# Patient Record
Sex: MALE | Race: BLACK OR AFRICAN AMERICAN | NOT HISPANIC OR LATINO | Employment: FULL TIME | URBAN - METROPOLITAN AREA
[De-identification: names, ages, dates, MRNs, and addresses within clinical notes are randomized per-mention and may not be internally consistent; named-entity substitution may affect disease eponyms.]

---

## 2019-07-04 ENCOUNTER — TRANSFERRED RECORDS (OUTPATIENT)
Dept: HEALTH INFORMATION MANAGEMENT | Facility: CLINIC | Age: 29
End: 2019-07-04

## 2019-08-12 ENCOUNTER — TRANSFERRED RECORDS (OUTPATIENT)
Dept: HEALTH INFORMATION MANAGEMENT | Facility: CLINIC | Age: 29
End: 2019-08-12

## 2019-11-26 ENCOUNTER — TRANSFERRED RECORDS (OUTPATIENT)
Dept: HEALTH INFORMATION MANAGEMENT | Facility: CLINIC | Age: 29
End: 2019-11-26

## 2019-12-17 ENCOUNTER — OFFICE VISIT (OUTPATIENT)
Dept: FAMILY MEDICINE | Facility: CLINIC | Age: 29
End: 2019-12-17
Payer: MEDICAID

## 2019-12-17 VITALS
HEART RATE: 74 BPM | HEIGHT: 70 IN | DIASTOLIC BLOOD PRESSURE: 81 MMHG | BODY MASS INDEX: 22.82 KG/M2 | WEIGHT: 159.4 LBS | RESPIRATION RATE: 16 BRPM | SYSTOLIC BLOOD PRESSURE: 118 MMHG | TEMPERATURE: 98.2 F

## 2019-12-17 DIAGNOSIS — G89.29 CHRONIC BILATERAL LOW BACK PAIN WITHOUT SCIATICA: ICD-10-CM

## 2019-12-17 DIAGNOSIS — M54.50 CHRONIC BILATERAL LOW BACK PAIN WITHOUT SCIATICA: ICD-10-CM

## 2019-12-17 DIAGNOSIS — N20.0 NEPHROLITHIASIS: Primary | ICD-10-CM

## 2019-12-17 SDOH — HEALTH STABILITY: MENTAL HEALTH: HOW OFTEN DO YOU HAVE A DRINK CONTAINING ALCOHOL?: NEVER

## 2019-12-17 ASSESSMENT — MIFFLIN-ST. JEOR: SCORE: 1684.28

## 2019-12-17 NOTE — PROGRESS NOTES
Preceptor Attestation:   Patient seen, evaluated and discussed with the resident. I have verified the content of the note, which accurately reflects my assessment of the patient and the plan of care.   Supervising Physician:  Ish Bhatia MD.

## 2019-12-17 NOTE — PROGRESS NOTES
"       HPI       Emelyn Marmolejo is a 29 year old  who presents for   Chief Complaint   Patient presents with     Pain     Muscle pain on back and chest, feels tightness and soreness. x 3 days, pain rate 5/10     Urinary Problem     pt states it was very yellow and smelled, also had pain as he urinates. pt thinks its is a kidney issue     Patient is a 29-year-old male who is here for further evaluation of back pain.  He has a long history of low back pain but she has been suspected to be secondary to a large left renal pelvis stone.  Previously he has seen urology in [place] in August with the recommendation for nephrectomy.  He has been seen in the emergency department for back pain, chest pain, and in [facility] for the same over the last month.  He has roughly 4 visits for this.  He was referred to nephrology and urology as well as physical therapy.  Per the EMR he currently has appointment scheduled with urology and physical therapy for January.     Today he reports ongoing low back pain which is not changed or different than previous.  He denies any hematuria but states his urine has been darker yellow.  He has had several urinalyses recently which were noninfectious but showed RBCs present.  He denies any fevers or chills.  He denies other medical history.    A [place] video  was used for  this visit.    +++++++    Problem, Medication and Allergy Lists were reviewed and updated if needed..    Patient is a new patient to this clinic and so  I reviewed/updated the Past Medical History, the Family History and the Social History .         Review of Systems:   Review of Systems  A greater than four-point review of systems was completed and negative other than noted in HPI.       Physical Exam:     Vitals:    12/17/19 1023   BP: 118/81   Pulse: 74   Resp: 16   Temp: 98.2  F (36.8  C)   TempSrc: Oral   Weight: 72.3 kg (159 lb 6.4 oz)   Height: 1.778 m (5' 10\")     Body mass index is 22.87 " kg/m .  Vitals were reviewed and were normal     Physical Exam  Constitutional:       General: He is not in acute distress.     Appearance: Normal appearance. He is not ill-appearing, toxic-appearing or diaphoretic.   HENT:      Head: Normocephalic and atraumatic.      Mouth/Throat:      Mouth: Mucous membranes are moist.   Cardiovascular:      Rate and Rhythm: Normal rate and regular rhythm.      Heart sounds: No murmur. No friction rub. No gallop.    Pulmonary:      Effort: Pulmonary effort is normal. No respiratory distress.      Breath sounds: Normal breath sounds. No wheezing, rhonchi or rales.   Abdominal:      General: There is no distension.      Palpations: Abdomen is soft.      Tenderness: There is no abdominal tenderness. There is no right CVA tenderness, left CVA tenderness, guarding or rebound.   Skin:     General: Skin is warm and dry.   Neurological:      General: No focal deficit present.      Mental Status: He is alert.   Psychiatric:         Mood and Affect: Mood normal.         Behavior: Behavior normal.           Results:   No testing ordered today    Assessment and Plan        1. Nephrolithiasis  2. Chronic bilateral low back pain without sciatica  The patient stated he was here to get a second opinion regarding his kidney.  We discussed that he already has one opinion from [place] and is scheduled to get a second opinion through urology at [facility].  No acute concerns today with normal vital signs and normal exam.  No concerning symptoms for infection associated with the stone and he has had several recent negative for infection urine tests.  I recommended to him to follow-up with urology at [facility] as scheduled and provided him with that appointment date as well as contact information for the clinic that referred him.  Recommended also that he follow-up with me here to consider referral to urology at the [facility] if he needs further evaluation or recommendations after that  appointment.         There are no discontinued medications.    Options for treatment and follow-up care were reviewed with the patient. Emelyn Marmolejo  engaged in the decision making process and verbalized understanding of the options discussed and agreed with the final plan.    Ramos Hanson MD

## 2019-12-17 NOTE — PATIENT INSTRUCTIONS
Appointments through [facility]:     1/8/2020 Physical therapy   1/24/2020 Scheduled to see urology      Yahir Winters MD    Family Medicine    [address]  [address] [place] [id number]         Phone: [phone]     If you would like to see somebody at the [facility] after the above urology appointment give me a call.

## 2019-12-30 ENCOUNTER — OFFICE VISIT (OUTPATIENT)
Dept: FAMILY MEDICINE | Facility: CLINIC | Age: 29
End: 2019-12-30
Payer: MEDICAID

## 2019-12-30 VITALS
HEIGHT: 69 IN | TEMPERATURE: 100.6 F | OXYGEN SATURATION: 97 % | BODY MASS INDEX: 22.9 KG/M2 | WEIGHT: 154.6 LBS | HEART RATE: 104 BPM | SYSTOLIC BLOOD PRESSURE: 122 MMHG | RESPIRATION RATE: 16 BRPM | DIASTOLIC BLOOD PRESSURE: 84 MMHG

## 2019-12-30 DIAGNOSIS — R10.9 RIGHT FLANK PAIN: ICD-10-CM

## 2019-12-30 DIAGNOSIS — J11.1 INFLUENZA: Primary | ICD-10-CM

## 2019-12-30 LAB
% GRANULOCYTES: 77.7 %G (ref 40–75)
BACTERIA: NORMAL
BILIRUBIN UR: NEGATIVE MG/DL
BLOOD UR: ABNORMAL MG/DL
BUN SERPL-MCNC: 7.7 MG/DL (ref 7–21)
CALCIUM SERPL-MCNC: 10.1 MG/DL (ref 8.5–10.1)
CASTS: NORMAL /LPF
CHLORIDE SERPLBLD-SCNC: 99.5 MMOL/L (ref 98–110)
CO2 SERPL-SCNC: 24.7 MMOL/L (ref 20–32)
CREAT SERPL-MCNC: 1 MG/DL (ref 0.7–1.3)
CRP SERPL-MCNC: 35 MG/L (ref 0–8)
CRYSTAL URINE: NORMAL /LPF
EPITHELIAL CELLS UR: <2 /LPF (ref 0–2)
GFR SERPL CREATININE-BSD FRML MDRD: >90 ML/MIN/1.7 M2
GLUCOSE SERPL-MCNC: 90.3 MG'DL (ref 70–99)
GLUCOSE URINE: NEGATIVE
GRANULOCYTES #: 6.3 K/UL (ref 1.6–8.3)
HCT VFR BLD AUTO: 52.6 % (ref 40–53)
HEMOGLOBIN: 15.6 G/DL (ref 13.3–17.7)
KETONES UR QL: NEGATIVE MG/DL
LEUKOCYTE ESTERASE UR: NEGATIVE
LYMPHOCYTES # BLD AUTO: 1.1 K/UL (ref 0.8–5.3)
LYMPHOCYTES NFR BLD AUTO: 13.9 %L (ref 20–48)
MCH RBC QN AUTO: 27.4 PG (ref 26.5–35)
MCHC RBC AUTO-ENTMCNC: 29.7 G/DL (ref 32–36)
MCV RBC AUTO: 92.3 FL (ref 78–100)
MID #: 0.7 K/UL (ref 0–2.2)
MID %: 8.4 %M (ref 0–20)
MUCOUS URINE: NORMAL LPF
NITRITE UR QL STRIP: NEGATIVE MG/DL
PH UR STRIP: 5.5 [PH] (ref 4.5–8)
PLATELET # BLD AUTO: 219 K/UL (ref 150–450)
POTASSIUM SERPL-SCNC: 3.8 MMOL/DL (ref 3.3–4.5)
PROTEIN UR: NEGATIVE MG/DL
RBC # BLD AUTO: 5.7 M/UL (ref 4.4–5.9)
RBC URINE: NORMAL /HPF
SODIUM SERPL-SCNC: 132.9 MMOL/L (ref 132.6–141.4)
SP GR UR STRIP: 1.01 (ref 1–1.03)
UROBILINOGEN UR STRIP-ACNC: ABNORMAL E.U./DL
WBC # BLD AUTO: 8.1 K/UL (ref 4–11)
WBC URINE: NORMAL /HPF

## 2019-12-30 RX ORDER — ACETAMINOPHEN 325 MG/1
975 TABLET ORAL ONCE
Status: COMPLETED | OUTPATIENT
Start: 2019-12-30 | End: 2019-12-30

## 2019-12-30 RX ORDER — ACETAMINOPHEN 500 MG
1000 TABLET ORAL EVERY 8 HOURS PRN
Qty: 90 TABLET | Refills: 1 | Status: SHIPPED | OUTPATIENT
Start: 2019-12-30 | End: 2020-01-30

## 2019-12-30 RX ADMIN — ACETAMINOPHEN 975 MG: 325 TABLET ORAL at 14:37

## 2019-12-30 ASSESSMENT — MIFFLIN-ST. JEOR: SCORE: 1646.64

## 2019-12-30 NOTE — LETTER
January 8, 2020      Emelyn Marmolejo  [address]  [place] [address]        Dear Emelyn,    Thank you for getting your care at [facility]. Please see below for your test results.    Resulted Orders   CBC with Diff Plt ([facility])   Result Value Ref Range    WBC 8.1 4.0 - 11.0 K/uL    Lymphocytes # 1.1 0.8 - 5.3 K/uL    % Lymphocytes 13.9 (L) 20.0 - 48.0 %L    Mid # 0.7 0.0 - 2.2 K/uL    Mid % 8.4 0.0 - 20.0 %M    GRANULOCYTES # 6.3 1.6 - 8.3 K/uL    % Granulocytes 77.7 (H) 40.0 - 75.0 %G    RBC 5.70 4.40 - 5.90 M/uL    Hemoglobin 15.6 13.3 - 17.7 g/dL    Hematocrit 52.6 40.0 - 53.0 %    MCV 92.3 78.0 - 100.0 fL    MCH 27.4 26.5 - 35.0 pg    MCHC 29.7 (L) 32.0 - 36.0 g/dL    Platelets 219.0 150.0 - 450.0 K/uL   CRP inflammation   Result Value Ref Range    CRP Inflammation 35.0 (H) 0.0 - 8.0 mg/L   Urinalysis, Micro If (UA) ([facility])   Result Value Ref Range    Specific Gravity Urine 1.010 1.005 - 1.030    pH Urine 5.5 4.5 - 8.0    Leukocyte Esterase UR Negative NEGATIVE    Nitrite Urine Negative NEGATIVE mg/dL    Protein UR Negative NEGATIVE mg/dL    Glucose Urine Negative NEGATIVE    Ketones Urine Negative NEGATIVE mg/dL    Urobilinogen mg/dL 0.2 E.U./dL 0.2 E.U./dL E.U./dL    Bilirubin UR Negative NEGATIVE mg/dL    Blood UR 2+ (A) NEGATIVE mg/dL   Urine Culture Aerobic Bacterial   Result Value Ref Range    Specimen Description Midstream Urine     Special Requests Specimen received in preservative     Culture Micro No growth    Basic Metabolic Panel ([facility])   Result Value Ref Range    Calcium 10.1 8.5 - 10.1 mg/dL    Chloride 99.5 98.0 - 110.0 mmol/L    Carbon Dioxide 24.7 20.0 - 32.0 mmol/L    Creatinine 1.0 0.7 - 1.3 mg/dL    Glucose 90.3 70.0 - 99.0 mg'dL    Potassium 3.8 3.3 - 4.5 mmol/dL    Sodium 132.9 132.6 - 141.4 mmol/L    GFR Estimate >90 >60.0 mL/min/1.7 m2    GFR Estimate If Black >90 >60.0 mL/min/1.7 m2    Urea Nitrogen 7.7 7.0 - 21.0 mg/dL   Urine Microscopic (UA)  ([facility])   Result Value Ref Range    WBC Urine None <5 /hpf    RBC Urine 25-50 <5 /hpf    Epithelial Cells UR <2 0 - 2 /lpf    Mucous Urine None NONE lpf    Casts Urine None NONE /lpf    Crystal Urine None NONE /lpf    Bacteria Wet Prep Few None       As we discussed at our last visit, please follow up with a clinic appointment to review these results further.    Sincerely,    Kevin Jarvis MD

## 2019-12-30 NOTE — PROGRESS NOTES
tylenol       HPI       Emelyn Marmolejo is a 29 year old  who presents for   Chief Complaint   Patient presents with     Pain     All over bodyache, x yesterday, pt states high fever, headache and back pain is still consistant, pain rate today is 10/10     Pmhx:   Has appt with urologist in 1/2020 for hx of kidney stone  Recent U/A showing RBC 12/3/19    Acute Illness   Concerns: fever, headache, body aches  Took 2 advil last night - felt better  Feels a burning/itchiness on the upper throat  Runny nose last night - right side is blocked now  When did it start? Yesterday evening  Is it getting better, worse or staying the same? worse: body pain and headaches    Fatigue/Achiness?: YES     Fever?:  YES     Chills/Sweats?:  YES - last night    Headache (location?) YES - bilateral temporal    Sinus Pressure?: YES - frontal    Eye redness/Discharge?: No     Ear Pain?: No     Runny nose?:  YES     Congestion?:  YES     Sore Throat?:  YES - mild soreness  Respiratory    Cough?: no     Wheeze?: No   GI/    Decreased Appetite?:  YES     Nausea?:No     Vomiting?: No     Diarrhea?:  No     Dysuria/Frequency?.:No       Any Illness Exposure?:  YES - ? Other family members have a cold    Any foreign travel or contact with anyone ill who travelled abroad? No     Therapies Tried and outcome: 2 advil tabs yesterday, Details: improved temporarily      A Maltese  was used for  this visit.    +++++++    Problem, Medication and Allergy Lists were reviewed and updated if needed..    Patient is an established patient of this clinic..         Review of Systems:   Review of Systems  Skin: negative except as above  Respiratory: negative except as above  Cardiovascular: negative except as above  Gastrointestinal: negative except as above  Genitourinary: negative except as above  Musculoskeletal: negative except as above  Neurologic: negative except as above  Psychiatric: negative except as above  Hematologic/Lymphatic/Immunologic:  "negative except as above  Endocrine: negative except as above         Physical Exam:     Vitals:    12/30/19 1316   BP: 122/84   Pulse: 104   Resp: 16   Temp: 100.6  F (38.1  C)   TempSrc: Oral   SpO2: 97%   Weight: 70.1 kg (154 lb 9.6 oz)   Height: 1.753 m (5' 9\")     Body mass index is 22.83 kg/m .  Vital signs normal except fever     Physical Exam  Constitutional: Oriented to person, place, and time. Appears well-developed and well-nourished.   HENT:   Head: Normocephalic and atraumatic.   Eyes: Conjunctivae are normal.   Neck: Normal range of motion.   Cardiovascular: Normal rate, regular rhythm, normal heart sounds and intact distal pulses.    Pulmonary/Chest: Effort normal and breath sounds normal. No respiratory distress. No wheezes.   Abdominal: Soft. Exhibits no distension. There is no flank or abdominal tenderness.   Musculoskeletal: Normal range of motion.   Neurological: Alert and oriented to person, place, and time.   Skin: Skin is warm and dry.   Psychiatric: Has a normal mood and affect. Behavior is normal.       Results:      Results from this visit  Results for orders placed or performed in visit on 12/30/19   CBC with Diff Plt ([facility])     Status: Abnormal   Result Value Ref Range    WBC 8.1 4.0 - 11.0 K/uL    Lymphocytes # 1.1 0.8 - 5.3 K/uL    % Lymphocytes 13.9 (L) 20.0 - 48.0 %L    Mid # 0.7 0.0 - 2.2 K/uL    Mid % 8.4 0.0 - 20.0 %M    GRANULOCYTES # 6.3 1.6 - 8.3 K/uL    % Granulocytes 77.7 (H) 40.0 - 75.0 %G    RBC 5.70 4.40 - 5.90 M/uL    Hemoglobin 15.6 13.3 - 17.7 g/dL    Hematocrit 52.6 40.0 - 53.0 %    MCV 92.3 78.0 - 100.0 fL    MCH 27.4 26.5 - 35.0 pg    MCHC 29.7 (L) 32.0 - 36.0 g/dL    Platelets 219.0 150.0 - 450.0 K/uL   CRP inflammation     Status: Abnormal   Result Value Ref Range    CRP Inflammation 35.0 (H) 0.0 - 8.0 mg/L   Urinalysis, Micro If (UA) ([facility])     Status: Abnormal   Result Value Ref Range    Specific Gravity Urine 1.010 1.005 - 1.030    pH Urine 5.5 4.5 - 8.0 "    Leukocyte Esterase UR Negative NEGATIVE    Nitrite Urine Negative NEGATIVE mg/dL    Protein UR Negative NEGATIVE mg/dL    Glucose Urine Negative NEGATIVE    Ketones Urine Negative NEGATIVE mg/dL    Urobilinogen mg/dL 0.2 E.U./dL 0.2 E.U./dL E.U./dL    Bilirubin UR Negative NEGATIVE mg/dL    Blood UR 2+ (A) NEGATIVE mg/dL   Basic Metabolic Panel ([facility])     Status: None   Result Value Ref Range    Calcium 10.1 8.5 - 10.1 mg/dL    Chloride 99.5 98.0 - 110.0 mmol/L    Carbon Dioxide 24.7 20.0 - 32.0 mmol/L    Creatinine 1.0 0.7 - 1.3 mg/dL    Glucose 90.3 70.0 - 99.0 mg'dL    Potassium 3.8 3.3 - 4.5 mmol/dL    Sodium 132.9 132.6 - 141.4 mmol/L    GFR Estimate >90 >60.0 mL/min/1.7 m2    GFR Estimate If Black >90 >60.0 mL/min/1.7 m2    Urea Nitrogen 7.7 7.0 - 21.0 mg/dL   Urine Microscopic (UA) ([facility])     Status: None   Result Value Ref Range    WBC Urine None <5 /hpf    RBC Urine 25-50 <5 /hpf    Epithelial Cells UR <2 0 - 2 /lpf    Mucous Urine None NONE lpf    Casts Urine None NONE /lpf    Crystal Urine None NONE /lpf    Bacteria Wet Prep Few None   Urine Culture Aerobic Bacterial     Status: None   Result Value Ref Range    Specimen Description Midstream Urine     Special Requests Specimen received in preservative     Culture Micro No growth        Assessment and Plan        1. Influenza  Supportive management as this is likely 2/2 influenza. He does not have any hx of immunocompromise status therefore starting oseltamivir would not be indicated.   - acetaminophen (TYLENOL) 500 MG tablet; Take 2 tablets (1,000 mg) by mouth every 8 hours as needed for mild pain  Dispense: 90 tablet; Refill: 1  -  : Sign Language or Oral - 113+ minutes    2. Right flank pain  Patient has a hx of nephrolithiasis and has Urology f/u soon. Will recheck labs today to monitor previously seen RBC on urinalysis. Recommended increasing hydration and close f/u with urology. Return to clinic if symptoms worsen.   - CBC with  Diff Plt ([facility])  - CRP inflammation  - Urinalysis, Micro If (UA) ([facility])  - Urine Culture Aerobic Bacterial  - acetaminophen (TYLENOL) tablet 975 mg  - Basic Metabolic Panel ([facility])  - Urine Microscopic (UA) ([facility])  -  : Sign Language or Oral - 113+ minutes       There are no discontinued medications.    Options for treatment and follow-up care were reviewed with the patient. Emelyn Marmolejo  engaged in the decision making process and verbalized understanding of the options discussed and agreed with the final plan.    Kevin Jarvis MD

## 2019-12-30 NOTE — NURSING NOTE
See MAR for details for Clinic administered medication.    Administrations This Visit       acetaminophen (TYLENOL) tablet 975 mg Admin Date  12/30/2019 Action  Given

## 2019-12-30 NOTE — NURSING NOTE
Due to patient being non-English speaking/uses sign language, an  was used for this visit. Only for face-to-face interpretation by an external agency, date and length of interpretation can be found on the scanned worksheet.     name: Grace Gil  Language: Lithuanian  Agency: [employer]  Phone number:   Type of interpretation: Face-to-face, spoken        Serenity Whitehead MA on 12/30/2019 at 1:14 PM

## 2019-12-31 LAB
BACTERIA SPEC CULT: NO GROWTH
Lab: NORMAL
SPECIMEN SOURCE: NORMAL

## 2020-01-17 NOTE — PROGRESS NOTES
Preceptor Attestation:   Patient seen, evaluated and discussed with the resident. I have verified the content of the note, which accurately reflects my assessment of the patient and the plan of care.   Supervising Physician:  Bina Daniels MD.

## 2020-01-25 ENCOUNTER — TRANSFERRED RECORDS (OUTPATIENT)
Dept: HEALTH INFORMATION MANAGEMENT | Facility: CLINIC | Age: 30
End: 2020-01-25

## 2020-01-27 ENCOUNTER — APPOINTMENT (OUTPATIENT)
Dept: INTERPRETER SERVICES | Facility: CLINIC | Age: 30
End: 2020-01-27
Payer: COMMERCIAL

## 2020-01-27 ENCOUNTER — OFFICE VISIT (OUTPATIENT)
Dept: FAMILY MEDICINE | Facility: CLINIC | Age: 30
End: 2020-01-27
Payer: COMMERCIAL

## 2020-01-27 VITALS
BODY MASS INDEX: 22.81 KG/M2 | DIASTOLIC BLOOD PRESSURE: 83 MMHG | OXYGEN SATURATION: 98 % | RESPIRATION RATE: 16 BRPM | WEIGHT: 154 LBS | SYSTOLIC BLOOD PRESSURE: 132 MMHG | HEART RATE: 83 BPM | TEMPERATURE: 98.1 F | HEIGHT: 69 IN

## 2020-01-27 DIAGNOSIS — R53.83 OTHER FATIGUE: ICD-10-CM

## 2020-01-27 DIAGNOSIS — R53.83 FATIGUE: Primary | ICD-10-CM

## 2020-01-27 DIAGNOSIS — R07.0 THROAT PAIN: Primary | ICD-10-CM

## 2020-01-27 DIAGNOSIS — R06.00 DYSPNEA: ICD-10-CM

## 2020-01-27 DIAGNOSIS — R06.02 SHORTNESS OF BREATH: ICD-10-CM

## 2020-01-27 LAB
DEPRECATED S PYO AG THROAT QL EIA: NORMAL
FLUAV+FLUBV AG SPEC QL: NEGATIVE
FLUAV+FLUBV AG SPEC QL: NEGATIVE
SPECIMEN SOURCE: NORMAL
SPECIMEN SOURCE: NORMAL

## 2020-01-27 PROCEDURE — 87081 CULTURE SCREEN ONLY: CPT | Performed by: NURSE PRACTITIONER

## 2020-01-27 RX ORDER — ALBUTEROL SULFATE 0.83 MG/ML
2.5 SOLUTION RESPIRATORY (INHALATION) ONCE
Status: COMPLETED | OUTPATIENT
Start: 2020-01-27 | End: 2020-01-27

## 2020-01-27 RX ADMIN — ALBUTEROL SULFATE 2.5 MG: 0.83 SOLUTION RESPIRATORY (INHALATION) at 14:37

## 2020-01-27 ASSESSMENT — ENCOUNTER SYMPTOMS
FEVER: 0
CHILLS: 0
FATIGUE: 1
SHORTNESS OF BREATH: 1

## 2020-01-27 ASSESSMENT — MIFFLIN-ST. JEOR: SCORE: 1638.92

## 2020-01-27 ASSESSMENT — PAIN SCALES - GENERAL: PAINLEVEL: NO PAIN (0)

## 2020-01-27 NOTE — NURSING NOTE
Chief Complaint   Patient presents with     Chest Pain     Pt complains of a cough, chest pain, and shortness of breath.         KARL Chacon on 1/27/2020 at 2:02 PM

## 2020-01-27 NOTE — PATIENT INSTRUCTIONS

## 2020-01-28 NOTE — TELEPHONE ENCOUNTER
RECORDS RECEIVED FROM: internal/CE/in process    DATE RECEIVED: 4/13/20    NOTES STATUS DETAILS   OFFICE NOTE from referring provider Internal 1.27.20 Bina Casey,    OFFICE NOTE from other specialist N/A    DISCHARGE SUMMARY from hospital N/A    DISCHARGE REPORT from the ER Care Everywhere 1.25.20 carmen black  10/6/19 [facility]     MEDICATION LIST Internal    IMAGING  (NEED IMAGES AND REPORTS)     CT SCAN Care Everywhere [facility] - 9/24/19   [facility]- 8/9/19   CHEST XRAY (CXR) In process/CE 4/13/20- scheduled   CE- 1.25.20 carmen  CE- 11.26.19- [facility] 10.6.19 [facility]- 5/3/19   TESTS     PULMONARY FUNCTION TESTING (PFT) In process 4/13/20- scheduled        Action 1.28.20 sv   Action Taken request sent to   carmen, 1.25.20  jonathan, 11/26/19  [facility] 10/6/19  [facility] , 9/24/19  [facility] health 5.3.19     Action 1.29.20 sv   Action Taken Received fax from [facility], reports sent to [facility] and cd was sent through [facility]        Action 1.30.20 sv    Action Taken Received fax from [facility] saying they dont have reports or images, unable to locate image on where it was done         Action 2.3.20 sv   Action Taken Received CD from [facility] with CT KUB, sent to [facility] for scaning, another request resent for CT chest and CXR    Received CXR from [facility] (1.25.20)   Not able to locate any information on where images where done from-  [facility]  CT chest- 8/9/19,  CXR-5/3/19 ( no information on images)        Action 2.5.20 sv   Action Taken Received reports and image from [facility]     Action 3.25.20 sv    Action Taken Imaging request sent out again to   [facility] CXR- 9.24.19, CT chest- 9.24.19         Action 3.26.20 sv   Action Taken Received fax back from [facility]. [facility] and [facility] stated that they dont have those images on file, unable to locate images.    Most updated images from [facility] and [facility] are in PACS

## 2020-01-29 ENCOUNTER — TELEPHONE (OUTPATIENT)
Dept: FAMILY MEDICINE | Facility: CLINIC | Age: 30
End: 2020-01-29

## 2020-01-29 ENCOUNTER — APPOINTMENT (OUTPATIENT)
Dept: INTERPRETER SERVICES | Facility: CLINIC | Age: 30
End: 2020-01-29
Payer: COMMERCIAL

## 2020-01-29 LAB
BACTERIA SPEC CULT: NORMAL
Lab: NORMAL
SPECIMEN SOURCE: NORMAL

## 2020-01-29 NOTE — TELEPHONE ENCOUNTER
INT and I tried calling pt. Phone number on file is incorrect or out of service.     Melissa Henning, TYREE 4:09 PM  1/29/2020

## 2020-01-30 ENCOUNTER — OFFICE VISIT (OUTPATIENT)
Dept: FAMILY MEDICINE | Facility: CLINIC | Age: 30
End: 2020-01-30
Payer: COMMERCIAL

## 2020-01-30 VITALS
BODY MASS INDEX: 22.48 KG/M2 | WEIGHT: 152.2 LBS | DIASTOLIC BLOOD PRESSURE: 80 MMHG | RESPIRATION RATE: 18 BRPM | HEART RATE: 72 BPM | SYSTOLIC BLOOD PRESSURE: 116 MMHG | TEMPERATURE: 98.8 F | OXYGEN SATURATION: 96 %

## 2020-01-30 DIAGNOSIS — R06.2 WHEEZING: ICD-10-CM

## 2020-01-30 DIAGNOSIS — R07.89 CHEST WALL PAIN: Primary | ICD-10-CM

## 2020-01-30 DIAGNOSIS — Z22.7 LATENT TUBERCULOSIS: ICD-10-CM

## 2020-01-30 RX ORDER — ALBUTEROL SULFATE 90 UG/1
2 AEROSOL, METERED RESPIRATORY (INHALATION) EVERY 6 HOURS
Qty: 18 G | Refills: 0 | Status: SHIPPED | OUTPATIENT
Start: 2020-01-30 | End: 2020-02-27

## 2020-01-30 RX ORDER — IBUPROFEN 800 MG/1
800 TABLET, FILM COATED ORAL EVERY 8 HOURS PRN
Qty: 15 TABLET | Refills: 0 | Status: SHIPPED | OUTPATIENT
Start: 2020-01-30 | End: 2020-02-27

## 2020-01-30 NOTE — PROGRESS NOTES
HPI       Emelyn Marmolejo is a 30 year old  who presents for   Chief Complaint   Patient presents with     Chest Pain     different parts of the chest and sob. and sob when sleeping     Patient is an otherwise healthy 30 year old male here for evaluation of ongoing chest pain, cough, shortness of breath. Patient has been experiencing chest pain, cough, shortness of breath for the past 4 days. Previous work up has been in the ED with negative EKG, CXR, troponin and UC with negative rapid strep and influenza testing. Today he reports ongoing migratory chest pain throughout both sides of his chest and shortness of breath when lying down at night. Pain has no particular worsening or alleviating factors. Denies any reflux symptoms. No fevers, chills. Overall has not been worsening.     Patient has a positive quant gold from April of this year. Has not been treated. Negative CXR.     +++++++    Problem, Medication and Allergy Lists were reviewed and updated if needed..    Patient is an established patient of this clinic..         Review of Systems:   Review of Systems  A greater than 4 point review of symptoms was completed and negative other than noted in the HPI.        Physical Exam:     Vitals:    01/30/20 1004   BP: 116/80   Pulse: 72   Resp: 18   Temp: 98.8  F (37.1  C)   TempSrc: Oral   SpO2: 96%   Weight: 69 kg (152 lb 3.2 oz)     Body mass index is 22.48 kg/m .  Vitals were reviewed and were normal     Physical Exam  Constitutional:       General: He is not in acute distress.     Appearance: Normal appearance. He is not ill-appearing, toxic-appearing or diaphoretic.   HENT:      Head: Normocephalic and atraumatic.   Cardiovascular:      Rate and Rhythm: Normal rate and regular rhythm.      Heart sounds: No murmur. No friction rub. No gallop.    Pulmonary:      Effort: Pulmonary effort is normal. No respiratory distress.      Breath sounds: Normal breath sounds. No stridor. No wheezing, rhonchi or rales.    Chest:      Chest wall: Tenderness (diffuse) present.   Skin:     General: Skin is warm and dry.   Neurological:      General: No focal deficit present.      Mental Status: He is alert.   Psychiatric:         Mood and Affect: Mood normal.         Behavior: Behavior normal.           Results:   No testing ordered today    Assessment and Plan        1. Chest wall pain  Patient suffering from what is most likely chest wall pain. He is a low risk 30 year old male without any cardiac history or significant risk factors. Cardiac workup in ED negative. Strep and flu negative in UC. No concerning exam or history findings today. Discussed symptomatic care with albuterol trial for shortness of breath and ibuprofen for pain. Also discussed short term use of ibuprofen only given history of kidney disease.   - albuterol (PROAIR HFA/PROVENTIL HFA/VENTOLIN HFA) 108 (90 Base) MCG/ACT inhaler; Inhale 2 puffs into the lungs every 6 hours  Dispense: 18 g; Refill: 0  - ibuprofen (ADVIL/MOTRIN) 800 MG tablet; Take 1tablet (800 mg) by mouth every 8 hours as needed for moderate pain  Dispense: 15 tablet; Refill: 0    2. Wheezing  - albuterol (PROAIR HFA/PROVENTIL HFA/VENTOLIN HFA) 108 (90 Base) MCG/ACT inhaler; Inhale 2 puffs into the lungs every 6 hours  Dispense: 18 g; Refill: 0    3. Latent tuberculosis  - MED THERAPY MANAGEMENT REFERRAL       There are no discontinued medications.    Options for treatment and follow-up care were reviewed with the patient. Emelyn Marmolejo  engaged in the decision making process and verbalized understanding of the options discussed and agreed with the final plan.    Ramos Hanson MD

## 2020-01-30 NOTE — PROGRESS NOTES
Preceptor Attestation:   Patient seen and discussed with the resident. Assessment and plan reviewed with resident and agreed upon.   Supervising Physician:  DO [facility]

## 2020-01-30 NOTE — PATIENT INSTRUCTIONS
Here is the plan from today's visit    1. Chest wall pain  - albuterol (PROAIR HFA/PROVENTIL HFA/VENTOLIN HFA) 108 (90 Base) MCG/ACT inhaler; Inhale 2 puffs into the lungs every 6 hours  Dispense: 18 g; Refill: 0    2. Wheezing  - albuterol (PROAIR HFA/PROVENTIL HFA/VENTOLIN HFA) 108 (90 Base) MCG/ACT inhaler; Inhale 2 puffs into the lungs every 6 hours  Dispense: 18 g; Refill: 0    3. Latent tuberculosis  - MED THERAPY MANAGEMENT REFERRAL      Please call or return to clinic if your symptoms don't go away.    Follow up plan  Please make a clinic appointment for follow up with me (MACARENA GORMAN) in 1  week for recheck.    Thank you for coming to [facility] today.  Lab Testing:  **If you had lab testing today and your results are reassuring or normal they will be mailed to you or sent through [employer] within 7 days.   **If the lab tests need quick action we will call you with the results.  The phone number we will call with results is # [phone] (home) . If this is not the best number please call our clinic and change the number.  Medication Refills:  If you need any refills please call your pharmacy and they will contact us.   If you need to  your refill at a new pharmacy, please contact the new pharmacy directly. The new pharmacy will help you get your medications transferred faster.   Scheduling:  If you have any concerns about today's visit or wish to schedule another appointment please call our office during normal business hours [phone] (8-5:00 M-F)  If a referral was made to a [facility] and you don't get a call from central scheduling please call [phone].  If a Mammogram was ordered for you at [facility] call [phone] to schedule or change your appointment.  If you had an XRay/CT/Ultrasound/MRI ordered the number is [phone] to schedule or change your radiology appointment.   Medical Concerns:  If you have urgent medical concerns please call  [phone] at any time of the day.    Ramos Hanson MD

## 2020-02-03 ENCOUNTER — TELEPHONE (OUTPATIENT)
Dept: FAMILY MEDICINE | Facility: CLINIC | Age: 30
End: 2020-02-03

## 2020-02-03 NOTE — TELEPHONE ENCOUNTER
MTM referral from: [facility] visit (referral by provider)    MTM referral outreach attempt #2 on February 3, 2020 at 12:12 PM      Outcome: Patient not reachable after several attempts, will route to MTM Pharmacist/Provider as an FYI. Thank you for the referral.    See [facility] Coordinator

## 2020-02-06 ENCOUNTER — OFFICE VISIT (OUTPATIENT)
Dept: FAMILY MEDICINE | Facility: CLINIC | Age: 30
End: 2020-02-06
Payer: COMMERCIAL

## 2020-02-06 VITALS
RESPIRATION RATE: 16 BRPM | BODY MASS INDEX: 22.81 KG/M2 | HEIGHT: 69 IN | TEMPERATURE: 98.8 F | HEART RATE: 82 BPM | WEIGHT: 154 LBS | DIASTOLIC BLOOD PRESSURE: 76 MMHG | SYSTOLIC BLOOD PRESSURE: 121 MMHG | OXYGEN SATURATION: 97 %

## 2020-02-06 DIAGNOSIS — N20.0 RECURRENT KIDNEY STONES: Primary | ICD-10-CM

## 2020-02-06 DIAGNOSIS — R10.9 RIGHT FLANK PAIN: ICD-10-CM

## 2020-02-06 LAB
BILIRUBIN UR: NEGATIVE MG/DL
BLOOD UR: ABNORMAL MG/DL
BUN SERPL-MCNC: 11.1 MG/DL (ref 7–21)
CALCIUM SERPL-MCNC: 10 MG/DL (ref 8.5–10.1)
CHLORIDE SERPLBLD-SCNC: 101.1 MMOL/L (ref 98–110)
CO2 SERPL-SCNC: 27.4 MMOL/L (ref 20–32)
CREAT SERPL-MCNC: 1 MG/DL (ref 0.7–1.3)
GFR SERPL CREATININE-BSD FRML MDRD: >90 ML/MIN/1.7 M2
GLUCOSE SERPL-MCNC: 104.5 MG'DL (ref 70–99)
GLUCOSE URINE: NEGATIVE
KETONES UR QL: NEGATIVE MG/DL
LEUKOCYTE ESTERASE UR: NEGATIVE
NITRITE UR QL STRIP: NEGATIVE MG/DL
PH UR STRIP: 6 [PH] (ref 4.5–8)
POTASSIUM SERPL-SCNC: 3.4 MMOL/L (ref 3.3–4.5)
PROTEIN UR: NEGATIVE MG/DL
SODIUM SERPL-SCNC: 136.4 MMOL/L (ref 132.6–141.4)
SP GR UR STRIP: 1.01 (ref 1–1.03)
UROBILINOGEN UR STRIP-ACNC: ABNORMAL E.U./DL

## 2020-02-06 RX ORDER — TAMSULOSIN HYDROCHLORIDE 0.4 MG/1
0.4 CAPSULE ORAL DAILY
Qty: 30 CAPSULE | Refills: 1 | Status: SHIPPED | OUTPATIENT
Start: 2020-02-06 | End: 2020-03-26

## 2020-02-06 ASSESSMENT — MIFFLIN-ST. JEOR: SCORE: 1638.92

## 2020-02-06 NOTE — PROGRESS NOTES
LEONARDO Marmolejo is a 30 year old  who presents for flank pain. He is a well documented history of renal stones. Since July 2019 he has had stones in his left kidney. He was seen for this in [place] and [place] [facility] The assessment is that the left kidney is severely shrunken, no longer functional, and should be removed. For the past couple months he has had increasing pain in his right flank. Whenever he is driving he has pain in the right side, and is often awoken at night from the pain. He says that is feels like another stone. He is having difficulty peeing as it hurts to pee and also his pee has turned orange. Pain control has only been partially achieved through tylenol. The pain is so bad that in the past week he has become nauseous and vomited multiple times. He denies trauma, cancer hx, HTN, or travel to [place].     flank Pain     Onset: 2 months    Description:   Character: Sharp  Location: right flank left flank  Radiation: None    Intensity: moderate    Progression of Symptoms:  worsening    Accompanying Signs & Symptoms:  Fever/Chills?: No   Gas/Bloating: No   Dysuria: YES   Hematuria:  YES   Nausea:  YES   Vomiting:  YES   Diarrhea: YES softer occassionally  Constipation: NO       History:           Trauma: No   Previous similar pain:  YES    Previous tests done: x-ray    Precipitating factors:   Does the pain change with:     Food?: no     BM?: no     Urination:yes    What makes it better?:    Therapies Tried and outcome: tylenol, Details:little help    LMP:  not applicable    A Japanese  was used for  this visit.    +++++++  Problem, Medication and Allergy Lists were reviewed and updated if needed..    Patient is an established patient of this clinic..         Review of Systems:   Review of Systems    6 point system negative unless stated above       Physical Exam:   There were no vitals filed for this visit.  There is no height or weight on file to calculate  BMI.  Vitals were reviewed and were normal     PHYSICAL EXAMINATION    Gen: alert, non-distressed, appropriately dressed  HEENT: atraumatic, EOMI, sclera clear, mucosa moist  CV: RRR without m/g/r, warm to touch, acyanotic, no abdominal pulsatile mass  RESP: CTAB, normal WoB  GI: soft, non-distended, tender in LLQ RLQ  : suprapubic tender  MSK: bilateral CVA tenderness, normal RoM, no edema in legs, no spinal process tenderness   NEURO: CN intact, mentating appropriately, coordination normal  PSYCH: pleasant, normal speech pattern, thought process clear        Results:      Results from this visit  Results for orders placed or performed in visit on 02/06/20   Basic Metabolic Panel ([place] [facility])     Status: Abnormal   Result Value Ref Range    Calcium 10.0 8.5 - 10.1 mg/dL    Chloride 101.1 98.0 - 110.0 mmol/L    Carbon Dioxide 27.4 20.0 - 32.0 mmol/L    Creatinine 1.0 0.7 - 1.3 mg/dL    Glucose 104.5 (H) 70.0 - 99.0 mg'dL    Potassium 3.4 3.3 - 4.5 mmol/L    Sodium 136.4 132.6 - 141.4 mmol/L    GFR Estimate >90 >60.0 mL/min/1.7 m2    GFR Estimate If Black >90 >60.0 mL/min/1.7 m2    Urea Nitrogen 11.1 7.0 - 21.0 mg/dL   Urinalysis (UA) ([facility])     Status: Abnormal   Result Value Ref Range    Specific Gravity Urine 1.015 1.005 - 1.030    pH Urine 6.0 4.5 - 8.0    Leukocyte Esterase UR Negative NEGATIVE    Nitrite Urine Negative NEGATIVE mg/dL    Protein UR Negative NEGATIVE mg/dL    Glucose Urine Negative NEGATIVE    Ketones Urine Negative NEGATIVE mg/dL    Urobilinogen mg/dL 0.2 E.U./dL 0.2 E.U./dL E.U./dL    Bilirubin UR Negative NEGATIVE mg/dL    Blood UR 2+ (A) NEGATIVE mg/dL       Assessment and Plan        1. Recurrent kidney stones  2. Right flank pain  Hx of kidney stones. Recently evaluated at [facility] for confirmed kidney stones. Physical exam suggestive of renal/bladder pathology. Low concern for AAA, malignancy, acute abdomen.       Patient was already evaluated by urology and they recommended a left  nephrectomy.  Patient seems hesitant about this and would like a referral to the [facility] urology department which I think is quite reasonable.  Patient's presentation today may be recurrence of kidney stones but also could include musculoskeletal back pain.  This pain has been going on for the last few months and I low suspicion of any acute pathology.  We will get an updated CT scan which has not been done since his onset of right-sided pain.  Patient has not been taking Flomax , which was recommended by the urology team and discussed with patient the need to start that today which he agreed to.              -Would recommend checking PTH at next visit  - tamsulosin (FLOMAX) 0.4 MG capsule; Take 1 capsule (0.4 mg) by mouth daily  Dispense: 30 capsule; Refill: 1  - Basic Metabolic Panel ([place])  - Urinalysis (UA) ([facility])  - UROLOGY ADULT REFERRAL - INTERNAL  - CT Urogram wo & w Contrast; Future  - Urine Culture Aerobic Bacterial  - Neisseria gonorrhoeae PCR  - Chlamydia trachomatis PCR     There are no discontinued medications.    Options for treatment and follow-up care were reviewed with the patient. Emelyn Marmolejo  engaged in the decision making process and verbalized understanding of the options discussed and agreed with the final plan.    Da Schofield, MS3  [facility] [employer]    I was present with the medical student who participated in the service and in the documentation of this note. I have verified the history and personally performed the physical exam and medical decision making, and have verified the content of the note, which accurately reflects my assessment of the patient and the plan of care.   Mark Mojica, DO

## 2020-02-07 LAB
BACTERIA SPEC CULT: NO GROWTH
C TRACH DNA SPEC QL NAA+PROBE: NEGATIVE
Lab: NORMAL
N GONORRHOEA DNA SPEC QL NAA+PROBE: NEGATIVE
SPECIMEN SOURCE: NORMAL

## 2020-02-07 NOTE — TELEPHONE ENCOUNTER
MEDICAL RECORDS REQUEST   [facility]  Urology Clinic  [address] [place] [address]  PHONE: [phone]  Fax: [phone]        FUTURE VISIT INFORMATION                                                   ISHAN Dietz: 1990 scheduled for future visit at [facility]    APPOINTMENT INFORMATION:    Date: 20 6:30PM    Provider:  Deya Andrews RN     Reason for Visit/Diagnosis: Recurrent kidney stones     REFERRAL INFORMATION:    Referring provider:  N/A    Specialty: N/A    Referring providers clinic:  [facility]    Clinic contact number:  N/A    RECORDS REQUESTED FOR VISIT                                                     NOTES  STATUS/DETAILS   OFFICE NOTE from referring provider  yes   OFFICE NOTE from other specialist  yes   DISCHARGE SUMMARY from hospital  yes   DISCHARGE REPORT from the ER  yes   OPERATIVE REPORT  no   MEDICATION LIST  yes   KIDNEY STONE     CT STONE  yes     PRE-VISIT CHECKLIST      Record collection complete Yes- Internal recs in epic  [facility] recs in CE  Fax to [facility] and  to push and fed ex imaging - 7:34AM  Received CD from [facility] - will have images uploaded  6:35AM   Appointment appropriately scheduled           (right time/right provider) Yes   MyChart activation No- Declined    Questionnaire complete If no, please explain: In process      Completed by: Prachi Yarbrough

## 2020-02-12 ENCOUNTER — PRE VISIT (OUTPATIENT)
Dept: UROLOGY | Facility: CLINIC | Age: 30
End: 2020-02-12

## 2020-02-12 NOTE — TELEPHONE ENCOUNTER
Reason for Visit: Consult    Diagnosis: Recurrent Kidney Stones    Orders/Procedures/Records: Records available    Contact Patient: N/A    Rooming Requirements: Normal      Dori Tabor  02/12/20  1:14 PM

## 2020-02-18 ENCOUNTER — APPOINTMENT (OUTPATIENT)
Dept: INTERPRETER SERVICES | Facility: CLINIC | Age: 30
End: 2020-02-18
Payer: COMMERCIAL

## 2020-02-18 ENCOUNTER — TELEPHONE (OUTPATIENT)
Dept: UROLOGY | Facility: CLINIC | Age: 30
End: 2020-02-18

## 2020-02-18 NOTE — TELEPHONE ENCOUNTER
Spoke with pt and rescheduled appt on 2/19/20 to 2/28/20 with Ricci. Scheduled per instructions from Arianna.

## 2020-02-18 NOTE — TELEPHONE ENCOUNTER
----- Message from Arianna Lawton CMA sent at 2/18/2020  2:59 PM CST -----  Please call this patient and make them make a appointment with , Dr. Mckenna or .this patient  dose not need to see Deyamaria m Keller

## 2020-02-19 ENCOUNTER — PRE VISIT (OUTPATIENT)
Dept: UROLOGY | Facility: CLINIC | Age: 30
End: 2020-02-19

## 2020-02-19 NOTE — TELEPHONE ENCOUNTER
Chief Complaint : New-From self    Hx/Sx: Recurrent Stones    Records/Orders: Available     Pt Contacted: n/a    At Rooming: Normal

## 2020-02-27 ENCOUNTER — OFFICE VISIT (OUTPATIENT)
Dept: FAMILY MEDICINE | Facility: CLINIC | Age: 30
End: 2020-02-27
Payer: COMMERCIAL

## 2020-02-27 VITALS
OXYGEN SATURATION: 97 % | BODY MASS INDEX: 21.74 KG/M2 | DIASTOLIC BLOOD PRESSURE: 84 MMHG | SYSTOLIC BLOOD PRESSURE: 132 MMHG | RESPIRATION RATE: 16 BRPM | WEIGHT: 147.2 LBS | HEART RATE: 78 BPM | TEMPERATURE: 98.8 F

## 2020-02-27 DIAGNOSIS — Z00.00 HEALTHCARE MAINTENANCE: ICD-10-CM

## 2020-02-27 DIAGNOSIS — K21.9 GASTROESOPHAGEAL REFLUX DISEASE, ESOPHAGITIS PRESENCE NOT SPECIFIED: Primary | ICD-10-CM

## 2020-02-27 LAB
CREAT SERPL-MCNC: 0.9 MG/DL (ref 0.7–1.3)
HBA1C MFR BLD: 5.3 % (ref 4.1–5.7)

## 2020-02-27 NOTE — PROGRESS NOTES
HPI       Emelyn Marmolejo is a 30 year old  who presents for   Chief Complaint   Patient presents with     check for dm     check blood pressure     Patient here for diabetes and blood pressure check.  Reports he has had borderline BP in past, never been on meds  Patient with known kidney disease from recurrent kidney stones, has normal renal function  Seeing urology tomorrow  Patient also diagnosed with GERD in ED a few weeks back. Omeprazole has really helped. He is requesting refill of omeprazole    A [employer]  was used for  this visit.    +++++++      Problem, Medication and Allergy Lists were reviewed and updated if needed..    Patient is an established patient of this clinic..         Review of Systems:   Review of Systems  ROS: 7 point ROS neg other than the symptoms noted above in the HPI.       Physical Exam:     Vitals:    02/27/20 1333   BP: 132/84   Pulse: 78   Resp: 16   Temp: 98.8  F (37.1  C)   TempSrc: Oral   SpO2: 97%   Weight: 66.8 kg (147 lb 3.2 oz)     Body mass index is 21.74 kg/m .  Vitals were reviewed and were normal     Physical Exam  General- No acute distress, well-appearing  Skin- warm, no obvious skin lesions  Neuro- AOX3, CN 2-12 grossly intact  pulm- normal TV, no distress  Psych- appropriate mood and affect    Results:   Results are ordered and pending    Assessment and Plan        Emelyn was seen today for check for dm.    Diagnoses and all orders for this visit:      --Patient to see Urology tomorrow, considering nephrectomy per prior urologist notes  --BP is upper end of normal, I provided education on lifestyle interventions  --omeprazole refilled  --Hgb A1c for screening        Gastroesophageal reflux disease, esophagitis presence not specified  -     omeprazole (PRILOSEC) 20 MG DR capsule; Take 1 capsule (20 mg) by mouth daily    Healthcare maintenance  -     Hemoglobin A1c ([place])  -     Creatinine (s) ([facility])           There are no discontinued  medications.    Options for treatment and follow-up care were reviewed with the patient. Emelyn Marmolejo  engaged in the decision making process and verbalized understanding of the options discussed and agreed with the final plan.    Mark Mojica DO

## 2020-02-27 NOTE — LETTER
February 27, 2020      Emelyn Marmolejo  [address]  [place] [address]        Dear Emelyn,    Thank you for getting your care at [facility]. Please see below for your test results.    Resulted Orders   Hemoglobin A1c ([facility])   Result Value Ref Range    Hemoglobin A1C 5.3 4.1 - 5.7 %   Creatinine (s) ([facility])   Result Value Ref Range    Creatinine 0.9 0.7 - 1.3 mg/dL       If you have any concerns about these results please call and leave a message for me or send a [employer] message to the clinic.    Sincerely,    Mark Mojica, DO

## 2020-02-27 NOTE — PROGRESS NOTES
Preceptor Attestation:   Patient seen and discussed with the resident. Assessment and plan reviewed with resident and agreed upon.   Supervising Physician:  Moi Yoon MD  [facility]

## 2020-02-28 ENCOUNTER — ANCILLARY PROCEDURE (OUTPATIENT)
Dept: CT IMAGING | Facility: CLINIC | Age: 30
End: 2020-02-28
Attending: UROLOGY
Payer: COMMERCIAL

## 2020-02-28 ENCOUNTER — OFFICE VISIT (OUTPATIENT)
Dept: UROLOGY | Facility: CLINIC | Age: 30
End: 2020-02-28
Attending: FAMILY MEDICINE
Payer: COMMERCIAL

## 2020-02-28 VITALS
DIASTOLIC BLOOD PRESSURE: 88 MMHG | WEIGHT: 147 LBS | HEIGHT: 69 IN | SYSTOLIC BLOOD PRESSURE: 144 MMHG | BODY MASS INDEX: 21.77 KG/M2 | HEART RATE: 77 BPM

## 2020-02-28 DIAGNOSIS — N20.0 CALCULUS OF KIDNEY: Primary | ICD-10-CM

## 2020-02-28 DIAGNOSIS — N20.0 CALCULUS OF KIDNEY: ICD-10-CM

## 2020-02-28 DIAGNOSIS — N26.1 ATROPHY OF LEFT KIDNEY: ICD-10-CM

## 2020-02-28 ASSESSMENT — MIFFLIN-ST. JEOR: SCORE: 1607.17

## 2020-02-28 ASSESSMENT — ENCOUNTER SYMPTOMS
ORTHOPNEA: 0
NAIL CHANGES: 0
COUGH: 0
LIGHT-HEADEDNESS: 0
INCREASED ENERGY: 0
HEMATURIA: 1
CHILLS: 0
STIFFNESS: 0
SLEEP DISTURBANCES DUE TO BREATHING: 1
PALPITATIONS: 0
HYPOTENSION: 0
EXERCISE INTOLERANCE: 0
HEMOPTYSIS: 0
POLYPHAGIA: 0
SPUTUM PRODUCTION: 0
POOR WOUND HEALING: 0
SHORTNESS OF BREATH: 1
COUGH DISTURBING SLEEP: 0
HYPERTENSION: 0
ARTHRALGIAS: 0
NECK PAIN: 0
POLYDIPSIA: 1
NIGHT SWEATS: 0
WHEEZING: 0
DYSPNEA ON EXERTION: 0
ALTERED TEMPERATURE REGULATION: 0
DIFFICULTY URINATING: 1
HALLUCINATIONS: 0
BACK PAIN: 1
POSTURAL DYSPNEA: 0
JOINT SWELLING: 0
LEG PAIN: 0
MUSCLE CRAMPS: 0
MUSCLE WEAKNESS: 0
SNORES LOUDLY: 0
FLANK PAIN: 0

## 2020-02-28 ASSESSMENT — PAIN SCALES - GENERAL: PAINLEVEL: NO PAIN (0)

## 2020-02-28 NOTE — LETTER
2020       RE: Emelyn Marmolejo  [address]  [place] [address]     Dear Colleague,    Thank you for referring your patient, Emelyn Marmolejo, to the [facility] UROLOGY AND INST FOR PROSTATE AND UROLOGIC CANCERS at [facility]. Please see a copy of my visit note below.      SUBJECTIVE:                                                      Emelyn Marmolejo is a 30 year old male who comes in for problems related to kidney stones.    Patient is a 29 y/o M with large left ureteral calculus in non-functioning left kidney.Had renal scan in 2019 that showed 14% function on the left. Was seen at [facility] and was given recommendation of nephrectomy rather than stone clearance as PCNL for the 3cm would be challenging given lack of parenchyma and non-functioning kidney.    I had a very similar discussion. We discussed 3cm stone, poorly functioning kidney, nephrectomy, PCNL, contralateral kidney function.           ROS:  CONSTITUTIONAL:NEGATIVE for fever, chills, change in weight  INTEGUMENTARY/SKIN: NEGATIVE for worrisome rashes, moles or lesions  EYES: NEGATIVE for vision changes or irritation  ENT/MOUTH: NEGATIVE for ear, mouth and throat problems  RESP:NEGATIVE for significant cough or SOB  CV: NEGATIVE for chest pain, palpitations or peripheral edema  GI: NEGATIVE for nausea, abdominal pain, heartburn, or change in bowel habits  MUSCULOSKELETAL: NEGATIVE for significant arthralgias or myalgia  PSYCHIATRIC: NEGATIVE for changes in mood or affect    No past medical history on file.    No past surgical history on file.    No family history on file.    Social History     Tobacco Use     Smoking status: Former Smoker     Types: Cigarettes     Last attempt to quit: 2019     Years since quittin.4     Smokeless tobacco: Never Used   Substance Use Topics     Alcohol use: Never     Frequency: Never         OBJECTIVE:                                                    BP (!)  "144/88   Pulse 77   Ht 1.753 m (5' 9\")   Wt 66.7 kg (147 lb)   BMI 21.71 kg/m     Body mass index is 21.71 kg/m .    EXAM:        GENERAL APPEARANCE: healthy, alert and no distress  RESP: negative  CV: negative   Abdomen: Abdomen soft, non-tender  CVAT: absent  U/A: nothing abnormal  KUB: nothing abnormal   (male): deferred  SKIN: no suspicious lesions or rashes    IMPRESSION: 29 y/o M with 3cm renal stone in atrophic left kidney         Diagnostic test results:  Reviewed renal scan and CT scan with the patient      ASSESSMENT/PLAN:                                                    No diagnosis found.    Recommend left nephrectomy   Patient will consider option as he is hesitant to proceed to nephrectomy  He will contact us if he would like to proceed.     Dariana Wynne MD  [facility] UROLOGY AND [facility] FOR PROSTATE AND UROLOGIC CANCERS      I spent over 30 minutes with the patient.  Over half this time was spent on counseling for renal calculus.       Answers for HPI/ROS submitted by the patient on 2/28/2020   General Symptoms: Yes  Skin Symptoms: Yes  HENT Symptoms: No  EYE SYMPTOMS: No  HEART SYMPTOMS: Yes  LUNG SYMPTOMS: Yes  INTESTINAL SYMPTOMS: No  URINARY SYMPTOMS: Yes  REPRODUCTIVE SYMPTOMS: No  SKELETAL SYMPTOMS: Yes  BLOOD SYMPTOMS: No  NERVOUS SYSTEM SYMPTOMS: No  MENTAL HEALTH SYMPTOMS: No  Night sweats: No  Chills: No  Increased stress: No  Excessive hunger: No  Excessive thirst: Yes  Feeling hot or cold when others believe the temperature is normal: No  Loss of height: No  Post-operative complications: No  Surgical site pain: No  Hallucinations: No  Change in or Loss of Energy: No  Hyperactivity: No  Confusion: No  Changes in hair: No  Itching: No  Rashes: No  Changes in nails: No  Acne: No  Change in facial hair: No  Non-healing sores: No  Scarring: No  Flaking of skin: No  Color changes of hands/feet in cold : No  Sun sensitivity: No  Skin thickening: No  Cough: No  Sputum or phlegm: " No  Coughing up blood: No  Difficulty breating or shortness of breath: Yes  Snoring: No  Wheezing: No  Difficulty breathing on exertion: No  Nighttime Cough: No  Difficulty breathing when lying flat: No  Chest pain or pressure: Yes  Fast or irregular heartbeat: No  Pain in legs with walking: No  Trouble breathing while lying down: No  Fingers or toes appear blue: No  High blood pressure: No  Low blood pressure: No  Varicose veins: No  Wake up at night with shortness of breath: Yes  Light-headedness: No  Exercise intolerance: No  Blood in urine: Yes  Decreased frequency of urination: Yes  Frequent nighttime urination: Yes  Flank pain: No  Difficulty emptying bladder: Yes  Back pain: Yes  Neck pain: No  Swollen joints: No  Joint pain: No  Bone pain: No  Muscle cramps: No  Muscle weakness: No  Joint stiffness: No      Again, thank you for allowing me to participate in the care of your patient.      Sincerely,    Dariana Wynne MD

## 2020-02-28 NOTE — PROGRESS NOTES
"  SUBJECTIVE:                                                      Emelyn Marmolejo is a 30 year old male who comes in for problems related to kidney stones.    Patient is a 31 y/o M with large left ureteral calculus in non-functioning left kidney.Had renal scan in 2019 that showed 14% function on the left. Was seen at [facility] and was given recommendation of nephrectomy rather than stone clearance as PCNL for the 3cm would be challenging given lack of parenchyma and non-functioning kidney.    I had a very similar discussion. We discussed 3cm stone, poorly functioning kidney, nephrectomy, PCNL, contralateral kidney function.           ROS:  CONSTITUTIONAL:NEGATIVE for fever, chills, change in weight  INTEGUMENTARY/SKIN: NEGATIVE for worrisome rashes, moles or lesions  EYES: NEGATIVE for vision changes or irritation  ENT/MOUTH: NEGATIVE for ear, mouth and throat problems  RESP:NEGATIVE for significant cough or SOB  CV: NEGATIVE for chest pain, palpitations or peripheral edema  GI: NEGATIVE for nausea, abdominal pain, heartburn, or change in bowel habits  MUSCULOSKELETAL: NEGATIVE for significant arthralgias or myalgia  PSYCHIATRIC: NEGATIVE for changes in mood or affect    No past medical history on file.    No past surgical history on file.    No family history on file.    Social History     Tobacco Use     Smoking status: Former Smoker     Types: Cigarettes     Last attempt to quit: 2019     Years since quittin.4     Smokeless tobacco: Never Used   Substance Use Topics     Alcohol use: Never     Frequency: Never         OBJECTIVE:                                                    BP (!) 144/88   Pulse 77   Ht 1.753 m (5' 9\")   Wt 66.7 kg (147 lb)   BMI 21.71 kg/m    Body mass index is 21.71 kg/m .    EXAM:        GENERAL APPEARANCE: healthy, alert and no distress  RESP: negative  CV: negative   Abdomen: Abdomen soft, non-tender  CVAT: absent  U/A: nothing abnormal  KUB: nothing abnormal   (male): " deferred  SKIN: no suspicious lesions or rashes    IMPRESSION: 31 y/o M with 3cm renal stone in atrophic left kidney         Diagnostic test results:  Reviewed renal scan and CT scan with the patient      ASSESSMENT/PLAN:                                                    No diagnosis found.    Recommend left nephrectomy   Patient will consider option as he is hesitant to proceed to nephrectomy  He will contact us if he would like to proceed.     Dariana Wynne MD  [facility] UROLOGY AND [place] FOR PROSTATE AND UROLOGIC CANCERS      I spent over 30 minutes with the patient.  Over half this time was spent on counseling for renal calculus.       Answers for HPI/ROS submitted by the patient on 2/28/2020   General Symptoms: Yes  Skin Symptoms: Yes  HENT Symptoms: No  EYE SYMPTOMS: No  HEART SYMPTOMS: Yes  LUNG SYMPTOMS: Yes  INTESTINAL SYMPTOMS: No  URINARY SYMPTOMS: Yes  REPRODUCTIVE SYMPTOMS: No  SKELETAL SYMPTOMS: Yes  BLOOD SYMPTOMS: No  NERVOUS SYSTEM SYMPTOMS: No  MENTAL HEALTH SYMPTOMS: No  Night sweats: No  Chills: No  Increased stress: No  Excessive hunger: No  Excessive thirst: Yes  Feeling hot or cold when others believe the temperature is normal: No  Loss of height: No  Post-operative complications: No  Surgical site pain: No  Hallucinations: No  Change in or Loss of Energy: No  Hyperactivity: No  Confusion: No  Changes in hair: No  Itching: No  Rashes: No  Changes in nails: No  Acne: No  Change in facial hair: No  Non-healing sores: No  Scarring: No  Flaking of skin: No  Color changes of hands/feet in cold : No  Sun sensitivity: No  Skin thickening: No  Cough: No  Sputum or phlegm: No  Coughing up blood: No  Difficulty breating or shortness of breath: Yes  Snoring: No  Wheezing: No  Difficulty breathing on exertion: No  Nighttime Cough: No  Difficulty breathing when lying flat: No  Chest pain or pressure: Yes  Fast or irregular heartbeat: No  Pain in legs with walking: No  Trouble breathing while  lying down: No  Fingers or toes appear blue: No  High blood pressure: No  Low blood pressure: No  Varicose veins: No  Wake up at night with shortness of breath: Yes  Light-headedness: No  Exercise intolerance: No  Blood in urine: Yes  Decreased frequency of urination: Yes  Frequent nighttime urination: Yes  Flank pain: No  Difficulty emptying bladder: Yes  Back pain: Yes  Neck pain: No  Swollen joints: No  Joint pain: No  Bone pain: No  Muscle cramps: No  Muscle weakness: No  Joint stiffness: No

## 2020-02-28 NOTE — NURSING NOTE
"  Chief Complaint   Patient presents with     New Patient     Stone Consult        Blood pressure (!) 144/88, pulse 77, height 1.753 m (5' 9\"), weight 66.7 kg (147 lb). Body mass index is 21.71 kg/m .    There is no problem list on file for this patient.      No Known Allergies    Current Outpatient Medications   Medication Sig Dispense Refill     omeprazole (PRILOSEC) 20 MG DR capsule Take 1 capsule (20 mg) by mouth daily 30 capsule 1     tamsulosin (FLOMAX) 0.4 MG capsule Take 1 capsule (0.4 mg) by mouth daily 30 capsule 1       Social History     Tobacco Use     Smoking status: Former Smoker     Types: Cigarettes     Last attempt to quit: 2019     Years since quittin.4     Smokeless tobacco: Never Used   Substance Use Topics     Alcohol use: Never     Frequency: Never     Drug use: Never       Jesus Mojica, EMT, EMT  2020  3:48 PM      "

## 2020-03-02 ENCOUNTER — TELEPHONE (OUTPATIENT)
Dept: UROLOGY | Facility: CLINIC | Age: 30
End: 2020-03-02

## 2020-03-02 NOTE — TELEPHONE ENCOUNTER
[facility] Call Center    Phone Message    May a detailed message be left on voicemail: yes     Reason for Call: Other:      Marilou is calling in stating that Emelyn wants to see a Resident dr to f/u on his kidney stone.   She said she they want to see a different dr.  Not Dr Wynne.      Please call back to discuss.       Action Taken: Message routed to:  [facility] Urology    Travel Screening: Not Applicable

## 2020-03-03 NOTE — TELEPHONE ENCOUNTER
Hi ladies,    Please call patient to coordinate an appointment with Dr. Maulik Finney.      Thanks, Bobby Lux MA

## 2020-03-03 NOTE — TELEPHONE ENCOUNTER
Donato Mckenzie,     Should patient schedule a visit with Dr. Maulik Finney? Dr. Wynne has not finish her note. Please advise     Thanks, Bobby Lux MA

## 2020-03-04 NOTE — TELEPHONE ENCOUNTER
Spoke with pt and scheduled the next avail appt with Dr Finney 4/21 3pm. If symptoms get worse pt will call and speak with triage.

## 2020-03-06 ENCOUNTER — TELEPHONE (OUTPATIENT)
Dept: UROLOGY | Facility: CLINIC | Age: 30
End: 2020-03-06

## 2020-03-06 NOTE — TELEPHONE ENCOUNTER
[facility] Call Center    Phone Message    May a detailed message be left on voicemail: yes     Reason for Call: Symptoms or Concerns     If patient has red-flag symptoms, warm transfer to triage line    Current symptom or concern: pt is calling due to the fact that he can't wait for a month for an appt for his kidney stones. His pain is getting worse over the last month and needs to get in.     Symptoms have been present for:  2 month(s)    Has patient previously been seen for this? Yes    By Dr Wynne    Date: 2/28/2020    Are there any new or worsening symptoms? Yes: just pain is getting worse      Action Taken: Message routed to:  [facility] urology    Travel Screening: Not Applicable

## 2020-03-09 PROBLEM — N20.0 RENAL STONE: Status: ACTIVE | Noted: 2019-08-21

## 2020-03-09 PROBLEM — N26.1 RENAL ATROPHY, LEFT: Status: ACTIVE | Noted: 2019-08-21

## 2020-03-09 PROBLEM — R93.89 ABNORMAL CT OF THE CHEST: Status: ACTIVE | Noted: 2019-10-09

## 2020-03-09 PROBLEM — R06.02 SHORTNESS OF BREATH: Status: ACTIVE | Noted: 2019-10-09

## 2020-03-10 ENCOUNTER — PRE VISIT (OUTPATIENT)
Dept: UROLOGY | Facility: CLINIC | Age: 30
End: 2020-03-10

## 2020-03-10 NOTE — TELEPHONE ENCOUNTER
Reason for Visit: Dr. Wynne pt - Kidney stones    Orders/Procedures/Records: Records available, last CT 2/28/2020    Contact Patient: N/A    Rooming Requirements: Castillo Tabor  03/10/20  7:02 AM

## 2020-03-13 ENCOUNTER — OFFICE VISIT (OUTPATIENT)
Dept: FAMILY MEDICINE | Facility: CLINIC | Age: 30
End: 2020-03-13
Payer: COMMERCIAL

## 2020-03-13 VITALS
TEMPERATURE: 98.9 F | HEIGHT: 69 IN | WEIGHT: 145.8 LBS | HEART RATE: 83 BPM | BODY MASS INDEX: 21.59 KG/M2 | DIASTOLIC BLOOD PRESSURE: 77 MMHG | RESPIRATION RATE: 16 BRPM | SYSTOLIC BLOOD PRESSURE: 113 MMHG | OXYGEN SATURATION: 95 %

## 2020-03-13 DIAGNOSIS — N20.0 RENAL STONE: ICD-10-CM

## 2020-03-13 DIAGNOSIS — R10.9 FLANK PAIN: Primary | ICD-10-CM

## 2020-03-13 DIAGNOSIS — N26.1 RENAL ATROPHY, LEFT: ICD-10-CM

## 2020-03-13 LAB
BACTERIA: NORMAL
BILIRUBIN UR: NEGATIVE MG/DL
BLOOD UR: ABNORMAL MG/DL
CASTS: NORMAL /LPF
CRYSTAL URINE: NORMAL /LPF
EPITHELIAL CELLS UR: <2 /LPF (ref 0–2)
GLUCOSE URINE: NEGATIVE
KETONES UR QL: NEGATIVE MG/DL
LEUKOCYTE ESTERASE UR: NEGATIVE
MUCOUS URINE: NORMAL LPF
NITRITE UR QL STRIP: NEGATIVE MG/DL
PH UR STRIP: 6 [PH] (ref 4.5–8)
PROTEIN UR: NEGATIVE MG/DL
RBC URINE: NORMAL /HPF
SP GR UR STRIP: <=1.005 (ref 1–1.03)
UROBILINOGEN UR STRIP-ACNC: ABNORMAL E.U./DL
WBC URINE: NORMAL /HPF

## 2020-03-13 ASSESSMENT — MIFFLIN-ST. JEOR: SCORE: 1606.33

## 2020-03-13 NOTE — PROGRESS NOTES
"       HPI       Emelyn Marmolejo is a 30 year old  who presents for   Chief Complaint   Patient presents with     Kidney Problem     Patient is complaing of kidney stones and right upper back pain for [id number]      Presenting today with right sided flank pain.  Has been there for months, past few days much more severe. Much more painful, pain with peeing. Urine very yellow, but now better. No blood.  Has not passed any stones. Not taking flomax. Pain radiates to groin. Use to come and go, now all the time.   Worse with eating. Sharp. No injury. + nausea, but now resolving. No constipation, a little diarrhea (loose 4x/day). Tylenol helped a little. Last week felt a little hot, did not check temp, had headache.     Urology 2/28/20, rec left nephrectomy. Patient would prefer to just have the stone out.       A [employer]  was used for  this visit.    +++++++      Problem, Medication and Allergy Lists were reviewed and updated if needed..    Patient is an established patient of this clinic..         Review of Systems:   Review of Systems  7 system ROS negative other than as noted in HPI         Physical Exam:     Vitals:    03/13/20 1509   BP: (!) 137/90   Pulse: 100   Resp: 16   Temp: 99.1  F (37.3  C)   TempSrc: Oral   SpO2: 98%   Weight: 66.1 kg (145 lb 12.8 oz)   Height: 1.76 m (5' 9.29\")     Body mass index is 21.35 kg/m .    Vital signs normal except blood pressure elevation     Physical Exam  Vitals signs and nursing note reviewed.   Constitutional:       General: He is not in acute distress.     Appearance: He is well-developed.   HENT:      Head: Normocephalic and atraumatic.   Neck:      Musculoskeletal: Normal range of motion.   Pulmonary:      Effort: Pulmonary effort is normal. No respiratory distress.   Abdominal:      General: Abdomen is flat. Bowel sounds are normal. There is no distension.      Palpations: Abdomen is soft.      Tenderness: There is abdominal tenderness (suprapubic). There is right " CVA tenderness and guarding. There is no left CVA tenderness or rebound.   Musculoskeletal: Normal range of motion.   Skin:     General: Skin is warm and dry.   Neurological:      Mental Status: He is alert and oriented to person, place, and time.           Results:      Results from this visit  Results for orders placed or performed in visit on 03/13/20   Urine Microscopic (UA) ([facility])     Status: None   Result Value Ref Range    WBC Urine None <5 /hpf    RBC Urine None <5 /hpf    Epithelial Cells UR <2 0 - 2 /lpf    Mucous Urine None NONE lpf    Casts Urine None NONE /lpf    Crystal Urine None NONE /lpf    Bacteria Wet Prep None None   Urinalysis (UA) ([facility])     Status: Abnormal   Result Value Ref Range    Specific Gravity Urine <=1.005 1.005 - 1.030    pH Urine 6.0 4.5 - 8.0    Leukocyte Esterase UR Negative NEGATIVE    Nitrite Urine Negative NEGATIVE mg/dL    Protein UR Negative NEGATIVE mg/dL    Glucose Urine Negative NEGATIVE    Ketones Urine Negative NEGATIVE mg/dL    Urobilinogen mg/dL 0.2 E.U./dL 0.2 E.U./dL E.U./dL    Bilirubin UR Negative NEGATIVE mg/dL    Blood UR 1+ (A) NEGATIVE mg/dL     CT stone protocol ordered and scheduled for 5pm today.  CBC, BMP, PTH ordered    Assessment and Plan        1. Flank pain  2. Renal atrophy, left  3. Renal stone  Patient presenting today with right flank pain and suprapubic pain, concerning for urologic pathology.  He is followed by urology due to recurrent left nephrolithiasis and resultant renal atrophy.  It has been recommended that he had a left nephrectomy, however patient declines this at this time.  Given that his pain has been present for months and he had recent ED evaluation including CT scan without right sided, this does lower my suspicion for acute pathology.  However he is functionally dependent on his right kidney, due to this I will be aggressive in ruling out right-sided stone/obstruction.  UA today in clinic with 1+ blood, no signs of  infection.  Have scheduled him for CT stone protocol at 5 PM today.  Also advised him to continue to take the Flomax as prescribed by urology.  Patient is agreeable to plan and will proceed to imaging department.  - Urine Microscopic (UA) ([facility])  - CT Abdomen Pelvis w/o Contrast; Future  - Urinalysis (UA) ([facility])  - BMP, PTH, CBC  - tamsulosin (FLOMAX) 0.4 MG capsule; Take 1 capsule (0.4 mg) by mouth daily  Dispense: 30 capsule; Refill: 1  - Follow-up with urology          There are no discontinued medications.    Options for treatment and follow-up care were reviewed with the patient. Emeyln Marmolejo  engaged in the decision making process and verbalized understanding of the options discussed and agreed with the final plan.    Kusum Dee MD

## 2020-03-16 ENCOUNTER — VIRTUAL VISIT (OUTPATIENT)
Dept: FAMILY MEDICINE | Facility: CLINIC | Age: 30
End: 2020-03-16
Payer: COMMERCIAL

## 2020-03-16 DIAGNOSIS — R10.9 FLANK PAIN: Primary | ICD-10-CM

## 2020-03-16 NOTE — PROGRESS NOTES
Assessment and Plan        Diagnoses and all orders for this visit:    Flank pain  Has not had a CT to r/o stone given his history. We rescheduled his CT for this afternoon and I advised that he go there now. He will head there as soon as he is able. Unable to assess him clinically but no evidence over the phone of pyelonephritis and he was able to maintain a conversation making him less likely to be acutely toxic.            LEONARDO Marmolejo is a 30 year old year old male with a history of  has no past medical history on file. who presents for No chief complaint on file.    This visit occurred via phone encounter due to COVID-19 pandemic.     Pt has a h/o L nephrectomy and recurrent kidney stones. He has been having right flank pain for several days now. He saw Dr. Dee here who had scheduled him for a same-day CT stone protocol to evaluate for another stone, however he was unable to make it to this appointment. He then went to the ED the next day and had basic labwork done including CBC, BMP, UA, Lipase that were all within normal limits, except some blood in his urine. No WBC elevation or Cr bump. He had a RUQ ultrasound done that showed some sludge, but nothing else. He was felt to be low risk for stone so was discharged home. He is continuing to have pain though. Denies fevers, chills, nausea, vomiting.       A [employer]  via language line was used for  this visit.    +++++++    Problem, Medication and Allergy Lists were reviewed and updated if needed.    Patient is an established patient of this clinic.         Review of Systems:   Review of Systems  A 6 point review of systems was performed and was negative except as noted above.         Physical Exam:   Unable to assess due to phone encounter      Results:   No testing ordered today     There are no discontinued medications.    Options for treatment and follow-up care were reviewed with the patient. Emelyn Marmolejo engaged in the decision making  process and verbalized understanding of the options discussed and agreed with the final plan.    Jese Jose MD

## 2020-03-16 NOTE — PROGRESS NOTES
Preceptor Attestation:   Patient seen, evaluated and discussed with the resident. I have verified the content of the note, which accurately reflects my assessment of the patient and the plan of care.   Supervising Physician:  Alvino Benavides MD

## 2020-03-17 NOTE — PROGRESS NOTES
Preceptor Attestation:   Patient discussed with the resident. I have verified the content of the note, which accurately reflects my assessment of the history and the plan of care.   Supervising Physician:  Alvino Benavides MD

## 2020-03-18 ENCOUNTER — TELEPHONE (OUTPATIENT)
Dept: UROLOGY | Facility: CLINIC | Age: 30
End: 2020-03-18

## 2020-03-24 ENCOUNTER — VIRTUAL VISIT (OUTPATIENT)
Dept: UROLOGY | Facility: CLINIC | Age: 30
End: 2020-03-24
Payer: COMMERCIAL

## 2020-03-24 ENCOUNTER — APPOINTMENT (OUTPATIENT)
Dept: INTERPRETER SERVICES | Facility: CLINIC | Age: 30
End: 2020-03-24
Payer: COMMERCIAL

## 2020-03-24 ENCOUNTER — TELEPHONE (OUTPATIENT)
Dept: UROLOGY | Facility: CLINIC | Age: 30
End: 2020-03-24

## 2020-03-24 DIAGNOSIS — N20.0 CALCULUS OF KIDNEY: Primary | ICD-10-CM

## 2020-03-25 ENCOUNTER — VIRTUAL VISIT (OUTPATIENT)
Dept: FAMILY MEDICINE | Facility: CLINIC | Age: 30
End: 2020-03-25
Payer: COMMERCIAL

## 2020-03-25 DIAGNOSIS — R10.9 RIGHT FLANK PAIN: Primary | ICD-10-CM

## 2020-03-25 NOTE — PROGRESS NOTES
"Family Medicine Telephone Visit Note           Telephone Visit Consent   Patient was verbally read the following and verbal consent was obtained.  \"I understand that I may revoke this request for a phone visit at any time.  This consent will automatically  3 months from the signed date and time.\"    Name person giving consent:  Patient   Date verbal consent given:  3/25/2020  Time verbal consent given:  3:06 PM    Chief Complaint   Patient presents with     right side pain     Current Outpatient Medications   Medication Sig Dispense Refill     omeprazole (PRILOSEC) 20 MG DR capsule Take 1 capsule (20 mg) by mouth daily (Patient not taking: Reported on 3/13/2020) 30 capsule 1     tamsulosin (FLOMAX) 0.4 MG capsule Take 1 capsule (0.4 mg) by mouth daily 30 capsule 1     No Known Allergies                HPI   Patients name: Suly hx of left renal atrophy from chronic nephrolithiasis   Appointment start time:  1506    Patient w  3 weeks of right sided flank/belly pain. Feels more in back dull ache almost like how his kidney stone feels in left side but it is constant. Never goes away. Takes tylenol x1 500mg and gets some relief. Patient was having the pain before stopping PPI on his own roughly 2 weeks ago. Didn't think about this but that his pain may have worsened after stopping that medicine. Has 1-2 BM a day, no significant gas or reflux. Doesn't use NSAIDs or drink alcohol. Unsure if ever checked for H pylori.     Pain does get worse after eating sometimes but not every time. Doesn't know what foods make it worse. Originally this was thought to be nephrolithiasis given his trouble with them on left side. CT scan 3 weeks ago w/o stones. Had phone visit w urology yesterday who states they don't think this is stone related and to call us. No fever, no chills, cp, SOB.      used via [employer] or similar service.  number: [phone]           Assessment and Plan   1. Right flank " pain  Subacute right flank pain. Pt has chronic hematuria from left renal stone. Symptoms are always present, worsen after food and stopping PPI. Think more likely gastritis/PUD vs biliary source (although biliary tree unremarkable on 2/28 CT. Patient would like to have an inperson exam to better differentiate what is the cause given this has been constant for 3 weeks. Feel this is appropriate. For now until tomorrow will increase tylenol to 500 every 4 hours, encouraged hydration, and to resume back on PPI. Would like further hx on biliary sx, full abdominal exam in AM. Consider H pylori testing if thought to be possible PUD.         After Visit Information:  Patient declined AVS     Appointment end time: 3:26 PM  This is a telephone visit that took 20 minutes.      Clinician location:  [facility] [place]

## 2020-03-26 ENCOUNTER — TELEPHONE (OUTPATIENT)
Dept: UROLOGY | Facility: CLINIC | Age: 30
End: 2020-03-26

## 2020-03-26 ENCOUNTER — OFFICE VISIT (OUTPATIENT)
Dept: FAMILY MEDICINE | Facility: CLINIC | Age: 30
End: 2020-03-26
Payer: COMMERCIAL

## 2020-03-26 VITALS
SYSTOLIC BLOOD PRESSURE: 118 MMHG | OXYGEN SATURATION: 99 % | TEMPERATURE: 98.6 F | HEART RATE: 81 BPM | DIASTOLIC BLOOD PRESSURE: 80 MMHG | RESPIRATION RATE: 16 BRPM

## 2020-03-26 DIAGNOSIS — N41.0 ACUTE PROSTATITIS: ICD-10-CM

## 2020-03-26 DIAGNOSIS — R19.7 DIARRHEA, UNSPECIFIED TYPE: ICD-10-CM

## 2020-03-26 DIAGNOSIS — R10.84 ABDOMINAL PAIN, GENERALIZED: ICD-10-CM

## 2020-03-26 DIAGNOSIS — R10.13 DYSPEPSIA: ICD-10-CM

## 2020-03-26 DIAGNOSIS — R10.9 FLANK PAIN: Primary | ICD-10-CM

## 2020-03-26 LAB
% GRANULOCYTES: 64 %G (ref 40–75)
ALBUMIN SERPL-MCNC: 4.6 MG/DL (ref 3.8–5)
ALP SERPL-CCNC: 86.5 U/L (ref 31.7–110.7)
ALT SERPL-CCNC: 27.3 U/L (ref 0–45)
AST SERPL-CCNC: 21.9 U/L (ref 0–55)
BACTERIA: NORMAL
BILIRUB SERPL-MCNC: <0.4 MG/DL (ref 0.2–1.3)
BILIRUBIN UR: NEGATIVE MG/DL
BILIRUBIN UR: NEGATIVE MG/DL
BLOOD UR: ABNORMAL MG/DL
BLOOD UR: ABNORMAL MG/DL
BUN SERPL-MCNC: 12.7 MG/DL (ref 7–21)
CALCIUM SERPL-MCNC: 9.8 MG/DL (ref 8.5–10.1)
CASTS: NORMAL /LPF
CHLORIDE SERPLBLD-SCNC: 100.4 MMOL/L (ref 98–110)
CO2 SERPL-SCNC: 27.2 MMOL/L (ref 20–32)
CREAT SERPL-MCNC: 1.1 MG/DL (ref 0.7–1.3)
CRP SERPL-MCNC: 42 MG/L (ref 0–8)
CRYSTAL URINE: NORMAL /LPF
EPITHELIAL CELLS UR: NORMAL /LPF (ref 0–2)
GFR SERPL CREATININE-BSD FRML MDRD: 85.2 ML/MIN/1.7 M2
GLUCOSE SERPL-MCNC: 97.4 MG'DL (ref 70–99)
GLUCOSE URINE: NEGATIVE
GLUCOSE URINE: NEGATIVE
GRANULOCYTES #: 4.9 K/UL (ref 1.6–8.3)
HCT VFR BLD AUTO: 44.5 % (ref 40–53)
HEMOGLOBIN: 13.4 G/DL (ref 13.3–17.7)
KETONES UR QL: NEGATIVE MG/DL
KETONES UR QL: NEGATIVE MG/DL
LEUKOCYTE ESTERASE UR: NEGATIVE
LEUKOCYTE ESTERASE UR: NEGATIVE
LYMPHOCYTES # BLD AUTO: 2.1 K/UL (ref 0.8–5.3)
LYMPHOCYTES NFR BLD AUTO: 27.4 %L (ref 20–48)
MCH RBC QN AUTO: 27.7 PG (ref 26.5–35)
MCHC RBC AUTO-ENTMCNC: 30.1 G/DL (ref 32–36)
MCV RBC AUTO: 91.9 FL (ref 78–100)
MID #: 0.7 K/UL (ref 0–2.2)
MID %: 8.6 %M (ref 0–20)
MUCOUS URINE: NORMAL LPF
NITRITE UR QL STRIP: NEGATIVE MG/DL
NITRITE UR QL STRIP: NEGATIVE MG/DL
PH UR STRIP: 6 [PH] (ref 4.5–8)
PH UR STRIP: 6.5 [PH] (ref 4.5–8)
PLATELET # BLD AUTO: 295 K/UL (ref 150–450)
POTASSIUM SERPL-SCNC: 3.6 MMOL/L (ref 3.3–4.5)
PROT SERPL-MCNC: 10 G/DL (ref 6.8–8.8)
PROTEIN UR: NEGATIVE MG/DL
PROTEIN UR: NEGATIVE MG/DL
RBC # BLD AUTO: 4.84 M/UL (ref 4.4–5.9)
RBC URINE: <2 /HPF
SODIUM SERPL-SCNC: 133.4 MMOL/L (ref 132.6–141.4)
SP GR UR STRIP: <=1.005 (ref 1–1.03)
SP GR UR STRIP: <=1.005 (ref 1–1.03)
UROBILINOGEN UR STRIP-ACNC: ABNORMAL E.U./DL
UROBILINOGEN UR STRIP-ACNC: ABNORMAL E.U./DL
WBC # BLD AUTO: 7.7 K/UL (ref 4–11)
WBC URINE: <2 /HPF

## 2020-03-26 NOTE — PROGRESS NOTES
[facility] Telephone Visit    I spoke with: Emelyn Marmolejo    The reason for the telephone visit was: kidney stone    Pertinent history and review of systems: 31 yo M with atrophic left kidney from large obstructing stone, presumably longstanding, minimal function.  Has been recommended to have left nephrectomy but is interested in stone removal alone.  Also with 3-4 months of intermittant right flank pain.  He says this pain comes and goes with foods, particularly spicy foods.  No other obvious palliative or aggravating factors.  Denies hematuria, dysuria or UTI. Has had renal scan over the past year which did not show obstruction on the right side, also has recent CT from 3 weeks ago that did not show any right sided pathology.    Assessment: 31 yo M with atrophic left kidney secondary to stone  -Discussed that based on  imaging unable to identify a urologic source of his ongoing right flank pain.  Does not appear to be stone or obstruction  -Recommend he discuss with his PMD  -Reviewed indications for nephrectomy of left kidney including pain, infection, hematuria.  Suspect this is longstanding and can be safely deferred until post Covid.  Do not recommend stone treatment alone as risk likleu outweighs benefit given marginal renal function  -Requests male physician to discuss nephrectomy    Phone call contact time - 15 mintues spent over phone with assistance of [employer]  through language line     EXAMINATION: CT ABDOMEN PELVIS W/O CONTRAST, 2/28/2020 3:34 PM     TECHNIQUE:  Helical CT images from the lung bases through the pubic  symphysis were obtained without IV contrast.      COMPARISON: CT 1/1/2020     HISTORY: Flank pain, recurrent stone disease suspected; Calculus of  kidney     FINDINGS:     Abdomen and pelvis: No significant change in 2.8 x 1.5 cm calculus  within the left renal pelvis, with unchanged atrophy appearance of the  left kidney. No significant left  "hydronephrosis. Density of this left  renal calculus measures 343 Hounsfield units. Unremarkable right  kidney, without nephrolithiasis or hydroureteronephrosis. Unremarkable  urinary bladder. Unremarkable prostate gland. Symmetric seminal  vesicles.     No abnormally dilated or thickened loops of small and large bowel. No  intraperitoneal free fluid or free air. No pneumatosis or portal  venous gas. No infrarenal abdominal aortic aneurysm. No pathologically  enlarged lymph nodes within the abdomen or pelvis.     Unremarkable noncontrast appearance of the liver, gallbladder,  pancreas, spleen, and adrenal glands.     Lung bases:  Visualized lung bases are clear, without pleural  effusion. No significant change in the extensive cystic changes within  the right middle lobe and cystic changes within the anterior aspect of  the bilateral lower lobes and lingula. The heart is normal in size,  without pericardial effusion.     Bones and soft tissues: No acute or aggressive appearing osseous  lesion. Mild degenerative changes of the spine. Stable sclerosis  involving the inferior endplates of T8, T11, T12. Tiny fat-containing  umbilical hernia.                                                                      IMPRESSION:   1. No significant change in large left renal calculus measuring 2.8 x  1.5 cm, with unchanged atrophy of the left kidney. No right  nephrolithiasis or hydroureteronephrosis.   2. Stable multicystic changes of the right middle lobe and anterior  aspects of the bilateral lower lobes. Recommend correlation with  clinical history and any outside dedicated chest imaging.    The patient has been notified of following:     \"This telephone visit will be conducted via a call between you and your physician/provider. We have found that certain health care needs can be provided without the need for a physical exam. This service lets us provide the care you need with a short phone conversation. If a prescription " "is necessary we can send it directly to your pharmacy. If lab work is needed we can place an order for that and you can then stop by our lab to have the test done at a later time.   If during the course of the call the physician/provider feels a telephone visit is not appropriate, you will not be charged for this service.\"        "

## 2020-03-26 NOTE — TELEPHONE ENCOUNTER
Writer attempted to reach the patient through  Services, to schedule a Virtual Video Visit with Dr. Hedrick here in [place] on Tuesday March 31, 2020.    Voicemail was left for the patient to call the Call Center to schedule.    CALL CENTER OK TO SCHEDULE VIRTUAL VIDEO VISIT with Dr. Hedrick on Tues March 31, M [facility].    Monse Barba  [facility]  Adult Med Spec/Surg Spec   [phone]

## 2020-03-26 NOTE — PROGRESS NOTES
HPI       Emelyn Marmolejo is a 30 year old  who presents for   Chief Complaint   Patient presents with     diarrhe     x 4 days     Dysuria     x 4 days     Hx atrophic L kidney from large obsturcting stone - nephrectomy upcoming    New R flank pain intermittent, related to food. worse after stopping PPI.    ED 3/14:   - U/S GB some sludge.   - CTAP: 2.5x1.5 left renal pelvis calculus; ureteral calculi both BL ureters, no hydro.  - Lipase normal    Virtual with Mock yesterday  - Per urology, CT findings unlikely to explain new R flank pain  - Revealed PPI stop made pain worse.   - Suspect maybe PUD  - Visit for exam to be done    Flank pain for a month  Throbbing, not like kidney stones - more subtle.   No rash.  Ongoing for 4 months. Increased for 2 weeks.  Worse with foods - right away. Especially milk and dairy. All foods except lemon juice - feels better with this. Doesn't eat much dairy items. Bread hard.  R flank pain - change in stools - 4-5 times a day very soft, yellow. Gurgling stomach - this for 1 month also. Lives with roommates, asymptomatic. No blood in stools.  RBC in urine always. No gross blood.  Off PPIs since 2 weeks.  Never any fevers.   Discomfort with urination. Feels incomplete voiding. Dribbling after a void. Straining to urinate. Nocturia.     +++++++    Problem, Medication and Allergy Lists were reviewed and updated if needed..    Patient is an established patient of this clinic..         Review of Systems:   Review of Systems         Physical Exam:     Vitals:    03/26/20 1519 03/26/20 1520   BP: 131/79 118/80   Pulse: 81    Resp: 16    Temp: 98.6  F (37  C)    TempSrc: Oral    SpO2: 99%      There is no height or weight on file to calculate BMI.  Vitals were reviewed and were normal     Physical Exam  Constitutional:       General: He is not in acute distress.     Appearance: He is well-developed. He is not toxic-appearing or diaphoretic.   HENT:      Head: Normocephalic and  atraumatic.   Eyes:      Conjunctiva/sclera: Conjunctivae normal.   Neck:      Musculoskeletal: Normal range of motion.   Cardiovascular:      Rate and Rhythm: Normal rate.   Pulmonary:      Effort: Pulmonary effort is normal. No respiratory distress.   Abdominal:      General: There is no distension.      Palpations: Abdomen is soft. There is no mass.      Tenderness: There is abdominal tenderness (moderate suprapubic; moderate RUQ; mild epigastric). There is no guarding or rebound.      Comments: Bilateral CVAT    Genitourinary:     Comments: Prostate non-enlarged though tender. Pain at tip of penis with palpation of prostate.  Musculoskeletal: Normal range of motion.   Neurological:      General: No focal deficit present.      Mental Status: He is alert.         Results:   Results are ordered and pending    Assessment and Plan        Emelyn was seen today for diarrhe and dysuria.    Diagnoses and all orders for this visit:    Flank pain  Hx of atrophic L kidney due to stone. Now RUQ and R flank pain - patient concerned for right-sided renal impairment though CT done 3/14 was reassuring. Appears more GI in origin (see below), though some lower urinary tract symptoms also.    -     Comprehensive Metabolic Panel ([facility])  -     Helicobacter pylori Antigen Stool; Future  -     Urinalysis (UA) ([facility])  -     Urine Microscopic (UA) ([facility])  -     Urinalysis, Micro If (UA) ([facility])  -     Urine Culture Aerobic Bacterial    Abdominal pain, RUQ, epigastric  Diarrhea, unspecified type  Dyspepsia  Ongoing 4 months of 4-5 episodes soft yellow stools daily, RUQ and epigastric abdominal pain worse immediately after eating. Does have hx of ulcers. Pain worse after stopping PPI, as well as worse with dairy (which he has been avoiding) and bread. No recent abx.  Will obtain H pylori lab, stool infectious studies and lactoferrin, and celiac w/u.  No concern for acute abdomen.  -     IgA [LAB73]  -     Deamidated Giladin  Peptide Alfonso IgA IgG [[id number]]  -     Tissue transglutaminase alfonso IgA and IgG [[id number]]  -     Endomysial Antibody IgA by IFA [[id number]]  -     HLA DQB1 for Celiac Disease [[id number]]  -     CRP inflammation  -     Ova and Parasite Exam Routine; Future  -     CBC with Diff Plt ([facility])  -     Urinalysis, Micro If (UA) ([facility])  -     Urine Culture Aerobic Bacterial  -     Fecal Lactoferrin; Future    Acute prostatitis  Non-enlarged tender prostate on exam, with pain radiating to tip of penis. Suspect acute prostatitis. Re-ordered UA for s/p prostate exam. Will also culture urine.  Discussed diagnosis with patient, and risk vs benefit of abx treatment. Patient agrees to treatment, but for now deferring for until we have labs completed from today.  I will call patient to discuss results of labs from today.  Will plan for 4 weeks of ciprofloxacin 500 mg BID. If renal function impaired will adjust.  -     Urinalysis, Micro If (UA) ([facility])  -     Urine Culture Aerobic Bacterial  Add on if able to 1st urine: urine GC/Chlamydia. If not, repeat collect when he returns to drop off stool       Medications Discontinued During This Encounter   Medication Reason     tamsulosin (FLOMAX) 0.4 MG capsule      omeprazole (PRILOSEC) 20 MG DR capsule        Options for treatment and follow-up care were reviewed with the patient. Emelyn Marmolejo  engaged in the decision making process and verbalized understanding of the options discussed and agreed with the final plan.    Aida Echevarria MD    F/u by phone Fri or Mon/Tues  - f/u lab results  - Rx prostatitis tx

## 2020-03-27 ENCOUNTER — APPOINTMENT (OUTPATIENT)
Dept: INTERPRETER SERVICES | Facility: CLINIC | Age: 30
End: 2020-03-27
Payer: COMMERCIAL

## 2020-03-27 DIAGNOSIS — R19.7 DIARRHEA, UNSPECIFIED TYPE: ICD-10-CM

## 2020-03-27 DIAGNOSIS — R10.9 FLANK PAIN: ICD-10-CM

## 2020-03-27 DIAGNOSIS — N41.0 ACUTE PROSTATITIS WITH HEMATURIA: Primary | ICD-10-CM

## 2020-03-27 DIAGNOSIS — R10.84 ABDOMINAL PAIN, GENERALIZED: ICD-10-CM

## 2020-03-27 DIAGNOSIS — N41.0 ACUTE PROSTATITIS: ICD-10-CM

## 2020-03-27 LAB
BACTERIA SPEC CULT: NO GROWTH
GLIADIN IGA SER-ACNC: 2 U/ML
GLIADIN IGG SER-ACNC: <1 U/ML
IGA SERPL-MCNC: 405 MG/DL (ref 84–499)
LACTOFERRIN STL QL IA: POSITIVE
Lab: NORMAL
SPECIMEN SOURCE: NORMAL
TTG IGA SER-ACNC: 1 U/ML
TTG IGG SER-ACNC: <1 U/ML

## 2020-03-27 RX ORDER — CIPROFLOXACIN 500 MG/1
500 TABLET, FILM COATED ORAL 2 TIMES DAILY
Qty: 56 TABLET | Refills: 0 | Status: SHIPPED | OUTPATIENT
Start: 2020-03-27 | End: 2020-04-27

## 2020-03-28 LAB — ENDOMYSIUM IGA TITR SER IF: NORMAL {TITER}

## 2020-03-29 LAB
C TRACH DNA SPEC QL NAA+PROBE: NEGATIVE
N GONORRHOEA DNA SPEC QL NAA+PROBE: NEGATIVE
SPECIMEN SOURCE: NORMAL
SPECIMEN SOURCE: NORMAL

## 2020-03-30 LAB
H PYLORI AG STL QL IA: POSITIVE
O+P STL MICRO: NORMAL
SPECIMEN SOURCE: NORMAL
SPECIMEN SOURCE: NORMAL

## 2020-03-30 NOTE — TELEPHONE ENCOUNTER
Writer called patient via  services, Patient would like video encounter instruction sent to [email or web address]. Will email to patient today.    Kylee Mena LPN

## 2020-03-31 ENCOUNTER — VIRTUAL VISIT (OUTPATIENT)
Dept: UROLOGY | Facility: CLINIC | Age: 30
End: 2020-03-31
Payer: COMMERCIAL

## 2020-03-31 ENCOUNTER — PREP FOR PROCEDURE (OUTPATIENT)
Dept: UROLOGY | Facility: CLINIC | Age: 30
End: 2020-03-31

## 2020-03-31 DIAGNOSIS — N26.1 RENAL ATROPHY, LEFT: Primary | ICD-10-CM

## 2020-03-31 DIAGNOSIS — N26.1 ATROPHIC KIDNEY: Primary | ICD-10-CM

## 2020-03-31 DIAGNOSIS — R10.11 RUQ ABDOMINAL PAIN: ICD-10-CM

## 2020-03-31 DIAGNOSIS — N20.0 RENAL STONE: ICD-10-CM

## 2020-03-31 PROCEDURE — 99203 OFFICE O/P NEW LOW 30 MIN: CPT | Mod: GT | Performed by: UROLOGY

## 2020-03-31 NOTE — Clinical Note
Hi Dr. Hanson,    I saw Emelyn today as a virtual visit for discussion of his left atrophic kidney.  He was referred to me for a nephrectomy from my partner Dr. Finney.  I agree that given his good baseline kidney function with low function of his left kidney that he would be best served by a nephrectomy.  We discussed that given the COVID-19 pandemic this will likely not be scheduled for 3 to 6 months.  He continues to have right upper quadrant abdominal pain which I do not believe is related to his right kidney.    Thanks,   Aj Hedrick M.D.  Cell: [phone]

## 2020-03-31 NOTE — PROGRESS NOTES
"Emelyn Marmolejo is a 30 year old male who is being evaluated via a billable video visit.      The patient has been notified of following:     \"This video visit will be conducted via a call between you and your physician/provider. We have found that certain health care needs can be provided without the need for an in-person physical exam.  This service lets us provide the care you need with a video conversation.  If a prescription is necessary we can send it directly to your pharmacy.  If lab work is needed we can place an order for that and you can then stop by our lab to have the test done at a later time.    If during the course of the call the physician/provider feels a video visit is not appropriate, you will not be charged for this service.\"     Patient has given verbal consent for Video visit? Yes    Patient would like the video invitation sent by: Text to cell phone: [phone]    PROVIDER NOTES    Subjective     Emelyn Marmolejo is a 30 year old male who presents to clinic today for the following health issues with the assistance of a .    HPI   30 year old man with an atrophic left kidney due to a large obstructing stone. He was last evaluated by Dr. Finney via telephone encounter on 3/24/20 and referred for discussion of a nephrectomy.  He has had a long history of this left atrophic kidney, however has also had about 3 to 4 months of intermittent right upper abdominal pain.  This pain is associated with p.o. food intake and he has not received any other treatment for this.  He is very concerned that this is related to his right kidney.  He denies any UTIs or flank pain.    No past medical history on file.   No past surgical history on file.  Current Outpatient Medications   Medication     ciprofloxacin (CIPRO) 500 MG tablet     No current facility-administered medications for this visit.           PHYSICAL EXAM  Patient is a 30 year old  male   Vitals: There were no vitals taken for this " visit.  General Appearance Adult: There is no height or weight on file to calculate BMI.  Alert, no acute distress, oriented  Lungs: no respiratory distress, or pursed lip breathing  Heart: No obvious jugular venous distension present  Abdomen: non-distended  Musculoskeltal: extremities normal, no peripheral edema  Skin: no suspicious lesions or rashes  Neuro: Alert, oriented, speech and mentation normal  Psych: affect and mood normal    Creatinine   Date Value Ref Range Status   03/26/2020 1.1 0.7 - 1.3 mg/dL Final      UA RESULTS:  Recent Labs   Lab Test 03/26/20  1602 03/26/20  1520   URINEGLC Negative  --    URINEKETONE Negative  --    SG <=1.005  --    URINEPH 6.0  --    NITRITE Negative  --    RBCU  --  <2   WBCU  --  <2        IMAGING:  NM RENAL SCAN 8/12/19:  Outside hospital imaging - report available  1. Left kidney 14% and Right kidney 86%    All pertinent imaging reviewed:    All imaging studies reviewed by me.  I personally reviewed these imaging films.  A formal report from radiology will follow.    CT ABD/PEL:  IMPRESSION:   1. No significant change in large left renal calculus measuring 2.8 x  1.5 cm, with unchanged atrophy of the left kidney. No right  nephrolithiasis or hydroureteronephrosis.   2. Stable multicystic changes of the right middle lobe and anterior  aspects of the bilateral lower lobes. Recommend correlation with  clinical history and any outside dedicated chest imaging.      ASSESSMENT and PLAN  30-year-old man with a left atrophic kidney with a 2.8 x 1.5 cm kidney stone and right abdominal pain    Left atrophic kidney  -I reviewed his labs and imaging we discussed in depth that this kidney has been demonstrated to have at best a 14% overall function  -We discussed that generally we recommend kidney removal when the function is less than 20%  -Reviewed that given his lack of urinary tract infections and his normal overall kidney function there is no evidence that the removal of the  left kidney is an urgent matter and he may safely be scheduled for this in 3 to 6 months  -We had a long discussion that this delay in scheduling will not impact his right kidney function.  - It is my professional judgment, in conjunction with the current COVID-19 pandemic recommended restrictions on elective procedures, that this procedure can safely be deferred until a later date which may be beyond the typical treatment period/window. I have engaged in a discussion with the patient (and family) about the need for this deferral, explained why the procedure can be safely deferred, the potential risks associated with the deferral, and answered all of the patients questions. The patient has also been advised that if there is a change in their condition/symptoms they should notify me, my clinic/facility, and/or seek appropriate medical care. That patient has also been informed that the decision to defer this procedure can be re-evaluated if there is a change in their condition/symptoms. I have also told the patient they have the right to seek a second opinion on the decision to defer their procedure.  -We discussed the plan for a left laparoscopic nephrectomy to be done at [facility]  -Again we will plan for this in about 3 to 6 months    Right abdominal pain  -We discussed that this abdominal pain does not seem to be consistent with a kidney etiology and is more likely related to his gastrointestinal tract or his gallbladder  -We discussed that this pain does not seem to be causing any change or function of his kidney and I would not anticipate that this would be the case.  -I recommend he follow-up with his PCP to discuss further treatment options and further diagnostic testing needed for this pain.    Chart documentation with Dragon Voice recognition Software. Although reviewed after completion, some words and grammatical errors may remain.     I spent over 40 minutes with the patient.   Over half this time was spent on counseling regarding the above.    Aj Hedrick MD   Urology  [facility]  Clinic Phone [phone]          Video-Visit Details    Type of service:  Video Visit    Video Start Time: 9:01 AM    Video End Time (time video stopped): 9:41 AM    Originating Location (pt. Location): Home    Distant Location (provider location):  [facility], [place]     Mode of Communication:  Video Conference via [employer]

## 2020-04-01 ENCOUNTER — VIRTUAL VISIT (OUTPATIENT)
Dept: FAMILY MEDICINE | Facility: CLINIC | Age: 30
End: 2020-04-01
Payer: COMMERCIAL

## 2020-04-01 ENCOUNTER — TELEPHONE (OUTPATIENT)
Dept: FAMILY MEDICINE | Facility: CLINIC | Age: 30
End: 2020-04-01

## 2020-04-01 DIAGNOSIS — N41.0 ACUTE BACTERIAL PROSTATITIS: Primary | ICD-10-CM

## 2020-04-01 DIAGNOSIS — T50.905A ADVERSE EFFECT OF DRUG, INITIAL ENCOUNTER: ICD-10-CM

## 2020-04-01 LAB
DQA1*: NORMAL
DQA1*LOCUS: NORMAL
DQB1* LOCUS: NORMAL
DQB1*: NORMAL
DRSSO COMMENTS: NORMAL
DRSSO TEST METHOD: NORMAL

## 2020-04-01 RX ORDER — DOXYCYCLINE HYCLATE 100 MG
100 TABLET ORAL 2 TIMES DAILY
Qty: 60 TABLET | Refills: 0 | Status: SHIPPED | OUTPATIENT
Start: 2020-04-01 | End: 2020-04-27

## 2020-04-01 NOTE — PROGRESS NOTES
"Family Medicine Telephone Visit Note               Telephone Visit Consent   Patient was verbally read the following and verbal consent was obtained.  \"I understand that I may revoke this request for a phone visit at any time.  This consent will automatically  3 months from the signed date and time.\"    Name person giving consent:  Patient   Date verbal consent given:    Time verbal consent given:  3:08 PM          No chief complaint on file.    Current Outpatient Medications   Medication Sig Dispense Refill     ciprofloxacin (CIPRO) 500 MG tablet Take 1 tablet (500 mg) by mouth 2 times daily for 28 days 56 tablet 0     No Known Allergies             HPI   Patients name: Emelyn  Appointment start time:  3:10 PM    Started on cipro for suspected acute bacterial prostatitis on 3/30/20. Immediately started having \"nerve pain\" and abdominal pain. Last took the medication last night, only took one day of cipro. No skin rash, facial swelling, or difficulty breathing. Patient is not going to continue taking the cipro.     Patient also has one functional kidney and does not want to take anything that could affect renal function.      used via Language Line or similar service.  number: Grace Louise         Assessment and Plan   1. Acute bacterial prostatitis  Patient with previous diagnosis of acute bacterial prostatitis but now not tolerating cipro after one day. Considered switch to bactrim but he has concerns about his renal function so selected doxycyline instead. 4 weeks of treatment and follow up by phone.   - doxycycline hyclate (VIBRA-TABS) 100 MG tablet; Take 1 tablet (100 mg) by mouth 2 times daily  Dispense: 60 tablet; Refill: 0    2. Adverse effect of drug, initial encounter  Patient with adverse effects form cipro with increased diffuse \"nerve pain\". No skin rash, facial swelling, or concern for anaphylaxis. Patient not interested in continuing treatment with cipro. Switched to " doxycycline as above. As these are not typical medication allergy symptoms did not put cipro on allergy list.       Refilled medications that would be required in the next 3 months.     After Visit Information:  Patient declined AVS     Appointment end time: 3:25 PM  This is a telephone visit that took 15 minutes.      Clinician location:  [facility]

## 2020-04-01 NOTE — TELEPHONE ENCOUNTER
Returned phone call to follow up. Pt reported -developing stomach upset, headache and body aches after starting medication. ciprofloxacin (CIPRO) 500 MG tablet  On 03/31/2020. No hives, swelling, difficulty breathing, rash or any other allergy reaction reported. Same provider telephone visit scheduled. At 1540 for medication consult. Pt verbalized understanding and agreed.    Maura Hodges RN

## 2020-04-01 NOTE — NURSING NOTE
Due to patient being non-English speaking/uses sign language, an  was used for this visit. Only for face-to-face interpretation by an external agency, date and length of interpretation can be found on the scanned worksheet.     name: Grace Gil  Language: Qatari  Agency: [employer]  Phone number: [phone]  Type of interpretation: Face-to-face, spoken

## 2020-04-01 NOTE — TELEPHONE ENCOUNTER
[facility] Family Medicine phone call message-patient reporting a symptom:     Symptom: Pt started taking Cipro yesterday and now is complaining of Left side pain and headache.    When did symptoms begin? Yesterday  Characteristics: (location on body, intensity, what makes it better or worse, associated symptoms):     Additional Details:       Same Day Visit Offered  Additional comments:    OK to leave message on voice mail? Yes    Advised patient that RN would call back within 3 hours, unless emergent   Primary language: Jamaican      needed? Yes    Call taken on April 1, 2020 at 8:25 AM by Lynda Luna CMA

## 2020-04-01 NOTE — PROGRESS NOTES
Preceptor Attestation: Telephone visit   Patient discussed with the resident. I have verified the content of the note, which accurately reflects my assessment of the patient and the plan of care.   Supervising Physician:  Trupti Hassan MD

## 2020-04-13 ENCOUNTER — PRE VISIT (OUTPATIENT)
Dept: PULMONOLOGY | Facility: CLINIC | Age: 30
End: 2020-04-13

## 2020-04-27 ENCOUNTER — VIRTUAL VISIT (OUTPATIENT)
Dept: FAMILY MEDICINE | Facility: CLINIC | Age: 30
End: 2020-04-27
Payer: COMMERCIAL

## 2020-04-27 DIAGNOSIS — N41.0 ACUTE PROSTATITIS: Primary | ICD-10-CM

## 2020-04-27 DIAGNOSIS — A04.8 H. PYLORI INFECTION: ICD-10-CM

## 2020-04-27 RX ORDER — AMOXICILLIN 500 MG/1
1000 CAPSULE ORAL 2 TIMES DAILY
Qty: 56 CAPSULE | Refills: 0 | Status: SHIPPED | OUTPATIENT
Start: 2020-04-27 | End: 2020-05-14

## 2020-04-27 RX ORDER — CLARITHROMYCIN 500 MG
500 TABLET ORAL 2 TIMES DAILY
Qty: 28 TABLET | Refills: 0 | Status: SHIPPED | OUTPATIENT
Start: 2020-04-27 | End: 2020-05-14

## 2020-04-27 NOTE — PROGRESS NOTES
"Family Medicine Telephone Visit Note               Telephone Visit Consent   Patient was verbally read the following and verbal consent was obtained.    \"This telephone visit will be conducted via a call between you and your physician/provider. We have found that certain health care needs can be provided without the need for a physical exam.  This service lets us provide the care you need with a short phone conversation.  If a prescription is necessary we can send it directly to your pharmacy.  If lab work is needed we can place an order for that and you can then stop by our lab to have the test done at a later time.    Telephone visits are billed at different rates depending on your insurance coverage. During this emergency period, for some insurers they may be billed the same as an in-person visit.  Please reach out to your insurance provider with any questions.    If during the course of the call the physician/provider feels a telephone visit is not appropriate, you will not be charged for this service.\"    Name person giving consent:  Patient   Date verbal consent given:  4/27/2020  Time verbal consent given:  1:24 PM      Chief Complaint   Patient presents with     Recheck Medication     finished doxicycline and would like to start medication for bacteria in his stomach         Due to patient being non-English speaking/uses sign language, an  was used for this visit. Only for face-to-face interpretation by an external agency, date and length of interpretation can be found on the scanned worksheet.     name: Grace Gil  Agency: [employer]  Language: [place] Islander   Telephone number: [phone]  Type of interpretation: Telephone, spoken         HPI   Patients name: Emelyn  Appointment start time:  1:31 PM    Seen for phone visit on 4/1 for follow up on prostatitis after side effect from cipro. Started on doxycycline and should have 2 days remaining. Took the medication for 3 weeks and discontinued " it.  Now asymptomatic with resolution of the pelvic pain.     Having some upper abdominal pain with eating.       Current Outpatient Medications   Medication Sig Dispense Refill     doxycycline hyclate (VIBRA-TABS) 100 MG tablet Take 1 tablet (100 mg) by mouth 2 times daily (Patient not taking: Reported on 4/27/2020) 60 tablet 0     No Known Allergies           Review of Systems:     Greater than 4 point review of symptoms was completed and negative other than noted in the HPI.           Assessment and Plan   1. Acute prostatitis  Patient took 3 out of the 4 weeks of his doxycycline for prostatitis.  Symptoms have resolved.  Discussed calling back if he has recurrence of symptoms but not resuming therapy at this point.    2. H. pylori infection  Patient with positive H. pylori after number of screening labs for chronic abdominal pain.  Will treat with triple therapy.  Patient is not going to start until Ramadan is over at the end of May.  Recommended follow-up in 4 to 6 weeks after completing treatment.  - omeprazole (PRILOSEC) 20 MG DR capsule; Take 1 capsule (20 mg) by mouth 2 times daily for 14 days  Dispense: 28 capsule; Refill: 0  - clarithromycin (BIAXIN) 500 MG tablet; Take 1 tablet (500 mg) by mouth 2 times daily for 14 days  Dispense: 28 tablet; Refill: 0  - amoxicillin (AMOXIL) 500 MG capsule; Take 2 capsules (1,000 mg) by mouth 2 times daily for 14 days  Dispense: 56 capsule; Refill: 0    Refilled medications that would be required in the next 3 months.     After Visit Information:  Patient declined AVS     No follow-ups on file.    Appointment end time: 1:48 PM  This is a telephone visit that took 17 minutes.      Clinician location:  [facility]    Ramos Hanson MD  I precepted today with Dr Primo Ricks.

## 2020-04-27 NOTE — NURSING NOTE
Due to patient being non-English speaking/uses sign language, an  was used for this visit. Only for face-to-face interpretation by an external agency, date and length of interpretation can be found on the scanned worksheet.     name: Grace Gil  Language: Belizean  Agency: [employer]  Phone number: [phone]  Type of interpretation: Face-to-face, spoken

## 2020-04-27 NOTE — PROGRESS NOTES
Preceptor Attestation:    I talked to the patient on the phone and discussed the patient with the resident. I have verified the content of the note, which accurately reflects my assessment of the patient and the plan of care.   Supervising Physician:  Primo Ricks MD.

## 2020-05-01 ENCOUNTER — VIRTUAL VISIT (OUTPATIENT)
Dept: FAMILY MEDICINE | Facility: CLINIC | Age: 30
End: 2020-05-01
Payer: COMMERCIAL

## 2020-05-01 DIAGNOSIS — F41.9 ANXIETY: Primary | ICD-10-CM

## 2020-05-01 DIAGNOSIS — R10.9 RIGHT-SIDED ABDOMINAL PAIN OF UNKNOWN ETIOLOGY: ICD-10-CM

## 2020-05-01 DIAGNOSIS — A04.8 H. PYLORI INFECTION: ICD-10-CM

## 2020-05-01 RX ORDER — FAMOTIDINE 20 MG/1
20 TABLET, FILM COATED ORAL
Qty: 60 TABLET | Refills: 1 | Status: SHIPPED | OUTPATIENT
Start: 2020-05-01 | End: 2020-05-14

## 2020-05-01 RX ORDER — LIDOCAINE 50 MG/G
PATCH TOPICAL
COMMUNITY
Start: 2020-02-19 | End: 2020-05-14

## 2020-05-01 RX ORDER — ACETAMINOPHEN 325 MG/1
325-650 TABLET ORAL
COMMUNITY
Start: 2019-11-20 | End: 2020-12-18

## 2020-05-01 RX ORDER — CETIRIZINE HYDROCHLORIDE 10 MG/1
TABLET ORAL
COMMUNITY
Start: 2019-03-07 | End: 2020-05-14

## 2020-05-01 RX ORDER — TAMSULOSIN HYDROCHLORIDE 0.4 MG/1
0.4 CAPSULE ORAL
COMMUNITY
Start: 2020-01-24 | End: 2020-05-14

## 2020-05-01 NOTE — PROGRESS NOTES
Preceptor Attestation:   Patient seen, evaluated and discussed with the resident. I have verified the content of the note, which accurately reflects my assessment of the patient and the plan of care.   Supervising Physician:  Bhavana Rowland MD

## 2020-05-01 NOTE — PROGRESS NOTES
"Family Medicine Video Visit Note  Emelyn is being evaluated via a billable video visit.               Video Visit Consent     Patient was verbally read the following and verbal consent was obtained.  \"Video visits are billed at different rates depending on your insurance coverage. During this emergency period, for some insurers they may be billed the same as an in-person visit.  Please reach out to your insurance provider with any questions.  If during the course of the call the physician/provider feels a telephone visit is not appropriate, you will not be charged for this service.\"     (Name person giving consent:  Patient   Date verbal consent given:  5/1/2020  Time verbal consent given:  3:41 PM)    Patient would like the video invitation sent by: Text to cell phone: [phone]         Chief Complaint   Patient presents with     RECHECK     follow up kidney stones, and right side pain               HPI       Video Start Time: 3:49 PM    Emelyn presents to clinic today for the following health issues:    H/o left atrophic kidney due to large obstructing stone. Now is reporting right sided lower abdominal pain, in addition to right upper abdominal pain. He has had extensive workup recently for these pains including an ED visit. Since I last saw him he has been worked up extensively for GI causes (see Dr. Echevarria in-person visit on 3/26), which did reveal H. Pylori test positive. He is currently practicing ramadan though and is waiting to start triple therapy until after this. He was also treated for prostatitis since I last saw him. On further elucidation he reports his pain is intermittent and sometimes he says it is lower and sometimes it is upper. Per urology's recent visit, they are not concerned about his right kidney function and did not recommend any change in course of action besides the left kidney nephrectomy previously planned. He also went to the ED down at [place] in March and had a RUQ US done which showed " "minimal sludge, but no stones or signs of cholecystitis. He has failed to make it to two urgently scheduled CT scans to rule out repeat kidney stones and his pain has not significantly worsened since then.     Current Outpatient Medications   Medication Sig Dispense Refill     acetaminophen (TYLENOL) 325 MG tablet Take 325-650 mg by mouth       cetirizine (ZYRTEC) 10 MG tablet        famotidine (PEPCID) 20 MG tablet Take 1 tablet (20 mg) by mouth nightly as needed (for bloating) 60 tablet 1     lidocaine (LIDODERM) 5 % patch Apply 1 patch to painful area of skin for up to 12 hours within a 24-hour period.       tamsulosin (FLOMAX) 0.4 MG capsule Take 0.4 mg by mouth       amoxicillin (AMOXIL) 500 MG capsule Take 2 capsules (1,000 mg) by mouth 2 times daily for 14 days (Patient not taking: Reported on 5/1/2020) 56 capsule 0     clarithromycin (BIAXIN) 500 MG tablet Take 1 tablet (500 mg) by mouth 2 times daily for 14 days (Patient not taking: Reported on 5/1/2020) 28 tablet 0     omeprazole (PRILOSEC) 20 MG DR capsule Take 1 capsule (20 mg) by mouth 2 times daily for 14 days (Patient not taking: Reported on 5/1/2020) 28 capsule 0     No Known Allergies           Review of Systems:     ROS COMP: Constitutional, HEENT, cardiovascular, pulmonary, gi and gu systems are negative, except as otherwise noted.         Physical Exam:     There were no vitals taken for this visit.  Estimated body mass index is 21.35 kg/m  as calculated from the following:    Height as of 3/13/20: 1.76 m (5' 9.29\").    Weight as of 3/13/20: 66.1 kg (145 lb 12.8 oz).    GENERAL: healthy, alert and no distress  EYES: Eyes grossly normal to inspection, conjunctivae and sclerae normal  RESP: no audible wheeze, cough, or visible cyanosis.  No visible retractions or increased work of breathing.  Able to speak fully in complete sentences.  NEURO: Cranial nerves grossly intact, mentation intact and speech normal  PSYCH: mentation appears normal, affect " normal/bright, judgement and insight intact, normal speech and appearance well-groomed          Assessment and Plan   Emelyn was seen today for recheck.    Diagnoses and all orders for this visit:    Right-sided abdominal pain of unknown etiology  Anxiety  H. pylori infection  Pt with extensive history and workup as noted in HPI. Hasn't yet been treated for H. Pylori given Ramadan, but would like something to help with pain. I offered pepcid for symptomatic management until he is able to take Triple Therapy after Ramadan. I feel his symptoms are not consistent with any obvious pathology as his pain moves around and his workup has been largely un-revealing. Urology has evaluated and do not feel worsening kidney function. Nothing notable on RUQ US either. It may be that H. Pylori is causing his symptoms in an atypical presentation, but we will not know until he is able to take his treatment. The other possible explanation is psychosomatization, which would explain the migrating pains. I did discuss the significant amount of stress he is dealing with regarding only having one kidney and the fears of losing his second kidney. I offered psychotherapy to help deal with this stress and anxiety and he was agreeable to this plan. He should have close follow up with one provider to help manage his symptoms and stress to best serve him.   -     BEHAVIORAL HEALTH REFERRAL ([facility] interal and external)  -     famotidine (PEPCID) 20 MG tablet; Take 1 tablet (20 mg) by mouth nightly as needed (for bloating) (Patient not taking: Reported on 5/7/2020)      Refilled medications that would be required in the next 3 months.     After Visit Information:  Patient declined AVS       No follow-ups on file.      Video-Visit Details    Type of service:  Video Visit    Video End Time (time video stopped): 4:20 PM    Originating Location (pt. Location): Home    Distant Location (provider location):  [facility]     Mode of  Communication:  Video Conference via [employer]      Kwabena Jose MD  I precepted today with Dr. Rowland.

## 2020-05-04 ENCOUNTER — TELEPHONE (OUTPATIENT)
Dept: PSYCHOLOGY | Facility: CLINIC | Age: 30
End: 2020-05-04

## 2020-05-04 NOTE — TELEPHONE ENCOUNTER
"Mental Health Referral:  Please schedule with Dr. Graham      Please let patient know this is for primary care behavioral health services.  Therapists at our clinic are able to see patients for 8-12 sessions (video/phone). If further assistance is needed, they will help the patient connect with ongoing services in the community.    Check with the patient if they are able to do a video visit.  They will need access to either a smartphone or a laptop with camera/microphone.  If they can do a video visit, please schedule as a video visit.  If they do not have the technology to do a video visit, please schedule as a telephone visit. For either visit, please put the phone number or email in the appt notes that the provider is supposed to call or send the visit invite.  There are some instructions regarding how the video visits work for patients below. This can be copied/pasted into a [employer]iva message if the patient would like more information.      If you are unable to reach the patient after two phone attempts, please send a letter and close the encounter.      Thank you!      You can use either your smartphone or a laptop/computer that is set up with a camera and microphone to do a phone visit.  You will select which device you want to use for the virtual visit.  If you use a smartphone/tablet, we will need the phone number to send you a text invitation to the visit.  If you are using a laptop/computer, we will need your email address to send you the invitation to the visit.      Smartphone/tablet:  Go to your Apple Brian Store or Google Play store to download an brian called \"AW Touchpoint\"  This is the brian that you will use for your visit. It is free.  That way when you receive the text invitation, you can just click on the invitation and it will bring right into the visit through the brian.    Computer/Laptop with Webcam:  It is important that you have set our default web browser to a modern web browswer such as Google " Chrome (recommended), Safari or Edge.  Internet Explorer is not compatible with the telemedicine website.  If you need instructions about how to change your default web browser on a PC, we can email them to you or send them through a [employer] message.  If you use a Mac, your default web browser should work already.  If you are using a laptop, please go to [email or web address] to make sure all your settings are good to go.    Please be ready 15 minutes before your visit.  You will receive an invitation to the visit via email or text message (whichever was specified by you) from your provider as soon as the provider is ready.  Please be ready to click the link in the invitation so you can join the visit.  Your provider will send the invitation once.  If you don't join the telemedicine visit within five minutes, the provider will call you to complete a telephone visit instead.  If you don't answer the phone or respond to the invitation within 15 minutes, your provider will likely go onto their next patient and you will need to reschedule your visit.

## 2020-05-05 ENCOUNTER — TELEPHONE (OUTPATIENT)
Dept: FAMILY MEDICINE | Facility: CLINIC | Age: 30
End: 2020-05-05

## 2020-05-05 NOTE — TELEPHONE ENCOUNTER
5/5/20 Unable to reach patient x 2 to schedule  appointment with . Sending letter to patient home.    Inna Sanders  Care Coordinator

## 2020-05-05 NOTE — TELEPHONE ENCOUNTER
[facility] phone call message-patient reporting a symptom:     Symptom: abdominal  pain poss chest pain     When did symptoms begin? One week    Characteristics: (location on body, intensity, what makes it better or worse, associated symptoms):      Additional Details:abd pain  irritability        Same Day Visit Offered: Yes, declined    Additional comments: pt want to speak with triage nurse regarding Lack of sleep stomach ache would you please call       OK to leave message on voice mail? Yes    Advised patient that RN would call back within 3 hours, unless emergent   Primary language: Bermudian      needed? Yes    Call taken on May 5, 2020 at 12:19 PM by Juan Hernandez

## 2020-05-05 NOTE — TELEPHONE ENCOUNTER
returned phone call to patient to follow up, unable to get hold, left message to call back the clinic if patient returns phone call, please transfer to RN.    Will continue to follow up.    Maura Hodges RN

## 2020-05-07 ENCOUNTER — VIRTUAL VISIT (OUTPATIENT)
Dept: FAMILY MEDICINE | Facility: CLINIC | Age: 30
End: 2020-05-07
Payer: COMMERCIAL

## 2020-05-07 DIAGNOSIS — R30.0 DYSURIA: ICD-10-CM

## 2020-05-07 DIAGNOSIS — G47.00 INSOMNIA, UNSPECIFIED TYPE: ICD-10-CM

## 2020-05-07 DIAGNOSIS — R30.0 DYSURIA: Primary | ICD-10-CM

## 2020-05-07 LAB
BACTERIA: NORMAL
BILIRUBIN UR: NEGATIVE MG/DL
BLOOD UR: ABNORMAL MG/DL
CASTS: NORMAL /LPF
CRYSTAL URINE: NORMAL /LPF
EPITHELIAL CELLS UR: NORMAL /LPF (ref 0–2)
GLUCOSE URINE: NEGATIVE
KETONES UR QL: NEGATIVE MG/DL
LEUKOCYTE ESTERASE UR: NEGATIVE
MUCOUS URINE: NORMAL LPF
NITRITE UR QL STRIP: NEGATIVE MG/DL
PH UR STRIP: 7 [PH] (ref 4.5–8)
PROTEIN UR: NEGATIVE MG/DL
RBC URINE: NORMAL /HPF
SP GR UR STRIP: 1.02 (ref 1–1.03)
UROBILINOGEN UR STRIP-ACNC: 0.2 E.U./DL
WBC URINE: NORMAL /HPF

## 2020-05-07 ASSESSMENT — ENCOUNTER SYMPTOMS
ABDOMINAL PAIN: 1
SLEEP DISTURBANCE: 1
CONSTIPATION: 0
SORE THROAT: 0
CHILLS: 0
FATIGUE: 1
SHORTNESS OF BREATH: 0
DYSURIA: 1
HEADACHES: 1
LIGHT-HEADEDNESS: 1
COUGH: 0
HEMATURIA: 0
DIARRHEA: 1
DIZZINESS: 0
NAUSEA: 0
BLOOD IN STOOL: 0
FEVER: 0
VOMITING: 0

## 2020-05-07 NOTE — LETTER
"May 7, 2020      Emelyn Marmolejo  [address]  [place] [address]        Dear Emelyn,    Thank you for getting your care at [facility]. Please see below for your test results.    Resulted Orders   Urine Microscopic (UA) ([facility])   Result Value Ref Range    WBC Urine None <5 /hpf    RBC Urine 2-5 <5 /hpf    Epithelial Cells UR None 0 - 2 /lpf    Mucous Urine None NONE lpf    Casts Urine None NONE /lpf    Crystal Urine None NONE /lpf    Bacteria Wet Prep None None       {[facility] FOLLOW UP LTR:[id number]::\"If you have any concerns about these results please call and leave a message for me or send a [employer] message to the clinic.\"}    Sincerely,    Magaly Bell MD    "

## 2020-05-07 NOTE — LETTER
"5/7/2020      RE: Ebergillian Jaleel  [address]  [place] [address]       Family Medicine Telephone Visit Note         Telephone Visit Consent   Patient was verbally read the following and verbal consent was obtained.    \"Telephone visits are billed at different rates depending on your insurance coverage. During this emergency period, for some insurers they may be billed the same as an in-person visit.  Please reach out to your insurance provider with any questions.  If during the course of the call the physician/provider feels a telephone visit is not appropriate, you will not be charged for this service.\"    Name person giving consent:  Patient   Date verbal consent given:  5/7/2020  Time verbal consent given:  2:21 PM      Chief Complaint   Patient presents with     RECHECK     sharp stomach pain-  pt states sx's have been going on for 4 to 3 days bottom of belly button. Some cramping some slight headache pain., loosing some sleep.       Due to patient being non-English speaking/uses sign language, an  was used for this visit. Only for face-to-face interpretation by an external agency, date and length of interpretation can be found on the scanned worksheet.     name: Barbie**  Agency: [employer]  Language: [employer]   Telephone number: [phone]  Type of interpretation: Telephone, spoken         HPI   Patients name: Emelyn  Appointment start time:  2:32 PM    Patient is a 30 year old male with past medical history of renal stone leading to left renal atrophy who has a telephone visit today to discuss abdominal pain with dysuria and trouble sleeping.     Abdominal pain:   Patient notes sharp, intermittent abdominal pain in the suprapubic region, which lasts about 1 minute of time and comes on about every couple hours, for the past 4 days.  He has associated dysuria.  He denies any hematuria.  He states that the dysuria is similar to prior urinary tract infections, but the " abdominal pain is new.  He denies any fevers with this.  He does have concern for urinary tract infection, given the fact that he only has 1 functioning kidney and is worried about this 1 failing.  He was recently treated with 3 weeks of doxycycline for prostatitis, finishing his course of couple weeks ago.  He has also had ongoing gassiness and diarrhea, which have been present for much longer than the pain has been, and he states this is most likely related to the H. pylori he has been diagnosed with.  He has not started treatment for that yet as it is currently Ramadan and he does not want to take his medications twice daily during Ramadan.  He denies any new sexual partners.    Insomnia:  Patient is also noting that he has had trouble sleeping for the past week.  Prior to coronavirus [place], he was going to bed between 9:51 PM and waking up at 5 AM.  However, he has not been working due to the coronavirus, and has not set a specific schedule for himself.  He therefore wakes at various times, and does not have a set sleep schedule right now.  He has not tried any environmental sleep hygiene changes.  He is reporting he feels he only gets about 1 hour of sleep every night.  He does not note anything additionally stressful other than the changes due to coronavirus.      Current Outpatient Medications   Medication Sig Dispense Refill     acetaminophen (TYLENOL) 325 MG tablet Take 325-650 mg by mouth       amoxicillin (AMOXIL) 500 MG capsule Take 2 capsules (1,000 mg) by mouth 2 times daily for 14 days (Patient not taking: Reported on 5/1/2020) 56 capsule 0     cetirizine (ZYRTEC) 10 MG tablet        clarithromycin (BIAXIN) 500 MG tablet Take 1 tablet (500 mg) by mouth 2 times daily for 14 days (Patient not taking: Reported on 5/1/2020) 28 tablet 0     famotidine (PEPCID) 20 MG tablet Take 1 tablet (20 mg) by mouth nightly as needed (for bloating) (Patient not taking: Reported on 5/7/2020) 60 tablet 1     lidocaine  "(LIDODERM) 5 % patch Apply 1 patch to painful area of skin for up to 12 hours within a 24-hour period.       omeprazole (PRILOSEC) 20 MG DR capsule Take 1 capsule (20 mg) by mouth 2 times daily for 14 days (Patient not taking: Reported on 5/1/2020) 28 capsule 0     tamsulosin (FLOMAX) 0.4 MG capsule Take 0.4 mg by mouth       No Known Allergies           Review of Systems:     Review of Systems   Constitutional: Positive for fatigue. Negative for chills and fever.   HENT: Negative for congestion and sore throat.    Respiratory: Negative for cough and shortness of breath.    Cardiovascular: Negative for chest pain.   Gastrointestinal: Positive for abdominal pain and diarrhea. Negative for blood in stool, constipation, nausea and vomiting.   Genitourinary: Positive for dysuria. Negative for discharge, hematuria, scrotal swelling and testicular pain.   Neurological: Positive for light-headedness (right before bed) and headaches. Negative for dizziness.   Psychiatric/Behavioral: Positive for sleep disturbance (insomnia).              Physical Exam:     There were no vitals taken for this visit.  Estimated body mass index is 21.35 kg/m  as calculated from the following:    Height as of 3/13/20: 1.76 m (5' 9.29\").    Weight as of 3/13/20: 66.1 kg (145 lb 12.8 oz).    Exam:  Constitutional: healthy, alert and no distress  Psychiatric: mentation appears normal and affect normal/bright    Diagnostic Test Results:  Labs reviewed in Epic  Results for orders placed or performed in visit on 05/07/20 (from the past 24 hour(s))   Urine Microscopic (UA) ([facility])   Result Value Ref Range    WBC Urine None <5 /hpf    RBC Urine 2-5 <5 /hpf    Epithelial Cells UR None 0 - 2 /lpf    Mucous Urine None NONE lpf    Casts Urine None NONE /lpf    Crystal Urine None NONE /lpf    Bacteria Wet Prep None None             Assessment and Plan   Emelyn was seen today for recheck.    Diagnoses and all orders for this visit:    Dysuria  Patient has " had sharp suprapubic abdominal pain intermittently, about every couple hours, which lasts about 1 minute, for the past 4 days.  He was recently treated with 3 weeks of doxycycline for prostatitis.  He states this pain is different from when he had prostatitis.  He has significant concerned that pain and dysuria he is feeling is related to a urinary tract infection, and is especially worried since he has only 1 functioning kidney.  He has not felt febrile.  When he palpates his abdomen, he is reporting pain with palpation of the suprapubic region.  He denies any CVA tenderness on the right side, though it is not known if he was performing this correctly.  We will obtain a urine sample today with a culture to rule out urinary tract infection.  If this is negative, symptoms could be related to ongoing prostatitis, bowel etiology, renal stone, or other etiology.  He denies any recent sexual partners, and did have negative gonorrhea and chlamydia a little over a month ago.  -     Urinalysis, Micro If (UA) ([facility]); Future  -     Urine Culture Aerobic Bacterial  -     Urine Microscopic (UA) ([facility])    Insomnia  Patient is reporting difficulty sleeping.  Since he has been off of work due to the coronavirus, he has not kept a schedule for himself.  He states he has not been able to sleep well for the last week, stating that he only sleeps an hour or so at night.  He is also not tried any sleep hygiene pieces.  He is given 2 different documents with sleep hygiene information, one on the AVS, and one found on the Internet with English translation.  He continues to have issues after sitting is scheduled for himself, and using the other sleep hygiene pieces, he should we discussed this with a provider here at [facility].    Refilled medications that would be required in the next 3 months.     After Visit Information:  Will be printed and picked up at     No follow-ups on file.    Appointment end time: 3:03 PM  This  is a telephone visit that took 31 minutes.      Clinician location:  [facility]    Magaly Bell MD  I precepted today with Dr. Ish Bhatia.      Preceptor Attestation:   I talked to the patient on the phone. I discussed the patient with the resident. I have verified the content of the note, which accurately reflects my assessment of the patient and the plan of care.   Supervising Physician:  Ish Bhatia MD.    Magaly Bell MD

## 2020-05-07 NOTE — PATIENT INSTRUCTIONS
Here is the plan from today's visit    1. Dysuria  We will check your urine today. If there is an infection, we will send a prescription for medication. If not, then we have to figure out another plan.   - Urinalysis, Micro If (UA) ([facility]); Future    Insomnia  I think your trouble sleeping may be because you haven't set a good schedule for yourself. I will attach information about sleep for you. Make a schedule and stick to it. It is easy to let the hours go by without a plan while you are off of work. Stick to a schedule and you may have an easier time sleeping.    Please call or return to clinic if your symptoms don't go away.    Follow up plan  Please make a clinic appointment for follow up with your primary physician Ramos Hanson MD as needed.    Thank you for coming to [facility] today.  Lab Testing:  **If you had lab testing today and your results are reassuring or normal they will be mailed to you or sent through [employer] within 7 days.   **If the lab tests need quick action we will call you with the results.  The phone number we will call with results is # [phone] (home) . If this is not the best number please call our clinic and change the number.  Medication Refills:  If you need any refills please call your pharmacy and they will contact us.   If you need to  your refill at a new pharmacy, please contact the new pharmacy directly. The new pharmacy will help you get your medications transferred faster.   Scheduling:  If you have any concerns about today's visit or wish to schedule another appointment please call our office during normal business hours [phone] (8-5:00 M-F)  If a referral was made to a [facility] and you don't get a call from central scheduling please call [phone].  If a Mammogram was ordered for you at [facility] call [phone] to schedule or change your appointment.  If you had an XRay/CT/Ultrasound/MRI ordered the number  "is [phone] to schedule or change your radiology appointment.   Medical Concerns:  If you have urgent medical concerns please call [phone] at any time of the day.    Magaly Bell MD    Patient Education     Tips for Sleep Hygiene  \"Sleep hygiene\" means having good sleep habits.Follow these tips to sleep better at night:     Get on a schedule. Go to bed and get up at about the same time every day.    Listen to your body. Only try to sleep when you actually feel tired or sleepy.    Be patient. If you haven't been able to get to sleep after about 30 minutes or more, get up and do something calming or boring until you feel sleepy. Then return to bed and try again.    Don't have caffeine (coffee, tea, cola drinks, chocolate and some medicines), alcohol or nicotine (cigarettes). These can make it harder for you to fall asleep and stay asleep.    Use your bed for sleeping only. That means no TV, computer or homework in bed, especially during the evening and before bedtime.    Don't nap during the day. If you must nap, make sure it is for less than 20 minutes.    Create sleep rituals that remind your body it is time to sleep. Examples include breathing exercises, stretching or reading a book.    Avoid all electronic media (smart phone, computer, tablet) within 2 hours of bed time. The \"blue light\" in these devices activates the part of the brain that keeps you awake.    Dim the lights at night.    Get early morning sources of light (walk in the sunshine) to help set sleep patterns at night.    Try a bath or shower before bed. Having a warm bath 1 to 2 hours before bedtime can help you feel sleepy. Hot baths can make you alert, so be mindful of the temperature.    Don't watch the clock. Checking the clock during the night can wake you up. It can also lead to negative thoughts such as, \"I will never fall asleep,\" which can increase anxiety and sleeplessness.    Use a sleep diary. Track your sleep schedule to " know your sleep patterns and to see where you can improve.    Get regular exercise every day. Try not to do heavy exercise in the 4 hours before bedtime.    Eat a healthy, balanced diet.    Try eating a light, healthy snack before bed, but avoid eating a heavy meal.    Create the right sleeping area. A cool, dark, quiet room is best. If needed, try earplugs, fans and blackout curtains.    Keep your daytime routine the same even if you have a bad night sleep. Avoiding activities the next day can make it harder to sleep.  For informational purposes only. Not to replace the advice of your health care provider.   Copyright   2013 [employer]. All rights reserved. [employer] 515159 - 01/16.  For informational purposes only. Not to replace the advice of your health care provider.  Copyright   2018 [employer]. All rights reserved.

## 2020-05-07 NOTE — PROGRESS NOTES
Preceptor Attestation:   I talked to the patient on the phone. I discussed the patient with the resident. I have verified the content of the note, which accurately reflects my assessment of the patient and the plan of care.   Supervising Physician:  Ish Bhatia MD.

## 2020-05-07 NOTE — PROGRESS NOTES
"Family Medicine Telephone Visit Note         Telephone Visit Consent   Patient was verbally read the following and verbal consent was obtained.    \"Telephone visits are billed at different rates depending on your insurance coverage. During this emergency period, for some insurers they may be billed the same as an in-person visit.  Please reach out to your insurance provider with any questions.  If during the course of the call the physician/provider feels a telephone visit is not appropriate, you will not be charged for this service.\"    Name person giving consent:  Patient   Date verbal consent given:  5/7/2020  Time verbal consent given:  2:21 PM      Chief Complaint   Patient presents with     RECHECK     sharp stomach pain-  pt states sx's have been going on for 4 to 3 days bottom of belly button. Some cramping some slight headache pain., loosing some sleep.       Due to patient being non-English speaking/uses sign language, an  was used for this visit. Only for face-to-face interpretation by an external agency, date and length of interpretation can be found on the scanned worksheet.     name: Barbie**  Agency: [employer]  Language: Scottish   Telephone number: [phone]  Type of interpretation: Telephone, spoken         HPI   Patients name: Emelyn  Appointment start time:  2:32 PM    Patient is a 30 year old male with past medical history of renal stone leading to left renal atrophy who has a telephone visit today to discuss abdominal pain with dysuria and trouble sleeping.     Abdominal pain:   Patient notes sharp, intermittent abdominal pain in the suprapubic region, which lasts about 1 minute of time and comes on about every couple hours, for the past 4 days.  He has associated dysuria.  He denies any hematuria.  He states that the dysuria is similar to prior urinary tract infections, but the abdominal pain is new.  He denies any fevers with this.  He does have concern for urinary " tract infection, given the fact that he only has 1 functioning kidney and is worried about this 1 failing.  He was recently treated with 3 weeks of doxycycline for prostatitis, finishing his course of couple weeks ago.  He has also had ongoing gassiness and diarrhea, which have been present for much longer than the pain has been, and he states this is most likely related to the H. pylori he has been diagnosed with.  He has not started treatment for that yet as it is currently Ramadan and he does not want to take his medications twice daily during Ramadan.  He denies any new sexual partners.    Insomnia:  Patient is also noting that he has had trouble sleeping for the past week.  Prior to coronavirus [place], he was going to bed between 9:51 PM and waking up at 5 AM.  However, he has not been working due to the coronavirus, and has not set a specific schedule for himself.  He therefore wakes at various times, and does not have a set sleep schedule right now.  He has not tried any environmental sleep hygiene changes.  He is reporting he feels he only gets about 1 hour of sleep every night.  He does not note anything additionally stressful other than the changes due to coronavirus.      Current Outpatient Medications   Medication Sig Dispense Refill     acetaminophen (TYLENOL) 325 MG tablet Take 325-650 mg by mouth       amoxicillin (AMOXIL) 500 MG capsule Take 2 capsules (1,000 mg) by mouth 2 times daily for 14 days (Patient not taking: Reported on 5/1/2020) 56 capsule 0     cetirizine (ZYRTEC) 10 MG tablet        clarithromycin (BIAXIN) 500 MG tablet Take 1 tablet (500 mg) by mouth 2 times daily for 14 days (Patient not taking: Reported on 5/1/2020) 28 tablet 0     famotidine (PEPCID) 20 MG tablet Take 1 tablet (20 mg) by mouth nightly as needed (for bloating) (Patient not taking: Reported on 5/7/2020) 60 tablet 1     lidocaine (LIDODERM) 5 % patch Apply 1 patch to painful area of skin for up to 12 hours within a  "24-hour period.       omeprazole (PRILOSEC) 20 MG DR capsule Take 1 capsule (20 mg) by mouth 2 times daily for 14 days (Patient not taking: Reported on 5/1/2020) 28 capsule 0     tamsulosin (FLOMAX) 0.4 MG capsule Take 0.4 mg by mouth       No Known Allergies           Review of Systems:     Review of Systems   Constitutional: Positive for fatigue. Negative for chills and fever.   HENT: Negative for congestion and sore throat.    Respiratory: Negative for cough and shortness of breath.    Cardiovascular: Negative for chest pain.   Gastrointestinal: Positive for abdominal pain and diarrhea. Negative for blood in stool, constipation, nausea and vomiting.   Genitourinary: Positive for dysuria. Negative for discharge, hematuria, scrotal swelling and testicular pain.   Neurological: Positive for light-headedness (right before bed) and headaches. Negative for dizziness.   Psychiatric/Behavioral: Positive for sleep disturbance (insomnia).              Physical Exam:     There were no vitals taken for this visit.  Estimated body mass index is 21.35 kg/m  as calculated from the following:    Height as of 3/13/20: 1.76 m (5' 9.29\").    Weight as of 3/13/20: 66.1 kg (145 lb 12.8 oz).    Exam:  Constitutional: healthy, alert and no distress  Psychiatric: mentation appears normal and affect normal/bright    Diagnostic Test Results:  Labs reviewed in Epic  Results for orders placed or performed in visit on 05/07/20 (from the past 24 hour(s))   Urine Microscopic (UA) ([facility])   Result Value Ref Range    WBC Urine None <5 /hpf    RBC Urine 2-5 <5 /hpf    Epithelial Cells UR None 0 - 2 /lpf    Mucous Urine None NONE lpf    Casts Urine None NONE /lpf    Crystal Urine None NONE /lpf    Bacteria Wet Prep None None             Assessment and Plan   Emelyn was seen today for recheck.    Diagnoses and all orders for this visit:    Dysuria  Patient has had sharp suprapubic abdominal pain intermittently, about every couple hours, which " lasts about 1 minute, for the past 4 days.  He was recently treated with 3 weeks of doxycycline for prostatitis.  He states this pain is different from when he had prostatitis.  He has significant concerned that pain and dysuria he is feeling is related to a urinary tract infection, and is especially worried since he has only 1 functioning kidney.  He has not felt febrile.  When he palpates his abdomen, he is reporting pain with palpation of the suprapubic region.  He denies any CVA tenderness on the right side, though it is not known if he was performing this correctly.  We will obtain a urine sample today with a culture to rule out urinary tract infection.  If this is negative, symptoms could be related to ongoing prostatitis, bowel etiology, renal stone, or other etiology.  He denies any recent sexual partners, and did have negative gonorrhea and chlamydia a little over a month ago.  -     Urinalysis, Micro If (UA) ([facility]); Future  -     Urine Culture Aerobic Bacterial  -     Urine Microscopic (UA) ([facility])    Insomnia  Patient is reporting difficulty sleeping.  Since he has been off of work due to the coronavirus, he has not kept a schedule for himself.  He states he has not been able to sleep well for the last week, stating that he only sleeps an hour or so at night.  He is also not tried any sleep hygiene pieces.  He is given 2 different documents with sleep hygiene information, one on the AVS, and one found on the Internet with Honduran translation.  He continues to have issues after sitting is scheduled for himself, and using the other sleep hygiene pieces, he should we discussed this with a provider here at [facility].    Refilled medications that would be required in the next 3 months.     After Visit Information:  Will be printed and picked up at     No follow-ups on file.    Appointment end time: 3:03 PM  This is a telephone visit that took 31 minutes.      Clinician location:   John Bell MD  I precepted today with Dr. Ish Bhatia.

## 2020-05-08 LAB
BACTERIA SPEC CULT: NO GROWTH
Lab: NORMAL
SPECIMEN SOURCE: NORMAL

## 2020-05-14 ENCOUNTER — VIRTUAL VISIT (OUTPATIENT)
Dept: FAMILY MEDICINE | Facility: CLINIC | Age: 30
End: 2020-05-14
Payer: COMMERCIAL

## 2020-05-14 DIAGNOSIS — Z11.4 SCREENING FOR HIV (HUMAN IMMUNODEFICIENCY VIRUS): ICD-10-CM

## 2020-05-14 DIAGNOSIS — M25.511 ACUTE PAIN OF BOTH SHOULDERS: ICD-10-CM

## 2020-05-14 DIAGNOSIS — M25.512 ACUTE PAIN OF BOTH SHOULDERS: ICD-10-CM

## 2020-05-14 DIAGNOSIS — R30.0 DYSURIA: Primary | ICD-10-CM

## 2020-05-14 DIAGNOSIS — Z87.442 HISTORY OF RENAL STONE: ICD-10-CM

## 2020-05-14 DIAGNOSIS — A04.8 H. PYLORI INFECTION: ICD-10-CM

## 2020-05-14 DIAGNOSIS — N26.1 RENAL ATROPHY, LEFT: ICD-10-CM

## 2020-05-14 RX ORDER — CAPSAICIN 0.025 %
1 CREAM (GRAM) TOPICAL 3 TIMES DAILY PRN
Qty: 120 G | Refills: 1 | Status: SHIPPED | OUTPATIENT
Start: 2020-05-14 | End: 2020-05-15

## 2020-05-14 NOTE — PROGRESS NOTES
"Family Medicine Telephone Visit Note           Telephone Visit Consent   Patient was verbally read the following and verbal consent was obtained.    \"Telephone visits are billed at different rates depending on your insurance coverage. During this emergency period, for some insurers they may be billed the same as an in-person visit.  Please reach out to your insurance provider with any questions.  If during the course of the call the physician/provider feels a telephone visit is not appropriate, you will not be charged for this service.\"    Name person giving consent:  Patient   Date verbal consent given:  5/14/2020  Time verbal consent given:  3:45 PM     Chief Complaint   Patient presents with     Dysuria     Patient reports having pain with urination for the past 2 weeks     Pain     Patient states they have been having lower back back and right shoulder pain. Patient states both shoulders now are in pain. All symptoms started last week at the same time     Insomnia     Patient states they have been having issues with staying sleep. Has been an issue for the past 2 weeks. They state they wake up out of no where and can't go back to sleep.      Due to patient being non-English speaking/uses sign language, an  was used for this visit. Only for face-to-face interpretation by an external agency, date and length of interpretation can be found on the scanned worksheet.     name: Grace Gil  Agency: [employer]  Language: Libyan   Telephone number: [phone]  Type of interpretation: Telemedicine, spoken         HPI   Patients name: Emelyn  Appointment start time:  4:02 PM  Patient is a 30 year old male with past medical history of renal stone leading to left renal atrophy who has a telephone visit today to discuss abdominal pain with dysuria and shoulder pain    Dysuria and Low back pain:  Patient was seen via telephone visit on May 7.  At that time had urinalysis with urine culture that was " "negative other than blood in urine.  Patient was recommended to increase fluid intake.  Patient notes he has continued low back pain and dysuria with urination.  He states that dysuria is similar to prior urinary tract infections.  He denies fevers.  Because he only has 1 functioning kidney, history of stones which caused initial kidney atrophy, he is worried about his kidney function.  He notes that he has increased fluid intake, but did not take other recommended medications at this time.  Patient was recently treated with 3 weeks of doxycycline for prostatitis and finished a course a couple weeks ago.  Of note, patient was recently diagnosed with H. pylori on stool antigen test.  Was recommended to start treatment for this, but declines as he is fasting for Ramadan.  He denied new sexual partners.     Shoulder pain: Patient notes he has shoulder pain that is in both shoulders, symptoms started last week.  Has not tried any medications to help with this.  Has not had this type of pain before.    Current Outpatient Medications   Medication Sig Dispense Refill     acetaminophen (TYLENOL) 325 MG tablet Take 325-650 mg by mouth       capsaicin (ZOSTRIX) 0.025 % external cream Apply 1 g topically 3 times daily as needed (pain) 120 g 1     No Known Allergies           Review of Systems:     Constitutional, HEENT, cardiovascular, pulmonary, gi and gu systems are negative, except as otherwise noted.         Physical Exam:     There were no vitals taken for this visit.  Estimated body mass index is 21.35 kg/m  as calculated from the following:    Height as of 3/13/20: 1.76 m (5' 9.29\").    Weight as of 3/13/20: 66.1 kg (145 lb 12.8 oz).    Exam:  Constitutional: alert and no distress  Psychiatric: affect normal/bright  Resp: Speaking in full sentences, breathing well on exam.    Results from last visit:  Orders Only on 05/07/2020   Component Date Value Ref Range Status     Specific Gravity Urine 05/07/2020 1.020  1.005 - " 1.030 Corrected     pH Urine 05/07/2020 7.0  4.5 - 8.0 Corrected     Leukocyte Esterase UR 05/07/2020 NEGATIVE  NEGATIVE Corrected     Nitrite Urine 05/07/2020 NEGATIVE  NEGATIVE mg/dL Corrected     Protein UR 05/07/2020 NEGATIVE  NEGATIVE mg/dL Corrected     Glucose Urine 05/07/2020 NEGATIVE  NEGATIVE Corrected     Ketones Urine 05/07/2020 NEGATIVE  NEGATIVE mg/dL Corrected     Urobilinogen mg/dL 05/07/2020 0.2  E.U./dL Corrected     Bilirubin UR 05/07/2020 NEGATIVE  NEGATIVE mg/dL Corrected     Blood UR 05/07/2020 1+* NEGATIVE mg/dL Corrected           Assessment and Plan   Emelyn was seen today for dysuria, pain and insomnia.    Diagnoses and all orders for this visit:    Dysuria  -     Cancel: Urinalysis, Micro If (UA) ([facility]); Future  -     Basic Metabolic Panel ([facility]); Future  Renal atrophy, left  -     UROLOGY ADULT REFERRAL - INTERNAL  -     Cancel: Urinalysis, Micro If (UA) ([facility]); Future  -     Basic Metabolic Panel ([facility]); Future  History of renal stone    Patient is a 30 year old male with past medical history of renal stone leading to left renal atrophy who has a telephone visit today to discuss.  Concerns.  Recently had visit on 5 7 which showed urinalysis with blood, microscopic with less than 5 RBCs, and negative urine culture.  Renal stone is certainly in the differential considering complex history which led to left renal atrophy.  Patient was supposed to have follow-up with urology but this was delayed due to the pandemic.  Had discussions about left nephrectomy given symptoms, and would like to reinitiate that discussion today.  Referred to urology for follow-up which could certainly be virtual.  Patient is very concerned about kidney function and would like retesting of creatinine.  I believe this is reasonable, and will place future orders for laboratories.  Physical exam would also certainly help differentiate dysuria.  Renal stone high the differential, less likely but not  excluded could be recurrence of cystitis versus pyelonephritis given low back symptoms.  Could also be STI related, but patient has negative gonorrhea and Chlamydia testing in March that was negative and denies any sexual contacts.  Does have a recent history of prostatitis and was treated with 3 weeks of doxycycline.  Could consider additional 2 weeks of doxycycline for additional prostatitis coverage, but would rather prefer ALONDRA to assess for tenderness first.  Will discuss with provider tomorrow to consider repeat urinalysis versus repeat gonorrhea chlamydia testing versus additional blood testing in addition to future BMP ordered tomorrow.    Acute pain of both shoulders  -     capsaicin (ZOSTRIX) 0.025 % external cream; Apply 1 g topically 3 times daily as needed (pain)  Patient with acute pain of both shoulders, certainly musculoskeletal in nature.  May be related to overuse versus muscle tension secondary to anxiety and stress.  Will treat with topical medication.  Patient wants to avoid pills at all possible.  Will consider future osteopathic manipulation versus physical therapy.    H. pylori infection  Patient currently declines initiation of treatment for H. pylori until after Ramadan.    Screening for HIV (human immunodeficiency virus)  -     HIV Antigen Antibody Combo; Future    If patient continues to have pain by early next week despite increasing fluid intake and improving sleep, highly consider 2 additional weeks of doxycycline given that this could be related to prostatitis.     After Visit Information:  Patient declined AVS     Return in 1 day (on 5/15/2020) for Lab Visit, In Person Office Visit.    Appointment end time: 4:26 PM  This is a telephone visit that took 24 minutes.    Clinician location:  John Doyle DO  I precepted today with Liliana Ward MD.

## 2020-05-14 NOTE — Clinical Note
FYMATT, this patient is on your schedule for 820 on Fridays 5/15.  Had a telephone visit with him today and she now had a telephone visit with him last week.  He has 1 solitary working kidney.  Will have a BMP in the morning to make sure his kidney function stable.  Is supposed to have a nephrectomy with nephrology to remove his nonworking left kidney which failed secondary to atrophy from renal stones.    I will be here in the morning if he wanted talk, but wanted you to see ahead of time.  Not sure about repeating a UA since there was one last week.  Might be useful to repeat a gonorrhea chlamydia?  That was negative in March.

## 2020-05-14 NOTE — PROGRESS NOTES
Preceptor Attestation:   I talked to the patient on the phone. I discussed the patient with the resident. I have verified the content of the note, which accurately reflects my assessment of the patient and the plan of care.   Supervising Physician:  Liliana Ward MD.

## 2020-05-15 ENCOUNTER — OFFICE VISIT (OUTPATIENT)
Dept: FAMILY MEDICINE | Facility: CLINIC | Age: 30
End: 2020-05-15
Payer: COMMERCIAL

## 2020-05-15 VITALS
TEMPERATURE: 97.7 F | OXYGEN SATURATION: 99 % | BODY MASS INDEX: 21.38 KG/M2 | DIASTOLIC BLOOD PRESSURE: 88 MMHG | SYSTOLIC BLOOD PRESSURE: 131 MMHG | HEART RATE: 79 BPM | WEIGHT: 146 LBS

## 2020-05-15 DIAGNOSIS — Z11.4 SCREENING FOR HIV (HUMAN IMMUNODEFICIENCY VIRUS): ICD-10-CM

## 2020-05-15 DIAGNOSIS — Z13.9 SCREENING FOR CONDITION: Primary | ICD-10-CM

## 2020-05-15 DIAGNOSIS — A04.8 H. PYLORI INFECTION: ICD-10-CM

## 2020-05-15 DIAGNOSIS — N26.1 RENAL ATROPHY, LEFT: ICD-10-CM

## 2020-05-15 LAB
ANION GAP SERPL CALCULATED.3IONS-SCNC: 7 MMOL/L (ref 3–14)
BUN SERPL-MCNC: 11 MG/DL (ref 7–30)
CALCIUM SERPL-MCNC: 9.4 MG/DL (ref 8.5–10.1)
CHLORIDE SERPL-SCNC: 102 MMOL/L (ref 94–109)
CO2 SERPL-SCNC: 27 MMOL/L (ref 20–32)
CREAT SERPL-MCNC: 0.91 MG/DL (ref 0.66–1.25)
CRP SERPL-MCNC: 8.2 MG/L (ref 0–8)
GFR SERPL CREATININE-BSD FRML MDRD: >90 ML/MIN/{1.73_M2}
GLUCOSE SERPL-MCNC: 93 MG/DL (ref 70–99)
POTASSIUM SERPL-SCNC: 3.9 MMOL/L (ref 3.4–5.3)
SODIUM SERPL-SCNC: 137 MMOL/L (ref 133–144)

## 2020-05-15 ASSESSMENT — ENCOUNTER SYMPTOMS
SLEEP DISTURBANCE: 1
COUGH: 0
ARTHRALGIAS: 0
UNEXPECTED WEIGHT CHANGE: 0
DYSURIA: 1
NAUSEA: 0
CONFUSION: 0
DIFFICULTY URINATING: 0
NECK PAIN: 0
BACK PAIN: 0
FEVER: 0
NERVOUS/ANXIOUS: 1
HEADACHES: 0
CHILLS: 0
MYALGIAS: 0
ABDOMINAL PAIN: 1
ABDOMINAL DISTENTION: 0
APPETITE CHANGE: 0
SHORTNESS OF BREATH: 0
DIARRHEA: 0

## 2020-05-15 NOTE — NURSING NOTE
Due to patient being non-English speaking/uses sign language, an  was used for this visit. Only for face-to-face interpretation by an external agency, date and length of interpretation can be found on the scanned worksheet.     name: Grace Gil  Language: Faroese  Agency: [employer]  Phone number:   Type of interpretation: Telephone, spoken        Danielle Jain CMA on 5/15/2020 at 8:38 AM

## 2020-05-15 NOTE — PATIENT INSTRUCTIONS
1. Tell the  to make me  Mock your PCP since Dr. Smith. You're going to see me again in early June     2. Since you like phone calls I will call you with a regimen from infectious disease to start on May 24th.     3. If there is an infection in urine I will call you for that s well.      4. During our next visit I want to see if your upper belly pain is better, talk about sleep and anxiety.       Thank you for coming to [facility].    **If you had lab testing today and your results are reassuring or normal they will be be mailed to you or sent to your MyChart and you will be notified by email within 7 days.   **If the lab tests need quick action we will call you with the results.  The phone number we will call with results is # [phone] (home)    (home) . If this is not the best number please call our clinic and change the number.  **If any referrals were ordered today you should be getting a call in the next week.  If you don't hear about this within a week please call one of the following numbers   If your referral is for P please call [phone]  If your referral is to outside [facility] please call the clinic number ([phone]) and ask for your team care coordinator.  If you need any refills please call your pharmacy and they will contact us.  If you have any concerns about today's visit or wish to schedule another appointment  please call our office during normal business hours  [phone] (8-5:00 M-F)  If you have urgent medical concerns please call [phone] at any time of the day.  If you have a medical emergency please call [phone]    Again thank you for choosing [facility] and please let us know how we can best partner with you to improve your and your family's health.

## 2020-05-15 NOTE — PROGRESS NOTES
HPI       Emelyn Marmolejo is a 30 year old  who presents for   Chief Complaint   Patient presents with     Follow Up     F/U from phone visit on 5/14/20 to complete labs.      Insomnia-see Dr. Bell note for more details from 5/7. Did not cover today as planned due to time spent on other.     H Pylori/ Abdominal pain- epigastric,very infrequent dysphagia. Deep pain in epigastrum. H pylori positive and delaying treatment due to Ramadan. SOemtimes worse w food but not always. Had biliary tree imaging that was unremarkable in Feb.     Dysuria, frequency, suprapubic pain- . Intermittent very sharp pain in suprapubic area. Occasional right flank pain. Chronic hematuria likely from left atrophic kidney. . Every time he urinates it is painful but the pain is usually more in suprapubic area. . Only penile feeling of dysuria is if he holds too long before urinating. '  -doxycycline took 2.5 of 4 weeks. Stopped it due to fatigue. While on doxy his urine symptoms did go away.       Very worried about right kidney. Wants to know if he has iinfection or stone in right kidney. Missed 2 CT scans to see about right renal stone. No stone since Feb CT.         A [employer]  was used for  this visit.        Problem, Medication and Allergy Lists were reviewed and updated if needed..    Patient is an established patient of this clinic..         Review of Systems:   Review of Systems   Constitutional: Negative for appetite change, chills, fever and unexpected weight change.   Respiratory: Negative for cough and shortness of breath.    Cardiovascular: Negative for chest pain.   Gastrointestinal: Positive for abdominal pain. Negative for abdominal distention, diarrhea and nausea.   Genitourinary: Positive for dysuria. Negative for decreased urine volume and difficulty urinating.   Musculoskeletal: Negative for arthralgias, back pain, myalgias and neck pain.   Neurological: Negative for headaches.   Psychiatric/Behavioral:  Positive for sleep disturbance. Negative for confusion and suicidal ideas. The patient is nervous/anxious.             Physical Exam:     Vitals:    05/15/20 0838   BP: 131/88   BP Location: Left arm   Patient Position: Sitting   Cuff Size: Adult Regular   Pulse: 79   Temp: 97.7  F (36.5  C)   TempSrc: Oral   SpO2: 99%   Weight: 66.2 kg (146 lb)     Body mass index is 21.38 kg/m .  Vitals were reviewed and were normal     Physical Exam  Constitutional:       General: He is not in acute distress.     Appearance: He is well-developed.   HENT:      Head: Normocephalic and atraumatic.   Eyes:      General:         Right eye: No discharge.         Left eye: No discharge.      Conjunctiva/sclera: Conjunctivae normal.   Pulmonary:      Effort: Pulmonary effort is normal. No respiratory distress.   Musculoskeletal:         General: No deformity.   Skin:     Coloration: Skin is not pale.      Findings: No erythema or rash.   Neurological:      Mental Status: He is alert and oriented to person, place, and time.      Coordination: Coordination normal.           Results:   Results are ordered and pending    Assessment and Plan        1. H. pylori infection       2. Screening for condition    - CRP inflammation  - Neisseria gonorrhoeae PCR  - Chlamydia trachomatis PCR  - Basic metabolic panel    3. Screening for HIV (human immunodeficiency virus)    - HIV Antigen Antibody Combo  - Basic metabolic panel    4. Renal atrophy, left    In regards to lower urinary symptoms do seem consistent with prostatitis given quick turn around with incomplete treatment. On May 24th will start quad therapy with doxycycline and addressing the fatigue separately vs doing a different quad therapy that includes an agent appropriate for his prostatitis. Would have a 1 month stool h pylori follow up to ensure eradication.     Would need 4-6 weeks total of doxycycline.    BMP today to ensure right kidney working well. G/C given still sexually active with  sx and doesn't always use protection (1 partner female). CRP given prostatitis       Pt with baseline anxiety/general mistrust towards medical system. Verbalizing how much he wanted to see one doctor if possible. I will be in clinic more often from June until February. Have switched to his PCP.         There are no discontinued medications.    Options for treatment and follow-up care were reviewed with the patient. Emelyn Marmolejo  engaged in the decision making process and verbalized understanding of the options discussed and agreed with the final plan.    Livan Castro MD

## 2020-05-15 NOTE — PROGRESS NOTES
Preceptor Attestation:   Patient seen, evaluated and discussed with the resident. I have verified the content of the note, which accurately reflects my assessment of the patient and the plan of care.   Supervising Physician:  Kwabena Burnham MD

## 2020-05-18 DIAGNOSIS — A04.8 H. PYLORI INFECTION: Primary | ICD-10-CM

## 2020-05-18 DIAGNOSIS — N41.0 ACUTE BACTERIAL PROSTATITIS: ICD-10-CM

## 2020-05-18 LAB — HIV 1+2 AB+HIV1 P24 AG SERPL QL IA: NONREACTIVE

## 2020-05-18 RX ORDER — DOXYCYCLINE 100 MG/1
100 CAPSULE ORAL 2 TIMES DAILY
Qty: 84 CAPSULE | Refills: 0 | Status: SHIPPED | OUTPATIENT
Start: 2020-05-18 | End: 2020-08-12

## 2020-05-18 RX ORDER — METRONIDAZOLE 500 MG/1
500 TABLET ORAL 2 TIMES DAILY
Qty: 28 TABLET | Refills: 0 | Status: SHIPPED | OUTPATIENT
Start: 2020-05-18 | End: 2020-08-12

## 2020-05-18 RX ORDER — NICOTINE POLACRILEX 4 MG/1
20 GUM, CHEWING ORAL 2 TIMES DAILY
Qty: 28 TABLET | Refills: 0 | Status: SHIPPED | OUTPATIENT
Start: 2020-05-18 | End: 2020-08-12

## 2020-05-18 NOTE — PROGRESS NOTES
Called [facility] normal lab results. Also discussed 4 drug regimen (bismuth quad therapy w doxy substitute for tetracycline and then extending doxy for prostate).  and coming to see me in person in 1 month,     Livan Castro MD

## 2020-06-11 ENCOUNTER — OFFICE VISIT (OUTPATIENT)
Dept: FAMILY MEDICINE | Facility: CLINIC | Age: 30
End: 2020-06-11
Payer: COMMERCIAL

## 2020-06-11 VITALS
DIASTOLIC BLOOD PRESSURE: 78 MMHG | BODY MASS INDEX: 19.77 KG/M2 | SYSTOLIC BLOOD PRESSURE: 130 MMHG | TEMPERATURE: 98.5 F | HEART RATE: 81 BPM | OXYGEN SATURATION: 99 % | WEIGHT: 135 LBS

## 2020-06-11 DIAGNOSIS — A04.8 H. PYLORI INFECTION: ICD-10-CM

## 2020-06-11 DIAGNOSIS — G89.29 CHRONIC RIGHT-SIDED THORACIC BACK PAIN: ICD-10-CM

## 2020-06-11 DIAGNOSIS — M54.6 CHRONIC RIGHT-SIDED THORACIC BACK PAIN: ICD-10-CM

## 2020-06-11 DIAGNOSIS — Z83.3 FAMILY HISTORY OF DIABETES MELLITUS: ICD-10-CM

## 2020-06-11 DIAGNOSIS — R35.89 POLYURIA: Primary | ICD-10-CM

## 2020-06-11 DIAGNOSIS — F06.4 ANXIETY DISORDER DUE TO GENERAL MEDICAL CONDITION: ICD-10-CM

## 2020-06-11 DIAGNOSIS — N41.9 PROSTATITIS, UNSPECIFIED PROSTATITIS TYPE: ICD-10-CM

## 2020-06-11 LAB — HBA1C MFR BLD: 5.4 % (ref 4.1–5.7)

## 2020-06-11 NOTE — PATIENT INSTRUCTIONS
1. Finish 2 weeks of the 3 medicines. and 4 weeks of the doxycycline., Come back to see me in 6 weeks and we will check on your weight and yout symptoms.     2. Call the number below to schedule physical therapy for your back and shoulders    3. Call you with appointment to set up therapy     Here is the plan from today's visit    Thank you for coming to [facility] today.  Lab Testing:  **If you had lab testing today and your results are reassuring or normal they will be mailed to you or sent through [employer] within 7 days.   **If the lab tests need quick action we will call you with the results.  The phone number we will call with results is # [phone] (home) . If this is not the best number please call our clinic and change the number.  Medication Refills:  If you need any refills please call your pharmacy and they will contact us.   If you need to  your refill at a new pharmacy, please contact the new pharmacy directly. The new pharmacy will help you get your medications transferred faster.   Scheduling:  If you have any concerns about today's visit or wish to schedule another appointment please call our office during normal business hours [phone] (8-5:00 M-F)  If a referral was made to a [facility] and you don't get a call from central scheduling please call [phone].  If a Mammogram was ordered for you at [facility] call [phone] to schedule or change your appointment.  If you had an XRay/CT/Ultrasound/MRI ordered the number is [phone] to schedule or change your radiology appointment.   Medical Concerns:  If you have urgent medical concerns please call [phone] at any time of the day.    Livan Castro MD

## 2020-06-11 NOTE — Clinical Note
Care coordination- could we call patient 6/19 and see if he started and if so how his antibiotics/medicines are going for H pylori. If he has questions or has not started them due to anxiety let me know and I will call him over the weekend. Also check in if he called for referral for physical therapy

## 2020-06-11 NOTE — NURSING NOTE
Due to patient being non-English speaking/uses sign language, an  was used for this visit. Only for face-to-face interpretation by an external agency, date and length of interpretation can be found on the scanned worksheet.     name: Grace Gil  Language: Guinean  Agency: [employer]  Phone number: [phone]  Type of interpretation: Face-to-face, spoken

## 2020-06-11 NOTE — PROGRESS NOTES
HPI       Emelyn Marmolejo is a 30 year old  who presents for   Chief Complaint   Patient presents with     Abdominal Pain     x 2 months abdominal pains and kidney stones       Right sided back pain still persists but over all a little better. Long term issue for him and tends to think it is from his kidney despite CT, BMPs, UA all being normal.     Epigastric Stomach pain persists. Didn't start his 4 drug therapy for H pylori  because was confused about drugs and how to take them. Using  today to answer all questions.     Prostatisis - similar didn't start 4 drug therapy. Still has occasional perineal discomofrt and dysuria. No pus, fevers, or change in symptoms. If anything these symptoms are milder compared to last visit.       Problem, Medication and Allergy Lists were      Patient Active Problem List    Diagnosis Date Noted     H. pylori infection 05/14/2020     Priority: Medium     History of renal stone 05/14/2020     Priority: Medium     Abnormal CT of the chest 10/09/2019     Priority: Medium     Shortness of breath 10/09/2019     Priority: Medium     Renal atrophy, left 08/21/2019     Priority: Medium     Renal stone 08/21/2019     Priority: Medium   ,     Current Outpatient Medications   Medication Sig Dispense Refill     acetaminophen (TYLENOL) 325 MG tablet Take 325-650 mg by mouth       doxycycline hyclate (VIBRAMYCIN) 100 MG capsule Take 1 capsule (100 mg) by mouth 2 times daily 84 capsule 0     omeprazole 20 MG tablet Take 1 tablet (20 mg) by mouth 2 times daily 28 tablet 0   ,   No Known Allergies.    Patient is an established patient of this clinic..         Review of Systems:   Review of Systems   Constitutional: Negative for appetite change, chills, fever and unexpected weight change.   Respiratory: Negative for cough and shortness of breath.    Cardiovascular: Negative for chest pain.   Gastrointestinal: Positive for abdominal pain and rectal pain. Negative for abdominal  distention, diarrhea and nausea.   Genitourinary: Positive for dysuria. Negative for decreased urine volume and difficulty urinating.   Musculoskeletal: Positive for back pain. Negative for arthralgias, myalgias and neck pain.   Neurological: Negative for headaches.            Physical Exam:     Vitals:    06/11/20 1549 06/11/20 1550   BP: (!) 126/90 130/78   Pulse: 81    Temp: 98.5  F (36.9  C)    TempSrc: Oral    SpO2: 99%    Weight: 61.2 kg (135 lb)      Body mass index is 19.77 kg/m .  Vitals were reviewed and were normal     Physical Exam  Constitutional:       General: He is not in acute distress.     Appearance: He is well-developed.   HENT:      Head: Normocephalic and atraumatic.   Eyes:      General:         Right eye: No discharge.         Left eye: No discharge.      Conjunctiva/sclera: Conjunctivae normal.   Pulmonary:      Effort: Pulmonary effort is normal. No respiratory distress.   Abdominal:      Comments: Back has thoracic and lumbar para muscular tightness. Flanks are bilterally non tender to palpation. No bony verterbral tenderness. Flexsion, extension, lateral rotation normal. SLR bilateral negative     Epigastrum mildly tendern to palpation. BS+, no organomegaly, rest of abd exam benign   Musculoskeletal:         General: No deformity.   Skin:     Coloration: Skin is not pale.      Findings: No erythema or rash.   Neurological:      Mental Status: He is alert and oriented to person, place, and time.      Coordination: Coordination normal.           Results:   Results are ordered and pending    Assessment and Plan      1. H. pylori infection      2. Polyuria    - Hemoglobin A1c ([place])    3. Family history of diabetes mellitus    - Hemoglobin A1c ([place])    4. Prostatitis, unspecified prostatitis type      Pt didn't start 4 drug treatment for H pylori that is modified with doxy with 2 additional weeks of doxy for prostatitis. Did bring all pills today and we went bottle by bottle  explaining how to take them and side effects. Re iterated these would be safe with his single kidney as well. Plan to start today.     Only new symptom from last time is possible polyuria but also is drinking more to prevent kidney stones. Does have strong hx of DM in multiple family members so A1c tested but only in pre diabetic range. Called patient to notify. Will continue to work on diet/exercise that has been harder to do with COVID.     Follow up in 6 weeks to ensure completion ofH pylori and doxy treatment. Will plan to re test H pylori at this time.     Will also send to PT for back pain and have behvaioral healthdiscuss anxiety related to health conditions, single kidney.        There are no discontinued medications.    Options for treatment and follow-up care were reviewed with the patient. Emelyn Marmolejo  engaged in the decision making process and verbalized understanding of the options discussed and agreed with the final plan.    Livan Castro MD

## 2020-06-17 ENCOUNTER — TELEPHONE (OUTPATIENT)
Dept: PSYCHOLOGY | Facility: CLINIC | Age: 30
End: 2020-06-17

## 2020-06-17 ENCOUNTER — TELEPHONE (OUTPATIENT)
Dept: FAMILY MEDICINE | Facility: CLINIC | Age: 30
End: 2020-06-17

## 2020-06-17 ASSESSMENT — ENCOUNTER SYMPTOMS
RECTAL PAIN: 1
ABDOMINAL PAIN: 1
NECK PAIN: 0
SHORTNESS OF BREATH: 0
DYSURIA: 1
UNEXPECTED WEIGHT CHANGE: 0
CHILLS: 0
COUGH: 0
APPETITE CHANGE: 0
FEVER: 0
BACK PAIN: 1
NAUSEA: 0
ARTHRALGIAS: 0
MYALGIAS: 0
DIARRHEA: 0
DIFFICULTY URINATING: 0
HEADACHES: 0
ABDOMINAL DISTENTION: 0

## 2020-06-17 NOTE — TELEPHONE ENCOUNTER
Referral - referred previously, wasn't able to get a hold of him.  Could you please try to schedule him again?  Either with Dandre or Santos. Thank you!

## 2020-06-19 ENCOUNTER — VIRTUAL VISIT (OUTPATIENT)
Dept: FAMILY MEDICINE | Facility: CLINIC | Age: 30
End: 2020-06-19
Payer: COMMERCIAL

## 2020-06-19 DIAGNOSIS — T37.8X5A: ICD-10-CM

## 2020-06-19 DIAGNOSIS — A04.8 H. PYLORI INFECTION: Primary | ICD-10-CM

## 2020-06-19 DIAGNOSIS — R11.0 NAUSEA: ICD-10-CM

## 2020-06-19 RX ORDER — LEVOFLOXACIN 500 MG/1
500 TABLET, FILM COATED ORAL DAILY
Qty: 12 TABLET | Refills: 0 | Status: SHIPPED | OUTPATIENT
Start: 2020-06-19 | End: 2020-08-12

## 2020-06-19 RX ORDER — ONDANSETRON 4 MG/1
4 TABLET, ORALLY DISINTEGRATING ORAL EVERY 8 HOURS PRN
Qty: 20 TABLET | Refills: 0 | Status: SHIPPED | OUTPATIENT
Start: 2020-06-19 | End: 2020-08-12

## 2020-06-19 RX ORDER — CLARITHROMYCIN 500 MG
500 TABLET ORAL 2 TIMES DAILY
Qty: 22 TABLET | Refills: 0 | Status: CANCELLED | OUTPATIENT
Start: 2020-06-19 | End: 2020-07-03

## 2020-06-19 NOTE — PROGRESS NOTES
"Family Medicine Telephone Visit Note               Telephone Visit Consent   Patient was verbally read the following and verbal consent was obtained.    \"Telephone visits are billed at different rates depending on your insurance coverage. During this emergency period, for some insurers they may be billed the same as an in-person visit.  Please reach out to your insurance provider with any questions.  If during the course of the call the physician/provider feels a telephone visit is not appropriate, you will not be charged for this service.\"    Name person giving consent:  Patient   Date verbal consent given:  6/19/2020  Time verbal consent given:  8:42 AM           Chief Complaint   Patient presents with     Recheck Medication            Due to patient being non-English speaking/uses sign language, an  was used for this visit. Only for face-to-face interpretation by an external agency, date and length of interpretation can be found on the scanned worksheet.     name: Randy Calderón  Agency: [employer]  Language: Mosotho   Telephone number: [phone]  Type of interpretation: Face-to-face, spoken               HPI   Patients name: Emelyn  Appointment start time:  0844    Has taken bismuth (pepto) before no problems, also taken doxy and PPI without issues. But since quad therapy started (new drug for his is flagyl) had quite a bit of nausea. Tried to push through but unable to keep taking it. Hoping for alternative treatment. Has done 3 full days, today is # 4    Current Outpatient Medications   Medication Sig Dispense Refill     acetaminophen (TYLENOL) 325 MG tablet Take 325-650 mg by mouth       doxycycline hyclate (VIBRAMYCIN) 100 MG capsule Take 1 capsule (100 mg) by mouth 2 times daily 84 capsule 0     omeprazole 20 MG tablet Take 1 tablet (20 mg) by mouth 2 times daily 28 tablet 0     No Known Allergies           Review of Systems:     Constitutional, HEENT, cardiovascular, pulmonary, gi and " "gu systems are negative, except as otherwise noted.         Physical Exam:     There were no vitals taken for this visit.  Estimated body mass index is 19.77 kg/m  as calculated from the following:    Height as of 3/13/20: 1.76 m (5' 9.29\").    Weight as of 6/11/20: 61.2 kg (135 lb).    Exam:  Constitutional: healthy, alert and no distress  Psychiatric: mentation appears normal and affect normal/bright            Assessment and Plan   1. H. pylori infection    - levofloxacin (LEVAQUIN) 500 MG tablet; Take 1 tablet (500 mg) by mouth daily  Dispense: 12 tablet; Refill: 0    2. Nausea    - ondansetron (ZOFRAN-ODT) 4 MG ODT tab; Take 1 tablet (4 mg) by mouth every 8 hours as needed for nausea  Dispense: 20 tablet; Refill: 0    3. Adverse effect of metronidazole, initial encounter      Pt with nausea preventing completion of doxy,flagyl,bismuth, PPI therapy for H pylori (atypical regimen is because also is taking doxy for 2 additional weeks due to prostatitis). Nausea very common side effect w flayl. No concerns of c diff no belly pain or diarrhea. Will sub levaquin 500 daily for flagyl. Gave zofran for mild nausea as he expresses wanting to be able to complete this regimen.     Livan Castro MD    Precepted today with Dr. Burnham       Refilled medications that would be required in the next 3 months.     After Visit Information:  Patient declined AVS     No follow-ups on file.    Appointment end time: 9:04 AM  This is a telephone visit that took  19 minutes.      Clinician location:  Kera Castro MD    "

## 2020-06-19 NOTE — PROGRESS NOTES
Preceptor Attestation:    I talked to the patient on the phone and discussed the patient with the resident. I have verified the content of the note, which accurately reflects my assessment of the patient and the plan of care.   Supervising Physician:  Kwabena Burnham MD.

## 2020-06-19 NOTE — NURSING NOTE
Due to patient being non-English speaking/uses sign language, an  was used for this visit. Only for face-to-face interpretation by an external agency, date and length of interpretation can be found on the scanned worksheet.     name: Randy Calderón  Agency: [employer]  Language: Egyptian   Telephone number: [phone]  Type of interpretation: Face-to-face, spoken

## 2020-06-23 ENCOUNTER — APPOINTMENT (OUTPATIENT)
Dept: INTERPRETER SERVICES | Facility: CLINIC | Age: 30
End: 2020-06-23
Payer: COMMERCIAL

## 2020-06-23 ENCOUNTER — DOCUMENTATION ONLY (OUTPATIENT)
Dept: FAMILY MEDICINE | Facility: CLINIC | Age: 30
End: 2020-06-23

## 2020-06-23 NOTE — PROGRESS NOTES
Pt walked into clinic wanting BP check. Pt stated he has a headache. Started yesterday. Denies blurry vision. Some nausea    BP 1210/85 asked for a recheck and was 110/70  HR 81  T 97.5  O2 99%  RR 18    RN stated to drink lots of water can try some tylenol. Stated he did eat some today. Also walked 30 minutes here to  medication. RN said if persists to schedule appt    Alfreda Thurman RN

## 2020-06-24 ENCOUNTER — TELEPHONE (OUTPATIENT)
Dept: UROLOGY | Facility: CLINIC | Age: 30
End: 2020-06-24

## 2020-06-24 ENCOUNTER — APPOINTMENT (OUTPATIENT)
Dept: INTERPRETER SERVICES | Facility: CLINIC | Age: 30
End: 2020-06-24
Payer: COMMERCIAL

## 2020-06-24 NOTE — TELEPHONE ENCOUNTER
Called patient using  services. Patient states he has left sided pain due to stone in left kidney. Patient also left kidney does not work. New pain on right side close to kidneys which usually happens after eating. Patient states pain is a 4/10. Denies fever and pain. Informed writer will send a message to Dr. Hedrick on how to move forward. Patient verbalized agreement and stated he would like an in-clinic appointment as soon as possible.    Kylee Mena LPN

## 2020-06-24 NOTE — TELEPHONE ENCOUNTER
[facility] Call Center    Phone Message    May a detailed message be left on voicemail: yes     Reason for Call: Symptoms or Concerns     If patient has red-flag symptoms, warm transfer to triage line    Current symptom or concern: Pt said that he previously had kidney pain on the left side but about 2 weeks ago his right kidney started to hurt and is getting worst.  Pt wanted to know if he can come in to see Dr. Hedrick in person.  Please call him back to discuss. Thanks    Symptoms have been present for:  2 week(s)    Has patient previously been seen for this? Yes    By : Dr. Hedrick    Date: 3/31/20    Are there any new or worsening symptoms? Yes: see above      Action Taken: Message routed to:  [facility] uro clinic    Travel Screening: Not Applicable

## 2020-06-25 ENCOUNTER — APPOINTMENT (OUTPATIENT)
Dept: INTERPRETER SERVICES | Facility: CLINIC | Age: 30
End: 2020-06-25
Payer: COMMERCIAL

## 2020-06-25 NOTE — TELEPHONE ENCOUNTER
Per Dr. Hedrick, patient to have in person visit. Assisted patient in scheduling visit with Dr. Hedrick for 6/30. Patient had no further questions.    Kylee Mena LPN

## 2020-06-30 ENCOUNTER — ANCILLARY PROCEDURE (OUTPATIENT)
Dept: ULTRASOUND IMAGING | Facility: CLINIC | Age: 30
End: 2020-06-30
Attending: UROLOGY
Payer: COMMERCIAL

## 2020-06-30 ENCOUNTER — OFFICE VISIT (OUTPATIENT)
Dept: UROLOGY | Facility: CLINIC | Age: 30
End: 2020-06-30
Payer: COMMERCIAL

## 2020-06-30 DIAGNOSIS — N26.1 ATROPHY OF LEFT KIDNEY: ICD-10-CM

## 2020-06-30 DIAGNOSIS — R10.11 RIGHT UPPER QUADRANT PAIN: ICD-10-CM

## 2020-06-30 DIAGNOSIS — R10.9 RIGHT FLANK PAIN: Primary | ICD-10-CM

## 2020-06-30 DIAGNOSIS — R10.9 RIGHT FLANK PAIN: ICD-10-CM

## 2020-06-30 LAB
ANION GAP SERPL CALCULATED.3IONS-SCNC: 5 MMOL/L (ref 3–14)
BUN SERPL-MCNC: 12 MG/DL (ref 7–30)
CALCIUM SERPL-MCNC: 9.2 MG/DL (ref 8.5–10.1)
CHLORIDE SERPL-SCNC: 109 MMOL/L (ref 94–109)
CO2 SERPL-SCNC: 24 MMOL/L (ref 20–32)
CREAT SERPL-MCNC: 0.88 MG/DL (ref 0.66–1.25)
GFR SERPL CREATININE-BSD FRML MDRD: >90 ML/MIN/{1.73_M2}
GLUCOSE SERPL-MCNC: 92 MG/DL (ref 70–99)
POTASSIUM SERPL-SCNC: 3.9 MMOL/L (ref 3.4–5.3)
SODIUM SERPL-SCNC: 138 MMOL/L (ref 133–144)

## 2020-06-30 PROCEDURE — 99214 OFFICE O/P EST MOD 30 MIN: CPT | Performed by: UROLOGY

## 2020-06-30 PROCEDURE — 80048 BASIC METABOLIC PNL TOTAL CA: CPT | Performed by: UROLOGY

## 2020-06-30 PROCEDURE — 36415 COLL VENOUS BLD VENIPUNCTURE: CPT | Performed by: UROLOGY

## 2020-06-30 PROCEDURE — 76770 US EXAM ABDO BACK WALL COMP: CPT | Performed by: RADIOLOGY

## 2020-06-30 NOTE — Clinical Note
Donato Castro,    I saw Emelyn yesterday for follow-up and further discussion of his right-sided flank and epigastric pain.  We discussed that I do not believe this is related to his right kidney, however given his concern about his functional solitary kidney I repeated a serum creatinine which was normal at 0.88, and obtained renal ultrasound which was read as having bilateral hydronephrosis.  On my interpretation the right-sided hydronephrosis is extremely mild, however I will plan to obtain a repeat CT scan to ensure that there is no abnormalities of the right kidney.  He had initially seen me for plans for a left nephrectomy given his left atrophic kidney, however I think that this should be put on hold until the etiology of his right-sided flank and epigastric pain is fully investigated.    Please let me know if you have any questions or concerns.  Thanks,   Aj Hedrick M.D.  Cell: [phone]

## 2020-06-30 NOTE — NURSING NOTE
Emelyn Marmolejo's goals for this visit include:   Chief Complaint   Patient presents with     Follow Up     kidney pain       He requests these members of his care team be copied on today's visit information:     PCP: Livan Castro    Referring Provider:  No referring provider defined for this encounter.    There were no vitals taken for this visit.    Do you need any medication refills at today's visit? No    Kylee Mena LPN

## 2020-06-30 NOTE — PROGRESS NOTES
[facility]   CHIEF COMPLAINT   It was my pleasure to see Emelyn Marmolejo who is a 30 year old male for follow-up of right flank pain, left atrophic kidney with the assistance of a      HPI   Emelyn Marmolejo is a very pleasant 30 year old male who presents with a history of an atrophic left kidney due to a large obstructing stone.  I had seen him previously for work-up for possible left simple nephrectomy.  At that time he was noting some right-sided flank and upper abdominal pain and is very concerned that this is related to his right kidney.  Imaging from February was reviewed with no evidence of pathology on the right side.  He subsequently been diagnosed with H. pylori and is undergoing treatment for this.  He notes continued right-sided flank and abdominal pain especially with intake of food and also prolonged sitting with driving.  He denies any fevers, chills, or hematuria    PHYSICAL EXAM  Patient is a 30 year old  male   Vitals: There were no vitals taken for this visit.  There is no height or weight on file to calculate BMI.  General Appearance Adult:   Alert, no acute distress, oriented  HENT: throat/mouth:normal, good dentition  Lungs: no respiratory distress, or pursed lip breathing  Heart: No obvious jugular venous distension present  Abdomen: soft, nontender, no organomegaly or masses  Back: No costovertebral tenderness bilaterally   Musculoskeltal: extremities normal, no peripheral edema  Skin: no suspicious lesions or rashes  Neuro: Alert, oriented, speech and mentation normal  Psych: affect and mood normal  Gait: Normal    Creatinine   Date Value Ref Range Status   06/30/2020 0.88 0.66 - 1.25 mg/dL Final   05/15/2020 0.91 0.66 - 1.25 mg/dL Final   03/26/2020 1.1 0.7 - 1.3 mg/dL Final   02/27/2020 0.9 0.7 - 1.3 mg/dL Final   02/06/2020 1.0 0.7 - 1.3 mg/dL Final       IMAGING:  All pertinent imaging reviewed:    All imaging studies reviewed by me.  I personally reviewed these imaging  films.  A formal report from radiology will follow.    FINDINGS:     Abdomen and pelvis: No significant change in 2.8 x 1.5 cm calculus  within the left renal pelvis, with unchanged atrophy appearance of the  left kidney. No significant left hydronephrosis. Density of this left  renal calculus measures 343 Hounsfield units. Unremarkable right  kidney, without nephrolithiasis or hydroureteronephrosis. Unremarkable  urinary bladder. Unremarkable prostate gland. Symmetric seminal  vesicles.     No abnormally dilated or thickened loops of small and large bowel. No  intraperitoneal free fluid or free air. No pneumatosis or portal  venous gas. No infrarenal abdominal aortic aneurysm. No pathologically  enlarged lymph nodes within the abdomen or pelvis.     Unremarkable noncontrast appearance of the liver, gallbladder,  pancreas, spleen, and adrenal glands.     Lung bases:  Visualized lung bases are clear, without pleural  effusion. No significant change in the extensive cystic changes within  the right middle lobe and cystic changes within the anterior aspect of  the bilateral lower lobes and lingula. The heart is normal in size,  without pericardial effusion.     Bones and soft tissues: No acute or aggressive appearing osseous  lesion. Mild degenerative changes of the spine. Stable sclerosis  involving the inferior endplates of T8, T11, T12. Tiny fat-containing  umbilical hernia.                                                         IMPRESSION:   1. No significant change in large left renal calculus measuring 2.8 x  1.5 cm, with unchanged atrophy of the left kidney. No right  nephrolithiasis or hydroureteronephrosis.   2. Stable multicystic changes of the right middle lobe and anterior  aspects of the bilateral lower lobes. Recommend correlation with  clinical history and any outside dedicated chest imaging.    RENAL U/S 6/30/20:  FINDINGS:  Right kidney: Measures 11.8 cm in length.   Left kidney: Measures 8.4 cm in  length. There is a persistent  shadowing calculus in the left kidney, 2.2 cm.     There is bilateral hydronephrosis.  No cystic or solid renal mass.     Bladder: Partially distended without abnormality.     The prostate and seminal vesical are visualized.  The prostate  measures 3.4 x 2.4 x 3.3 cm. There is a small simple prostate cyst, 6  mm.                                                                      IMPRESSION:  1.  Bilateral hydronephrosis without obstructing calculus visualized.  2.  The prostate measures within normal limits.    ASSESSMENT and PLAN  30-year-old man with a left atrophic kidney secondary to a large renal stone with right-sided flank and epigastric pain likely related to H. pylori infection.    Functional solitary kidney  -He is very concerned that given his functionally solitary right kidney that any pain that he feels in the right side could be related to his kidney  -I reviewed his prior laboratory values which demonstrate normal kidney function as well as his prior imaging  -We discussed that given his ongoing symptoms on the right it is reasonable to repeat a BMP and a renal ultrasound  -Please were completed following the visit with a serum creatinine of 0.88, which is normal and at his baseline.  Renal ultrasound was read as having bilateral hydronephrosis, however on my view the right-sided hydronephrosis is very minimal.  However, given this finding and his concern about his kidney I think it is reasonable to repeat a CT abdomen pelvis without contrast to definitively rule out any issue with the right kidney.  -Recommend that he continue to follow-up with his primary care provider regarding treatment for his H. pylori infection and that should his symptoms persist consider a referral to gastroenterology  -Given that he is currently asymptomatic from his left atrophic kidney with no recent urinary tract infections, I do believe that his left nephrectomy should wait until the  etiology of his right-sided pain has been fully explored.      I spent over 25 minutes with the patient.  Over half this time was spent on counseling regarding the above.    Aj Hedrick MD   Urology  [facility]  [facility] Phone: [phone]  [facility] Phone: [phone]

## 2020-07-02 ENCOUNTER — ANCILLARY PROCEDURE (OUTPATIENT)
Dept: CT IMAGING | Facility: CLINIC | Age: 30
End: 2020-07-02
Attending: UROLOGY
Payer: COMMERCIAL

## 2020-07-02 DIAGNOSIS — R10.9 RIGHT FLANK PAIN: ICD-10-CM

## 2020-07-02 LAB — RADIOLOGIST FLAGS: NORMAL

## 2020-07-02 PROCEDURE — 74176 CT ABD & PELVIS W/O CONTRAST: CPT | Performed by: RADIOLOGY

## 2020-07-03 ENCOUNTER — VIRTUAL VISIT (OUTPATIENT)
Dept: UROLOGY | Facility: CLINIC | Age: 30
End: 2020-07-03
Payer: COMMERCIAL

## 2020-07-03 ENCOUNTER — TELEPHONE (OUTPATIENT)
Dept: UROLOGY | Facility: CLINIC | Age: 30
End: 2020-07-03

## 2020-07-03 VITALS — BODY MASS INDEX: 19.9 KG/M2 | HEIGHT: 70 IN | WEIGHT: 139 LBS

## 2020-07-03 DIAGNOSIS — N26.1 RENAL ATROPHY, LEFT: ICD-10-CM

## 2020-07-03 DIAGNOSIS — N20.1 RIGHT URETERAL STONE: Primary | ICD-10-CM

## 2020-07-03 PROCEDURE — 99213 OFFICE O/P EST LOW 20 MIN: CPT | Mod: 95 | Performed by: UROLOGY

## 2020-07-03 RX ORDER — TAMSULOSIN HYDROCHLORIDE 0.4 MG/1
0.4 CAPSULE ORAL DAILY
Qty: 30 CAPSULE | Refills: 0 | Status: SHIPPED | OUTPATIENT
Start: 2020-07-03 | End: 2020-08-12

## 2020-07-03 ASSESSMENT — MIFFLIN-ST. JEOR: SCORE: 1586.75

## 2020-07-03 ASSESSMENT — PAIN SCALES - GENERAL: PAINLEVEL: NO PAIN (0)

## 2020-07-03 NOTE — LETTER
"7/3/2020       RE: Emelyn Marmolejo  [address]  [facility] [address]     Dear Colleague,    Thank you for referring your patient, Emelyn Marmolejo, to the [facility] at [facility]. Please see a copy of my visit note below.    Emelyn Marmolejo is a 30 year old male who is being evaluated via a billable video visit.      The patient has been notified of following:     \"This video visit will be conducted via a call between you and your physician/provider. We have found that certain health care needs can be provided without the need for an in-person physical exam.  This service lets us provide the care you need with a video conversation.  If a prescription is necessary we can send it directly to your pharmacy.  If lab work is needed we can place an order for that and you can then stop by our lab to have the test done at a later time.    Video visits are billed at different rates depending on your insurance coverage.  Please reach out to your insurance provider with any questions.    If during the course of the call the physician/provider feels a video visit is not appropriate, you will not be charged for this service.\"    Patient has given verbal consent for Video visit? Yes  How would you like to obtain your AVS? Mail copy.   Patient would like the video invitation sent by: Text to cell phone: [phone]  Will anyone else be joining your video visit? No    PROVIDER NOTE:  [place]   CHIEF COMPLAINT   It was my pleasure to see Emelyn Marmolejo who is a 30 year old male for follow-up of right flank pain, left atrophic kidney.    HPI   Emelyn Marmolejo is a very pleasant 30 year old male who presents with a history of an atrophic left kidney due to a large obstructing stone.  I had seen him previously for work-up for possible left simple nephrectomy.  At that time he was noting some right-sided flank and upper abdominal pain and is very " "concerned that this is related to his right kidney.  Imaging from February was reviewed with no evidence of pathology on the right side.  He subsequently been diagnosed with H. pylori and is undergoing treatment for this.  He notes continued right-sided flank and abdominal pain especially with intake of food and also prolonged sitting with driving.  He denies any fevers, chills, or hematuria.    TODAY:  Follow-up today to discuss the results of his CT scan which was completed yesterday which demonstrates a small 3 mm distal ureteral stone.  He is feeling okay today with minimal right-sided flank pain.  He denies any fevers, chills, nausea, or vomiting.  He denies noting any stone passage    PHYSICAL EXAM  Patient is a 30 year old  male   Vitals: Height 1.778 m (5' 10\"), weight 63 kg (139 lb).  Body mass index is 19.94 kg/m .  General Appearance Adult:   Alert, no acute distress, oriented  HENT: throat/mouth:normal, good dentition  Lungs: no respiratory distress, or pursed lip breathing  Heart: No obvious jugular venous distension present  Abdomen: non - distended  Musculoskeltal: extremities normal, no peripheral edema  Skin: no suspicious lesions or rashes  Neuro: Alert, oriented, speech and mentation normal  Psych: affect and mood normal  Gait: Normal     Creatinine   Date Value Ref Range Status   06/30/2020 0.88 0.66 - 1.25 mg/dL Final   05/15/2020 0.91 0.66 - 1.25 mg/dL Final   03/26/2020 1.1 0.7 - 1.3 mg/dL Final   02/27/2020 0.9 0.7 - 1.3 mg/dL Final   02/06/2020 1.0 0.7 - 1.3 mg/dL Final       IMAGING:  All pertinent imaging reviewed:    All imaging studies reviewed by me.  I personally reviewed these imaging films.  A formal report from radiology will follow.    CT ABD/PEL 2/28/20  FINDINGS:     Abdomen and pelvis: No significant change in 2.8 x 1.5 cm calculus  within the left renal pelvis, with unchanged atrophy appearance of the  left kidney. No significant left hydronephrosis. Density of this left  renal " calculus measures 343 Hounsfield units. Unremarkable right  kidney, without nephrolithiasis or hydroureteronephrosis. Unremarkable  urinary bladder. Unremarkable prostate gland. Symmetric seminal  vesicles.     No abnormally dilated or thickened loops of small and large bowel. No  intraperitoneal free fluid or free air. No pneumatosis or portal  venous gas. No infrarenal abdominal aortic aneurysm. No pathologically  enlarged lymph nodes within the abdomen or pelvis.     Unremarkable noncontrast appearance of the liver, gallbladder,  pancreas, spleen, and adrenal glands.     Lung bases:  Visualized lung bases are clear, without pleural  effusion. No significant change in the extensive cystic changes within  the right middle lobe and cystic changes within the anterior aspect of  the bilateral lower lobes and lingula. The heart is normal in size,  without pericardial effusion.     Bones and soft tissues: No acute or aggressive appearing osseous  lesion. Mild degenerative changes of the spine. Stable sclerosis  involving the inferior endplates of T8, T11, T12. Tiny fat-containing  umbilical hernia.                                                         IMPRESSION:   1. No significant change in large left renal calculus measuring 2.8 x  1.5 cm, with unchanged atrophy of the left kidney. No right  nephrolithiasis or hydroureteronephrosis.   2. Stable multicystic changes of the right middle lobe and anterior  aspects of the bilateral lower lobes. Recommend correlation with  clinical history and any outside dedicated chest imaging.    RENAL U/S 6/30/20:  FINDINGS:  Right kidney: Measures 11.8 cm in length.   Left kidney: Measures 8.4 cm in length. There is a persistent  shadowing calculus in the left kidney, 2.2 cm.     There is bilateral hydronephrosis.  No cystic or solid renal mass.     Bladder: Partially distended without abnormality.     The prostate and seminal vesical are visualized.  The prostate  measures 3.4 x  2.4 x 3.3 cm. There is a small simple prostate cyst, 6  mm.                                                             IMPRESSION:  1.  Bilateral hydronephrosis without obstructing calculus visualized.  2.  The prostate measures within normal limits.    CT ABD/PEL 7/2/20:  IMPRESSION:   1. Sub-3 mm stone in the right ureter at the level of the pelvis, new.  2. Mild prominence of the right renal collecting system, without overt  hydronephrosis.   3. Stable large left renal calculus at the renal pelvis, with  associated atrophy of the left kidney.  4. Partially visualized multicystic changes of the lung bases appear  stable from prior CT.    ASSESSMENT and PLAN  30-year-old man with a left atrophic kidney secondary to a large renal stone with right-sided flank and epigastric pain likely related to H. pylori infection and now with evidence of a 3 mm distal ureteral stone    Functional solitary right kidney with a 3 mm distal ureteral stone  -We reviewed his labs and imaging  -I was able to show him his CT images using our video connection  -This does demonstrate a small 3 mm right distal ureteral stone with mild hydronephrosis  -We discussed that given the stone size and location he would be estimated to have a 70 to 80% chance of spontaneous stone passage with up to 4 weeks of medical expulsive therapy  -We discussed the benefits of tamsulosin 0.4 mg daily and that this is been shown to help stones pass more quickly.  Prescription for this was sent to his pharmacy  -Given his functional solitary kidney, we discussed the importance of confirming that the stone does pass in a timely fashion.  Ideally he will be able to  a urine strainer at the pharmacy today as well and catch his stone if he passes it.  -If he does not pass the stone, or if it is unclear if he has passed the stone, I will plan for follow-up in 3 to 4 weeks with a CT scan prior to assess for stone passage.  If the stone has not passed at that  time he would then need to undergo ureteroscopy for stone removal.    I spent over 15 minutes with the patient.  Over half this time was spent on counseling regarding the above.    Aj Hedrick MD   Urology  [facility]  [facility] Phone: [phone]  [facility] Phone: [phone]      Video-Visit Details    Type of service:  Video Visit    Video Start Time: 1:50 PM  Video End Time: 2:05 PM    Originating Location (pt. Location): Home    Distant Location (provider location):  [facility]     Platform used for Video Visit: Hamzah Hedrick MD

## 2020-07-03 NOTE — NURSING NOTE
Chief Complaint   Patient presents with     Follow Up     Discuss CT results.      Erica Mora, CMA

## 2020-07-03 NOTE — PROGRESS NOTES
"Emelyn Marmolejo is a 30 year old male who is being evaluated via a billable video visit.      The patient has been notified of following:     \"This video visit will be conducted via a call between you and your physician/provider. We have found that certain health care needs can be provided without the need for an in-person physical exam.  This service lets us provide the care you need with a video conversation.  If a prescription is necessary we can send it directly to your pharmacy.  If lab work is needed we can place an order for that and you can then stop by our lab to have the test done at a later time.    Video visits are billed at different rates depending on your insurance coverage.  Please reach out to your insurance provider with any questions.    If during the course of the call the physician/provider feels a video visit is not appropriate, you will not be charged for this service.\"    Patient has given verbal consent for Video visit? Yes  How would you like to obtain your AVS? Mail copy.   Patient would like the video invitation sent by: Text to cell phone: [phone]  Will anyone else be joining your video visit? No    PROVIDER NOTE:  [place]   CHIEF COMPLAINT   It was my pleasure to see Emelyn Marmolejo who is a 30 year old male for follow-up of right flank pain, left atrophic kidney.    HPI   Emelyn Marmolejo is a very pleasant 30 year old male who presents with a history of an atrophic left kidney due to a large obstructing stone.  I had seen him previously for work-up for possible left simple nephrectomy.  At that time he was noting some right-sided flank and upper abdominal pain and is very concerned that this is related to his right kidney.  Imaging from February was reviewed with no evidence of pathology on the right side.  He subsequently been diagnosed with H. pylori and is undergoing treatment for this.  He notes continued right-sided flank and abdominal pain especially with intake of food and also " "prolonged sitting with driving.  He denies any fevers, chills, or hematuria.    TODAY:  Follow-up today to discuss the results of his CT scan which was completed yesterday which demonstrates a small 3 mm distal ureteral stone.  He is feeling okay today with minimal right-sided flank pain.  He denies any fevers, chills, nausea, or vomiting.  He denies noting any stone passage    PHYSICAL EXAM  Patient is a 30 year old  male   Vitals: Height 1.778 m (5' 10\"), weight 63 kg (139 lb).  Body mass index is 19.94 kg/m .  General Appearance Adult:   Alert, no acute distress, oriented  HENT: throat/mouth:normal, good dentition  Lungs: no respiratory distress, or pursed lip breathing  Heart: No obvious jugular venous distension present  Abdomen: non - distended  Musculoskeltal: extremities normal, no peripheral edema  Skin: no suspicious lesions or rashes  Neuro: Alert, oriented, speech and mentation normal  Psych: affect and mood normal  Gait: Normal     Creatinine   Date Value Ref Range Status   06/30/2020 0.88 0.66 - 1.25 mg/dL Final   05/15/2020 0.91 0.66 - 1.25 mg/dL Final   03/26/2020 1.1 0.7 - 1.3 mg/dL Final   02/27/2020 0.9 0.7 - 1.3 mg/dL Final   02/06/2020 1.0 0.7 - 1.3 mg/dL Final       IMAGING:  All pertinent imaging reviewed:    All imaging studies reviewed by me.  I personally reviewed these imaging films.  A formal report from radiology will follow.    CT ABD/PEL 2/28/20  FINDINGS:     Abdomen and pelvis: No significant change in 2.8 x 1.5 cm calculus  within the left renal pelvis, with unchanged atrophy appearance of the  left kidney. No significant left hydronephrosis. Density of this left  renal calculus measures 343 Hounsfield units. Unremarkable right  kidney, without nephrolithiasis or hydroureteronephrosis. Unremarkable  urinary bladder. Unremarkable prostate gland. Symmetric seminal  vesicles.     No abnormally dilated or thickened loops of small and large bowel. No  intraperitoneal free fluid or free " air. No pneumatosis or portal  venous gas. No infrarenal abdominal aortic aneurysm. No pathologically  enlarged lymph nodes within the abdomen or pelvis.     Unremarkable noncontrast appearance of the liver, gallbladder,  pancreas, spleen, and adrenal glands.     Lung bases:  Visualized lung bases are clear, without pleural  effusion. No significant change in the extensive cystic changes within  the right middle lobe and cystic changes within the anterior aspect of  the bilateral lower lobes and lingula. The heart is normal in size,  without pericardial effusion.     Bones and soft tissues: No acute or aggressive appearing osseous  lesion. Mild degenerative changes of the spine. Stable sclerosis  involving the inferior endplates of T8, T11, T12. Tiny fat-containing  umbilical hernia.                                                         IMPRESSION:   1. No significant change in large left renal calculus measuring 2.8 x  1.5 cm, with unchanged atrophy of the left kidney. No right  nephrolithiasis or hydroureteronephrosis.   2. Stable multicystic changes of the right middle lobe and anterior  aspects of the bilateral lower lobes. Recommend correlation with  clinical history and any outside dedicated chest imaging.    RENAL U/S 6/30/20:  FINDINGS:  Right kidney: Measures 11.8 cm in length.   Left kidney: Measures 8.4 cm in length. There is a persistent  shadowing calculus in the left kidney, 2.2 cm.     There is bilateral hydronephrosis.  No cystic or solid renal mass.     Bladder: Partially distended without abnormality.     The prostate and seminal vesical are visualized.  The prostate  measures 3.4 x 2.4 x 3.3 cm. There is a small simple prostate cyst, 6  mm.                                                             IMPRESSION:  1.  Bilateral hydronephrosis without obstructing calculus visualized.  2.  The prostate measures within normal limits.    CT ABD/PEL 7/2/20:  IMPRESSION:   1. Sub-3 mm stone in the right  ureter at the level of the pelvis, new.  2. Mild prominence of the right renal collecting system, without overt  hydronephrosis.   3. Stable large left renal calculus at the renal pelvis, with  associated atrophy of the left kidney.  4. Partially visualized multicystic changes of the lung bases appear  stable from prior CT.    ASSESSMENT and PLAN  30-year-old man with a left atrophic kidney secondary to a large renal stone with right-sided flank and epigastric pain likely related to H. pylori infection and now with evidence of a 3 mm distal ureteral stone    Functional solitary right kidney with a 3 mm distal ureteral stone  -We reviewed his labs and imaging  -I was able to show him his CT images using our video connection  -This does demonstrate a small 3 mm right distal ureteral stone with mild hydronephrosis  -We discussed that given the stone size and location he would be estimated to have a 70 to 80% chance of spontaneous stone passage with up to 4 weeks of medical expulsive therapy  -We discussed the benefits of tamsulosin 0.4 mg daily and that this is been shown to help stones pass more quickly.  Prescription for this was sent to his pharmacy  -Given his functional solitary kidney, we discussed the importance of confirming that the stone does pass in a timely fashion.  Ideally he will be able to  a urine strainer at the pharmacy today as well and catch his stone if he passes it.  -If he does not pass the stone, or if it is unclear if he has passed the stone, I will plan for follow-up in 3 to 4 weeks with a CT scan prior to assess for stone passage.  If the stone has not passed at that time he would then need to undergo ureteroscopy for stone removal.      I spent over 15 minutes with the patient.  Over half this time was spent on counseling regarding the above.    Aj Hedrick MD   Urology  [facility]  [facility] Phone: [phone]  [facility]  [facility] Phone: [phone]      Video-Visit Details    Type of service:  Video Visit    Video Start Time: 1:50 PM  Video End Time: 2:05 PM    Originating Location (pt. Location): Home    Distant Location (provider location):  [facility]     Platform used for Video Visit: Hamzah Hedrick MD

## 2020-07-03 NOTE — TELEPHONE ENCOUNTER
Aj Hedrick MD  P [facility] [place]               Please contact the patient to inform them of their CT results. This does show a small 3mm stone in his RIGHT distal ureter. This could be contributing to his right sided flank pain     Plan   - Start tamsulosin 0.4mg (Flomax) daily for 30 days or until stone passes   - Reassure him that his kidney function is normal and his right kidney does not appear blocked   -  him on warning signs of fever, chills, N/V for ER presentation   - Let me know if he wants to talk today and I could try to add on a telephone slot     Thanks,   Aj OTERO. MD [place]        Received the above result note and the 7/2/20 CT results from Dr. Hedrick. With [employer]  services,  # [id number], patient was contacted and informed of the above information. Patient verbalized understanding. Patient allergies verified. Flomax ordered per Dr. Hedrick and sent for Dr. Hedrick to review and sign. Prescription sent to [facility] per patient request. Patient requested for telephone visit with Dr. Hedrick today. Telephone visit scheduled for today at 3:00pm. Dr. Hedrick updated.     Inez Mora RN, BSN

## 2020-07-03 NOTE — Clinical Note
Donato Castro,    I had a virtual visit with Emelyn today to discuss the CT scan results.  I started him on tamsulosin 0.4 mg daily and will plan for follow-up with him in about 3 weeks to ensure stone passage given his functional solitary kidney.    Thanks,   Aj Hedrick M.D.  Cell: [phone]

## 2020-07-03 NOTE — TELEPHONE ENCOUNTER
Imaging called with incidental finding with a 3mm right ureteral stone on patients recent CT. Sending a message to Dr. Hedrick.    Kylee Mena LPN

## 2020-07-06 ENCOUNTER — TELEPHONE (OUTPATIENT)
Dept: UROLOGY | Facility: CLINIC | Age: 30
End: 2020-07-06

## 2020-07-06 ENCOUNTER — VIRTUAL VISIT (OUTPATIENT)
Dept: PSYCHOLOGY | Facility: CLINIC | Age: 30
End: 2020-07-06
Payer: COMMERCIAL

## 2020-07-06 DIAGNOSIS — F41.9 ANXIETY DISORDER, UNSPECIFIED TYPE: ICD-10-CM

## 2020-07-06 NOTE — TELEPHONE ENCOUNTER
Called and spoke to patient regarding note below. Telephone visit with Dr. Hedrick scheduled for 7/23/20 at 11:30am. Patient was given the number for imaging scheduling and will call to schedule CT scan on 7/22 or 7/23. Patient aware that if he passes the stone then the CT and follow up visit can be cancelled.    Inez Mora RN, BSN

## 2020-07-06 NOTE — PROGRESS NOTES
" name: Randy Calderón  Agency: [employer]  Language: Norwegian   Telephone number: [phone]  Type of interpretation: Telephone, spoken    Complexity statement: Due to patient being non-English speaking/uses sign language, an  was used for this visit.  Date and length of interpretation can be found on the scanned worksheet.  An  is used not only to interpret language, since the patient does not speak English, but also to help with the complexity of understandings across cultures, since the patient is not well integrated in the larger [place] culture.    The following statement was read to the patient at the outset of this visit on 7/6/2020:     \"We have found that certain health care needs can be provided without the need for a face to face visit.  This service lets us provide the care you need with a phone conversation. I will have full access to your [facility] medical record during this entire phone call.   I will be taking notes for your medical record.  Since this is like an office visit, we will bill your insurance company for this service. There are potential benefits and risks of telephone visits (e.g. limits to patient confidentiality) that differ from in-person visits.?  Confidentiality still applies for telephone services, and nobody will record the visit.  It is important to be in a quiet, private space that is free of distractions (including cell phone or other devices) during the visit.??If during the course of the call I believe a telephone visit is not appropriate, you will not be charged for this service.\"     Consent has been obtained for this service by care team member: Yes      Emergency contact: Saul Pat [phone]   Closest Emergency Room: [facility]   Location at time of call: Home    Start of call: 3:23 PM   End of call: 3:40 PM       Stomach problems, kidney stone.     Behavioral Health Progress Note    Client Legal Name: Emelyn " "Jaleel   Client Preferred Name: Emelyn   Service Type: Individual  Length of Visit: 20 minutes  Attendees:      Identifying Information and Presenting Problem:    The patient is a 30 year old [place] male who is being seen for problematic symptoms of anxiety related to health problems.    Treatment Objective(s) Addressed in This Session:  Psychological distress related to Chronic Disease Management     Progress on / Status of Treatment Objective(s) / Homework:  n/a - first visit    Topics Discussed/Interventions Provided:  Patient was confused about reason for visit and reason for referral to behavioral health. Per referral from Dr. Jose last month, \"I did discuss the significant amount of stress he is dealing with regarding only having one kidney and the fears of losing his second kidney. I offered psychotherapy to help deal with this stress and anxiety and he was agreeable to this plan.: After reviewing this patient notes he recalls this but is feeling much better now after seeing the urologist last week. He learned from the urologist that it was possible to live with only one kidney and feels he got the reassurance he needed. He also learned he has a kidney stone which explains some of the pain he was having and this is now being treated to his satisfaction. He notes that he does not have any other stress in his life. Attempted to discuss mind-body connection and how sometimes our stress is displayed in physical pain. He states he understands this but this is not the case     Assessment: The patient appeared to be active and engaged in today's session and was receptive to feedback.     Mental Status: Emelyn appeared generally alert and oriented.Unable to assess dress, grooming, hygiene, and eye contact due to phone visit. Speech was of normal volume and rate and was clear, coherent, and relevant. Mood was \"fine\" with congruent affect. Thought processes were relevant, logical and goal-directed. Thought " content was WNL with no evidence of psychotic or paranoid features. No evidence of SI/HI or self-harm, intent, or plans. Memory appeared grossly intact. Insight and judgment appeared fair and patient exhibited good impulse control during the appointment.     Does the patient appear to be at imminent risk of harm to self/others at this time? No    The session was necessary to address symptoms of health-related anxiety that have been interfering with patient's ability to function in terms of self-care and accessing medical system effectively.  Ongoing psychotherapy is recommended to provide support but patient declined follow-up.   Diagnosis (DSM-5):  Adjustment disorder with anxiety     Per record patient does have a great deal of anxiety around his health concerns and persistently seeks care to get answers/reassurance from providers and while he denies having any anxiety right now, I do wonder about somatic expression of some of his worries. Somatic symptom disorder is a possibility.     Plan:  1. No follow-up planned.   2. Reiterated recommendation for patient to see consistent PCP at visits as this will improve his care. He states understanding but declined to schedule a visit today with Dr. Castro (listed PCP).   3. He may return to care if needed in the future.       NOTE: Treatment plan update/diagnostic assessment deferred as patient declined follow-up.

## 2020-07-06 NOTE — TELEPHONE ENCOUNTER
----- Message from Inez Mora RN sent at 7/3/2020  3:28 PM CDT -----  Received message from Dr. Hedrick who recommends for patient to follow up in clinic on 7/23/20 with CT scan prior. Per Dr. Hedrick, if patient passes stone, then we can cancel CT and follow up visit.    Inez Mora RN, BSN

## 2020-07-07 NOTE — PROGRESS NOTES
Preceptor Attestation:  I have reviewed and agree with the behavioral health fellow's documentation for this video/phone visit.  I did not see the patient.  Supervising Clinical Psychologist:  Mira Tong PSYD LP

## 2020-07-29 ENCOUNTER — ANCILLARY PROCEDURE (OUTPATIENT)
Dept: CT IMAGING | Facility: CLINIC | Age: 30
End: 2020-07-29
Attending: UROLOGY
Payer: COMMERCIAL

## 2020-07-29 DIAGNOSIS — N20.1 RIGHT URETERAL STONE: ICD-10-CM

## 2020-08-06 ENCOUNTER — VIRTUAL VISIT (OUTPATIENT)
Dept: UROLOGY | Facility: CLINIC | Age: 30
End: 2020-08-06
Payer: COMMERCIAL

## 2020-08-06 ENCOUNTER — OFFICE VISIT (OUTPATIENT)
Dept: FAMILY MEDICINE | Facility: CLINIC | Age: 30
End: 2020-08-06
Payer: COMMERCIAL

## 2020-08-06 VITALS
TEMPERATURE: 98.9 F | SYSTOLIC BLOOD PRESSURE: 121 MMHG | RESPIRATION RATE: 16 BRPM | WEIGHT: 132.8 LBS | HEIGHT: 70 IN | OXYGEN SATURATION: 100 % | HEART RATE: 76 BPM | DIASTOLIC BLOOD PRESSURE: 78 MMHG | BODY MASS INDEX: 19.01 KG/M2

## 2020-08-06 DIAGNOSIS — Z11.59 ENCOUNTER FOR SCREENING FOR OTHER VIRAL DISEASES: Primary | ICD-10-CM

## 2020-08-06 DIAGNOSIS — N20.1 RIGHT URETERAL STONE: Primary | ICD-10-CM

## 2020-08-06 DIAGNOSIS — M54.6 CHRONIC RIGHT-SIDED THORACIC BACK PAIN: ICD-10-CM

## 2020-08-06 DIAGNOSIS — A04.8 H. PYLORI INFECTION: ICD-10-CM

## 2020-08-06 DIAGNOSIS — G47.00 INSOMNIA, UNSPECIFIED TYPE: Primary | ICD-10-CM

## 2020-08-06 DIAGNOSIS — G89.29 CHRONIC RIGHT-SIDED THORACIC BACK PAIN: ICD-10-CM

## 2020-08-06 DIAGNOSIS — J30.2 SEASONAL ALLERGIC RHINITIS, UNSPECIFIED TRIGGER: ICD-10-CM

## 2020-08-06 PROCEDURE — 99442 ZZC PHYSICIAN TELEPHONE EVALUATION 11-20 MIN: CPT | Performed by: UROLOGY

## 2020-08-06 RX ORDER — TRAZODONE HYDROCHLORIDE 50 MG/1
50 TABLET, FILM COATED ORAL
Qty: 30 TABLET | Refills: 0 | Status: SHIPPED | OUTPATIENT
Start: 2020-08-06 | End: 2020-08-12

## 2020-08-06 RX ORDER — LANOLIN ALCOHOL/MO/W.PET/CERES
CREAM (GRAM) TOPICAL
Qty: 30 TABLET | Refills: 0 | Status: SHIPPED | OUTPATIENT
Start: 2020-08-06 | End: 2021-02-03

## 2020-08-06 RX ORDER — ECHINACEA PURPUREA EXTRACT 125 MG
TABLET ORAL
Qty: 104 ML | Refills: 1 | Status: SHIPPED | OUTPATIENT
Start: 2020-08-06 | End: 2021-02-03

## 2020-08-06 ASSESSMENT — ENCOUNTER SYMPTOMS
DECREASED CONCENTRATION: 0
NERVOUS/ANXIOUS: 0
CHILLS: 0
HYPERACTIVE: 0
AGITATION: 0
SLEEP DISTURBANCE: 0
SHORTNESS OF BREATH: 0
COUGH: 0
TREMORS: 0
PALPITATIONS: 0
BACK PAIN: 0
ARTHRALGIAS: 0
FEVER: 0

## 2020-08-06 ASSESSMENT — MIFFLIN-ST. JEOR: SCORE: 1550.69

## 2020-08-06 NOTE — PROGRESS NOTES
Preceptor Attestation:   Patient seen and discussed with the resident. Assessment and plan reviewed with resident and agreed upon.   Supervising Physician:  Moi Yoon MD  [facility]

## 2020-08-06 NOTE — PROGRESS NOTES
PROVIDER NOTE:  [facility]   CHIEF COMPLAINT   It was my pleasure to see Emelyn Marmolejo who is a 30 year old male for follow-up of right flank pain, left atrophic kidney. With the assistance of a      HPI   Emelyn Marmolejo is a very pleasant 30 year old male who presents with a history of an atrophic left kidney due to a large obstructing stone.  I had seen him previously for work-up for possible left simple nephrectomy.  At that time he was noting some right-sided flank and upper abdominal pain and is very concerned that this is related to his right kidney.  Imaging from February was reviewed with no evidence of pathology on the right side.  He subsequently been diagnosed with H. pylori and is undergoing treatment for this.  He notes continued right-sided flank and abdominal pain especially with intake of food and also prolonged sitting with driving.  He denies any fevers, chills, or hematuria.    7/3/20:  Follow-up today to discuss the results of his CT scan which was completed yesterday which demonstrates a small 3 mm distal ureteral stone.  He is feeling okay today with minimal right-sided flank pain.  He denies any fevers, chills, nausea, or vomiting.  He denies noting any stone passage    TODAY:  Follow-up today after his CT scan last week which demonstrated persistence of the 3 mm distal ureteral stone  He notes that he is continued to have ongoing right-sided flank pain    PHYSICAL EXAM  Patient is a 30 year old  male   Vitals: There were no vitals taken for this visit.  There is no height or weight on file to calculate BMI.  General: No evidence of distress   Lungs: normal respiratory effort  Neuro: Alert, oriented, speech and mentation normal  Psych: affect and mood normal    Creatinine  7/25/2020  0.90    Creatinine   Date Value Ref Range Status   06/30/2020 0.88 0.66 - 1.25 mg/dL Final   05/15/2020 0.91 0.66 - 1.25 mg/dL Final   03/26/2020 1.1 0.7 - 1.3 mg/dL Final   02/27/2020 0.9 0.7 -  1.3 mg/dL Final   02/06/2020 1.0 0.7 - 1.3 mg/dL Final       IMAGING:  All pertinent imaging reviewed:    All imaging studies reviewed by me.  I personally reviewed these imaging films.  A formal report from radiology will follow.    CT ABD/PEL 2/28/20  FINDINGS:     Abdomen and pelvis: No significant change in 2.8 x 1.5 cm calculus  within the left renal pelvis, with unchanged atrophy appearance of the  left kidney. No significant left hydronephrosis. Density of this left  renal calculus measures 343 Hounsfield units. Unremarkable right  kidney, without nephrolithiasis or hydroureteronephrosis. Unremarkable  urinary bladder. Unremarkable prostate gland. Symmetric seminal  vesicles.     No abnormally dilated or thickened loops of small and large bowel. No  intraperitoneal free fluid or free air. No pneumatosis or portal  venous gas. No infrarenal abdominal aortic aneurysm. No pathologically  enlarged lymph nodes within the abdomen or pelvis.     Unremarkable noncontrast appearance of the liver, gallbladder,  pancreas, spleen, and adrenal glands.     Lung bases:  Visualized lung bases are clear, without pleural  effusion. No significant change in the extensive cystic changes within  the right middle lobe and cystic changes within the anterior aspect of  the bilateral lower lobes and lingula. The heart is normal in size,  without pericardial effusion.     Bones and soft tissues: No acute or aggressive appearing osseous  lesion. Mild degenerative changes of the spine. Stable sclerosis  involving the inferior endplates of T8, T11, T12. Tiny fat-containing  umbilical hernia.                                                         IMPRESSION:   1. No significant change in large left renal calculus measuring 2.8 x  1.5 cm, with unchanged atrophy of the left kidney. No right  nephrolithiasis or hydroureteronephrosis.   2. Stable multicystic changes of the right middle lobe and anterior  aspects of the bilateral lower lobes.  Recommend correlation with  clinical history and any outside dedicated chest imaging.    RENAL U/S 6/30/20:  FINDINGS:  Right kidney: Measures 11.8 cm in length.   Left kidney: Measures 8.4 cm in length. There is a persistent  shadowing calculus in the left kidney, 2.2 cm.     There is bilateral hydronephrosis.  No cystic or solid renal mass.     Bladder: Partially distended without abnormality.     The prostate and seminal vesical are visualized.  The prostate  measures 3.4 x 2.4 x 3.3 cm. There is a small simple prostate cyst, 6  mm.                                                             IMPRESSION:  1.  Bilateral hydronephrosis without obstructing calculus visualized.  2.  The prostate measures within normal limits.    CT ABD/PEL 7/2/20:  IMPRESSION:   1. Sub-3 mm stone in the right ureter at the level of the pelvis, new.  2. Mild prominence of the right renal collecting system, without overt  hydronephrosis.   3. Stable large left renal calculus at the renal pelvis, with  associated atrophy of the left kidney.  4. Partially visualized multicystic changes of the lung bases appear  stable from prior CT.    CT ABD/PEL 7/29/20:  FINDINGS:     Abdomen and pelvis:   Liver: Normal noncontrast appearance of the liver.  Gallbladder: No gallstones. No evidence of acute cholecystitis.  Spleen: Normal size.  Pancreas: No suspicious pancreatic lesions. The pancreatic duct is not  dilated.  Adrenal glands: No adrenal nodules.  Kidneys: No significant change in the 2.8 x 1.5 cm calculus in the  left renal pelvis but stable atrophic appearance of the left kidney.  Mild left hydronephrosis, similar in appearance. Mildly decreased mild  right pelvocaliectasis without overt hydronephrosis. There is again  mild prominence of the right ureter up 7 mm, which may be secondary to  a stable 3 mm stone in similar position in the region of the right  distal ureter although location within the ureter is difficult to  confirm and this  calculus has been present across multiple prior exams  dating back to January 2020 (series 5, image 280). Urinary bladder is  partially distended. There is a 4 mm calculus located medial and  posterior to the left ureterovesical junction, which likely represents  a migrated IVC seen left distal ureteral calculus (series 5, image  344).  Bowel: No bowel wall thickening. The appendix is unremarkable.  Lymph nodes: No retroperitoneal, mesenteric, or pelvic  lymphadenopathy.  Fluid: No free fluid within the abdomen.  Vessels: No infrarenal aortic aneurysm.      Lung bases: Stable cystic changes within the right middle lobe and  anterior aspect of the lower lungs bilaterally. No pleural effusion.  Heart size is normal without pericardial effusion.     Bones and soft tissues: No suspicious osseous lesions. Stable  sclerosis of the inferior endplates of T11 and T12.                                                            IMPRESSION:   1.  Stable large left renal calculus within the pelvis, with  associated atrophy of left kidney.  2.  The previously seen calculus within the left distal ureter appears  to have migrated and is now located within the posterior aspect of the  urinary bladder, medial to the left ureterovesical junction.  3.  Improved right pelvocaliectasis. Persistent mild dilation of the  right ureter, which may be secondary to a 3 mm calculus in the region  of the right distal ureter. However, this 3mm calculus in the region  of the right distal ureter has been seen on multiple prior exams  dating back to January 2020 and may represent an adjacent vascular  calcification.  4.  Redemonstration of previously visualized multicystic changes of  the lung bases.            ASSESSMENT and PLAN  30-year-old man with a left atrophic kidney secondary to a large renal stone with right-sided flank and epigastric pain likely related to H. pylori infection and now with evidence of a 3 mm distal ureteral  stone    Functional solitary right kidney with a 3 mm distal ureteral stone  -We reviewed his labs and imaging  -We discussed that his most recent CT scan from 7/29/2020 does demonstrate persistence of this right distal ureteral stone with mild associated hydronephrosis  -We discussed that given the failure of medical expulsive therapy that we would need to then move onto a ureteroscopy  -We discussed the recommendation for cystoscopy, right retrograde pyelogram, right ureteroscopy with laser lithotripsy and basket removal of stone, and right ureteral stent placement  -We will work to schedule this next week  -We discussed the risks and benefits of surgery as well as the expected postoperative outcome    Telephone time: 13 minutes    Aj Hedrick MD   Urology  [facility]  [facility] Phone: [phone]  [facility] Phone: [phone]

## 2020-08-06 NOTE — PROGRESS NOTES
HPI       Emelyn Marmolejo is a 30 year old  who presents for   Chief Complaint   Patient presents with     Insomnia     per pt for about 2 weeks he has been having a hard time falling asleep and staying asleep     Right Kidney Pain and Stone- saw urology 8/6 and will have stone removal scheduled.     Insomnia  2 weeks trouble following asleep. Worried about kidney health/stone removed. Has trouble falling asleep and staying asleep. Drinks a lot of black tea. Last one in afternoon. But not new .. about 2 months of doing this.   -sleeps in bed alone, no change in routine/temperature.         Sinus drainage/allergy rhinitis     H pylori finished treatment about 2-3 weeks ago. Pain only there when pushing deep. Also at night time notices stomach gurgling frequently that bothers him. Test of cure pending today.         Problem, Medication and Allergy Lists were      Patient Active Problem List    Diagnosis Date Noted     Right ureteral stone 08/06/2020     Priority: Medium     Added automatically from request for surgery [id number]       Anxiety disorder, unspecified 07/06/2020     Priority: Medium     H. pylori infection 05/14/2020     Priority: Medium     History of renal stone 05/14/2020     Priority: Medium     Abnormal CT of the chest 10/09/2019     Priority: Medium     Shortness of breath 10/09/2019     Priority: Medium     Renal atrophy, left 08/21/2019     Priority: Medium     Renal stone 08/21/2019     Priority: Medium   ,     Current Outpatient Medications   Medication Sig Dispense Refill     acetaminophen (TYLENOL) 325 MG tablet Take 325-650 mg by mouth       levofloxacin (LEVAQUIN) 500 MG tablet Take 1 tablet (500 mg) by mouth daily (Patient not taking: Reported on 7/3/2020) 12 tablet 0     omeprazole 20 MG tablet Take 1 tablet (20 mg) by mouth 2 times daily (Patient not taking: Reported on 7/3/2020) 28 tablet 0     ondansetron (ZOFRAN-ODT) 4 MG ODT tab Take 1 tablet (4 mg) by mouth every 8 hours as needed  "for nausea (Patient not taking: Reported on 7/3/2020) 20 tablet 0   ,   No Known Allergies.    Patient is an established patient of this clinic..         Review of Systems:   Review of Systems   Constitutional: Negative for chills and fever.   Respiratory: Negative for cough and shortness of breath.    Cardiovascular: Negative for chest pain and palpitations.   Musculoskeletal: Negative for arthralgias and back pain.   Neurological: Negative for tremors.   Psychiatric/Behavioral: Negative for agitation, behavioral problems, decreased concentration, self-injury and sleep disturbance. The patient is not nervous/anxious and is not hyperactive.    All other systems reviewed and are negative.           Physical Exam:     Vitals:    08/06/20 1413   BP: 121/78   BP Location: Left arm   Patient Position: Sitting   Cuff Size: Adult Regular   Pulse: 76   Resp: 16   Temp: 98.9  F (37.2  C)   TempSrc: Oral   SpO2: 100%   Weight: 60.2 kg (132 lb 12.8 oz)   Height: 1.765 m (5' 9.5\")     Body mass index is 19.33 kg/m .  Vitals were reviewed and were normal     Physical Exam  Constitutional:       General: He is not in acute distress.     Appearance: He is well-developed.   HENT:      Head: Normocephalic and atraumatic.   Eyes:      General:         Right eye: No discharge.         Left eye: No discharge.      Conjunctiva/sclera: Conjunctivae normal.   Pulmonary:      Effort: Pulmonary effort is normal. No respiratory distress.   Musculoskeletal:         General: No deformity.   Skin:     Coloration: Skin is not pale.      Findings: No erythema or rash.   Neurological:      Mental Status: He is alert and oriented to person, place, and time.      Coordination: Coordination normal.             Results:   Results are ordered and pending    Assessment and Plan           1. Insomnia, unspecified type      2. Chronic right-sided thoracic back pain      3. H. pylori infection      1. Switch to non caffeine in afternoon tea. Try and stop " using cell phone 2 hours before bedtime. If you don't fall asleep in 1 hour get up and do another activity. Mindfulness tayo. 3mg melatonin 2 hours before bedtime. If this all doesn't work then try the trazodone medicine     2. Spray saline in nose twice a day for sinuses. Also getting sleep will help with this.     3. Schedule phone visit with me in 2-4 weeks     4. Checking for h pylori today to see if negative. If still negative can talk to GI doctors about your pain.      There are no discontinued medications.    Options for treatment and follow-up care were reviewed with the patient. Emelyn Marmolejo  engaged in the decision making process and verbalized understanding of the options discussed and agreed with the final plan.    Livan Castro MD

## 2020-08-06 NOTE — NURSING NOTE
Due to patient being non-English speaking/uses sign language, an  was used for this visit. Only for face-to-face interpretation by an external agency, date and length of interpretation can be found on the scanned worksheet.     name: Randy Hernandez  Agency: [employer]  Language: South African   Telephone number: [phone]  Type of interpretation: Telephone, spoken     LUCINDA West 2:11 PM August 6, 2020

## 2020-08-06 NOTE — PATIENT INSTRUCTIONS
1. Switch to non caffeine in afternoon tea. Try and stop using cell phone 2 hours before bedtime. If you don't fall asleep in 1 hour get up and do another activity. Mindfulness tayo. 3mg melatonin 2 hours before bedtime. If this all doesn't work then try the trazodone medicine     2. Spray saline in nose twice a day for sinuses. Also getting sleep will help with this.     3. Schedule a follow up physical with me in 2-4 weeks     4. Checking for h pylori today to see if negative. If still negative can talk to GI doctors about your pain.     **If you had lab testing today and your results are reassuring or normal they will be mailed to you or sent through [facility] within 7 days.   **If the lab tests need quick action we will call you with the results.  The phone number we will call with results is # [phone] (home) . If this is not the best number please call our clinic and change the number.  Medication Refills:  If you need any refills please call your pharmacy and they will contact us.   If you need to  your refill at a new pharmacy, please contact the new pharmacy directly. The new pharmacy will help you get your medications transferred faster.   Scheduling:  If you have any concerns about today's visit or wish to schedule another appointment please call our office during normal business hours [phone] (8-5:00 M-F)  If a referral was made to a [facility] and you don't get a call from central scheduling please call [phone].  If a Mammogram was ordered for you at [facility] call [phone] to schedule or change your appointment.  If you had an XRay/CT/Ultrasound/MRI ordered the number is [phone] to schedule or change your radiology appointment.   Medical Concerns:  If you have urgent medical concerns please call [phone] at any time of the day.    Livan Castro MD

## 2020-08-06 NOTE — PROGRESS NOTES
"Emelyn Marmolejo is a 30 year old male who is being evaluated via a billable telephone visit.      The patient has been notified of following:     \"This telephone visit will be conducted via a call between you and your physician/provider. We have found that certain health care needs can be provided without the need for a physical exam.  This service lets us provide the care you need with a short phone conversation.  If a prescription is necessary we can send it directly to your pharmacy.  If lab work is needed we can place an order for that and you can then stop by our lab to have the test done at a later time.    Telephone visits are billed at different rates depending on your insurance coverage. During this emergency period, for some insurers they may be billed the same as an in-person visit.  Please reach out to your insurance provider with any questions.    If during the course of the call the physician/provider feels a telephone visit is not appropriate, you will not be charged for this service.\"    Patient has given verbal consent for Telephone visit?  Yes    What phone number would you like to be contacted at? [phone]    How would you like to obtain your AVS? Cesar Mena LPN on 8/6/2020 at 9:27 AM    "

## 2020-08-10 DIAGNOSIS — Z11.59 ENCOUNTER FOR SCREENING FOR OTHER VIRAL DISEASES: ICD-10-CM

## 2020-08-11 ENCOUNTER — ANESTHESIA EVENT (OUTPATIENT)
Dept: SURGERY | Facility: AMBULATORY SURGERY CENTER | Age: 30
End: 2020-08-11

## 2020-08-11 LAB
SARS-COV-2 RNA SPEC QL NAA+PROBE: NOT DETECTED
SPECIMEN SOURCE: NORMAL

## 2020-08-12 ENCOUNTER — OFFICE VISIT (OUTPATIENT)
Dept: PEDIATRICS | Facility: CLINIC | Age: 30
End: 2020-08-12
Payer: COMMERCIAL

## 2020-08-12 VITALS
HEART RATE: 71 BPM | DIASTOLIC BLOOD PRESSURE: 60 MMHG | TEMPERATURE: 97.9 F | WEIGHT: 131 LBS | BODY MASS INDEX: 18.75 KG/M2 | OXYGEN SATURATION: 97 % | HEIGHT: 70 IN | SYSTOLIC BLOOD PRESSURE: 115 MMHG

## 2020-08-12 DIAGNOSIS — N26.1 RENAL ATROPHY, LEFT: ICD-10-CM

## 2020-08-12 DIAGNOSIS — Z01.818 PREOP GENERAL PHYSICAL EXAM: Primary | ICD-10-CM

## 2020-08-12 DIAGNOSIS — N20.0 RENAL STONE: ICD-10-CM

## 2020-08-12 PROBLEM — R06.02 SHORTNESS OF BREATH: Status: RESOLVED | Noted: 2019-10-09 | Resolved: 2020-08-12

## 2020-08-12 PROBLEM — F41.9 ANXIETY DISORDER, UNSPECIFIED: Status: RESOLVED | Noted: 2020-07-06 | Resolved: 2020-08-12

## 2020-08-12 PROCEDURE — 99203 OFFICE O/P NEW LOW 30 MIN: CPT | Performed by: INTERNAL MEDICINE

## 2020-08-12 ASSESSMENT — MIFFLIN-ST. JEOR: SCORE: 1542.52

## 2020-08-12 ASSESSMENT — LIFESTYLE VARIABLES: TOBACCO_USE: 1

## 2020-08-12 NOTE — ANESTHESIA PREPROCEDURE EVALUATION
"Anesthesia Pre-Procedure Evaluation    Patient: Emelyn Marmolejo   MRN:     [id number] Gender:   male   Age:    30 year old :      1990        Preoperative Diagnosis: Right ureteral stone [N20.1]   Procedure(s):  CYSTOSCOPY, RIGHT RETROGRADE PYELOGRAM, RIGHT URETEROSCOPY WITH LASER LITHOTRIPSY AND BASKET REMOVAL OF STONE, RIGHT URETERAL STENT PLACEMENT     LABS:  CBC:   Lab Results   Component Value Date    HGB 13.4 2020    HGB 15.6 2019    HCT 44.5 2020    HCT 52.6 2019     BMP:   Lab Results   Component Value Date     2020     05/15/2020    POTASSIUM 3.9 2020    POTASSIUM 3.9 05/15/2020    CHLORIDE 109 2020    CHLORIDE 102 05/15/2020    CO2 24 2020    CO2 27 05/15/2020    BUN 12 2020    BUN 11 05/15/2020    CR 0.88 2020    CR 0.91 05/15/2020    GLC 92 2020    GLC 93 05/15/2020     COAGS: No results found for: PTT, INR, FIBR  POC: No results found for: BGM, HCG, HCGS  OTHER:   Lab Results   Component Value Date    A1C 5.4 2020    DARRELL 9.2 2020    PROTTOTAL 10.0 (H) 2020    ALT 27.3 2020    AST 21.9 2020    ALKPHOS 86.5 2020    BILITOTAL <0.4 2020    CRP 8.2 (H) 05/15/2020        Preop Vitals    BP Readings from Last 3 Encounters:   20 121/78   20 130/78   05/15/20 131/88    Pulse Readings from Last 3 Encounters:   20 76   20 81   05/15/20 79      Resp Readings from Last 3 Encounters:   20 16   20 16   20 16    SpO2 Readings from Last 3 Encounters:   20 100%   20 99%   05/15/20 99%      Temp Readings from Last 1 Encounters:   20 37.2  C (98.9  F) (Oral)    Ht Readings from Last 1 Encounters:   20 1.765 m (5' 9.5\")      Wt Readings from Last 1 Encounters:   20 60.2 kg (132 lb 12.8 oz)    Estimated body mass index is 19.33 kg/m  as calculated from the following:    Height as of 20: 1.765 m (5' 9.5\").    Weight as of 20: " 60.2 kg (132 lb 12.8 oz).     LDA:        Past Medical History:   Diagnosis Date     Prostate infection       History reviewed. No pertinent surgical history.   No Known Allergies     Anesthesia Evaluation     . Pt has not had prior anesthetic            ROS/MED HX    ENT/Pulmonary:     (+)tobacco use, Past use , . 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    Neurologic:  - neg neurologic ROS     Cardiovascular:  - neg cardiovascular ROS       METS/Exercise Tolerance:  4 - Raking leaves, gardening   Hematologic:  - neg hematologic  ROS       Musculoskeletal:  - neg musculoskeletal ROS       GI/Hepatic:         Renal/Genitourinary: Comment: Right ureteral stone        Endo:  - neg endo ROS       Psychiatric:     (+) psychiatric history anxiety      Infectious Disease:  - neg infectious disease ROS       Malignancy:      - no malignancy   Other:    - neg other ROS                     PHYSICAL EXAM:   Mental Status/Neuro: A/A/O   Airway: Facies: Feasible  Mallampati: I  Mouth/Opening: Full  TM distance: > 6 cm  Neck ROM: Full   Respiratory: Auscultation: CTAB     Resp. Rate: Normal     Resp. Effort: Normal      CV: Rhythm: Regular  Rate: Age appropriate  Heart: Normal Sounds  Edema: None   Comments:      Dental: Normal Dentition                Assessment:   ASA SCORE: 2    H&P: History and physical reviewed and following examination; no interval change.   Smoking Status:  Non-Smoker/Unknown   NPO Status: NPO Appropriate     Plan:   Anes. Type:  General   Pre-Medication: None   Induction:  IV (Standard)   Airway: LMA   Access/Monitoring: PIV   Maintenance: Balanced     Postop Plan:   Postop Pain: Opioids  Postop Sedation/Airway: Not planned  Disposition: Outpatient     PONV Management:   Adult Risk Factors:, Non-Smoker, Postop Opioids   Prevention: Ondansetron, Propofol     CONSENT: Direct conversation   Plan and risks discussed with: Patient   Blood  Products: Consent Deferred (Minimal Blood Loss)       Comments for Plan/Consent:  GA LMA vs ETT, Standard ASA monitoring  All available and pertinent medical records and test results reviewed.  Risks, including but not limited to airway injury, bronchospasm,  hypoxemia, PONV d/w patient                 Deedee Greenberg MD

## 2020-08-12 NOTE — PROGRESS NOTES
[address] [address]  [phone]  Dept: [phone]    PRE-OP EVALUATION:  Today's date: 2020    Emelyn Marmolejo (: 1990) presents for pre-operative evaluation assessment as requested by Aj Pichardo MD .  He requires evaluation and anesthesia risk assessment prior to undergoing surgery/procedure for treatment of CYSTOSCOPY, RIGHT RETROGRADE PYELOGRAM, RIGHT URETEROSCOPY WITH LASER LITHOTRIPSY AND BASKET REMOVAL OF STONE, RIGHT URETERAL STENT PLACEMENT  .    Proposed Surgery/ Procedure: CYSTOSCOPY, RIGHT RETROGRADE PYELOGRAM, RIGHT URETEROSCOPY WITH LASER LITHOTRIPSY AND BASKET REMOVAL OF STONE, RIGHT URETERAL STENT PLACEMENT   Date of Surgery/ Procedure: 20  Time of Surgery/ Procedure: 1:55pm  Hospital/Surgical Facility: [facility]  Surgery Fax Number: Note does not need to be faxed, will be available electronically in Epic.  Primary Physician: Livan Castro  Type of Anesthesia Anticipated: General    Preoperative Questionnaire:   No - Have you ever had a heart attack or stroke?  No - Have you ever had surgery on your heart or blood vessels, such as a stent, coronary (heart) bypass, or surgery on an artery in the head, neck, heart, or legs?  No - Do you have chest pain when you are physically active?  No - Do you have a history of heart failure?  No - Do you currently have a cold, bronchitis, or symptoms of other respiratory (head and chest) infections?  No - Do you have a cough, shortness of breath, or wheezing?  No - Do you or anyone in your family have a history of blood clots?  No - Do you or anyone in your family have a serious bleeding problem, such as long-lasting bleeding after surgeries or cuts?  No - Have you ever had anemia or been told to take iron pills?  No - Have you had any abnormal blood loss such as black, tarry or bloody stools, or abnormal vaginal bleeding?  No - Have you ever had a blood transfusion?  Yes - Are  you willing to have a blood transfusion if it is medically needed before, during, or after your surgery?  No - Have you or anyone in your family ever had problems with anesthesia (sedation for surgery)?  No - Do you have sleep apnea, excessive snoring, or daytime drowsiness?   No - Do you have any artifical heart valves or other implanted medical devices, such as a pacemaker, defibrillator, or continuous glucose monitor?  No - Do you have any artifical joints?  No - Are you allergic to latex?      Patient has a Health Care Directive or Living Will:  NO    HPI:     HPI related to upcoming procedure: patient with history of kidney stones over the last 2 years. He is scheduled for surgery.     No other chronic health issues.     MEDICAL HISTORY:     Patient Active Problem List    Diagnosis Date Noted     Right ureteral stone 08/06/2020     Priority: Medium     Added automatically from request for surgery [id number]       Anxiety disorder, unspecified 07/06/2020     Priority: Medium     H. pylori infection 05/14/2020     Priority: Medium     History of renal stone 05/14/2020     Priority: Medium     Abnormal CT of the chest 10/09/2019     Priority: Medium     Shortness of breath 10/09/2019     Priority: Medium     Renal atrophy, left 08/21/2019     Priority: Medium     Renal stone 08/21/2019     Priority: Medium      Past Medical History:   Diagnosis Date     Prostate infection      History reviewed. No pertinent surgical history.  Current Outpatient Medications   Medication Sig Dispense Refill     acetaminophen (TYLENOL) 325 MG tablet Take 325-650 mg by mouth       melatonin 3 MG tablet Take 2 hours before bedtime for the next 10 days. And then as needed after for sleep. 30 tablet 0     sodium chloride (OCEAN) 0.65 % nasal spray Spray in each nose twice a day 104 mL 1     levofloxacin (LEVAQUIN) 500 MG tablet Take 1 tablet (500 mg) by mouth daily (Patient not taking: Reported on 7/3/2020) 12 tablet 0     omeprazole 20 MG  "tablet Take 1 tablet (20 mg) by mouth 2 times daily (Patient not taking: Reported on 7/3/2020) 28 tablet 0     ondansetron (ZOFRAN-ODT) 4 MG ODT tab Take 1 tablet (4 mg) by mouth every 8 hours as needed for nausea (Patient not taking: Reported on 7/3/2020) 20 tablet 0     traZODone (DESYREL) 50 MG tablet Take 1 tablet (50 mg) by mouth nightly as needed for sleep 30 tablet 0     OTC products: None, except as noted above    No Known Allergies   Latex Allergy: NO    Social History     Tobacco Use     Smoking status: Former Smoker     Types: Cigarettes     Last attempt to quit: 2019     Years since quittin.9     Smokeless tobacco: Never Used   Substance Use Topics     Alcohol use: Never     Frequency: Never     History   Drug Use Unknown       REVIEW OF SYSTEMS:   CONSTITUTIONAL: NEGATIVE for fever, chills, change in weight  ENT/MOUTH: NEGATIVE for ear, mouth and throat problems  RESP: NEGATIVE for significant cough or SOB  CV: NEGATIVE for chest pain, palpitations or peripheral edema  GI: NEGATIVE for nausea, abdominal pain, heartburn, or change in bowel habits  MUSCULOSKELETAL: left flank pain    EXAM:   /81   Pulse 71   Temp 97.9  F (36.6  C) (Temporal)   Ht 1.765 m (5' 9.5\")   Wt 59.4 kg (131 lb)   SpO2 97%   BMI 19.07 kg/m    GENERAL APPEARANCE: healthy, alert and no distress  HENT: ear canals and TM's normal and nose and mouth without ulcers or lesions  RESP: lungs clear to auscultation - no rales, rhonchi or wheezes  CV: regular rate and rhythm, normal S1 S2, no S3 or S4 and no murmur, click or rub   ABDOMEN: soft, nontender, no HSM or masses and bowel sounds normal  MS: extremities normal- no gross deformities noted  NEURO: Normal strength and tone, sensory exam grossly normal, mentation intact and speech normal    DIAGNOSTICS:   EKG: Not indicated due to non-vascular surgery and low risk of event (age <65 and without cardiac risk factors)    CBC/CMP from 2020 were within normal limits " (see Care Everywhere)    IMPRESSION:   Reason for surgery/procedure: kidney stone  Diagnosis/reason for consult:       ICD-10-CM    1. Preop general physical exam  Z01.818    2. Renal stone  N20.0    3. Renal atrophy, left  N26.1         The proposed surgical procedure is considered LOW risk.    REVISED CARDIAC RISK INDEX  The patient has the following serious cardiovascular risks for perioperative complications such as (MI, PE, VFib and 3  AV Block):  No serious cardiac risks  INTERPRETATION: 0 risks: Class I (very low risk - 0.4% complication rate)    The patient has the following additional risks for perioperative complications:  No identified additional risk      RECOMMENDATIONS:         --Patient is to take all scheduled medications on the day of surgery    APPROVAL GIVEN to proceed with proposed procedure, without further diagnostic evaluation       Signed Electronically by: Constance Ramsey MD PhD    Copy of this evaluation report is provided to requesting physician.    [facility] Preop Guidelines    Revised Cardiac Risk Index

## 2020-08-13 ENCOUNTER — ANESTHESIA (OUTPATIENT)
Dept: SURGERY | Facility: AMBULATORY SURGERY CENTER | Age: 30
End: 2020-08-13

## 2020-08-13 ENCOUNTER — ANCILLARY PROCEDURE (OUTPATIENT)
Dept: GENERAL RADIOLOGY | Facility: CLINIC | Age: 30
End: 2020-08-13
Attending: UROLOGY
Payer: COMMERCIAL

## 2020-08-13 ENCOUNTER — HOSPITAL ENCOUNTER (OUTPATIENT)
Facility: AMBULATORY SURGERY CENTER | Age: 30
Discharge: HOME | End: 2020-08-13
Attending: UROLOGY | Admitting: UROLOGY
Payer: COMMERCIAL

## 2020-08-13 VITALS
OXYGEN SATURATION: 98 % | HEART RATE: 84 BPM | SYSTOLIC BLOOD PRESSURE: 121 MMHG | DIASTOLIC BLOOD PRESSURE: 79 MMHG | TEMPERATURE: 96.9 F | RESPIRATION RATE: 16 BRPM

## 2020-08-13 DIAGNOSIS — N20.1 RIGHT URETERAL STONE: Primary | ICD-10-CM

## 2020-08-13 DIAGNOSIS — N20.0 RENAL CALCULI: ICD-10-CM

## 2020-08-13 DIAGNOSIS — N20.1 RIGHT URETERAL STONE: ICD-10-CM

## 2020-08-13 PROCEDURE — G8907 PT DOC NO EVENTS ON DISCHARG: HCPCS

## 2020-08-13 PROCEDURE — G8916 PT W IV AB GIVEN ON TIME: HCPCS

## 2020-08-13 PROCEDURE — 52351 CYSTOURETERO & OR PYELOSCOPE: CPT | Mod: RT

## 2020-08-13 PROCEDURE — G8918 PT W/O PREOP ORDER IV AB PRO: HCPCS

## 2020-08-13 RX ORDER — GABAPENTIN 300 MG/1
300 CAPSULE ORAL ONCE
Status: COMPLETED | OUTPATIENT
Start: 2020-08-13 | End: 2020-08-13

## 2020-08-13 RX ORDER — HYDROMORPHONE HYDROCHLORIDE 1 MG/ML
.3-.5 INJECTION, SOLUTION INTRAMUSCULAR; INTRAVENOUS; SUBCUTANEOUS EVERY 10 MIN PRN
Status: DISCONTINUED | OUTPATIENT
Start: 2020-08-13 | End: 2020-08-14 | Stop reason: HOSPADM

## 2020-08-13 RX ORDER — KETOROLAC TROMETHAMINE 30 MG/ML
INJECTION, SOLUTION INTRAMUSCULAR; INTRAVENOUS PRN
Status: DISCONTINUED | OUTPATIENT
Start: 2020-08-13 | End: 2020-08-13

## 2020-08-13 RX ORDER — CEFAZOLIN SODIUM 1 G/3ML
1 INJECTION, POWDER, FOR SOLUTION INTRAMUSCULAR; INTRAVENOUS SEE ADMIN INSTRUCTIONS
Status: DISCONTINUED | OUTPATIENT
Start: 2020-08-13 | End: 2020-08-14 | Stop reason: HOSPADM

## 2020-08-13 RX ORDER — LIDOCAINE HYDROCHLORIDE 20 MG/ML
INJECTION, SOLUTION INFILTRATION; PERINEURAL PRN
Status: DISCONTINUED | OUTPATIENT
Start: 2020-08-13 | End: 2020-08-13

## 2020-08-13 RX ORDER — SODIUM CHLORIDE, SODIUM LACTATE, POTASSIUM CHLORIDE, CALCIUM CHLORIDE 600; 310; 30; 20 MG/100ML; MG/100ML; MG/100ML; MG/100ML
INJECTION, SOLUTION INTRAVENOUS CONTINUOUS
Status: DISCONTINUED | OUTPATIENT
Start: 2020-08-13 | End: 2020-08-14 | Stop reason: HOSPADM

## 2020-08-13 RX ORDER — KETOROLAC TROMETHAMINE 10 MG/1
10 TABLET, FILM COATED ORAL EVERY 6 HOURS
Qty: 20 TABLET | Refills: 0 | Status: SHIPPED | OUTPATIENT
Start: 2020-08-13 | End: 2020-08-17

## 2020-08-13 RX ORDER — FENTANYL CITRATE 50 UG/ML
INJECTION, SOLUTION INTRAMUSCULAR; INTRAVENOUS PRN
Status: DISCONTINUED | OUTPATIENT
Start: 2020-08-13 | End: 2020-08-13

## 2020-08-13 RX ORDER — ONDANSETRON 2 MG/ML
4 INJECTION INTRAMUSCULAR; INTRAVENOUS EVERY 30 MIN PRN
Status: DISCONTINUED | OUTPATIENT
Start: 2020-08-13 | End: 2020-08-14 | Stop reason: HOSPADM

## 2020-08-13 RX ORDER — DEXAMETHASONE SODIUM PHOSPHATE 4 MG/ML
INJECTION, SOLUTION INTRA-ARTICULAR; INTRALESIONAL; INTRAMUSCULAR; INTRAVENOUS; SOFT TISSUE PRN
Status: DISCONTINUED | OUTPATIENT
Start: 2020-08-13 | End: 2020-08-13

## 2020-08-13 RX ORDER — FENTANYL CITRATE 50 UG/ML
25-50 INJECTION, SOLUTION INTRAMUSCULAR; INTRAVENOUS
Status: DISCONTINUED | OUTPATIENT
Start: 2020-08-13 | End: 2020-08-14 | Stop reason: HOSPADM

## 2020-08-13 RX ORDER — ONDANSETRON 4 MG/1
4 TABLET, ORALLY DISINTEGRATING ORAL EVERY 30 MIN PRN
Status: DISCONTINUED | OUTPATIENT
Start: 2020-08-13 | End: 2020-08-14 | Stop reason: HOSPADM

## 2020-08-13 RX ORDER — LIDOCAINE 40 MG/G
CREAM TOPICAL
Status: DISCONTINUED | OUTPATIENT
Start: 2020-08-13 | End: 2020-08-14 | Stop reason: HOSPADM

## 2020-08-13 RX ORDER — FUROSEMIDE 10 MG/ML
INJECTION INTRAMUSCULAR; INTRAVENOUS PRN
Status: DISCONTINUED | OUTPATIENT
Start: 2020-08-13 | End: 2020-08-13

## 2020-08-13 RX ORDER — PHENYLEPHRINE HYDROCHLORIDE 10 MG/ML
INJECTION INTRAVENOUS PRN
Status: DISCONTINUED | OUTPATIENT
Start: 2020-08-13 | End: 2020-08-13

## 2020-08-13 RX ORDER — PROPOFOL 10 MG/ML
INJECTION, EMULSION INTRAVENOUS PRN
Status: DISCONTINUED | OUTPATIENT
Start: 2020-08-13 | End: 2020-08-13

## 2020-08-13 RX ORDER — ACETAMINOPHEN 325 MG/1
975 TABLET ORAL ONCE
Status: COMPLETED | OUTPATIENT
Start: 2020-08-13 | End: 2020-08-13

## 2020-08-13 RX ORDER — MEPERIDINE HYDROCHLORIDE 25 MG/ML
12.5 INJECTION INTRAMUSCULAR; INTRAVENOUS; SUBCUTANEOUS
Status: DISCONTINUED | OUTPATIENT
Start: 2020-08-13 | End: 2020-08-14 | Stop reason: HOSPADM

## 2020-08-13 RX ORDER — OXYCODONE HYDROCHLORIDE 5 MG/1
5 TABLET ORAL EVERY 4 HOURS PRN
Status: DISCONTINUED | OUTPATIENT
Start: 2020-08-13 | End: 2020-08-14 | Stop reason: HOSPADM

## 2020-08-13 RX ORDER — NALOXONE HYDROCHLORIDE 0.4 MG/ML
.1-.4 INJECTION, SOLUTION INTRAMUSCULAR; INTRAVENOUS; SUBCUTANEOUS
Status: DISCONTINUED | OUTPATIENT
Start: 2020-08-13 | End: 2020-08-14 | Stop reason: HOSPADM

## 2020-08-13 RX ORDER — CEFAZOLIN SODIUM 2 G/100ML
2 INJECTION, SOLUTION INTRAVENOUS
Status: COMPLETED | OUTPATIENT
Start: 2020-08-13 | End: 2020-08-13

## 2020-08-13 RX ORDER — ONDANSETRON 2 MG/ML
INJECTION INTRAMUSCULAR; INTRAVENOUS PRN
Status: DISCONTINUED | OUTPATIENT
Start: 2020-08-13 | End: 2020-08-13

## 2020-08-13 RX ADMIN — PHENYLEPHRINE HYDROCHLORIDE 50 MCG: 10 INJECTION INTRAVENOUS at 14:38

## 2020-08-13 RX ADMIN — GABAPENTIN 300 MG: 300 CAPSULE ORAL at 13:44

## 2020-08-13 RX ADMIN — CEFAZOLIN SODIUM 2 G: 2 INJECTION, SOLUTION INTRAVENOUS at 14:15

## 2020-08-13 RX ADMIN — PROPOFOL 200 MG: 10 INJECTION, EMULSION INTRAVENOUS at 14:11

## 2020-08-13 RX ADMIN — KETOROLAC TROMETHAMINE 30 MG: 30 INJECTION, SOLUTION INTRAMUSCULAR; INTRAVENOUS at 14:53

## 2020-08-13 RX ADMIN — FENTANYL CITRATE 50 MCG: 50 INJECTION, SOLUTION INTRAMUSCULAR; INTRAVENOUS at 14:11

## 2020-08-13 RX ADMIN — ACETAMINOPHEN 975 MG: 325 TABLET ORAL at 13:44

## 2020-08-13 RX ADMIN — FENTANYL CITRATE 50 MCG: 50 INJECTION, SOLUTION INTRAMUSCULAR; INTRAVENOUS at 14:22

## 2020-08-13 RX ADMIN — LIDOCAINE HYDROCHLORIDE 60 MG: 20 INJECTION, SOLUTION INFILTRATION; PERINEURAL at 14:11

## 2020-08-13 RX ADMIN — DEXAMETHASONE SODIUM PHOSPHATE 4 MG: 4 INJECTION, SOLUTION INTRA-ARTICULAR; INTRALESIONAL; INTRAMUSCULAR; INTRAVENOUS; SOFT TISSUE at 14:15

## 2020-08-13 RX ADMIN — SODIUM CHLORIDE, SODIUM LACTATE, POTASSIUM CHLORIDE, CALCIUM CHLORIDE: 600; 310; 30; 20 INJECTION, SOLUTION INTRAVENOUS at 14:08

## 2020-08-13 RX ADMIN — ONDANSETRON 4 MG: 2 INJECTION INTRAMUSCULAR; INTRAVENOUS at 14:53

## 2020-08-13 RX ADMIN — FUROSEMIDE 20 MG: 10 INJECTION INTRAMUSCULAR; INTRAVENOUS at 14:53

## 2020-08-13 NOTE — ANESTHESIA CARE TRANSFER NOTE
Patient: Emelyn Marmolejo    Procedure(s):  CYSTOSCOPY, RIGHT RETROGRADE PYELOGRAM, RIGHT diagnostic URETEROSCOPY    Diagnosis: Right ureteral stone [N20.1]  Diagnosis Additional Information: No value filed.    Anesthesia Type:   General     Note:  Airway :Nasal Cannula  Patient transferred to:PACU  Comments: Awake, comfortable, sats 99%, Report to RN.Handoff Report: Identifed the Patient, Identified the Reponsible Provider, Reviewed the pertinent medical history, Discussed the surgical course, Reviewed Intra-OP anesthesia mangement and issues during anesthesia, Set expectations for post-procedure period and Allowed opportunity for questions and acknowledgement of understanding      Vitals: (Last set prior to Anesthesia Care Transfer)    CRNA VITALS  8/13/2020 1432 - 8/13/2020 1509      8/13/2020             Pulse:  87    SpO2:  94 %                Electronically Signed By: CORRINE Manuel CRNA  August 13, 2020  3:09 PM

## 2020-08-13 NOTE — DISCHARGE INSTRUCTIONS
POSTOPERATIVE INSTRUCTIONS    Diagnosis-------------------------------   RIGHT ureteral stone - passed    Procedure-------------------------------  Procedure(s) (LRB):  CYSTOSCOPY, RIGHT RETROGRADE PYELOGRAM, RIGHT diagnostic URETEROSCOPY (Right)      Findings--------------------------------  Cystoscopy with no evidence of stone in the bladder. RIGHT Retrograde Pyelogram and Ureteroscopy with no evidence of stone in the ureter or entire right collecting system.    Home-going instructions-----------------         Activity Limitation:     - No driving or operating heavy machinery while on narcotic pain medication.     FOLLOW THESE INSTRUCTIONS AS INDICATED BELOW:  - Observe operative area for signs of excessive bleeding.  - You may shower.  - Increase fluid intake to promote clear urine.  - Resume usual diet as tolerated    What to expect while recovering-----------  - You may experience some intermittent bleeding that makes your urine pink or cherry colored. This is normal.  - However, if you are unable to urinate, passing large amount of clots, have brittni blood in your urine, or have a temperature >101 degrees, call the urology nurse on call, or present to your nearest emergency department.  - You are encouraged to walk daily, and have no activity restrictions.     Discharge Medications/instructions:     - Take Tylenol 1000mg every 6 hours for pain    - Take Toradol 10mg every 6 hours as needed for additional pain control      Questions/concerns------------------------  [facility]: [phone]  [facility]: [phone]    Future appointments  You will be contacted to arrange follow up telephone visit with Dr. Hedrick in ~2-3 weeks.    Aj Hedrick MD   [facility]   Adult Discharge Orders & Instructions     For 24 hours after surgery    1. Get plenty of rest.  A responsible adult must stay with you for at least 24 hours after you leave the hospital.   2. Do not  drive or use heavy equipment.  If you have weakness or tingling, don't drive or use heavy equipment until this feeling goes away.  3. Do not drink alcohol.  4. Avoid strenuous or risky activities.  Ask for help when climbing stairs.   5. You may feel lightheaded.  IF so, sit for a few minutes before standing.  Have someone help you get up.   6. If you have nausea (feel sick to your stomach): Drink only clear liquids such as apple juice, ginger ale, broth or 7-Up.  Rest may also help.  Be sure to drink enough fluids.  Move to a regular diet as you feel able.  7. You may have a slight fever. Call the doctor if your fever is over 100 F (37.7 C) (taken under the tongue) or lasts longer than 24 hours.  8. You may have a dry mouth, a sore throat, muscle aches or trouble sleeping.  These should go away after 24 hours.  9. Do not make important or legal decisions.     Call your doctor for any of the followin.  Signs of infection (fever, growing tenderness at the surgery site, a large amount of drainage or bleeding, severe pain, foul-smelling drainage, redness, swelling).    2. It has been over 8 to 10 hours since surgery and you are still not able to urinate (pass water).    3.  Headache for over 24 hours.    4.  Numbness, tingling or weakness the day after surgery (if you had spinal anesthesia).                  5. Signs of Covid-19 infection (temperature over 100 degrees, shortness of breath, cough, loss of taste/smell, generalized body aches, persistent headache,                  chills, sore throat, nausea/vomiting/diarrhea).

## 2020-08-13 NOTE — ANESTHESIA POSTPROCEDURE EVALUATION
Anesthesia POST Procedure Evaluation    Patient: Emelyn Marmolejo   MRN:     [id number] Gender:   male   Age:    30 year old :      1990        Preoperative Diagnosis: Right ureteral stone [N20.1]   Procedure(s):  CYSTOSCOPY, RIGHT RETROGRADE PYELOGRAM, RIGHT diagnostic URETEROSCOPY   Postop Comments: No value filed.     Anesthesia Type: General       Disposition: Outpatient   Postop Pain Control: Uneventful            Sign Out: Well controlled pain   PONV: No   Neuro/Psych: Uneventful            Sign Out: Acceptable/Baseline neuro status   Airway/Respiratory: Uneventful            Sign Out: Acceptable/Baseline resp. status   CV/Hemodynamics: Uneventful            Sign Out: Acceptable CV status   Other NRE: NONE   DID A NON-ROUTINE EVENT OCCUR? No         Last Anesthesia Record Vitals:  CRNA VITALS  2020 1432 - 2020 1532      2020             Pulse:  87    SpO2:  94 %          Last PACU Vitals:  Vitals Value Taken Time   BP 99/78 2020  3:15 PM   Temp     Pulse     Resp 15 2020  3:17 PM   SpO2 99 % 2020  3:17 PM   Temp src     NIBP     Pulse     SpO2     Resp     Temp     Ht Rate     Temp 2     Vitals shown include unvalidated device data.      Electronically Signed By: Deedee Greenberg MD, 2020, 4:02 PM

## 2020-08-13 NOTE — OP NOTE
OPERATIVE REPORT  DATE OF SURGERY: 08/13/20  LOCATION OF SURGERY: [facility]  PREOPERATIVE DIAGNOSIS:  (N20.1) Right ureteral stone  POSTOPERATIVE DIAGNOSIS: (N20.1) Right ureteral stone - Passed    START TIME: 2:28 PM  END TIME: 2:59 PM  PROCEDURE PERFORMED:   1. Cystoscopy  2. RIGHT retrograde pyelogram  3. RIGHT ureteroscopy diagnostic  4. <1hr physician fluoroscopy time      STAFF SURGEON: Aj Hedrick MD  ANESTHESIA: General.   ESTIMATED BLOOD LOSS: 0 mL.   DRAINS AND TUBES: 16fr reilly catheter  COMPLICATIONS: None.   DISPOSITION: PACU.   SPECIMENS OBTAINED: None  SIGNIFICANT FINDINGS: Cystoscopy with no evidence of stone in the bladder. RIGHT Retrograde Pyelogram and Ureteroscopy with no evidence of stone in the ureter or entire right collecting system.     HISTORY OF PRESENT ILLNESS: Mr. Marmolejo is a 30 year old man with a functionally solitary RIGHT kidney with a LEFT atrophic kidney. He was having right flank pain in addition to his chronic right back and abdominal pain and was found to have a 3mm distal ureteral stone. He was treated with Medical Expulsive Therapy (MET) with no notable stone passage. He noted continued symptoms and was counseled on treatment options.    OPERATION PERFORMED:   Informed consent was obtained and the patient was brought to the operating room where general anesthesia was induced. The patient was given appropriate preoperative antibiotics and positioned supine. The patient was then repositioned in dorsal lithotomy with all pressure points padded.  We then performed a timeout, verifying the correct patient's site and procedure to be performed.    22 Sami cystoscope was inserted atraumatically into the bladder.  Cystoscopy was performed with no evidence of stone in the bladder.  The right ureteral orifice was identified and cannulated with a 0.035 sensor wire which was advanced to the kidney under fluoroscopic guidance.  The cystoscope was removed.  A semirigid ureteroscope  was assembled and inserted atraumatically into the bladder.  The right ureteral orifice was then cannulated with an Amplatz Super Stiff wire and the ureteroscope was advanced into the ureteral orifice utilizing a robotic technique.  The ureteroscope was advanced gently up to the level of the UPJ with no evidence of stone in the ureter.  A gentle retrograde pyelogram was performed outlining the right renal collecting system.  The wire was left in situ and the semirigid ureteroscope was removed.  A flexible ureteroscope was then assembled and backloaded over the sensor wire up to the level of the renal pelvis.  Flexible pyeloscopy was then performed with visualization of the entire renal collecting system with no evidence of the previous 3 mm ureteral stone having been washed back up to the kidney.  The ureteroscope was removed with excellent visualization of the entire ureter with no ureteral injury or abnormalities noted.  Decision was made not to leave a ureteral stent.  All wires were removed.  The bladder was drained with a 16 Swedish Burgos catheter which was left in place.  He received 30 mg of IV Toradol and 20 mg of IV Lasix.  He was emerged from anesthesia and taken to the recovery room in stable condition.    Chart documentation with Dragon Voice recognition Software. Although reviewed after completion, some words and grammatical errors may remain.     Aj Hedrick MD   Urology  [facility]  Clinic Phone [phone]

## 2020-08-14 ENCOUNTER — TELEPHONE (OUTPATIENT)
Dept: UROLOGY | Facility: CLINIC | Age: 30
End: 2020-08-14

## 2020-08-14 NOTE — TELEPHONE ENCOUNTER
[facility] Call Center    Phone Message    May a detailed message be left on voicemail: yes     Reason for Call: PT called today.  He had surgery yesterday with Dr. Hedrick.  From what I could understand PT said that an  was not available for his visit and he did not get any information on his surgery, how it went or what he needs to do for follow up.  PT would like a call back to discuss.  PT requires a [facility] .  Thanks.    Action Taken: Message routed to:  Adult Clinics: Urology p [id number]    Travel Screening: Not Applicable

## 2020-08-14 NOTE — TELEPHONE ENCOUNTER
Per chart review, patient is to have telephone visit with Dr. Hedrick 2-4 weeks post op. Called patient to assist in scheduling this appointment with a [facility] . Patient requested it be a 2 week post op. Scheduled patient for video visit for 8/25. Patient had no further questions.    Kylee Mena LPN

## 2020-08-17 ENCOUNTER — TELEPHONE (OUTPATIENT)
Dept: FAMILY MEDICINE | Facility: CLINIC | Age: 30
End: 2020-08-17
Payer: COMMERCIAL

## 2020-08-18 ENCOUNTER — APPOINTMENT (OUTPATIENT)
Dept: INTERPRETER SERVICES | Facility: CLINIC | Age: 30
End: 2020-08-18
Payer: COMMERCIAL

## 2020-08-18 ENCOUNTER — TELEPHONE (OUTPATIENT)
Dept: UROLOGY | Facility: CLINIC | Age: 30
End: 2020-08-18

## 2020-08-18 NOTE — TELEPHONE ENCOUNTER
Called patient using  services. [facility]  Per Dr. Hedrick, no changes for healing but pain meds should be discussed with doctor who prescribed it as the post op medications are done now.     Patient had no further questions.        Kylee Mena LPN

## 2020-08-18 NOTE — TELEPHONE ENCOUNTER
[facility] Call Center    Phone Message    May a detailed message be left on voicemail: yes     Reason for Call: Symptoms or Concerns     Patient just diagnosed POSITIVE FOR COVID19, 2 days ago.    He has questions about if he should continue his treatment as discussed after surgery? Continue his pain meds?     Action Taken: Message routed to:  Adult Clinics: Urology p [id number]    Travel Screening: Not Applicable

## 2020-08-19 ENCOUNTER — TELEPHONE (OUTPATIENT)
Dept: UROLOGY | Facility: CLINIC | Age: 30
End: 2020-08-19

## 2020-08-19 NOTE — TELEPHONE ENCOUNTER
Patient called clinic. Patient endorses pain on the right side that is getting worse. States pain is at 10/10 even while taking Toradol. Patient would like a message sent to Dr. Hedrick to see if anything else can be done.    Kylee Mena LPN

## 2020-08-24 NOTE — TELEPHONE ENCOUNTER
Called patient using  services. Relayed note below per Dr. Hedrick. Patient had no further questions.      Aj Hedrick MD  You; [facility] Adult [place] 21 hours ago (4:30 PM)       Given his normal Ureteroscopy, I do not think the pain is coming from his kidney.            Kylee Mena LPN

## 2020-08-25 ENCOUNTER — VIRTUAL VISIT (OUTPATIENT)
Dept: UROLOGY | Facility: CLINIC | Age: 30
End: 2020-08-25
Payer: COMMERCIAL

## 2020-08-25 ENCOUNTER — PREP FOR PROCEDURE (OUTPATIENT)
Dept: UROLOGY | Facility: CLINIC | Age: 30
End: 2020-08-25

## 2020-08-25 DIAGNOSIS — N26.1 RENAL ATROPHY, LEFT: Primary | ICD-10-CM

## 2020-08-25 DIAGNOSIS — R10.9 FLANK PAIN: ICD-10-CM

## 2020-08-25 DIAGNOSIS — N26.1 ATROPHY OF LEFT KIDNEY: Primary | ICD-10-CM

## 2020-08-25 PROBLEM — N20.1 RIGHT URETERAL STONE: Status: RESOLVED | Noted: 2020-08-06 | Resolved: 2020-08-25

## 2020-08-25 PROCEDURE — 99442 ZZC PHYSICIAN TELEPHONE EVALUATION 11-20 MIN: CPT | Performed by: UROLOGY

## 2020-08-25 NOTE — PROGRESS NOTES
PROVIDER NOTE:  [facility]   CHIEF COMPLAINT   It was my pleasure to see Emelyn Marmolejo who is a 30 year old male for follow-up of right flank pain, left atrophic kidney. With the assistance of a      HPI   Emelyn Marmolejo is a very pleasant 30 year old male who presents with a history of an atrophic left kidney due to a large obstructing stone.  I had seen him previously for work-up for possible left simple nephrectomy.  At that time he was noting some right-sided flank and upper abdominal pain and is very concerned that this is related to his right kidney.  Imaging from February was reviewed with no evidence of pathology on the right side.  He subsequently been diagnosed with H. pylori and is undergoing treatment for this.  He notes continued right-sided flank and abdominal pain especially with intake of food and also prolonged sitting with driving.  He denies any fevers, chills, or hematuria.    7/3/20:  Follow-up today to discuss the results of his CT scan which was completed yesterday which demonstrates a small 3 mm distal ureteral stone.  He is feeling okay today with minimal right-sided flank pain.  He denies any fevers, chills, nausea, or vomiting.  He denies noting any stone passage    8/6/20:  Follow-up today after his CT scan last week which demonstrated persistence of the 3 mm distal ureteral stone  He notes that he is continued to have ongoing right-sided flank pain    TODAY:  He is now s/p diagnostic Ureteroscopy on 8/13/20. No stone identified at that time with complete visualization of the entire right collecting system.  The last 3-4 days he has been doing well with resolution of the RIGHT flank pain  He is having some intermittent LEFT flank pain    Developed COVID-19, now recovered     PHYSICAL EXAM  Patient is a 30 year old  male   Vitals: There were no vitals taken for this visit.  There is no height or weight on file to calculate BMI.  General: No evidence of distress   Lungs:  normal respiratory effort  Neuro: Alert, oriented, speech and mentation normal  Psych: affect and mood normal    Creatinine  7/25/2020  0.90    Creatinine   Date Value Ref Range Status   06/30/2020 0.88 0.66 - 1.25 mg/dL Final   05/15/2020 0.91 0.66 - 1.25 mg/dL Final   03/26/2020 1.1 0.7 - 1.3 mg/dL Final   02/27/2020 0.9 0.7 - 1.3 mg/dL Final   02/06/2020 1.0 0.7 - 1.3 mg/dL Final       IMAGING:  All pertinent imaging reviewed:    All imaging studies reviewed by me.  I personally reviewed these imaging films.  A formal report from radiology will follow.    CT ABD/PEL 2/28/20  FINDINGS:     Abdomen and pelvis: No significant change in 2.8 x 1.5 cm calculus  within the left renal pelvis, with unchanged atrophy appearance of the  left kidney. No significant left hydronephrosis. Density of this left  renal calculus measures 343 Hounsfield units. Unremarkable right  kidney, without nephrolithiasis or hydroureteronephrosis. Unremarkable  urinary bladder. Unremarkable prostate gland. Symmetric seminal  vesicles.     No abnormally dilated or thickened loops of small and large bowel. No  intraperitoneal free fluid or free air. No pneumatosis or portal  venous gas. No infrarenal abdominal aortic aneurysm. No pathologically  enlarged lymph nodes within the abdomen or pelvis.     Unremarkable noncontrast appearance of the liver, gallbladder,  pancreas, spleen, and adrenal glands.     Lung bases:  Visualized lung bases are clear, without pleural  effusion. No significant change in the extensive cystic changes within  the right middle lobe and cystic changes within the anterior aspect of  the bilateral lower lobes and lingula. The heart is normal in size,  without pericardial effusion.     Bones and soft tissues: No acute or aggressive appearing osseous  lesion. Mild degenerative changes of the spine. Stable sclerosis  involving the inferior endplates of T8, T11, T12. Tiny fat-containing  umbilical hernia.                                                          IMPRESSION:   1. No significant change in large left renal calculus measuring 2.8 x  1.5 cm, with unchanged atrophy of the left kidney. No right  nephrolithiasis or hydroureteronephrosis.   2. Stable multicystic changes of the right middle lobe and anterior  aspects of the bilateral lower lobes. Recommend correlation with  clinical history and any outside dedicated chest imaging.    RENAL U/S 6/30/20:  FINDINGS:  Right kidney: Measures 11.8 cm in length.   Left kidney: Measures 8.4 cm in length. There is a persistent  shadowing calculus in the left kidney, 2.2 cm.     There is bilateral hydronephrosis.  No cystic or solid renal mass.     Bladder: Partially distended without abnormality.     The prostate and seminal vesical are visualized.  The prostate  measures 3.4 x 2.4 x 3.3 cm. There is a small simple prostate cyst, 6  mm.                                                             IMPRESSION:  1.  Bilateral hydronephrosis without obstructing calculus visualized.  2.  The prostate measures within normal limits.    CT ABD/PEL 7/2/20:  IMPRESSION:   1. Sub-3 mm stone in the right ureter at the level of the pelvis, new.  2. Mild prominence of the right renal collecting system, without overt  hydronephrosis.   3. Stable large left renal calculus at the renal pelvis, with  associated atrophy of the left kidney.  4. Partially visualized multicystic changes of the lung bases appear  stable from prior CT.    CT ABD/PEL 7/29/20:  FINDINGS:     Abdomen and pelvis:   Liver: Normal noncontrast appearance of the liver.  Gallbladder: No gallstones. No evidence of acute cholecystitis.  Spleen: Normal size.  Pancreas: No suspicious pancreatic lesions. The pancreatic duct is not  dilated.  Adrenal glands: No adrenal nodules.  Kidneys: No significant change in the 2.8 x 1.5 cm calculus in the  left renal pelvis but stable atrophic appearance of the left kidney.  Mild left hydronephrosis, similar  in appearance. Mildly decreased mild  right pelvocaliectasis without overt hydronephrosis. There is again  mild prominence of the right ureter up 7 mm, which may be secondary to  a stable 3 mm stone in similar position in the region of the right  distal ureter although location within the ureter is difficult to  confirm and this calculus has been present across multiple prior exams  dating back to January 2020 (series 5, image 280). Urinary bladder is  partially distended. There is a 4 mm calculus located medial and  posterior to the left ureterovesical junction, which likely represents  a migrated IVC seen left distal ureteral calculus (series 5, image  344).  Bowel: No bowel wall thickening. The appendix is unremarkable.  Lymph nodes: No retroperitoneal, mesenteric, or pelvic  lymphadenopathy.  Fluid: No free fluid within the abdomen.  Vessels: No infrarenal aortic aneurysm.      Lung bases: Stable cystic changes within the right middle lobe and  anterior aspect of the lower lungs bilaterally. No pleural effusion.  Heart size is normal without pericardial effusion.     Bones and soft tissues: No suspicious osseous lesions. Stable  sclerosis of the inferior endplates of T11 and T12.                                                            IMPRESSION:   1.  Stable large left renal calculus within the pelvis, with  associated atrophy of left kidney.  2.  The previously seen calculus within the left distal ureter appears  to have migrated and is now located within the posterior aspect of the  urinary bladder, medial to the left ureterovesical junction.  3.  Improved right pelvocaliectasis. Persistent mild dilation of the  right ureter, which may be secondary to a 3 mm calculus in the region  of the right distal ureter. However, this 3mm calculus in the region  of the right distal ureter has been seen on multiple prior exams  dating back to January 2020 and may represent an adjacent vascular  calcification.  4.   Redemonstration of previously visualized multicystic changes of  the lung bases.            ASSESSMENT and PLAN  30-year-old man with a left atrophic kidney secondary to a large renal stone with recent CT scan with a 3 mm right ureteral stone which he is now status post diagnostic ureteroscopy last week with no evidence of stone in the right collecting system.    Functional solitary right kidney   -We discussed that his ureteroscopy last week did not reveal any evidence of stone in the entire right collecting system.  We discussed that I was able to visualize the entire ureter as well as renal pelvis and calyces with no evidence of the stone.  -We discussed that based on this, there is no further evidence of concern for his right kidney contributing to his underlying pain.  He does note that his right-sided flank pain has resolved in the last 3 to 4 days     Left flank pain  -We again discussed treatment options for his left atrophic kidney and recommendations for a laparoscopic nephrectomy  -He was waiting to schedule his left laparoscopic nephrectomy until his right ureteral stone had been dealt with.  He is still thinking about timing with regarding to surgery and will call to schedule when ready  -I provided the number to the [facility] office  -We again discussed the risks and benefits of the surgery.      Telephone time: 16 minutes    Aj Hedrick MD   Urology  [facility]  [facility] Phone: [phone]  [facility] Phone: [phone]

## 2020-08-25 NOTE — PROGRESS NOTES
"Emelyn Marmolejo is a 30 year old male who is being evaluated via a billable video visit.      The patient has been notified of following:     \"This video visit will be conducted via a call between you and your physician/provider. We have found that certain health care needs can be provided without the need for an in-person physical exam.  This service lets us provide the care you need with a video conversation.  If a prescription is necessary we can send it directly to your pharmacy.  If lab work is needed we can place an order for that and you can then stop by our lab to have the test done at a later time.    Video visits are billed at different rates depending on your insurance coverage.  Please reach out to your insurance provider with any questions.    If during the course of the call the physician/provider feels a video visit is not appropriate, you will not be charged for this service.\"    Patient has given verbal consent for Video visit? Yes  How would you like to obtain your AVS? MyChart  If you are dropped from the video visit, the video invite should be resent to: Text to cell phone: [phone]  Will anyone else be joining your video visit? No        Video-Visit Details    Type of service:  Video Visit - converted to telephone visit      "

## 2020-08-25 NOTE — Clinical Note
Donato Castro,    I followed up with Emelyn today after his diagnostic ureteroscopy last week.  The time of ureteroscopy there is no evidence of ureteral or kidney stone in the right kidney indicating that he likely passed the stone prior to surgery.  He is very thankful that there is no further evidence of issue with his functionally solitary right kidney.  He is continued to have intermittent left flank pain we again discussed recommendations for a left laparoscopic nephrectomy.  He will call my office when he is ready to schedule this.    Thanks,   Aj Hedrick M.D.  Cell: [phone]

## 2020-08-26 ENCOUNTER — VIRTUAL VISIT (OUTPATIENT)
Dept: FAMILY MEDICINE | Facility: CLINIC | Age: 30
End: 2020-08-26
Payer: COMMERCIAL

## 2020-08-26 DIAGNOSIS — N20.1 RIGHT URETERAL STONE: ICD-10-CM

## 2020-08-26 DIAGNOSIS — Z86.16 HISTORY OF 2019 NOVEL CORONAVIRUS DISEASE (COVID-19): Primary | ICD-10-CM

## 2020-08-26 DIAGNOSIS — G47.00 INSOMNIA, UNSPECIFIED TYPE: ICD-10-CM

## 2020-08-26 NOTE — PROGRESS NOTES
Preceptor Attestation:   I talked to the patient on the phone. I discussed the patient with the resident. I have verified the content of the note, which accurately reflects my assessment of the patient and the plan of care.   Supervising Physician:  Jacki Magaña MD.

## 2020-08-26 NOTE — PROGRESS NOTES
"Family Medicine Telephone Visit Note               Telephone Visit Consent   Patient was verbally read the following and verbal consent was obtained.    \"Telephone visits are billed at different rates depending on your insurance coverage. During this emergency period, for some insurers they may be billed the same as an in-person visit.  Please reach out to your insurance provider with any questions.  If during the course of the call the physician/provider feels a telephone visit is not appropriate, you will not be charged for this service.\"    Name person giving consent:  Patient   Date verbal consent given:  8/26/2020  Time verbal consent given:  4:24 PM           Chief Complaint   Patient presents with     RECHECK     COVID (no symptoms now), right kidney stone (passed), insomnia (better)         No intepreter used.              HPI   Patients name: Emelyn  Appointment start time :  1626     COVID follow up   -3 days of sx total, fevers, aches, mild cough. Started 8/14. Tested + 8/17. Has been w/o symptoms       Right kidney stone- passed with flomax. Good check up w urology and ureterogram. Will plan to have left nephrectomy in future.     Insomnia- once stone passed worry, pain improved and sleep now better. No longer needing melatonin more than couple times a week.     Current Outpatient Medications   Medication Sig Dispense Refill     acetaminophen (TYLENOL) 325 MG tablet Take 325-650 mg by mouth       melatonin 3 MG tablet Take 2 hours before bedtime for the next 10 days. And then as needed after for sleep. 30 tablet 0     sodium chloride (OCEAN) 0.65 % nasal spray Spray in each nose twice a day 104 mL 1     Allergies   Allergen Reactions     Nsaids      Not true allergy. But be sparing with patient as he has single kidney               Review of Systems:     Constitutional, HEENT, cardiovascular, pulmonary, gi and gu systems are negative, except as otherwise noted.         Physical Exam:     There were no " "vitals taken for this visit.  Estimated body mass index is 19.07 kg/m  as calculated from the following:    Height as of 8/12/20: 1.765 m (5' 9.5\").    Weight as of 8/12/20: 59.4 kg (131 lb).    Exam:  Constitutional: healthy, alert and no distress  Psychiatric: mentation appears normal and affect normal/bright          Assessment and Plan       ICD-10-CM    1. History of 2019 novel coronavirus disease (COVID-19)  Z86.19 No sx, can break quarantine tomorrow 10 days after test. Get flu shot in 1 month    2. Right ureteral stone  N20.1 Continue flomax, following w urology     passed w flomax   3. Insomnia, unspecified type  G47.00 Continue melatonin PRN     resolved with melatonin, passing of kidney stone       Added NSAID preacution to allergy list as well given single kidney     Refilled medications that would be required in the next 3 months.     After Visit Information:  Patient declined AVS     No follow-ups on file.    Appointment end [id number]  This is a telephone visit that took 11 minutes.      Clinician location:  [facility]     Livan Castro MD  I precepted today with Gómez.    "

## 2020-09-02 ENCOUNTER — TELEPHONE (OUTPATIENT)
Dept: UROLOGY | Facility: CLINIC | Age: 30
End: 2020-09-02

## 2020-09-02 DIAGNOSIS — N26.1 ATROPHY OF LEFT KIDNEY: ICD-10-CM

## 2020-09-02 DIAGNOSIS — R10.9 FLANK PAIN: Primary | ICD-10-CM

## 2020-09-02 NOTE — TELEPHONE ENCOUNTER
[facility] Call Center    Phone Message    May a detailed message be left on voicemail: yes     Reason for Call: Pt called today.  He is having some pain on his right side and he doesn't know if it's related to the surgery he had.  He would like to schedule an in person visit with Dr. Hedrick.  Thanks.    Action Taken: Message routed to:  Adult Clinics: Urology p [id number]    Travel Screening: Not Applicable

## 2020-09-02 NOTE — TELEPHONE ENCOUNTER
Received note below per Dr. Hedrick. Specialty  assisted patient in making lab appt, us appt and in person appt with Dr. Hedrick next week.    Aj Hedrick MD  You; [facility] 44 minutes ago (2:50 PM)       Sure, I can see him next week with a renal U/S and BMP prior.   Thanks,   Aj Mena LPN

## 2020-09-04 ENCOUNTER — ANCILLARY PROCEDURE (OUTPATIENT)
Dept: ULTRASOUND IMAGING | Facility: CLINIC | Age: 30
End: 2020-09-04
Attending: UROLOGY
Payer: COMMERCIAL

## 2020-09-04 DIAGNOSIS — N26.1 ATROPHY OF LEFT KIDNEY: ICD-10-CM

## 2020-09-04 DIAGNOSIS — R10.9 FLANK PAIN: ICD-10-CM

## 2020-09-04 LAB
ANION GAP SERPL CALCULATED.3IONS-SCNC: 5 MMOL/L (ref 3–14)
BUN SERPL-MCNC: 15 MG/DL (ref 7–30)
CALCIUM SERPL-MCNC: 9.4 MG/DL (ref 8.5–10.1)
CHLORIDE SERPL-SCNC: 106 MMOL/L (ref 94–109)
CO2 SERPL-SCNC: 29 MMOL/L (ref 20–32)
CREAT SERPL-MCNC: 1.19 MG/DL (ref 0.66–1.25)
GFR SERPL CREATININE-BSD FRML MDRD: 80 ML/MIN/{1.73_M2}
GLUCOSE SERPL-MCNC: 92 MG/DL (ref 70–99)
POTASSIUM SERPL-SCNC: 3.7 MMOL/L (ref 3.4–5.3)
RADIOLOGIST FLAGS: NORMAL
SODIUM SERPL-SCNC: 140 MMOL/L (ref 133–144)

## 2020-09-04 PROCEDURE — 80048 BASIC METABOLIC PNL TOTAL CA: CPT | Performed by: UROLOGY

## 2020-09-04 PROCEDURE — 76770 US EXAM ABDO BACK WALL COMP: CPT | Performed by: STUDENT IN AN ORGANIZED HEALTH CARE EDUCATION/TRAINING PROGRAM

## 2020-09-04 PROCEDURE — 36415 COLL VENOUS BLD VENIPUNCTURE: CPT | Performed by: UROLOGY

## 2020-09-14 ENCOUNTER — ALLIED HEALTH/NURSE VISIT (OUTPATIENT)
Dept: FAMILY MEDICINE | Facility: CLINIC | Age: 30
End: 2020-09-14
Payer: COMMERCIAL

## 2020-09-14 DIAGNOSIS — Z13.9 SCREENING FOR CONDITION: Primary | ICD-10-CM

## 2020-09-21 ENCOUNTER — OFFICE VISIT (OUTPATIENT)
Dept: FAMILY MEDICINE | Facility: CLINIC | Age: 30
End: 2020-09-21
Payer: COMMERCIAL

## 2020-09-21 VITALS
HEART RATE: 81 BPM | SYSTOLIC BLOOD PRESSURE: 125 MMHG | OXYGEN SATURATION: 98 % | RESPIRATION RATE: 16 BRPM | TEMPERATURE: 99.2 F | WEIGHT: 130.8 LBS | BODY MASS INDEX: 18.73 KG/M2 | DIASTOLIC BLOOD PRESSURE: 84 MMHG | HEIGHT: 70 IN

## 2020-09-21 DIAGNOSIS — R10.9 FLANK PAIN: ICD-10-CM

## 2020-09-21 DIAGNOSIS — R10.31 RLQ ABDOMINAL PAIN: Primary | ICD-10-CM

## 2020-09-21 ASSESSMENT — ENCOUNTER SYMPTOMS
PALPITATIONS: 0
ARTHRALGIAS: 0
VOMITING: 0
COLOR CHANGE: 0
SHORTNESS OF BREATH: 0
CHILLS: 0
SORE THROAT: 0
HEMATURIA: 0
FEVER: 0
COUGH: 0
EYE PAIN: 0
DYSURIA: 0
ABDOMINAL PAIN: 0
BACK PAIN: 0
SEIZURES: 0

## 2020-09-21 ASSESSMENT — MIFFLIN-ST. JEOR: SCORE: 1541.61

## 2020-09-21 NOTE — PROGRESS NOTES
"       HPI       Emelyn Marmolejo is a 30 year old  who presents for   Chief Complaint   Patient presents with     Abdominal Pain     x 3 weeks, feeling a lot of pain when laying down, feeling achy pain     Pain     Lower back pain, x pt states to have been a long time as he has been having kidney issues       Last couple of weeks abdominal pain. Sharp/aching in global/all over. . Gets better when stomach is full. Sharp sometimes/dull aching others. Simethicone in ED did not help.   - 30 lbs weight loss over 1.5 years. Not intentional. No diarrhea, no fevers, no nausea or vomiting. Eats 3 meals or so a day.        Sleeping- not getting enough. 3-4 hours asleep then will wake up sometimes due to discomfort and can't get back to sleep.     Problem, Medication and Allergy Lists were reviewed and updated if needed..    Patient is an established patient of this clinic..         Review of Systems:   Review of Systems   Constitutional: Negative for chills and fever.   HENT: Negative for ear pain and sore throat.    Eyes: Negative for pain and visual disturbance.   Respiratory: Negative for cough and shortness of breath.    Cardiovascular: Negative for chest pain and palpitations.   Gastrointestinal: Negative for abdominal pain and vomiting.   Genitourinary: Negative for dysuria and hematuria.   Musculoskeletal: Negative for arthralgias and back pain.   Skin: Negative for color change and rash.   Neurological: Negative for seizures and syncope.   All other systems reviewed and are negative.           Physical Exam:     Vitals:    09/21/20 1324   BP: 125/84   Pulse: 81   Resp: 16   Temp: 99.2  F (37.3  C)   TempSrc: Oral   SpO2: 98%   Weight: 59.3 kg (130 lb 12.8 oz)   Height: 1.765 m (5' 9.5\")     Body mass index is 19.04 kg/m .  Vitals were reviewed and were normal     Physical Exam  Constitutional:       General: He is not in acute distress.     Appearance: He is well-developed.   HENT:      Head: Normocephalic and " atraumatic.   Eyes:      General:         Right eye: No discharge.         Left eye: No discharge.      Conjunctiva/sclera: Conjunctivae normal.   Pulmonary:      Effort: Pulmonary effort is normal. No respiratory distress.   Abdominal:      Comments: Diffuse dull abd tenderness throughout abd. No masses palpable, no guarding or rebound.    Musculoskeletal:         General: No deformity.      Comments: Right T12-L4 paraspinal muscle tenderness. Full ROM of back with felxion, extension, lateral rotation of spine    Skin:     Coloration: Skin is not pale.      Findings: No erythema or rash.   Neurological:      Mental Status: He is alert and oriented to person, place, and time.      Coordination: Coordination normal.           Results:   Results are ordered and pending    Assessment and Plan        1. RLQ abdominal pain    Patient with intermittent diffuse abd pain persisting off/on for over 5 months now. Also has recurrent flank pain that is difficult to interpret due to known obstructive left stone resulting in atrophic left kidney, and right sided stone that pt passed on his own/is followed by urology for. He also underwent treatment for prostatitis (for dysuria that did resolve) and 4 drug therapy for H pylori.      Did complete 4 drug H pylori treatment in early August. Will recheck to see if cured today. But given his 30lbs weight loss over past year, inflammatory markers, fecal lactoferrin + will refer to GI for closer IBD evaluation or other work up. Did have thorough celiac testing negative in March.     In regards to recurrent left/ right flank pain this is over the area and same pain as when he was found to have right stone that cleared as well as known having large left renal stone. Pain remains unchanged. US 9/4 when having same pain w no stone on rt side and unchanged left sided stone.   Does have prominent MSK spasms over the right area. Will try topical NSAID/ therapy for pain and have pt reach back  out to discuss left nephrectomy.  Follow up in 4 weeks to see how PT/NSAID does.     If no improvement  would repeat inflammatory markers and consider rheum work up; but pt has full ROM spine, pain improves with use and doesn't have prominent stiffness in back, no fevers so not necessary today. Follow up GI visit at next month appt as well. Pt doesn't want to discuss behvioral/anxiety approaches until other causes for pain are complete.          There are no discontinued medications.    Options for treatment and follow-up care were reviewed with the patient. Emelyn Marmolejo  engaged in the decision making process and verbalized understanding of the options discussed and agreed with the final plan.    Livan Castro MD

## 2020-09-21 NOTE — PATIENT INSTRUCTIONS
1. Abdominal pain - Will get you another kit to test for H pylori to see if it went away.     [phone] is the number for GI doctor. They will call you but if not call to make the appt with them.      2. Call  office and see when your follow up appt is to talk about having left kidney removed.     3. We will see if pain on right side gets better w therapy and pain cream too     Thank you for coming to [facility].    **If you had lab testing today and your results are reassuring or normal they will be be mailed to you or sent to your MyChart and you will be notified by email within 7 days.   **If the lab tests need quick action we will call you with the results.  The phone number we will call with results is # [phone] (home)    (home) . If this is not the best number please call our clinic and change the number.  **If any referrals were ordered today you should be getting a call in the next week.  If you don't hear about this within a week please call one of the following numbers   If your referral is for [facility] please call [phone]  If your referral is to outside [facility] please call the clinic number ([phone]) and ask for your team care coordinator.  If you need any refills please call your pharmacy and they will contact us.  If you have any concerns about today's visit or wish to schedule another appointment  please call our office during normal business hours  [phone] (8-5:00 M-F)  If you have urgent medical concerns please call [phone] at any time of the day.  If you have a medical emergency please call [phone]    Again thank you for choosing [facility] and please let us know how we can best partner with you to improve your and your family's health.

## 2020-09-21 NOTE — NURSING NOTE
Due to patient being non-English speaking/uses sign language, an  was used for this visit. Only for face-to-face interpretation by an external agency, date and length of interpretation can be found on the scanned worksheet.     name: Vonda Richardson  Agency: [employer]  Language: Trinidadian   Telephone number: [phone]  Type of interpretation: Telephone, spoken    Serenity Whitehead MA

## 2020-09-21 NOTE — PROGRESS NOTES
Preceptor Attestation:  Patient seen and evaluated in person. I discussed the patient with the resident. I have verified the content of the note, which accurately reflects my assessment of the patient and the plan of care.   Supervising Physician:  Rip Doyle DO.

## 2020-09-22 LAB — H PYLORI AG STL QL IA: NEGATIVE

## 2020-09-23 NOTE — PROGRESS NOTES
[facility] Initial Evaluation    Subjective:  Emelyn Marmolejo is a 30 year old male with a lumbar condition. Symptoms commenced as a result of: unknown cause. Condition occurred in the following environment: NA. Onset of symptoms: May 2020. Location of symptoms: right low back, also has left low back pain related to kidney stones. Pain level on number scale: 6/10. Quality of pain: ache. Associated symptoms: stiffness. Pain frequency (constant/intermittent): constant. Symptoms are exacerbated by: driving, sitting, bending. Symptoms are relieved by: tylenol. Progression of symptoms since onset (same/better/worse): same. Special tests (x-ray, MRI, CT scan, EMG, bone scan): CT, US. Previous treatment: None. Improvement with previous treatment: NA. General health as reported by patient is fair. Pertinent medical history includes: history of kidney stones, See Epic. Medical allergies: see Epic. Other pertinent surgeries: see Epic. Current medications: See Epic. Occupation: Unemployed. Patient is (working in normal job without restrictions/working in normal job with restrictions/working in an alternate job/not working due to present treatment problem): NA. Primary job tasks: NA. Barriers at home/work: None reported by patient. Red flags: None reported by patient.    Objective  Posture    Sitting (good/fair/poor): poor  Standing (good/fair/poor):  fair  Lordosis (red/acc/normal):  normal  Lateral shift (right/left/nil):  nil  Relevant lateral shift (yes/no):  no  Correction of posture (better/worse/no effect): better    Lumbar Movement Loss Lawrence Mod Min Nil Pain   Flexion   x  +   Extension   x     Side Gliding L    x    Side Gliding R    x      Assessment/Plan:    Patient is a 30 year old male with lumbar complaints.    Patient has the following significant findings with corresponding treatment plan.                Diagnosis 1:  lbp  Pain -  manual therapy, self management, education, directional  preference exercise and home program  Decreased ROM/flexibility - manual therapy and therapeutic exercise  Decreased joint mobility - manual therapy and therapeutic exercise  Decreased strength - therapeutic exercise and therapeutic activities  Impaired muscle performance - neuro re-education  Decreased function - therapeutic activities  Impaired posture - neuro re-education     Previous and current functional limitations:  (See Goal Flow Sheet for this information)    Short term and Long term goals: (See Goal Flow Sheet for this information)     Communication ability:  Patient appears to be able to clearly communicate and understand verbal and written communication and follow directions correctly.  Treatment Explanation - The following has been discussed with the patient:   RX ordered/plan of care  Anticipated outcomes  Possible risks and side effects  This patient would benefit from PT intervention to resume normal activities.   Rehab potential is good.    Frequency:  1 X week, once daily  Duration:  for 4 weeks tapering to 2 X a month over 1 month  Discharge Plan:  Achieve all LTG.  Independent in home treatment program.  Reach maximal therapeutic benefit.    Please refer to the daily flowsheet for treatment today, total treatment time and time spent performing 1:1 timed codes.

## 2020-09-24 ENCOUNTER — THERAPY VISIT (OUTPATIENT)
Dept: PHYSICAL THERAPY | Facility: CLINIC | Age: 30
End: 2020-09-24
Attending: STUDENT IN AN ORGANIZED HEALTH CARE EDUCATION/TRAINING PROGRAM
Payer: COMMERCIAL

## 2020-09-24 DIAGNOSIS — R10.9 FLANK PAIN: ICD-10-CM

## 2020-09-24 DIAGNOSIS — R10.31 RLQ ABDOMINAL PAIN: ICD-10-CM

## 2020-09-24 DIAGNOSIS — G89.29 CHRONIC BILATERAL LOW BACK PAIN WITHOUT SCIATICA: ICD-10-CM

## 2020-09-24 DIAGNOSIS — M54.50 CHRONIC BILATERAL LOW BACK PAIN WITHOUT SCIATICA: ICD-10-CM

## 2020-09-24 PROCEDURE — 97161 PT EVAL LOW COMPLEX 20 MIN: CPT | Mod: GP | Performed by: PHYSICAL THERAPIST

## 2020-09-24 PROCEDURE — 97110 THERAPEUTIC EXERCISES: CPT | Mod: GP | Performed by: PHYSICAL THERAPIST

## 2020-09-24 PROCEDURE — 97530 THERAPEUTIC ACTIVITIES: CPT | Mod: GP | Performed by: PHYSICAL THERAPIST

## 2020-09-28 NOTE — TELEPHONE ENCOUNTER
RECORDS RECEIVED FROM: Dr. Livan Castro - [facility]    DATE RECEIVED: 10/29/2020   NOTES STATUS DETAILS   OFFICE NOTE from referring provider Internal 9/21/2020, 3/25/2020 Office visit with Dr. Castro    OFFICE NOTE from other specialist Internal 8/25/2020, 6/30/2020, 3/31/2020 Office visit with Dr. Aj Hedrick ([facility])     5/1/2020, 3/16/2020 Office visit with Dr. Kwabena Jose ([facility])     3/26/2020 Office visit with Dr. Aida Echevarria ([facility])     3/13/2020 Office visit with Dr. Kusum Dee ([facility])     2/6/2020 Office visit with Dr. Mark Mojica ([facility])    DISCHARGE SUMMARY from hospital Care Everywhere 9/9/2020, 8/16/2020 ([facility])   3/14/2020 ([place])   2/19/2020 ([facility])    OPERATIVE REPORT N/A    MEDICATION LIST Internal         ENDOSCOPY  N/A    COLONOSCOPY N/A    ERCP N/A    EUS N/A    STOOL TESTING Internal 3/27/2020   PERTINENT LABS Internal    PATHOLOGY REPORTS (RELATED) N/A    IMAGING (CT, MRI, EGD) Internal/ Care Everywhere CT Abdomen Pelvis: 7/29/2020, 7/2/2020, 2/28/2020    [facility]:  - CT Abdomen Pelvis: 1/1/2020     REFERRAL INFORMATION    Date referral was placed: 10/29/2020   Date all records received:    Date records were scanned into EPIC:    Date records were sent to Provider to review:    Date and recommendation received from provider:  LETTER SENT  SCHEDULE APPOINTMENT   Date patient was contacted to schedule:

## 2020-10-29 ENCOUNTER — PRE VISIT (OUTPATIENT)
Dept: GASTROENTEROLOGY | Facility: CLINIC | Age: 30
End: 2020-10-29

## 2020-10-29 ENCOUNTER — VIRTUAL VISIT (OUTPATIENT)
Dept: GASTROENTEROLOGY | Facility: CLINIC | Age: 30
End: 2020-10-29
Payer: COMMERCIAL

## 2020-10-29 VITALS — BODY MASS INDEX: 19.26 KG/M2 | HEIGHT: 69 IN | WEIGHT: 130 LBS

## 2020-10-29 DIAGNOSIS — R10.84 ABDOMINAL PAIN, GENERALIZED: Primary | ICD-10-CM

## 2020-10-29 DIAGNOSIS — N26.1 RENAL ATROPHY, LEFT: ICD-10-CM

## 2020-10-29 DIAGNOSIS — U07.1 2019 NOVEL CORONAVIRUS DISEASE (COVID-19): ICD-10-CM

## 2020-10-29 DIAGNOSIS — N20.0 RENAL STONE: ICD-10-CM

## 2020-10-29 PROCEDURE — 99443 PR PHYSICIAN TELEPHONE EVALUATION 21-30 MIN: CPT | Performed by: INTERNAL MEDICINE

## 2020-10-29 ASSESSMENT — PAIN SCALES - GENERAL: PAINLEVEL: NO PAIN (0)

## 2020-10-29 ASSESSMENT — MIFFLIN-ST. JEOR: SCORE: 1530.06

## 2020-10-29 NOTE — PROGRESS NOTES
"Emelyn Marmolejo is a 30 year old male who is being evaluated via a billable telephone visit.      The patient has been notified of following:     \"This telephone visit will be conducted via a call between you and your physician/provider. We have found that certain health care needs can be provided without the need for a physical exam.  This service lets us provide the care you need with a short phone conversation.  If a prescription is necessary we can send it directly to your pharmacy.  If lab work is needed we can place an order for that and you can then stop by our lab to have the test done at a later time.    Telephone visits are billed at different rates depending on your insurance coverage. During this emergency period, for some insurers they may be billed the same as an in-person visit.  Please reach out to your insurance provider with any questions.    If during the course of the call the physician/provider feels a telephone visit is not appropriate, you will not be charged for this service.\"    Patient has given verbal consent for Telephone visit?  Yes    What phone number would you like to be contacted at? [phone]       line: [phone]    How would you like to obtain your AVS? Mail a copy    Phone call duration: 35 minutes    Armando Salazar DO      Gastroenterology Visit for: Emelyn Marmolejo 1990   MRN: [id number]     Reason for Visit:  chief complaint    Referred by: Gloria  / 2020 [address] [place] [address] / [facility] [id number]  Patient Care Team:  Livan Castro MD as PCP - General (Family Practice)  Beau Tran [facility] as Pharmacist (Pharmacist)  Deya Andrews CNP as Nurse Practitioner (Nurse Practitioner)  Magaly Mojica RN as Registered Nurse  Maulik Finney MD as MD (Urology)  Livan Castro MD as Assigned PCP  Aj Hedrick MD as Assigned Surgical Provider    History of Present Illness:   Emelyn Marmolejo is a 30 year old male with history of COVID-19, atrophic " "left kidney, kidney stones who is presenting as a new patient in consultation at the request of Dr. Castro with a chief complaint of abdominal pain.    [facility] : [id number]  ---------------------------------------------------------------  Emelyn Marmolejo states he has been having problems with his stomach. He has been having right sided pain for a long time. He reports that he has a history of ulcers and has pain when his stomach. He has bloating when his stomach is empty. He has borborygmus. Occasionally he has pain. He reports that he had a gastric ulcer in [place]. He was ever scoped. The patient's abdominal pain is diffuse and not localized to RUQ. Pains can be sharp or dull. The pain is made worse by being empty. When he eats meat he has pain on the right upper quadrant. The pain is different from the kidney stone pains. The abdominal pain is constant \"all the time\". His pain is improved by eating oats and softer foods. The patient reports bloating around his umbilicus. The patient has been having hard stools with bristol 1 appearance and he is not concerned. Over the past month despite hard stools his abdominal pain has been improved. He feels that he has sharp pains near his bladder.     When he was diagnosed with COVID he had fevers for the first three days. Currently does not have muscle aches or breathing difficulty. His COVID diagnosis was 8/17/20    H pylori negative as of 9/21/20  ---------------------------------------------------------------     Emelyn Marmolejo denies dysphagia, odynophagia, heartburn/reflux, nausea, vomiting, early satiety, diarrhea, hematochezia, or melena. No unintentional weight loss.     Family history of colon cancer: none  Wt Readings from Last 5 Encounters:   10/29/20 59 kg (130 lb)   09/21/20 59.3 kg (130 lb 12.8 oz)   08/12/20 59.4 kg (131 lb)   08/06/20 60.2 kg (132 lb 12.8 oz)   07/03/20 63 kg (139 lb)     Today's Sandy Swallow Questionnaire (SSQ):    Brief Esophageal " Dysphagia Questionnaire (BEDQ):  We would like to ask about your dysphagia symptoms over the past 30 days, 6 months, and 12 months.  Please think about your symptom experiences and choose the best answer.      Over the past 14 days, on average, how often have you had the following?  If your response falls between two categories, please make your best guess.  If you are unable to eat the type of food in the question, please check  Cannot eat this.    Cannot  Eat This 0  Rarely/ Never 1  Once or twice a month 2  1-2 times per week 3  3-5 times per week 4  Daily or almost daily 5  Several times per day   Trouble eating solid food (meat, bread, vegetables)             Trouble eating soft foods (yogurt, jello, pudding)             Trouble swallowing liquids               Pain while swallowing  ---         Coughing or choking while swallowing foods or liquids ---           SCORE:    Over the past 14 days, on average, how would you rate your discomfort or pain during swallowing? If you are unable to eat the type of food in the question, please check  Cannot eat this.    Cannot Eat This 0  None 1  Very Mild 2  Mild 3  Moderate 4  Moderately Severe    5  Severe   Eating solid food   (meat, bread, vegetables)          Eating soft foods   (yogurt, jello, pudding)          Drinking liquids            SCORE:    Approximately how many times in the past 12 months have you:   Had food stuck in your throat or esophagus for a period lasting longer than 30 minutes?  Had to visit the emergency room because of food stuck in your throat or esophagus?    Eckardt Score:   Score Dysphagia Regurgitation Retrosternal Pain Weight Loss (kg)   0 None None None None   1 Occasional Occasional Occasional <5   2 Daily Daily Daily 5-10   3 Each meal Each meal Each meal >10     SCORE:    Any hardcopy questionnaire completed by the patient regarding SSQ, BEDQ, or Eckardt Score have been scanned into the media section of Epic.     STUDIES &  PROCEDURES:    EGD:   Date:  Impression:  Pathology Report:    Colonoscopy:  Date:  Impression:    Pathology Report:     EndoFLIP directed at the UES or LES (8cm (EF-325) balloon length or 16cm (EF-322) balloon length):   Date:  8cm balloon  Balloon inflation Balloon pressure CSA (mm^2) DI (mm^2/mmHg) Dmin (mm) Compliance   20 (ladmark ID)        30        40        50           16cm balloon  Balloon inflation Balloon pressure CSA (mm^2) DI (mm^2/mmHg) Dmin (mm) Compliance   30 (ladmark ID)        40        50        60        70           High Resolution Manometry:  Date:  Impression:    PH/Impedance:  Date:  Impression:     Bravo:  48 or 96hr  Date:  Impression:    CT:  Date: 7/29/20  Impression:  IMPRESSION:   1.  Stable large left renal calculus within the pelvis, with  associated atrophy of left kidney.  2.  The previously seen calculus within the left distal ureter appears  to have migrated and is now located within the posterior aspect of the  urinary bladder, medial to the left ureterovesical junction.  3.  Improved right pelvocaliectasis. Persistent mild dilation of the  right ureter, which may be secondary to a 3 mm calculus in the region  of the right distal ureter. However, this 3mm calculus in the region  of the right distal ureter has been seen on multiple prior exams  dating back to January 2020 and may represent an adjacent vascular  calcification.  4.  Redemonstration of previously visualized multicystic changes of  the lung bases.    US renal 9/4/20  IMPRESSION:  1.  Unchanged atrophic left kidney.  Large left renal calculus.   Negative for right or left hydronephrosis .  2.  Left renal mid-lower pole prominence, favor lobulation as seen on  CT exams dating back to 2019 less likely discrete lesion.  Consider  contrast enhanced CT to confirm.     [Consider Follow Up: Left kidney]    Esophagram:  Date:  Impression:     Prior medical records were reviewed including, but not limited to, notes from  referring providers, lab work, radiographic tests, and other diagnostic tests. Pertinent results were summarized above.     History     Past Medical History:   Diagnosis Date     Prostate infection        Past Surgical History:   Procedure Laterality Date     COMBINED CYSTOSCOPY, URETEROSCOPY, LASER HOLMIUM LITHOTRIPSY URETER(S) Right 2020    Procedure: CYSTOSCOPY, RIGHT RETROGRADE PYELOGRAM, RIGHT diagnostic URETEROSCOPY;  Surgeon: Aj Hedrick MD;  Location:  OR       Social History     Socioeconomic History     Marital status: Single     Spouse name: Not on file     Number of children: Not on file     Years of education: Not on file     Highest education level: Not on file   Occupational History     Not on file   Social Needs     Financial resource strain: Not on file     Food insecurity     Worry: Not on file     Inability: Not on file     Transportation needs     Medical: Not on file     Non-medical: Not on file   Tobacco Use     Smoking status: Former Smoker     Types: Cigarettes     Quit date: 2019     Years since quittin.1     Smokeless tobacco: Never Used   Substance and Sexual Activity     Alcohol use: Never     Frequency: Never     Drug use: Never     Sexual activity: Not Currently     Partners: Female     Birth control/protection: Condom   Lifestyle     Physical activity     Days per week: Not on file     Minutes per session: Not on file     Stress: Not on file   Relationships     Social connections     Talks on phone: Not on file     Gets together: Not on file     Attends Voodoo service: Not on file     Active member of club or organization: Not on file     Attends meetings of clubs or organizations: Not on file     Relationship status: Not on file     Intimate partner violence     Fear of current or ex partner: Not on file     Emotionally abused: Not on file     Physically abused: Not on file     Forced sexual activity: Not on file   Other Topics Concern     Parent/sibling w/  "CABG, MI or angioplasty before 65F 55M? Not Asked   Social History Narrative     Not on file       Family History   Problem Relation Age of Onset     Crohn's Disease No family hx of      Ulcerative Colitis No family hx of      GERD No family hx of      Stomach Cancer No family hx of        Medications and Allergies:     Outpatient Encounter Medications as of 10/29/2020   Medication Sig Dispense Refill     acetaminophen (TYLENOL) 325 MG tablet Take 325-650 mg by mouth       melatonin 3 MG tablet Take 2 hours before bedtime for the next 10 days. And then as needed after for sleep. 30 tablet 0     sodium chloride (OCEAN) 0.65 % nasal spray Spray in each nose twice a day 104 mL 1     diclofenac (VOLTAREN) 1 % topical gel Place 2 g onto the skin 4 times daily (Patient not taking: Reported on 10/29/2020) 100 g 3     No facility-administered encounter medications on file as of 10/29/2020.         Allergies   Allergen Reactions     Nsaids      Not true allergy. But be sparing with patient as he has single kidney         Review of systems:  A full 10 point review of systems was obtained and was negative except for the pertinent positives and negatives stated within the HPI.    Objective Findings:   Physical Exam:    Constitutional: Ht 1.753 m (5' 9\")   Wt 59 kg (130 lb)   BMI 19.20 kg/m    Telephone telemedicine consultation     Labs, Radiology, Pathology     Lab Results   Component Value Date    HGB 13.4 03/26/2020    HGB 15.6 12/30/2019    ALT 27.3 03/26/2020    AST 21.9 03/26/2020     09/04/2020     06/30/2020     05/15/2020    BUN 15 09/04/2020    BUN 12 06/30/2020    BUN 11 05/15/2020    CO2 29 09/04/2020    CO2 24 06/30/2020    CO2 27 05/15/2020        Liver Function Studies -   Recent Labs   Lab Test 03/26/20  1507   PROTTOTAL 10.0*   BILITOTAL <0.4   ALKPHOS 86.5   AST 21.9   ALT 27.3          Patient Active Problem List    Diagnosis Date Noted     Abdominal pain, generalized 10/31/2020     " Priority: Medium     2019 novel coronavirus disease (COVID-19) 10/31/2020     Priority: Medium     Chronic bilateral low back pain without sciatica 09/24/2020     Priority: Medium     H. pylori infection 05/14/2020     Priority: Medium     History of renal stone 05/14/2020     Priority: Medium     Abnormal CT of the chest 10/09/2019     Priority: Medium     Renal atrophy, left 08/21/2019     Priority: Medium     Renal stone 08/21/2019     Priority: Medium      Assessment and Plan   Assessment:    Emelyn Marmolejo is a 30 year old male with history of COVID-19, atrophic left kidney, kidney stones who is presenting as a new patient in consultation at the request of Dr. Castro with a chief complaint of abdominal pain.    The patient was seen as telephone telemedicine consultation regarding his vague diffuse abdominal pain. It is unclear what the underlying etiology is. This is in the setting of kidney stones which may be confounding the picture. Given what appears to be intermittent right upper quadrant pains I have asked that he undergo an ultrasound of the right upper quadrant to evaluate for possible symptomatic cholelithiasis. He will also require an upper endoscopy in the coming months to assess his pain. Fortunately he has already had a negative H pylori test which essentially rules out this as an etiology. He may have Functional dyspepsia as an etiology but organic disease will need to be ruled out first. Alternatively he may have reflux disease. As such,  I have recommended that he begin taking pepcid BID. The rational for this medication over a PPI is that there are data to support an increased risk of COVID with PPI use. Because the patient has already been diagnosed with COVID and it is unclear if he could have reactivation or if he is at risk of re-exposure we will avoid PPI use for the time being. His symptoms are not consistent with ulcer disease and his history of ulcer disease is based on sensation rather  than endoscopic evaluation.     We will plan for upper endoscopy >90 days from the time of his previous diagnosis so that he may be re-tested for COVID since EGD is an aerosolizing procedure and it is a non-urgent indication.     Orders Placed This Encounter   Procedures     US Abdomen Limited      Plan:  1. Please obtain an ultrasound of the gallbladder in case the pain is due to the gallbladder  2. Please begin taking Pepcid twice daily for your abdominal pains.   3. We will plan to perform upper endoscopy in the future after you have obtained your ultrasound of the gallbladder.     Follow up plan:   Return to clinic 3 months and as needed.    The risks and benefits of my recommendations, as well as other treatment options were discussed with the patient and any available family today. All questions were answered.     o Follow up: As planned above. Today, I personally spent 35 minutes in direct telephone time with the patient, of which greater than 50% of the time was spent in patient education and counseling as described above. Approximately 15 minutes were spent on indirect care associated with the patient's consultation including but not limited to review of: patient medical records to date, clinic visits, hospital records, lab results, imaging studies, and procedural documentation. The findings from this review are summarized in the above note.    The patient verbalized understanding of the plan and was appreciative for the time spent and information provided during the office visit.     Author:   Armando Salazar DO   of Medicine  Division of Gastroenterology, Hepatology, and Nutrition  [facility]     Documentation assisted by voice recognition and documentation system.

## 2020-10-29 NOTE — LETTER
"    10/29/2020         RE: Emelyn Marmolejo  [address]  [facility] [address]        Dear Colleague,    Thank you for referring your patient, Emelyn Marmolejo, to the [facility] [place]. Please see a copy of my visit note below.    Emelyn Marmolejo is a 30 year old male who is being evaluated via a billable telephone visit.      The patient has been notified of following:     \"This telephone visit will be conducted via a call between you and your physician/provider. We have found that certain health care needs can be provided without the need for a physical exam.  This service lets us provide the care you need with a short phone conversation.  If a prescription is necessary we can send it directly to your pharmacy.  If lab work is needed we can place an order for that and you can then stop by our lab to have the test done at a later time.    Telephone visits are billed at different rates depending on your insurance coverage. During this emergency period, for some insurers they may be billed the same as an in-person visit.  Please reach out to your insurance provider with any questions.    If during the course of the call the physician/provider feels a telephone visit is not appropriate, you will not be charged for this service.\"    Patient has given verbal consent for Telephone visit?  Yes    What phone number would you like to be contacted at? [phone]       line: [phone]    How would you like to obtain your AVS? Mail a copy    Phone call duration: 35 minutes    Armando Salazar DO      Gastroenterology Visit for: Emelyn Marmolejo 1990   MRN: [id number]     Reason for Visit:  chief complaint    Referred by: Gloria  / 2020 E 28TH  / [facility] [id number]  Patient Care Team:  Livan Castro MD as PCP - General (Family Practice)  Beau Tran RPH as Pharmacist (Pharmacist)  Deya Andrews CNP as Nurse Practitioner (Nurse " "Practitioner)  Magaly Mojica RN as Registered Nurse  Maulik Finney MD as MD (Urology)  Livan Castro MD as Assigned PCP  Aj Hedrick MD as Assigned Surgical Provider    History of Present Illness:   Emelyn Marmolejo is a 30 year old male with history of COVID-19, atrophic left kidney, kidney stones who is presenting as a new patient in consultation at the request of Dr. Castro with a chief complaint of abdominal pain.    [facility] : [id number]  ---------------------------------------------------------------  Emelyn Marmolejo states he has been having problems with his stomach. He has been having right sided pain for a long time. He reports that he has a history of ulcers and has pain when his stomach. He has bloating when his stomach is empty. He has borborygmus. Occasionally he has pain. He reports that he had a gastric ulcer in [place]. He was ever scoped. The patient's abdominal pain is diffuse and not localized to RUQ. Pains can be sharp or dull. The pain is made worse by being empty. When he eats meat he has pain on the right upper quadrant. The pain is different from the kidney stone pains. The abdominal pain is constant \"all the time\". His pain is improved by eating oats and softer foods. The patient reports bloating around his umbilicus. The patient has been having hard stools with bristol 1 appearance and he is not concerned. Over the past month despite hard stools his abdominal pain has been improved. He feels that he has sharp pains near his bladder.     When he was diagnosed with COVID he had fevers for the first three days. Currently does not have muscle aches or breathing difficulty. His COVID diagnosis was 8/17/20    H pylori negative as of 9/21/20  ---------------------------------------------------------------     Emelyn Marmolejo denies dysphagia, odynophagia, heartburn/reflux, nausea, vomiting, early satiety, diarrhea, hematochezia, or melena. No unintentional weight loss.     Family " history of colon cancer: none  Wt Readings from Last 5 Encounters:   10/29/20 59 kg (130 lb)   09/21/20 59.3 kg (130 lb 12.8 oz)   08/12/20 59.4 kg (131 lb)   08/06/20 60.2 kg (132 lb 12.8 oz)   07/03/20 63 kg (139 lb)     Today's Sandy Swallow Questionnaire (SSQ):    Brief Esophageal Dysphagia Questionnaire (BEDQ):  We would like to ask about your dysphagia symptoms over the past 30 days, 6 months, and 12 months.  Please think about your symptom experiences and choose the best answer.      Over the past 14 days, on average, how often have you had the following?  If your response falls between two categories, please make your best guess.  If you are unable to eat the type of food in the question, please check  Cannot eat this.    Cannot  Eat This 0  Rarely/ Never 1  Once or twice a month 2  1-2 times per week 3  3-5 times per week 4  Daily or almost daily 5  Several times per day   Trouble eating solid food (meat, bread, vegetables)             Trouble eating soft foods (yogurt, jello, pudding)             Trouble swallowing liquids               Pain while swallowing  ---         Coughing or choking while swallowing foods or liquids ---           SCORE:    Over the past 14 days, on average, how would you rate your discomfort or pain during swallowing? If you are unable to eat the type of food in the question, please check  Cannot eat this.    Cannot Eat This 0  None 1  Very Mild 2  Mild 3  Moderate 4  Moderately Severe    5  Severe   Eating solid food   (meat, bread, vegetables)          Eating soft foods   (yogurt, jello, pudding)          Drinking liquids            SCORE:    Approximately how many times in the past 12 months have you:   Had food stuck in your throat or esophagus for a period lasting longer than 30 minutes?  Had to visit the emergency room because of food stuck in your throat or esophagus?    Eckardt Score:   Score Dysphagia Regurgitation Retrosternal Pain Weight Loss (kg)   0 None None None  None   1 Occasional Occasional Occasional <5   2 Daily Daily Daily 5-10   3 Each meal Each meal Each meal >10     SCORE:    Any hardcopy questionnaire completed by the patient regarding SSQ, BEDQ, or Eckardt Score have been scanned into the media section of Epic.     STUDIES & PROCEDURES:    EGD:   Date:  Impression:  Pathology Report:    Colonoscopy:  Date:  Impression:    Pathology Report:     EndoFLIP directed at the UES or LES (8cm (EF-325) balloon length or 16cm (EF-322) balloon length):   Date:  8cm balloon  Balloon inflation Balloon pressure CSA (mm^2) DI (mm^2/mmHg) Dmin (mm) Compliance   20 (ladmark ID)        30        40        50           16cm balloon  Balloon inflation Balloon pressure CSA (mm^2) DI (mm^2/mmHg) Dmin (mm) Compliance   30 (ladmark ID)        40        50        60        70           High Resolution Manometry:  Date:  Impression:    PH/Impedance:  Date:  Impression:     Bravo:  48 or 96hr  Date:  Impression:    CT:  Date: 7/29/20  Impression:  IMPRESSION:   1.  Stable large left renal calculus within the pelvis, with  associated atrophy of left kidney.  2.  The previously seen calculus within the left distal ureter appears  to have migrated and is now located within the posterior aspect of the  urinary bladder, medial to the left ureterovesical junction.  3.  Improved right pelvocaliectasis. Persistent mild dilation of the  right ureter, which may be secondary to a 3 mm calculus in the region  of the right distal ureter. However, this 3mm calculus in the region  of the right distal ureter has been seen on multiple prior exams  dating back to January 2020 and may represent an adjacent vascular  calcification.  4.  Redemonstration of previously visualized multicystic changes of  the lung bases.    US renal 9/4/20  IMPRESSION:  1.  Unchanged atrophic left kidney.  Large left renal calculus.   Negative for right or left hydronephrosis .  2.  Left renal mid-lower pole prominence, favor  lobulation as seen on  CT exams dating back to 2019 less likely discrete lesion.  Consider  contrast enhanced CT to confirm.     [Consider Follow Up: Left kidney]    Esophagram:  Date:  Impression:     Prior medical records were reviewed including, but not limited to, notes from referring providers, lab work, radiographic tests, and other diagnostic tests. Pertinent results were summarized above.     History     Past Medical History:   Diagnosis Date     Prostate infection        Past Surgical History:   Procedure Laterality Date     COMBINED CYSTOSCOPY, URETEROSCOPY, LASER HOLMIUM LITHOTRIPSY URETER(S) Right 2020    Procedure: CYSTOSCOPY, RIGHT RETROGRADE PYELOGRAM, RIGHT diagnostic URETEROSCOPY;  Surgeon: Aj Hedrick MD;  Location:  OR       Social History     Socioeconomic History     Marital status: Single     Spouse name: Not on file     Number of children: Not on file     Years of education: Not on file     Highest education level: Not on file   Occupational History     Not on file   Social Needs     Financial resource strain: Not on file     Food insecurity     Worry: Not on file     Inability: Not on file     Transportation needs     Medical: Not on file     Non-medical: Not on file   Tobacco Use     Smoking status: Former Smoker     Types: Cigarettes     Quit date: 2019     Years since quittin.1     Smokeless tobacco: Never Used   Substance and Sexual Activity     Alcohol use: Never     Frequency: Never     Drug use: Never     Sexual activity: Not Currently     Partners: Female     Birth control/protection: Condom   Lifestyle     Physical activity     Days per week: Not on file     Minutes per session: Not on file     Stress: Not on file   Relationships     Social connections     Talks on phone: Not on file     Gets together: Not on file     Attends Confucianist service: Not on file     Active member of club or organization: Not on file     Attends meetings of clubs or organizations: Not on  "file     Relationship status: Not on file     Intimate partner violence     Fear of current or ex partner: Not on file     Emotionally abused: Not on file     Physically abused: Not on file     Forced sexual activity: Not on file   Other Topics Concern     Parent/sibling w/ CABG, MI or angioplasty before 65F 55M? Not Asked   Social History Narrative     Not on file       Family History   Problem Relation Age of Onset     Crohn's Disease No family hx of      Ulcerative Colitis No family hx of      GERD No family hx of      Stomach Cancer No family hx of        Medications and Allergies:     Outpatient Encounter Medications as of 10/29/2020   Medication Sig Dispense Refill     acetaminophen (TYLENOL) 325 MG tablet Take 325-650 mg by mouth       melatonin 3 MG tablet Take 2 hours before bedtime for the next 10 days. And then as needed after for sleep. 30 tablet 0     sodium chloride (OCEAN) 0.65 % nasal spray Spray in each nose twice a day 104 mL 1     diclofenac (VOLTAREN) 1 % topical gel Place 2 g onto the skin 4 times daily (Patient not taking: Reported on 10/29/2020) 100 g 3     No facility-administered encounter medications on file as of 10/29/2020.         Allergies   Allergen Reactions     Nsaids      Not true allergy. But be sparing with patient as he has single kidney         Review of systems:  A full 10 point review of systems was obtained and was negative except for the pertinent positives and negatives stated within the HPI.    Objective Findings:   Physical Exam:    Constitutional: Ht 1.753 m (5' 9\")   Wt 59 kg (130 lb)   BMI 19.20 kg/m    Telephone telemedicine consultation     Labs, Radiology, Pathology     Lab Results   Component Value Date    HGB 13.4 03/26/2020    HGB 15.6 12/30/2019    ALT 27.3 03/26/2020    AST 21.9 03/26/2020     09/04/2020     06/30/2020     05/15/2020    BUN 15 09/04/2020    BUN 12 06/30/2020    BUN 11 05/15/2020    CO2 29 09/04/2020    CO2 24 06/30/2020    " CO2 27 05/15/2020        Liver Function Studies -   Recent Labs   Lab Test 03/26/20  1507   PROTTOTAL 10.0*   BILITOTAL <0.4   ALKPHOS 86.5   AST 21.9   ALT 27.3          Patient Active Problem List    Diagnosis Date Noted     Abdominal pain, generalized 10/31/2020     Priority: Medium     2019 novel coronavirus disease (COVID-19) 10/31/2020     Priority: Medium     Chronic bilateral low back pain without sciatica 09/24/2020     Priority: Medium     H. pylori infection 05/14/2020     Priority: Medium     History of renal stone 05/14/2020     Priority: Medium     Abnormal CT of the chest 10/09/2019     Priority: Medium     Renal atrophy, left 08/21/2019     Priority: Medium     Renal stone 08/21/2019     Priority: Medium      Assessment and Plan   Assessment:    Emelyn Marmolejo is a 30 year old male with history of COVID-19, atrophic left kidney, kidney stones who is presenting as a new patient in consultation at the request of Dr. Castro with a chief complaint of abdominal pain.    The patient was seen as telephone telemedicine consultation regarding his vague diffuse abdominal pain. It is unclear what the underlying etiology is. This is in the setting of kidney stones which may be confounding the picture. Given what appears to be intermittent right upper quadrant pains I have asked that he undergo an ultrasound of the right upper quadrant to evaluate for possible symptomatic cholelithiasis. He will also require an upper endoscopy in the coming months to assess his pain. Fortunately he has already had a negative H pylori test which essentially rules out this as an etiology. He may have Functional dyspepsia as an etiology but organic disease will need to be ruled out first. Alternatively he may have reflux disease. As such,  I have recommended that he begin taking pepcid BID. The rational for this medication over a PPI is that there are data to support an increased risk of COVID with PPI use. Because the patient has  already been diagnosed with COVID and it is unclear if he could have reactivation or if he is at risk of re-exposure we will avoid PPI use for the time being. His symptoms are not consistent with ulcer disease and his history of ulcer disease is based on sensation rather than endoscopic evaluation.     We will plan for upper endoscopy >90 days from the time of his previous diagnosis so that he may be re-tested for COVID since EGD is an aerosolizing procedure and it is a non-urgent indication.     Orders Placed This Encounter   Procedures     US Abdomen Limited      Plan:  1. Please obtain an ultrasound of the gallbladder in case the pain is due to the gallbladder  2. Please begin taking Pepcid twice daily for your abdominal pains.   3. We will plan to perform upper endoscopy in the future after you have obtained your ultrasound of the gallbladder.     Follow up plan:   Return to clinic 3 months and as needed.    The risks and benefits of my recommendations, as well as other treatment options were discussed with the patient and any available family today. All questions were answered.     o Follow up: As planned above. Today, I personally spent 35 minutes in direct telephone time with the patient, of which greater than 50% of the time was spent in patient education and counseling as described above. Approximately 15 minutes were spent on indirect care associated with the patient's consultation including but not limited to review of: patient medical records to date, clinic visits, hospital records, lab results, imaging studies, and procedural documentation. The findings from this review are summarized in the above note.    The patient verbalized understanding of the plan and was appreciative for the time spent and information provided during the office visit.     Author:   Armando Salazar DO   of Medicine  Division of Gastroenterology, Hepatology, and Nutrition  [facility]  [place]     Documentation assisted by voice recognition and documentation system.

## 2020-10-29 NOTE — PATIENT INSTRUCTIONS
It was a pleasure taking care of you today.  I've included a brief summary of our discussion and care plan from today's visit below.  Please review this information with your primary care provider.  _______________________________________________________________________    My recommendations are summarized as follows:    1. Please obtain an ultrasound of the gallbladder in case the pain is due to the gallbladder  2. Please begin taking Pepcid twice daily for your abdominal pains.   3. We will plan to perform upper endoscopy in the future after you have obtained your ultrasound of the gallbladder.     To schedule endoscopic procedures you may call: [phone]  To schedule radiology tests you may call: [phone]  To schedule an ENT appointment you may call: [phone]    Please call our nurse Erica with any questions or concerns- [phone].  --    Return to GI Clinic in 3 months to review your progress.    _______________________________________________________________________    Who do I call with any questions after my visit?  Please be in touch if there are any further questions that arise following today's visit.  There are multiple ways to contact your gastroenterology care team.        During business hours, you may reach a Gastroenterology nurse at [phone] and choose option 3.         To schedule or reschedule an appointment, please call [phone].       You can always send a secure message through [employer].  [employer] messages are answered by your nurse or doctor typically within 24 hours.  Please allow extra time on weekends and holidays.        For urgent/emergent questions after business hours, you may reach the on-call GI Fellow by contacting the [facility] at [phone].     How will I get the results of any tests ordered?    You will receive all of your results.  If you have signed up for MyChart, any tests ordered at your visit will be available to you after your  physician reviews them.  Typically this takes 1-2 weeks.  If there are urgent results that require a change in your care plan, your physician or nurse will call you to discuss the next steps.      What is [employer]?  [email or web address] is a secure way for you to access all of your healthcare records from the [facility].  It is a web based computer program, so you can sign on to it from any location.  It also allows you to send secure messages to your care team.  I recommend signing up for [email or web address] access if you have not already done so and are comfortable with using a computer.      How to I schedule a follow-up visit?  If you did not schedule a follow-up visit today, please call [phone] to schedule a follow-up office visit.        Sincerely,    Armando Salazar DO   of Medicine  Division of Gastroenterology, Hepatology, and Nutrition  [facility]

## 2020-10-29 NOTE — NURSING NOTE
"Chief Complaint   Patient presents with     New Patient     abdominal pain, weight loss       Vitals:    10/29/20 1334   Weight: 59 kg (130 lb)   Height: 1.753 m (5' 9\")       Body mass index is 19.2 kg/m .      Markell Cuellar LPN                        "

## 2020-10-31 PROBLEM — U07.1 2019 NOVEL CORONAVIRUS DISEASE (COVID-19): Status: ACTIVE | Noted: 2020-10-31

## 2020-10-31 PROBLEM — R10.84 ABDOMINAL PAIN, GENERALIZED: Status: ACTIVE | Noted: 2020-10-31

## 2020-11-02 ENCOUNTER — OFFICE VISIT (OUTPATIENT)
Dept: FAMILY MEDICINE | Facility: CLINIC | Age: 30
End: 2020-11-02
Payer: COMMERCIAL

## 2020-11-02 VITALS
DIASTOLIC BLOOD PRESSURE: 82 MMHG | HEART RATE: 90 BPM | TEMPERATURE: 98.8 F | RESPIRATION RATE: 16 BRPM | WEIGHT: 136 LBS | SYSTOLIC BLOOD PRESSURE: 131 MMHG | BODY MASS INDEX: 20.08 KG/M2 | OXYGEN SATURATION: 99 %

## 2020-11-02 DIAGNOSIS — R00.2 PALPITATIONS: Primary | ICD-10-CM

## 2020-11-02 DIAGNOSIS — R06.02 SHORTNESS OF BREATH: ICD-10-CM

## 2020-11-02 DIAGNOSIS — J98.4 LUNG CYST: ICD-10-CM

## 2020-11-02 PROCEDURE — 99214 OFFICE O/P EST MOD 30 MIN: CPT | Mod: GC | Performed by: STUDENT IN AN ORGANIZED HEALTH CARE EDUCATION/TRAINING PROGRAM

## 2020-11-02 PROCEDURE — 93010 ELECTROCARDIOGRAM REPORT: CPT | Mod: GC | Performed by: STUDENT IN AN ORGANIZED HEALTH CARE EDUCATION/TRAINING PROGRAM

## 2020-11-02 ASSESSMENT — ENCOUNTER SYMPTOMS
COUGH: 0
APPETITE CHANGE: 0
ARTHRALGIAS: 0
ABDOMINAL DISTENTION: 0
BACK PAIN: 0
FEVER: 0
UNEXPECTED WEIGHT CHANGE: 0
CHILLS: 0
CHEST TIGHTNESS: 1
NECK PAIN: 0
PALPITATIONS: 1
NAUSEA: 0
ABDOMINAL PAIN: 0
MYALGIAS: 0
SHORTNESS OF BREATH: 0
DIFFICULTY URINATING: 0
DIARRHEA: 0
HEADACHES: 0

## 2020-11-02 NOTE — PROGRESS NOTES
LEONARDO Marmolejo is a 30 year old  who presents for   Chief Complaint   Patient presents with     RECHECK     Follow up Back pain and feels achy     Chest Pain     Shoulder Pain     Chest pain/pressure and palpitations  Bottom of left breast has been aching. Sharp, and feels heart feels like beating fast. Started last week. Most days on then off. Sx last about 5 minutes or so and go away. Doesn't get SOB, but has mild nausea.   - no cough, URI like sx  - stress/anxiety issues feel about the same.   -had this one time before and said was pleurisy.             Problem, Medication and Allergy Lists were reviewed and updated if needed..    Patient is an established patient of this clinic..         Review of Systems:   Review of Systems   Constitutional: Negative for appetite change, chills, fever and unexpected weight change.   Respiratory: Positive for chest tightness. Negative for cough and shortness of breath.    Cardiovascular: Positive for palpitations. Negative for chest pain.   Gastrointestinal: Negative for abdominal distention, abdominal pain, diarrhea and nausea.   Genitourinary: Negative for decreased urine volume and difficulty urinating.   Musculoskeletal: Negative for arthralgias, back pain, myalgias and neck pain.   Neurological: Negative for headaches.            Physical Exam:     Vitals:    11/02/20 1546   BP: 131/82   Pulse: 90   Resp: 16   Temp: 98.8  F (37.1  C)   TempSrc: Oral   SpO2: 99%   Weight: 61.7 kg (136 lb)     Body mass index is 20.08 kg/m .  Vitals were reviewed and were normal     Physical Exam  Constitutional:       General: He is not in acute distress.     Appearance: He is well-developed.   HENT:      Head: Normocephalic and atraumatic.   Eyes:      General:         Right eye: No discharge.         Left eye: No discharge.      Conjunctiva/sclera: Conjunctivae normal.   Cardiovascular:      Rate and Rhythm: Normal rate.      Pulses: Normal pulses.      Heart sounds: No  murmur.      Comments: 2/6 late diastolic heart murmur. Chest pain non reproducible on exam  Pulmonary:      Effort: Pulmonary effort is normal. No respiratory distress.   Musculoskeletal:         General: No deformity.   Skin:     Coloration: Skin is not pale.      Findings: No erythema or rash.   Neurological:      Mental Status: He is alert and oriented to person, place, and time.      Coordination: Coordination normal.           Results:    Results from this visitNo results found for any visits on 11/02/20.    Assessment and Plan      Pt w prior recurrent kdiney stones w obstruction causing left renal atrophy  , COVID infection in 8/2020, coming in with 2 weeks of exertional sometimes makes it worse, left chest wall pain. EKG showed right axis deviation and possible repolarization vs lateral ischemia. On chart review does have right cystic lung lesions so CT reordered demonstrating bronchiectasis. This could explain right heart deviation with right sided heart enlargement? Unclear and will have exercise ECHO urgently to test for ischemia, right sided heart enlargement. No PE on CT and pt not having sx currently so ok to wait as long as done this week. But ER visit if sx worsen prior to then.     Will also refer to Pulm for evaluation of bronchiectasis. It looks to have been present prior to COVID.       1. Palpitations    - EKG 12-lead complete w/read - Clinics  - Echocardiogram Exercise Stress; Future    2. Shortness of breath    - Echocardiogram Exercise Stress; Future  - PULMONARY MEDICINE REFERRAL - INTERNAL    3. Lung cyst    - Echocardiogram Exercise Stress; Future  - PULMONARY MEDICINE REFERRAL - INTERNAL       There are no discontinued medications.    Options for treatment and follow-up care were reviewed with the patient. Emelyn Marmolejo  engaged in the decision making process and verbalized understanding of the options discussed and agreed with the final plan.    Livan Castro MD

## 2020-11-02 NOTE — PATIENT INSTRUCTIONS
1. We will get you set up for a ECHO to look at your heart while you exercise.   2. Also will look at lungs with a CT scan.     Thank you for coming to [facility].    **If you had lab testing today and your results are reassuring or normal they will be be mailed to you or sent to your MyChart and you will be notified by email within 7 days.   **If the lab tests need quick action we will call you with the results.  The phone number we will call with results is # [phone] (home)    (home) . If this is not the best number please call our clinic and change the number.  **If any referrals were ordered today you should be getting a call in the next week.  If you don't hear about this within a week please call one of the following numbers   If your referral is for [facility] please call [phone]  If your referral is to outside [facility] please call the clinic number ([phone]) and ask for your team care coordinator.  If you need any refills please call your pharmacy and they will contact us.  If you have any concerns about today's visit or wish to schedule another appointment  please call our office during normal business hours  [phone] (8-5:00 M-F)  If you have urgent medical concerns please call [phone] at any time of the day.  If you have a medical emergency please call [phone]    Again thank you for choosing [facility] and please let us know how we can best partner with you to improve your and your family's health.

## 2020-11-02 NOTE — PROGRESS NOTES
Preceptor Attestation:    Patient seen and evaluated in person. I discussed the patient with the resident. I personally viewed the EKG and agree with the interpretation documented by the resident. I have verified the content of the note, which accurately reflects my assessment of the patient and the plan of care.   Supervising Physician:  Liliana Ward MD.

## 2020-11-02 NOTE — NURSING NOTE
Due to patient being non-English speaking/uses sign language, an  was used for this visit. Only for face-to-face interpretation by an external agency, date and length of interpretation can be found on the scanned worksheet.     name: Grace Gil  Language: [place] Islander  Agency: [employer]  Phone number: [phone]  Type of interpretation:Telephone, spoken

## 2020-11-03 ENCOUNTER — ANCILLARY PROCEDURE (OUTPATIENT)
Dept: CT IMAGING | Facility: CLINIC | Age: 30
End: 2020-11-03
Attending: FAMILY MEDICINE
Payer: COMMERCIAL

## 2020-11-03 DIAGNOSIS — R06.02 SHORTNESS OF BREATH: ICD-10-CM

## 2020-11-03 DIAGNOSIS — J98.4 LUNG CYST: ICD-10-CM

## 2020-11-03 DIAGNOSIS — R00.2 PALPITATIONS: ICD-10-CM

## 2020-11-03 LAB
CREAT BLD-MCNC: 0.9 MG/DL (ref 0.66–1.25)
GFR SERPL CREATININE-BSD FRML MDRD: >90 ML/MIN/{1.73_M2}

## 2020-11-03 PROCEDURE — 36415 COLL VENOUS BLD VENIPUNCTURE: CPT | Performed by: PATHOLOGY

## 2020-11-03 PROCEDURE — 71275 CT ANGIOGRAPHY CHEST: CPT | Performed by: RADIOLOGY

## 2020-11-03 PROCEDURE — 82565 ASSAY OF CREATININE: CPT | Performed by: PATHOLOGY

## 2020-11-03 RX ORDER — IOPAMIDOL 755 MG/ML
53 INJECTION, SOLUTION INTRAVASCULAR ONCE
Status: COMPLETED | OUTPATIENT
Start: 2020-11-03 | End: 2020-11-03

## 2020-11-03 RX ADMIN — IOPAMIDOL 53 ML: 755 INJECTION, SOLUTION INTRAVASCULAR at 17:02

## 2020-11-05 ENCOUNTER — HOSPITAL ENCOUNTER (OUTPATIENT)
Dept: CARDIOLOGY | Facility: CLINIC | Age: 30
Discharge: HOME | End: 2020-11-05
Attending: FAMILY MEDICINE | Admitting: FAMILY MEDICINE
Payer: COMMERCIAL

## 2020-11-05 DIAGNOSIS — J98.4 LUNG CYST: ICD-10-CM

## 2020-11-05 DIAGNOSIS — R00.2 PALPITATIONS: ICD-10-CM

## 2020-11-05 DIAGNOSIS — R06.02 SHORTNESS OF BREATH: ICD-10-CM

## 2020-11-05 PROCEDURE — 93018 CV STRESS TEST I&R ONLY: CPT | Performed by: INTERNAL MEDICINE

## 2020-11-05 PROCEDURE — 255N000002 HC RX 255 OP 636: Performed by: INTERNAL MEDICINE

## 2020-11-05 PROCEDURE — 999N000208 ECHO STRESS ECHOCARDIOGRAM

## 2020-11-05 PROCEDURE — 93350 STRESS TTE ONLY: CPT | Mod: 26 | Performed by: INTERNAL MEDICINE

## 2020-11-05 PROCEDURE — 93321 DOPPLER ECHO F-UP/LMTD STD: CPT | Mod: 26 | Performed by: INTERNAL MEDICINE

## 2020-11-05 PROCEDURE — 93016 CV STRESS TEST SUPVJ ONLY: CPT | Performed by: INTERNAL MEDICINE

## 2020-11-05 PROCEDURE — 93325 DOPPLER ECHO COLOR FLOW MAPG: CPT | Mod: 26 | Performed by: INTERNAL MEDICINE

## 2020-11-05 RX ADMIN — PERFLUTREN 5 ML: 6.52 INJECTION, SUSPENSION INTRAVENOUS at 14:18

## 2020-11-09 PROBLEM — G89.29 CHRONIC BILATERAL LOW BACK PAIN WITHOUT SCIATICA: Status: RESOLVED | Noted: 2020-09-24 | Resolved: 2020-11-09

## 2020-11-09 PROBLEM — M54.50 CHRONIC BILATERAL LOW BACK PAIN WITHOUT SCIATICA: Status: RESOLVED | Noted: 2020-09-24 | Resolved: 2020-11-09

## 2020-11-16 NOTE — PROGRESS NOTES
PFT / Pulmonology referral(s)    [address]  3rd Floor  [facility] [place]  [address]  [phone]    12/15/20 @ 1:30p.m PFT  12/15/20 @ 2:40p.m

## 2020-11-18 NOTE — TELEPHONE ENCOUNTER
RECORDS RECEIVED FROM: internal /CE   DATE RECEIVED: 12.15.20    NOTES STATUS DETAILS   OFFICE NOTE from referring provider Internal  Gloria Harris    OFFICE NOTE from other specialist Internal  11.3.20 Gloria DARLING     DISCHARGE SUMMARY from hospital na    DISCHARGE REPORT from the ER ce 1.25.20 carmen black  10/6/19 [facility]   MEDICATION LIST Internal     IMAGING  (NEED IMAGES AND REPORTS)     CT SCAN Internal/CE [place]- 9.24.19   Internal- 11.3.20   [facility] care- 8.9.19    CHEST XRAY (CXR) Internal/CE Internal 1.25.20, 11.26.19     [facility]-8.17.20, 11.26.19,     [facility]- 5.17.20, 1.25.20  [place]- 10.7.19,  [facility] care-5.3.19      TESTS     PULMONARY FUNCTION TESTING (PFT) Scheduled  Scheduled 12.15.20        Action 11.18.20 sv   Action Taken Image request sent to   [facility] CT- 9.24.19-- 11/23/20-   [facility]- CXR- 8.17.20, 11.26.19,-- received images in PACS---   [facility]- CXR-5.17.20, 1.25.20-- received images in PACS---  [facility]-CXR-10.7.19,  [facility]-CXR--5.3.19        Action    Action Taken [place]-CXR-10.6.19,- 11/23/20- 2nd request sent, imaging disc was sent on 12.1.20,   Did not received, called and spoke with records, another disc was sent out 12.14.20, report in Care everywhere     [facility]-CXR--5.3.19   9.24.19-- 11/23/20- 2nd request sent, called and spoke with [facility]- images cant be located to be sent, report is in Care everywhere     [facility] CT [id number]-- 11/23/20- 2nd request sent, 11/30/20 3rd request sent --   --called and spoke with [place], per [facility],  images were sent on a disc, it was never received. Another copy was sent out.

## 2020-11-19 ENCOUNTER — ANCILLARY PROCEDURE (OUTPATIENT)
Dept: ULTRASOUND IMAGING | Facility: CLINIC | Age: 30
End: 2020-11-19
Attending: INTERNAL MEDICINE
Payer: COMMERCIAL

## 2020-11-19 ENCOUNTER — OFFICE VISIT (OUTPATIENT)
Dept: FAMILY MEDICINE | Facility: CLINIC | Age: 30
End: 2020-11-19
Payer: COMMERCIAL

## 2020-11-19 VITALS
BODY MASS INDEX: 19.24 KG/M2 | HEIGHT: 70 IN | DIASTOLIC BLOOD PRESSURE: 77 MMHG | WEIGHT: 134.4 LBS | HEART RATE: 84 BPM | TEMPERATURE: 98.7 F | SYSTOLIC BLOOD PRESSURE: 128 MMHG | OXYGEN SATURATION: 98 %

## 2020-11-19 DIAGNOSIS — R10.84 ABDOMINAL PAIN, GENERALIZED: ICD-10-CM

## 2020-11-19 DIAGNOSIS — R10.9 FLANK PAIN: Primary | Chronic | ICD-10-CM

## 2020-11-19 DIAGNOSIS — J47.9 BRONCHIECTASIS WITHOUT COMPLICATION (H): ICD-10-CM

## 2020-11-19 DIAGNOSIS — R45.89 ANXIETY ABOUT HEALTH: ICD-10-CM

## 2020-11-19 DIAGNOSIS — N26.1 RENAL ATROPHY, LEFT: ICD-10-CM

## 2020-11-19 PROCEDURE — 76705 ECHO EXAM OF ABDOMEN: CPT | Mod: GC | Performed by: RADIOLOGY

## 2020-11-19 PROCEDURE — 99214 OFFICE O/P EST MOD 30 MIN: CPT | Mod: GC | Performed by: STUDENT IN AN ORGANIZED HEALTH CARE EDUCATION/TRAINING PROGRAM

## 2020-11-19 ASSESSMENT — ENCOUNTER SYMPTOMS
ABDOMINAL PAIN: 0
COUGH: 0
UNEXPECTED WEIGHT CHANGE: 0
DIARRHEA: 0
FEVER: 0
NAUSEA: 0
HEADACHES: 0
BACK PAIN: 0
DIFFICULTY URINATING: 0
MYALGIAS: 0
SHORTNESS OF BREATH: 0
CHILLS: 0
ARTHRALGIAS: 0
NECK PAIN: 0
ABDOMINAL DISTENTION: 0
APPETITE CHANGE: 0

## 2020-11-19 ASSESSMENT — MIFFLIN-ST. JEOR: SCORE: 1565.88

## 2020-11-19 NOTE — PROGRESS NOTES
HPI       Emelyn Marmolejo is a 30 year old  who presents for   Chief Complaint   Patient presents with     Follow Up     Results on CT scan      Pain     Right side pain on back,  chronic issue as pt states, feeling an inflammation       Follow up on left sided chest pain from last visit, ECHO stress, CT scan  - doesn't have chronic cough. Doesn't get chronic cough.   - Pain in chest resolved in about a weeks time on own. Did have normal stress ECHO but noted poor exercise tolerance. CT scan showed stable bronchiectic changes in lungs. Has pulmonary appt in 1 month to discuss if this needs further work up or not.       Right rib/back pain   Chronic - urology doesn't think related to kidney or stones. Constant always present just under rib cage.Well below lung so unlikely to be related to bronchiectasis.   - saw by GI - ddx dyspepsia, ulcer, refluxrelated. No improvement w PPI. Pain mildly worse past 2 weeks. Has US today. They are planning on EGD in future as well but at least 90 days past when patient was diagnosed w COVID. (possibly done in Jan/Feb 2021)   Physical therapy - no improvement w daily exercises.     Has a US today to see if from gallbladder. It is more lateral than typical gallbladder pain and always present. Does worsen sometimes w food, pressing on it also makes it worse.  Lateral subcostal, almost posterior. Wanted to know if cirrhosis, hepatitis, tumor, new kidney stone. Chronically very worried about all health conditions and symptoms.       Problem, Medication and Allergy Lists were reviewed and updated if needed..    Patient is an established patient of this clinic..         Review of Systems:   Review of Systems   Constitutional: Negative for appetite change, chills, fever and unexpected weight change.   Respiratory: Negative for cough and shortness of breath.    Cardiovascular: Negative for chest pain.   Gastrointestinal: Negative for abdominal distention, abdominal pain, diarrhea and  "nausea.   Genitourinary: Negative for decreased urine volume and difficulty urinating.   Musculoskeletal: Negative for arthralgias, back pain, myalgias and neck pain.   Neurological: Negative for headaches.            Physical Exam:     Vitals:    11/19/20 1011   BP: 128/77   Pulse: 84   Temp: 98.7  F (37.1  C)   TempSrc: Oral   SpO2: 98%   Weight: 61 kg (134 lb 6.4 oz)   Height: 1.778 m (5' 10\")     Body mass index is 19.28 kg/m .  Vitals were reviewed and were normal     Physical Exam  Constitutional:       General: He is not in acute distress.     Appearance: He is well-developed.   HENT:      Head: Normocephalic and atraumatic.   Eyes:      General:         Right eye: No discharge.         Left eye: No discharge.      Conjunctiva/sclera: Conjunctivae normal.   Pulmonary:      Effort: Pulmonary effort is normal. No respiratory distress.   Abdominal:      Comments: Lateral subcostal tenderness along R side/flank. No CVA tenderness to percussion. No hepatomegaly, negative paz   Musculoskeletal:         General: No deformity.   Skin:     Coloration: Skin is not pale.      Findings: No erythema or rash.   Neurological:      Mental Status: He is alert and oriented to person, place, and time.      Coordination: Coordination normal.           Results:   No testing ordered today    Assessment and Plan      Pt w follow up today on left chest pain worsened w exercise that self resolved. As part of work up had echo stress that was normal other than poor exercise toelrance. Due to exercise tolerance and patient preference as well as CT findings of bronchietic changes has upcoming appt w pulmonary. Pt missed PFT appointment. Will have pulmonary review need for them w their upcoming appt versus trying to reschedule w COVID cases increasing.     In regards to right subcstal flank pain. Seen by urology don't think related to kidneys. GI work up still pending - US today for gallbladder, w plans for EGD. Location is very " unusual for GI pain. If workup negative given all other organs in area of pain ruled out would treat as MSK/nerve related. Osteopathic manupulation, topical NSAIDs, possible nerve block would be in future. Pt prefers to have full eval before doing these treatments.    Also discussed patients long term anixety in relation to health. Tends to perseverate and wonder if any symptoms he have are life threatning/worrisome. His history of a large kidney stone obstructing his left kidney makes addressing this harder. But he openly admits he worries too much and would like to go back to therapy for this ( had one meeting w our behavioral health team before). Will message BH to get back into therapy.       1. Right subcostal/flank pain      2. Anxiety about health      3. Bronchiectasis without complication (H)      4. Renal atrophy, left         There are no discontinued medications.    Options for treatment and follow-up care were reviewed with the patient. Emelyn Marmolejo  engaged in the decision making process and verbalized understanding of the options discussed and agreed with the final plan.    Livan Castro MD

## 2020-11-19 NOTE — NURSING NOTE
Due to patient being non-English speaking/uses sign language, an  was used for this visit. Only for face-to-face interpretation by an external agency, date and length of interpretation can be found on the scanned worksheet.     name: Grace Gil  Language: Togolese  Agency: [employer]  Phone number: [phone]  Type of interpretation: Telephone, spoken

## 2020-11-19 NOTE — Clinical Note
Dr. Tong- pt has seen  in past for therapy for health care anxiety. Would like to resume this .. does he need new referral? If so just let me know and i'll place.

## 2020-11-19 NOTE — PATIENT INSTRUCTIONS
1. Pain plan  - make sure it is not gallblader  -if not then it has to be nerve or muscle related.  - could talk about trying manipulation or a nerve block   -therapy getting back into this for medical worrying       2. For your lungs you are seeing them in 1 month to make sure nothing else to do.       Thank you for coming to [facility].    **If you had lab testing today and your results are reassuring or normal they will be be mailed to you or sent to your MyChart and you will be notified by email within 7 days.   **If the lab tests need quick action we will call you with the results.  The phone number we will call with results is # [phone] (home)    (home) . If this is not the best number please call our clinic and change the number.  **If any referrals were ordered today you should be getting a call in the next week.  If you don't hear about this within a week please call one of the following numbers   If your referral is for [facility] please call [phone]  If your referral is to outside [facility] please call the clinic number ([phone]) and ask for your team care coordinator.  If you need any refills please call your pharmacy and they will contact us.  If you have any concerns about today's visit or wish to schedule another appointment  please call our office during normal business hours  [phone] (8-5:00 M-F)  If you have urgent medical concerns please call [phone] at any time of the day.  If you have a medical emergency please call [phone]    Again thank you for choosing [facility] and please let us know how we can best partner with you to improve your and your family's health.

## 2020-11-20 DIAGNOSIS — R10.84 ABDOMINAL PAIN, GENERALIZED: Primary | ICD-10-CM

## 2020-11-23 ENCOUNTER — TELEPHONE (OUTPATIENT)
Dept: PSYCHOLOGY | Facility: CLINIC | Age: 30
End: 2020-11-23

## 2020-11-23 NOTE — TELEPHONE ENCOUNTER
Mental Health Referral:  Please schedule with Dr. Graham      Please let patient know this is for primary care behavioral health services.  Therapists at our clinic are able to see patients for 8-12 sessions (video/phone). If further assistance is needed, they will help the patient connect with ongoing services in the community.    Check with the patient if they are able to do a video visit.  They will need access to either a smartphone or a laptop with camera/microphone.  If they can do a video visit, please schedule as a video visit.  If they do not have the technology to do a video visit, please schedule as a telephone visit. For either visit, please put the phone number or email in the appt notes that the provider is supposed to call or send the visit invite.       If you are unable to reach the patient after two phone attempts, please send a letter and close the encounter.      Thank you!

## 2020-11-24 NOTE — PROGRESS NOTES
Preceptor Attestation:   Patient seen, evaluated and discussed with the resident. I have verified the content of the note, which accurately reflects my assessment of the patient and the plan of care.   Supervising Physician:  Brittny Eid, DO

## 2020-11-29 ENCOUNTER — TELEPHONE (OUTPATIENT)
Dept: GASTROENTEROLOGY | Facility: CLINIC | Age: 30
End: 2020-11-29

## 2020-11-30 ENCOUNTER — VIRTUAL VISIT (OUTPATIENT)
Dept: PSYCHOLOGY | Facility: CLINIC | Age: 30
End: 2020-11-30
Payer: COMMERCIAL

## 2020-11-30 DIAGNOSIS — F43.22 ADJUSTMENT DISORDER WITH ANXIOUS MOOD: Primary | ICD-10-CM

## 2020-11-30 PROCEDURE — 90785 PSYTX COMPLEX INTERACTIVE: CPT | Mod: 95 | Performed by: STUDENT IN AN ORGANIZED HEALTH CARE EDUCATION/TRAINING PROGRAM

## 2020-11-30 PROCEDURE — 90832 PSYTX W PT 30 MINUTES: CPT | Mod: 95 | Performed by: STUDENT IN AN ORGANIZED HEALTH CARE EDUCATION/TRAINING PROGRAM

## 2020-11-30 NOTE — PROGRESS NOTES
"The following statement was read to the patient at the outset of previous virtual visit:     \"We have found that certain health care needs can be provided without the need for a face to face visit.  This service lets us provide the care you need with a phone conversation. I will have full access to your [facility] medical record during this entire phone call.   I will be taking notes for your medical record.  Since this is like an office visit, we will bill your insurance company for this service. There are potential benefits and risks of telephone visits (e.g. limits to patient confidentiality) that differ from in-person visits.?  Confidentiality still applies for telephone services, and nobody will record the visit.  It is important to be in a quiet, private space that is free of distractions (including cell phone or other devices) during the visit.??If during the course of the call I believe a telephone visit is not appropriate, you will not be charged for this service.\"     Consent has been obtained for this service by care team member: Yes     Emergency contact: Adilia dupont - [phone]  Closest Emergency Room: [facility]  Location at time of call: Home    Start of call: 10:05 am  End of call: 10:15 am    Behavioral Health Progress Note    Client Legal Name:  Emelyn Marmolejo   Client Preferred Name:  Emelyn   Service Type:  Individual  Length of Visit:  10 minutes  Attendees:  patient     name: Grace Gil  Agency: [employer]  Language: Dutch   Telephone number: [phone]  Type of interpretation: Telephone, spoken    Complexity statement: Due to patient being non-English speaking/uses sign language, an  was used for this visit.  Date and length of interpretation can be found on the scanned worksheet.  An  is used not only to interpret language, since the patient does not speak English, but also to help with the complexity of understandings across " cultures, since the patient is not well integrated in the larger [place] culture.      Identifying Information and Presenting Problem:  Emelyn is a 30 year old Iranian male who was referred to behavioral health for problematic symptoms of anxiety related to health problems.    Treatment Objective(s) Addressed in This Session:  Psychological distress related to Chronic Disease Management    Progress on / Status of Treatment Objective(s) / Homework:  N/A - first visit     Topics Discussed/Interventions Provided:  This provider reached patient for behavioral health appointment today. Patient was referred by Dr. Castro due to strong anxiety related to health problems. Patient was previously referred by Dr. Jose this summer for same concerns and patient spoke once to Dr. Amos (07/06/2020). Patient declined psychotherapy at the time saying he was feeling better and was reassured by his doctors.     Today, patient did not remember being referred to behavioral health services and did not know what this appointment was for when reached. After explanation of behavioral health services and review of Dr. Castro's note, patient said that he was not having any problems he wished to discuss. He also said he is not feeling overly stressed and that his concerns are manageable. He did endorse some problems sleeping for the past two weeks but noted that he had melatonin that he can take if this continues. Offered support if needed and encouraged him to schedule with behavioral health if needed at any time in the future. Patient was appreciative of offer.     Assessment:   The patient appeared to be active and engaged in today's session and was receptive to feedback.     Mental Status:   Emelyn appeared generally alert and oriented. Speech was of normal volume and rate and was clear, coherent, and relevant. Mood was good with congruent affect. Thought processes were relevant, logical and goal-directed. Thought content was WNL with no  evidence of psychotic or paranoid features. No evidence of SI/HI or self-harm, intent, or plans. Memory appeared grossly intact. Insight and judgment appeared fair and patient exhibited good impulse control during the appointment.     Does the patient appear to be at imminent risk of harm to self/others at this time?  No    The session was necessary to address symptoms of health-related anxiety that have been interfering with patient's ability to function in terms of self-care and engaging in medical system.  Ongoing psychotherapy is recommended to provide support but patient declined follow-up.     DSM-5 Diagnosis:  Adjustment disorder with anxious mood    Plan:  1. No BH follow-up planned at this time. Patient aware that he can schedule with  if needed.   2. Routing to PCP for update.   3. Utilize crisis resources as needed.    Subha Graham, PhD    NOTE: Treatment plan update/diagnostic assessment deferred as patient declined follow-up.

## 2020-11-30 NOTE — Clinical Note
Hi Dr. Castro,   I spoke with Emelyn today but he wasn't interested in behavioral health. If he changes his mind he can schedule with me anytime. Thanks! - Magdalena

## 2020-12-10 ENCOUNTER — TELEPHONE (OUTPATIENT)
Dept: GASTROENTEROLOGY | Facility: CLINIC | Age: 30
End: 2020-12-10

## 2020-12-10 NOTE — TELEPHONE ENCOUNTER
Patient is scheduled for EGD with Dr. ZAMARRIPA    Spoke with: REYNOLD    Date of Procedure: 01/25/2021    Location: [facility]    Sedation Type MAC    Pre-op for Unit J MAC NOT NEEDED    (if yes advise patient they will need a pre-op prior to procedure)      Is patient on blood thinners? -NO (If yes- inform patient to follow up with PCP or provider for follow up instructions)     Informed patient they will need an adult  YES    Informed Patient of COVID Test Requirement YES    Preferred Pharmacy for Pre Prescription UNKNOWN    Confirmed Nurse will call to complete assessment YES    Additional comments: NA

## 2020-12-15 ENCOUNTER — PRE VISIT (OUTPATIENT)
Dept: PULMONOLOGY | Facility: CLINIC | Age: 30
End: 2020-12-15

## 2020-12-15 ENCOUNTER — OFFICE VISIT (OUTPATIENT)
Dept: PULMONOLOGY | Facility: CLINIC | Age: 30
End: 2020-12-15
Attending: INTERNAL MEDICINE
Payer: COMMERCIAL

## 2020-12-15 VITALS — HEART RATE: 88 BPM | SYSTOLIC BLOOD PRESSURE: 130 MMHG | DIASTOLIC BLOOD PRESSURE: 84 MMHG | OXYGEN SATURATION: 98 %

## 2020-12-15 DIAGNOSIS — R06.00 DYSPNEA: ICD-10-CM

## 2020-12-15 DIAGNOSIS — R06.02 SHORTNESS OF BREATH: ICD-10-CM

## 2020-12-15 DIAGNOSIS — R53.83 FATIGUE: ICD-10-CM

## 2020-12-15 DIAGNOSIS — R53.83 OTHER FATIGUE: ICD-10-CM

## 2020-12-15 PROCEDURE — 94726 PLETHYSMOGRAPHY LUNG VOLUMES: CPT | Performed by: INTERNAL MEDICINE

## 2020-12-15 PROCEDURE — 94375 RESPIRATORY FLOW VOLUME LOOP: CPT | Performed by: INTERNAL MEDICINE

## 2020-12-15 PROCEDURE — G0463 HOSPITAL OUTPT CLINIC VISIT: HCPCS | Mod: 25

## 2020-12-15 PROCEDURE — 94729 DIFFUSING CAPACITY: CPT | Performed by: INTERNAL MEDICINE

## 2020-12-15 PROCEDURE — 99204 OFFICE O/P NEW MOD 45 MIN: CPT | Mod: 25 | Performed by: INTERNAL MEDICINE

## 2020-12-15 NOTE — PROGRESS NOTES
"[facility]  Clinic Initial Visit Note    Reason for visit: \" Bronchiectasis\"    HPI: 30-year-old male with history of latent TB (untreated) history of recurrent chest pain and recent COVID-19 infection see for next evaluation of bronchiectasis.  Patient was referred for the [facility].    Patient is not totally clear why he is being seen today but thinks is related to a finding noted on his CT chest.  He denies true respiratory symptoms today.  Denies cough or shortness of breath.  He has had recurrent chest pain/shoulder/back pain that has required multiple ER visits including recently.  The work-up thus far has been negative.  He had a stress echo which was negative.  A CT of his chest was done in November which showed cylindrical bronchiectasis noted in the right middle lobe as well as small areas in the right lower lobe and left lower lobe.  Review of his imaging and previous documentation suggest this is chronic in nature.    He was born in [place] and came to [place] in [place] after spending roughly 11 months in a refugee camp in [facility].  He was seen by pulmonologist in [place] in 2019 at which point he was noted to have right middle lobe cylindrical bronchiectasis.  The thought at that time as this was secondary to previous infection and was to be monitored going forward.  He carries a diagnosis of latent TB (+ April 2019) and tells me he completed 2 days of therapy which apparently was stopped due to history of kidney stones.  He was positive for COVID-19 in August and complained of issues with fevers but no respiratory complaints.  He tells me he recovered completely.    Pulmonary function test today show mild restriction with preserved diffusion capacity.  Results should be interpreted with caution given that there was some difficulty with  and spirometry does not meet criteria for reproducibility.    PMH:  Past Medical History:   Diagnosis " Date     Prostate infection    Latent TB  COVID-19 infection (2020)  History of atypical chest pain    Allergies:  Allergies   Allergen Reactions     Nsaids      Not true allergy. But be sparing with patient as he has single kidney        Social History:  Social History     Socioeconomic History     Marital status: Single     Spouse name: Not on file     Number of children: Not on file     Years of education: Not on file     Highest education level: Not on file   Occupational History     Not on file   Social Needs     Financial resource strain: Not on file     Food insecurity     Worry: Not on file     Inability: Not on file     Transportation needs     Medical: Not on file     Non-medical: Not on file   Tobacco Use     Smoking status: Former Smoker     Types: Cigarettes     Quit date: 2019     Years since quittin.2     Smokeless tobacco: Never Used   Substance and Sexual Activity     Alcohol use: Never     Frequency: Never     Drug use: Never     Sexual activity: Not Currently     Partners: Female     Birth control/protection: Condom   Lifestyle     Physical activity     Days per week: Not on file     Minutes per session: Not on file     Stress: Not on file   Relationships     Social connections     Talks on phone: Not on file     Gets together: Not on file     Attends Latter day service: Not on file     Active member of club or organization: Not on file     Attends meetings of clubs or organizations: Not on file     Relationship status: Not on file     Intimate partner violence     Fear of current or ex partner: Not on file     Emotionally abused: Not on file     Physically abused: Not on file     Forced sexual activity: Not on file   Other Topics Concern     Parent/sibling w/ CABG, MI or angioplasty before 65F 55M? Not Asked   Social History Narrative     Not on file     History of smoking: YES  Active user: NO   Pack years: Half a pack per day x15 years  Quit: 1 year ago    History of alcohol use:  NO    History of recreational drug use: NO    He is not currently employed.  Previously worked as a  prior to COVID-19 pandemic.      The patient is single.  Lives in [place] in an apartment with 3 other men.    He was born in [place] and came to [place] in [place].  He spent 11 months prior to that in a refugee camp in [facility].    Hot Tub Exposure: NO  Recent Travel:  NO   Hx of incarceration:  NO  Bird Exposure:   NO  Animal Exposure:  NO  Inhalation Exposure:  NO  Hobbies:    NO    Medications:  Current Outpatient Medications   Medication Sig Dispense Refill     acetaminophen (TYLENOL) 325 MG tablet Take 325-650 mg by mouth       diclofenac (VOLTAREN) 1 % topical gel Place 2 g onto the skin 4 times daily 100 g 3     melatonin 3 MG tablet Take 2 hours before bedtime for the next 10 days. And then as needed after for sleep. 30 tablet 0     sodium chloride (OCEAN) 0.65 % nasal spray Spray in each nose twice a day 104 mL 1       Family History:  Family History   Problem Relation Age of Onset     Crohn's Disease No family hx of      Ulcerative Colitis No family hx of      GERD No family hx of      Stomach Cancer No family hx of    No siblings   Mother  when he was young  Father killed in [place]     Review of Systems:   Complete 10 point ROS negative unless mentioned in HPI    Physical Exam:  /84   Pulse 88   SpO2 98%   General:  Adult male;appears stated age; no acute distress; the patient is a good historian  HEENT:  NCAT; EOMI; No icterus; no injection; MMM  Neck: Supple, full range of motion, no lymphadenopathy  Pulm: CTAB, normal to percussion  CV: RRR, no murmurs, rubs or gallops  Abdomen: Soft, NT, ND, + BS  Extremities:  No cyanosis or clubbing, no edema  Neuro: Alert and oriented x 3, moves all extremities no obvious weakness  Skin: No skin rashes noted    Labs and Radiology:    CT chest (11/3/20): Right middle lobe cylindrical bronchiectasis.  In addition, there is right  lower lobe and left lower lobe cylindrical bronchiectasis.         PFT's:        PFT Interpretation:  Pulmonary function test personally reviewed by me and show mild restriction with preserved diffusion capacity.  Results need to be assessed carefully as this was done via  had some difficulty following instructions.    Assessment and Plan:  Emelyn Marmolejo is a 30-year-old male with history of latent TB (untreated) history of recurrent chest pain and recent COVID-19 infection see for next evaluation of bronchiectasis.  Patient was referred for the [facility].    Right middle lobe cylindrical bronchiectasis: Per chart review, was present on CT scan from 2019 upon [place] the [place].  Likely chronic in nature secondary to previous infection.  No acute intervention needed at this time but certainly that area could be prone to recurrent infection.  If this becomes an issue, would treat aggressively with antibiotics as well as aggressive airway clearance device.  He does not complain of any respiratory complaints at this time and I doubt that his atypical chest pain is related to this.  He had some concerns about its ability to spread which I do not think is likely given this is probably the result of an infection for many years ago.  He can likely be followed by his PCP and treat aggressively for recurrent infection if this was occur.  Certainly, if there is ongoing concerns about worsening changes in his CT he can be referred to pulmonary.    -No further pulmonary follow-up needed at this time    Sea Osorio MD   of Medicine   Division of Pulmonary, Allergy, Critical Care and Sleep Medicine  [facility]  Pager: [phone]    The above note was dictated using voice recognition software and may include typographical errors. Please contact the author for any clarifications.

## 2020-12-15 NOTE — LETTER
"    12/15/2020         RE: Emelyn Marmolejo  [address] [address]        Dear Colleague,    Thank you for referring your patient, Emelyn Marmolejo, to the [facility] [place]. Please see a copy of my visit note below.    [facility]  Clinic Initial Visit Note    Reason for visit: \" Bronchiectasis\"    HPI: 30-year-old male with history of latent TB (untreated) history of recurrent chest pain and recent COVID-19 infection see for next evaluation of bronchiectasis.  Patient was referred for the [facility].    Patient is not totally clear why he is being seen today but thinks is related to a finding noted on his CT chest.  He denies true respiratory symptoms today.  Denies cough or shortness of breath.  He has had recurrent chest pain/shoulder/back pain that has required multiple ER visits including recently.  The work-up thus far has been negative.  He had a stress echo which was negative.  A CT of his chest was done in November which showed cylindrical bronchiectasis noted in the right middle lobe as well as small areas in the right lower lobe and left lower lobe.  Review of his imaging and previous documentation suggest this is chronic in nature.    He was born in [place] and came to [place] in [place] after spending roughly 11 months in a refugee camp in [facility].  He was seen by pulmonologist in [place] in 2019 at which point he was noted to have right middle lobe cylindrical bronchiectasis.  The thought at that time as this was secondary to previous infection and was to be monitored going forward.  He carries a diagnosis of latent TB (+ April 2019) and tells me he completed 2 days of therapy which apparently was stopped due to history of kidney stones.  He was positive for COVID-19 in August and complained of issues with fevers but no respiratory complaints.  He tells me he recovered " completely.    Pulmonary function test today show mild restriction with preserved diffusion capacity.  Results should be interpreted with caution given that there was some difficulty with  and spirometry does not meet criteria for reproducibility.    PMH:  Past Medical History:   Diagnosis Date     Prostate infection    Latent TB  COVID-19 infection (2020)  History of atypical chest pain    Allergies:  Allergies   Allergen Reactions     Nsaids      Not true allergy. But be sparing with patient as he has single kidney        Social History:  Social History     Socioeconomic History     Marital status: Single     Spouse name: Not on file     Number of children: Not on file     Years of education: Not on file     Highest education level: Not on file   Occupational History     Not on file   Social Needs     Financial resource strain: Not on file     Food insecurity     Worry: Not on file     Inability: Not on file     Transportation needs     Medical: Not on file     Non-medical: Not on file   Tobacco Use     Smoking status: Former Smoker     Types: Cigarettes     Quit date: 2019     Years since quittin.2     Smokeless tobacco: Never Used   Substance and Sexual Activity     Alcohol use: Never     Frequency: Never     Drug use: Never     Sexual activity: Not Currently     Partners: Female     Birth control/protection: Condom   Lifestyle     Physical activity     Days per week: Not on file     Minutes per session: Not on file     Stress: Not on file   Relationships     Social connections     Talks on phone: Not on file     Gets together: Not on file     Attends Amish service: Not on file     Active member of club or organization: Not on file     Attends meetings of clubs or organizations: Not on file     Relationship status: Not on file     Intimate partner violence     Fear of current or ex partner: Not on file     Emotionally abused: Not on file     Physically abused: Not on file      Forced sexual activity: Not on file   Other Topics Concern     Parent/sibling w/ CABG, MI or angioplasty before 65F 55M? Not Asked   Social History Narrative     Not on file     History of smoking: YES  Active user: NO   Pack years: Half a pack per day x15 years  Quit: 1 year ago    History of alcohol use: NO    History of recreational drug use: NO    He is not currently employed.  Previously worked as a  prior to COVID-19 pandemic.      The patient is single.  Lives in [place] in an apartment with 3 other men.    He was born in [place] and came [place] in [place].  He spent 11 months prior to that in a refugee camp in [facility].    Hot Tub Exposure: NO  Recent Travel:  NO   Hx of incarceration:  NO  Bird Exposure:   NO  Animal Exposure:  NO  Inhalation Exposure:  NO  Hobbies:    NO    Medications:  Current Outpatient Medications   Medication Sig Dispense Refill     acetaminophen (TYLENOL) 325 MG tablet Take 325-650 mg by mouth       diclofenac (VOLTAREN) 1 % topical gel Place 2 g onto the skin 4 times daily 100 g 3     melatonin 3 MG tablet Take 2 hours before bedtime for the next 10 days. And then as needed after for sleep. 30 tablet 0     sodium chloride (OCEAN) 0.65 % nasal spray Spray in each nose twice a day 104 mL 1       Family History:  Family History   Problem Relation Age of Onset     Crohn's Disease No family hx of      Ulcerative Colitis No family hx of      GERD No family hx of      Stomach Cancer No family hx of    No siblings   Mother  when he was young  Father killed in [place]     Review of Systems:   Complete 10 point ROS negative unless mentioned in HPI    Physical Exam:  /84   Pulse 88   SpO2 98%   General:  Adult male;appears stated age; no acute distress; the patient is a good historian  HEENT:  NCAT; EOMI; No icterus; no injection; MMM  Neck: Supple, full range of motion, no lymphadenopathy  Pulm: CTAB, normal to percussion  CV: RRR, no murmurs, rubs or  gallops  Abdomen: Soft, NT, ND, + BS  Extremities:  No cyanosis or clubbing, no edema  Neuro: Alert and oriented x 3, moves all extremities no obvious weakness  Skin: No skin rashes noted    Labs and Radiology:    CT chest (11/3/20): Right middle lobe cylindrical bronchiectasis.  In addition, there is right lower lobe and left lower lobe cylindrical bronchiectasis.         PFT's:        PFT Interpretation:  Pulmonary function test personally reviewed by me and show mild restriction with preserved diffusion capacity.  Results need to be assessed carefully as this was done via  had some difficulty following instructions.    Assessment and Plan:  Emelyn Marmolejo is a 30-year-old male with history of latent TB (untreated) history of recurrent chest pain and recent COVID-19 infection see for next evaluation of bronchiectasis.  Patient was referred for the [facility].    Right middle lobe cylindrical bronchiectasis: Per chart review, was present on CT scan from [place] the [place].  Likely chronic in nature secondary to previous infection.  No acute intervention needed at this time but certainly that area could be prone to recurrent infection.  If this becomes an issue, would treat aggressively with antibiotics as well as aggressive airway clearance device.  He does not complain of any respiratory complaints at this time and I doubt that his atypical chest pain is related to this.  He had some concerns about its ability to spread which I do not think is likely given this is probably the result of an infection for many years ago.  He can likely be followed by his PCP and treat aggressively for recurrent infection if this was occur.  Certainly, if there is ongoing concerns about worsening changes in his CT he can be referred to pulmonary.    -No further pulmonary follow-up needed at this time    Sea Osorio MD   of Medicine   Division of Pulmonary, Allergy, Critical Care and  Sleep Medicine  [facility]  Pager: [phone]    The above note was dictated using voice recognition software and may include typographical errors. Please contact the author for any clarifications.          Again, thank you for allowing me to participate in the care of your patient.        Sincerely,        Sea Osorio MD

## 2020-12-16 LAB
DLCOUNC-%PRED-PRE: 113 %
DLCOUNC-PRE: 36.26 ML/MIN/MMHG
DLCOUNC-PRED: 31.9 ML/MIN/MMHG
ERV-%PRED-PRE: 78 %
ERV-PRE: 1.63 L
ERV-PRED: 2.07 L
EXPTIME-PRE: 5.77 SEC
FEF2575-%PRED-PRE: 85 %
FEF2575-PRE: 3.56 L/SEC
FEF2575-PRED: 4.19 L/SEC
FEFMAX-%PRED-PRE: 70 %
FEFMAX-PRE: 7.2 L/SEC
FEFMAX-PRED: 10.15 L/SEC
FEV1-%PRED-PRE: 80 %
FEV1-PRE: 3.26 L
FEV1FEV6-PRE: 85 %
FEV1FEV6-PRED: 83 %
FEV1FVC-PRE: 85 %
FEV1FVC-PRED: 83 %
FEV1SVC-PRE: 82 %
FEV1SVC-PRED: 73 %
FIFMAX-PRE: 5.41 L/SEC
FRCPLETH-%PRED-PRE: 92 %
FRCPLETH-PRE: 3.07 L
FRCPLETH-PRED: 3.33 L
FVC-%PRED-PRE: 78 %
FVC-PRE: 3.85 L
FVC-PRED: 4.9 L
IC-%PRED-PRE: 66 %
IC-PRE: 2.32 L
IC-PRED: 3.48 L
RVPLETH-%PRED-PRE: 80 %
RVPLETH-PRE: 1.44 L
RVPLETH-PRED: 1.79 L
TLCPLETH-%PRED-PRE: 76 %
TLCPLETH-PRE: 5.39 L
TLCPLETH-PRED: 7.02 L
VA-%PRED-PRE: 78 %
VA-PRE: 5.15 L
VC-%PRED-PRE: 71 %
VC-PRE: 3.95 L
VC-PRED: 5.55 L

## 2020-12-18 ENCOUNTER — OFFICE VISIT (OUTPATIENT)
Dept: FAMILY MEDICINE | Facility: CLINIC | Age: 30
End: 2020-12-18
Payer: COMMERCIAL

## 2020-12-18 VITALS
TEMPERATURE: 98.4 F | WEIGHT: 135 LBS | RESPIRATION RATE: 18 BRPM | OXYGEN SATURATION: 99 % | DIASTOLIC BLOOD PRESSURE: 81 MMHG | BODY MASS INDEX: 20.46 KG/M2 | HEIGHT: 68 IN | SYSTOLIC BLOOD PRESSURE: 121 MMHG | HEART RATE: 83 BPM

## 2020-12-18 DIAGNOSIS — R10.9 FLANK PAIN: ICD-10-CM

## 2020-12-18 DIAGNOSIS — R35.0 URINARY FREQUENCY: Primary | ICD-10-CM

## 2020-12-18 LAB
ANION GAP SERPL CALCULATED.3IONS-SCNC: 3 MMOL/L (ref 3–14)
BACTERIA: NORMAL
BILIRUBIN UR: NEGATIVE MG/DL
BLOOD UR: ABNORMAL MG/DL
BUN SERPL-MCNC: 12 MG/DL (ref 7–30)
CALCIUM SERPL-MCNC: 9.5 MG/DL (ref 8.5–10.1)
CASTS: NORMAL /LPF
CHLORIDE SERPL-SCNC: 106 MMOL/L (ref 94–109)
CO2 SERPL-SCNC: 29 MMOL/L (ref 20–32)
CREAT SERPL-MCNC: 0.97 MG/DL (ref 0.66–1.25)
CRYSTAL URINE: NORMAL /LPF
EPITHELIAL CELLS UR: NORMAL /LPF (ref 0–2)
GFR SERPL CREATININE-BSD FRML MDRD: >90 ML/MIN/{1.73_M2}
GLUCOSE SERPL-MCNC: 82 MG/DL (ref 70–99)
GLUCOSE URINE: NEGATIVE
KETONES UR QL: NEGATIVE MG/DL
LEUKOCYTE ESTERASE UR: NEGATIVE
MUCOUS URINE: NORMAL LPF
NITRITE UR QL STRIP: NEGATIVE MG/DL
PH UR STRIP: 7 [PH] (ref 4.5–8)
POTASSIUM SERPL-SCNC: 3.8 MMOL/L (ref 3.4–5.3)
PROTEIN UR: NEGATIVE MG/DL
RBC URINE: <2 /HPF
SODIUM SERPL-SCNC: 138 MMOL/L (ref 133–144)
SP GR UR STRIP: 1.01 (ref 1–1.03)
UROBILINOGEN UR STRIP-ACNC: ABNORMAL E.U./DL
WBC URINE: NORMAL /HPF

## 2020-12-18 PROCEDURE — 80048 BASIC METABOLIC PNL TOTAL CA: CPT | Performed by: FAMILY MEDICINE

## 2020-12-18 PROCEDURE — 81001 URINALYSIS AUTO W/SCOPE: CPT | Performed by: STUDENT IN AN ORGANIZED HEALTH CARE EDUCATION/TRAINING PROGRAM

## 2020-12-18 PROCEDURE — 99214 OFFICE O/P EST MOD 30 MIN: CPT | Mod: GC | Performed by: STUDENT IN AN ORGANIZED HEALTH CARE EDUCATION/TRAINING PROGRAM

## 2020-12-18 PROCEDURE — 36415 COLL VENOUS BLD VENIPUNCTURE: CPT | Performed by: FAMILY MEDICINE

## 2020-12-18 PROCEDURE — 80076 HEPATIC FUNCTION PANEL: CPT | Performed by: FAMILY MEDICINE

## 2020-12-18 PROCEDURE — 87086 URINE CULTURE/COLONY COUNT: CPT | Performed by: FAMILY MEDICINE

## 2020-12-18 RX ORDER — MAGNESIUM HYDROXIDE/ALUMINUM HYDROXICE/SIMETHICONE 120; 1200; 1200 MG/30ML; MG/30ML; MG/30ML
30 SUSPENSION ORAL EVERY 4 HOURS PRN
Qty: 355 ML | Refills: 0 | Status: SHIPPED | OUTPATIENT
Start: 2020-12-18 | End: 2021-02-03

## 2020-12-18 RX ORDER — ACETAMINOPHEN 325 MG/1
325-650 TABLET ORAL EVERY 6 HOURS PRN
Qty: 100 TABLET | Refills: 3 | Status: SHIPPED | OUTPATIENT
Start: 2020-12-18 | End: 2021-02-03

## 2020-12-18 RX ORDER — TAMSULOSIN HYDROCHLORIDE 0.4 MG/1
0.4 CAPSULE ORAL DAILY
Qty: 30 CAPSULE | Refills: 0 | Status: SHIPPED | OUTPATIENT
Start: 2020-12-18 | End: 2021-02-03

## 2020-12-18 RX ORDER — SIMETHICONE 80 MG
80 TABLET,CHEWABLE ORAL EVERY 6 HOURS PRN
Qty: 15 TABLET | Refills: 0 | Status: SHIPPED | OUTPATIENT
Start: 2020-12-18 | End: 2021-02-03

## 2020-12-18 ASSESSMENT — ENCOUNTER SYMPTOMS
UNEXPECTED WEIGHT CHANGE: 0
CHILLS: 0
DIARRHEA: 0
COUGH: 0
ABDOMINAL DISTENTION: 0
BACK PAIN: 0
HEADACHES: 0
NAUSEA: 0
APPETITE CHANGE: 0
SHORTNESS OF BREATH: 0
NECK PAIN: 0
ARTHRALGIAS: 0
FEVER: 0
MYALGIAS: 0
DIFFICULTY URINATING: 0
ABDOMINAL PAIN: 0

## 2020-12-18 ASSESSMENT — MIFFLIN-ST. JEOR: SCORE: 1536.86

## 2020-12-18 NOTE — PROGRESS NOTES
HPI       Emelyn Marmolejo is a 30 year old  who presents for   Chief Complaint   Patient presents with     Pain     Lower back pain on both sides, pt infroms to have had kidney stones in left side in the past, x 2 weeks to have noticed an odor in his urine, feeling pressure when urinating. urinating a lot more than usual. yesterday night feeling an inflammation in the pelvic region.        Urinating more frequently x 2 weeks, has chronic pain in R have seen him multiple times before with negative work ups thus far. Urology feels unrelated to stones (his most prominent) worry, and mostly negative GI workup w exception of upcoming EGD in 1 month. However, pt states but seems worse than usual as well. Last night pain woke him up about 2 AM. Has a single functioning right kidney due to stone obstruction from L kidney. Took tylenol x1 early this morning. Underneath his right rib cage. Non pleuritic. He pinches a lump of skin underneath his right rib cage and asks multiple times what organs are there and what have we done to figure out cause.     Sharp deep ache. Sometimes worse w urination, sometimes worse w fatty food, sometimes worse when really active but sometimes seems better. Poor historian and doesn't answer most questions asked directly regadless of . At our last visit we discussed health care anxiety and he wanted to talk w a therapist about this. But then when therapy called he said he didn't want to talk to them. EGD is in 1 month.       [place] UA besides trace blood        Problem, Medication and Allergy Lists were reviewed and updated if needed..    Patient is an established patient of this clinic..         Review of Systems:   Review of Systems   Constitutional: Negative for appetite change, chills, fever and unexpected weight change.   Respiratory: Negative for cough and shortness of breath.    Cardiovascular: Negative for chest pain.   Gastrointestinal: Negative for abdominal distention,  "abdominal pain, diarrhea and nausea.   Genitourinary: Negative for decreased urine volume and difficulty urinating.   Musculoskeletal: Negative for arthralgias, back pain, myalgias and neck pain.   Neurological: Negative for headaches.            Physical Exam:     Vitals:    12/18/20 1102   BP: 121/81   Pulse: 83   Resp: 18   Temp: 98.4  F (36.9  C)   TempSrc: Oral   SpO2: 99%   Weight: 61.2 kg (135 lb)   Height: 1.727 m (5' 8\")     Body mass index is 20.53 kg/m .  Vitals were reviewed and were normal     Physical Exam  Constitutional:       Comments: Mildly distressed, frequently pinching skin on right side   HENT:      Head: Normocephalic and atraumatic.      Nose: Nose normal. No congestion.   Cardiovascular:      Rate and Rhythm: Normal rate and regular rhythm.      Pulses: Normal pulses.      Heart sounds: Normal heart sounds.   Pulmonary:      Effort: Pulmonary effort is normal.      Breath sounds: Normal breath sounds.   Abdominal:      General: Abdomen is flat. Bowel sounds are normal.      Palpations: Abdomen is soft.      Comments: Negative murphys. With deep palpation 4cm distal to his R rib cage in mid axillary line he states is painful. Over his stomach or back it is not.    Lymphadenopathy:      Cervical: No cervical adenopathy.           Results:   Results are ordered and pending    Assessment and Plan        Patient with difficult to explain chronic right sided subcostal pain. See above notes. He does have a hx of stones and has subjective urinary sx but negative UA, culture, normal BMP today. He always has trace blood in UAs possibly from his left stone. Still could be a right stone but less likely given multiple urologic work ups. With his history you can never exclude them. He wants to try hydration and flomax incase it is a stone. Also elected to try simethicone and maalox in case more of a GI complaint. He sees urology on 22nd and would like to try and delay getting another CT if he can.     - " For now my tenative plan is  1. Try above treatments and see if maalox helps pain  2. Follow up urology visit and see there thoughts  3. Await EGD to rule out GI system (doesn't seem like functional abd pain or dyspepsia in my opinion either)  4. We have not addressed musculoskeletal system pain well. Would like to try deep psoas stretch and a focused history/exam to see if this could be a cause. Psoas muscle, external obliques can cause chronic difficult to diagnosis relapsing pain. But patient doesn't want to explore this until we have ruled out more serious causes.   5. Lastly could consider nerve block or DO visit focused on MSK pain in that area.   6. Continue to explore health care related anxiety.     Finally, we went over his normal results from bronchiectic changes see pulmonary note for more details.   1. Urinary frequency    - Urinalysis, Micro If (UA) ([facility])  - Urine Culture Aerobic Bacterial  - Basic metabolic panel  - Urine Microscopic (UA) ([facility])  - simethicone (MYLICON) 80 MG chewable tablet; Take 1 tablet (80 mg) by mouth every 6 hours as needed for flatulence or cramping  Dispense: 15 tablet; Refill: 0  - alum & mag hydroxide-simethicone (MAALOX) 200-200-20 MG/5ML SUSP suspension; Take 30 mLs by mouth every 4 hours as needed for indigestion  Dispense: 355 mL; Refill: 0  - acetaminophen (TYLENOL) 325 MG tablet; Take 1-2 tablets (325-650 mg) by mouth every 6 hours as needed for mild pain  Dispense: 100 tablet; Refill: 3  - Hepatic panel    2. Right subcostal/flank pain    - tamsulosin (FLOMAX) 0.4 MG capsule; Take 1 capsule (0.4 mg) by mouth daily  Dispense: 30 capsule; Refill: 0  - acetaminophen (TYLENOL) 325 MG tablet; Take 1-2 tablets (325-650 mg) by mouth every 6 hours as needed for mild pain  Dispense: 100 tablet; Refill: 3       There are no discontinued medications.    Options for treatment and follow-up care were reviewed with the patient. Emelyn Maromlejo  engaged in the decision  making process and verbalized understanding of the options discussed and agreed with the final plan.    Livan Castro MD

## 2020-12-18 NOTE — PATIENT INSTRUCTIONS
1. I want you to drink plenty of water and try the simethicone and maalox and flomax. If these do not help the pain than I think we should consider doing the CT scan.

## 2020-12-19 LAB
ALBUMIN SERPL-MCNC: 4.1 G/DL (ref 3.4–5)
ALP SERPL-CCNC: 82 U/L (ref 40–150)
ALT SERPL W P-5'-P-CCNC: 22 U/L (ref 0–70)
AST SERPL W P-5'-P-CCNC: 21 U/L (ref 0–45)
BILIRUB DIRECT SERPL-MCNC: 0.1 MG/DL (ref 0–0.2)
BILIRUB SERPL-MCNC: 0.4 MG/DL (ref 0.2–1.3)
PROT SERPL-MCNC: 8.1 G/DL (ref 6.8–8.8)

## 2020-12-20 LAB
BACTERIA SPEC CULT: NO GROWTH
Lab: NORMAL
SPECIMEN SOURCE: NORMAL

## 2020-12-22 ENCOUNTER — VIRTUAL VISIT (OUTPATIENT)
Dept: UROLOGY | Facility: CLINIC | Age: 30
End: 2020-12-22
Payer: COMMERCIAL

## 2020-12-22 DIAGNOSIS — R10.9 LEFT FLANK PAIN: ICD-10-CM

## 2020-12-22 DIAGNOSIS — N26.1 ATROPHY OF LEFT KIDNEY: Primary | ICD-10-CM

## 2020-12-22 PROCEDURE — 99213 OFFICE O/P EST LOW 20 MIN: CPT | Mod: 95 | Performed by: UROLOGY

## 2020-12-22 NOTE — PROGRESS NOTES
"Emelyn Marmolejo is a 30 year old male who is being evaluated via a billable telephone visit.      The patient has been notified of following:     \"This telephone visit will be conducted via a call between you and your physician/provider. We have found that certain health care needs can be provided without the need for a physical exam.  This service lets us provide the care you need with a short phone conversation.  If a prescription is necessary we can send it directly to your pharmacy.  If lab work is needed we can place an order for that and you can then stop by our lab to have the test done at a later time.    Telephone visits are billed at different rates depending on your insurance coverage. During this emergency period, for some insurers they may be billed the same as an in-person visit.  Please reach out to your insurance provider with any questions.    If during the course of the call the physician/provider feels a telephone visit is not appropriate, you will not be charged for this service.\"    Patient has given verbal consent for Telephone visit?  Yes    What phone number would you like to be contacted at?[phone]    How would you like to obtain your AVS? Mail a copy    Phone call duration:  minutes    Klaus Hou CMA      "

## 2020-12-22 NOTE — PROGRESS NOTES
PROVIDER NOTE:  [facility]   CHIEF COMPLAINT   It was my pleasure to see Emelyn Marmolejo who is a 30 year old male for follow-up of right flank pain, left atrophic kidney. With the assistance of a      HPI   Emelyn Marmolejo is a very pleasant 30 year old male who presents with a history of an atrophic left kidney due to a large obstructing stone.  I had seen him previously for work-up for possible left simple nephrectomy.  At that time he was noting some right-sided flank and upper abdominal pain and is very concerned that this is related to his right kidney.  Imaging from February was reviewed with no evidence of pathology on the right side.  He subsequently been diagnosed with H. pylori and is undergoing treatment for this.  He notes continued right-sided flank and abdominal pain especially with intake of food and also prolonged sitting with driving.  He denies any fevers, chills, or hematuria.    7/3/20:  Follow-up today to discuss the results of his CT scan which was completed yesterday which demonstrates a small 3 mm distal ureteral stone.  He is feeling okay today with minimal right-sided flank pain.  He denies any fevers, chills, nausea, or vomiting.  He denies noting any stone passage    8/6/20:  Follow-up today after his CT scan last week which demonstrated persistence of the 3 mm distal ureteral stone  He notes that he is continued to have ongoing right-sided flank pain    8/25/20:  He is now s/p diagnostic Ureteroscopy on 8/13/20. No stone identified at that time with complete visualization of the entire right collecting system.  The last 3-4 days he has been doing well with resolution of the RIGHT flank pain  He is having some intermittent LEFT flank pain    Developed COVID-19, now recovered     TODAY:  With the assistance of a    He notes that he has been having increase LEFT sided pain - especially in the morning  He continues to have right abdominal pain and burning  with unknown cause  He wants to move forward with left nephrectomy      PHYSICAL EXAM  Patient is a 30 year old  male   Vitals: There were no vitals taken for this visit.  There is no height or weight on file to calculate BMI.  General: No evidence of distress   Lungs: normal respiratory effort  Neuro: Alert, oriented, speech and mentation normal  Psych: affect and mood normal    Creatinine  7/25/2020  0.90    Creatinine   Date Value Ref Range Status   12/18/2020 0.97 0.66 - 1.25 mg/dL Final   09/04/2020 1.19 0.66 - 1.25 mg/dL Final   06/30/2020 0.88 0.66 - 1.25 mg/dL Final   05/15/2020 0.91 0.66 - 1.25 mg/dL Final   03/26/2020 1.1 0.7 - 1.3 mg/dL Final       IMAGING:  All pertinent imaging reviewed:    All imaging studies reviewed by me.  I personally reviewed these imaging films.  A formal report from radiology will follow.    CT ABD/PEL 2/28/20  FINDINGS:     Abdomen and pelvis: No significant change in 2.8 x 1.5 cm calculus  within the left renal pelvis, with unchanged atrophy appearance of the  left kidney. No significant left hydronephrosis. Density of this left  renal calculus measures 343 Hounsfield units. Unremarkable right  kidney, without nephrolithiasis or hydroureteronephrosis. Unremarkable  urinary bladder. Unremarkable prostate gland. Symmetric seminal  vesicles.     No abnormally dilated or thickened loops of small and large bowel. No  intraperitoneal free fluid or free air. No pneumatosis or portal  venous gas. No infrarenal abdominal aortic aneurysm. No pathologically  enlarged lymph nodes within the abdomen or pelvis.     Unremarkable noncontrast appearance of the liver, gallbladder,  pancreas, spleen, and adrenal glands.     Lung bases:  Visualized lung bases are clear, without pleural  effusion. No significant change in the extensive cystic changes within  the right middle lobe and cystic changes within the anterior aspect of  the bilateral lower lobes and lingula. The heart is normal in  size,  without pericardial effusion.     Bones and soft tissues: No acute or aggressive appearing osseous  lesion. Mild degenerative changes of the spine. Stable sclerosis  involving the inferior endplates of T8, T11, T12. Tiny fat-containing  umbilical hernia.                                                         IMPRESSION:   1. No significant change in large left renal calculus measuring 2.8 x  1.5 cm, with unchanged atrophy of the left kidney. No right  nephrolithiasis or hydroureteronephrosis.   2. Stable multicystic changes of the right middle lobe and anterior  aspects of the bilateral lower lobes. Recommend correlation with  clinical history and any outside dedicated chest imaging.    RENAL U/S 6/30/20:  FINDINGS:  Right kidney: Measures 11.8 cm in length.   Left kidney: Measures 8.4 cm in length. There is a persistent  shadowing calculus in the left kidney, 2.2 cm.     There is bilateral hydronephrosis.  No cystic or solid renal mass.     Bladder: Partially distended without abnormality.     The prostate and seminal vesical are visualized.  The prostate  measures 3.4 x 2.4 x 3.3 cm. There is a small simple prostate cyst, 6  mm.                                                             IMPRESSION:  1.  Bilateral hydronephrosis without obstructing calculus visualized.  2.  The prostate measures within normal limits.    CT ABD/PEL 7/2/20:  IMPRESSION:   1. Sub-3 mm stone in the right ureter at the level of the pelvis, new.  2. Mild prominence of the right renal collecting system, without overt  hydronephrosis.   3. Stable large left renal calculus at the renal pelvis, with  associated atrophy of the left kidney.  4. Partially visualized multicystic changes of the lung bases appear  stable from prior CT.    CT ABD/PEL 7/29/20:  FINDINGS:     Abdomen and pelvis:   Liver: Normal noncontrast appearance of the liver.  Gallbladder: No gallstones. No evidence of acute cholecystitis.  Spleen: Normal  size.  Pancreas: No suspicious pancreatic lesions. The pancreatic duct is not  dilated.  Adrenal glands: No adrenal nodules.  Kidneys: No significant change in the 2.8 x 1.5 cm calculus in the  left renal pelvis but stable atrophic appearance of the left kidney.  Mild left hydronephrosis, similar in appearance. Mildly decreased mild  right pelvocaliectasis without overt hydronephrosis. There is again  mild prominence of the right ureter up 7 mm, which may be secondary to  a stable 3 mm stone in similar position in the region of the right  distal ureter although location within the ureter is difficult to  confirm and this calculus has been present across multiple prior exams  dating back to January 2020 (series 5, image 280). Urinary bladder is  partially distended. There is a 4 mm calculus located medial and  posterior to the left ureterovesical junction, which likely represents  a migrated IVC seen left distal ureteral calculus (series 5, image  344).  Bowel: No bowel wall thickening. The appendix is unremarkable.  Lymph nodes: No retroperitoneal, mesenteric, or pelvic  lymphadenopathy.  Fluid: No free fluid within the abdomen.  Vessels: No infrarenal aortic aneurysm.      Lung bases: Stable cystic changes within the right middle lobe and  anterior aspect of the lower lungs bilaterally. No pleural effusion.  Heart size is normal without pericardial effusion.     Bones and soft tissues: No suspicious osseous lesions. Stable  sclerosis of the inferior endplates of T11 and T12.                                                            IMPRESSION:   1.  Stable large left renal calculus within the pelvis, with  associated atrophy of left kidney.  2.  The previously seen calculus within the left distal ureter appears  to have migrated and is now located within the posterior aspect of the  urinary bladder, medial to the left ureterovesical junction.  3.  Improved right pelvocaliectasis. Persistent mild dilation of  the  right ureter, which may be secondary to a 3 mm calculus in the region  of the right distal ureter. However, this 3mm calculus in the region  of the right distal ureter has been seen on multiple prior exams  dating back to January 2020 and may represent an adjacent vascular  calcification.  4.  Redemonstration of previously visualized multicystic changes of  the lung bases.            ASSESSMENT and PLAN  30-year-old man with a left atrophic kidney secondary to a large renal stone with recent CT scan with a 3 mm right ureteral stone which he is now status post diagnostic ureteroscopy last week with no evidence of stone in the right collecting system. He has had normal renal U/S since that time.     Functional solitary right kidney   -We discussed that his ureteroscopy last week did not reveal any evidence of stone in the entire right collecting system.  We discussed that I was able to visualize the entire ureter as well as renal pelvis and calyces with no evidence of the stone.  -We discussed that based on this, there is no further evidence of concern for his right kidney contributing to his underlying right abdominal pain.     Left flank pain  -We again discussed treatment options for his left atrophic kidney and recommendations for a laparoscopic nephrectomy  - We discussed the risks and benefits of surgery in detail. Specifically we discuss the risk of infection, bleeding, and damage to surrounding organs. We discussed the plan for one night hospital stay.  - Orders for surgery previously placed, message sent to scheduling to reach out to schedule    Telephone time: 21 minutes    Aj Hedrick MD   Urology  [facility]  [facility] Phone: [phone]  [facility] Phone: [phone]

## 2020-12-29 ENCOUNTER — HOSPITAL ENCOUNTER (INPATIENT)
Facility: CLINIC | Age: 30
Setting detail: SURGERY ADMIT
End: 2020-12-29
Attending: UROLOGY | Admitting: UROLOGY
Payer: COMMERCIAL

## 2020-12-29 DIAGNOSIS — N26.1 ATROPHY OF LEFT KIDNEY: ICD-10-CM

## 2021-01-03 DIAGNOSIS — Z11.59 ENCOUNTER FOR SCREENING FOR OTHER VIRAL DISEASES: Primary | ICD-10-CM

## 2021-01-04 ENCOUNTER — HEALTH MAINTENANCE LETTER (OUTPATIENT)
Age: 31
End: 2021-01-04

## 2021-01-12 ENCOUNTER — OFFICE VISIT (OUTPATIENT)
Dept: FAMILY MEDICINE | Facility: CLINIC | Age: 31
End: 2021-01-12
Payer: COMMERCIAL

## 2021-01-12 VITALS
RESPIRATION RATE: 16 BRPM | TEMPERATURE: 98.7 F | SYSTOLIC BLOOD PRESSURE: 131 MMHG | DIASTOLIC BLOOD PRESSURE: 75 MMHG | HEART RATE: 70 BPM | OXYGEN SATURATION: 99 % | WEIGHT: 135 LBS | BODY MASS INDEX: 20.53 KG/M2

## 2021-01-12 DIAGNOSIS — N20.0 RENAL STONE: ICD-10-CM

## 2021-01-12 DIAGNOSIS — R10.84 ABDOMINAL PAIN, GENERALIZED: ICD-10-CM

## 2021-01-12 DIAGNOSIS — N26.1 RENAL ATROPHY, LEFT: Primary | ICD-10-CM

## 2021-01-12 DIAGNOSIS — K59.00 CONSTIPATION, UNSPECIFIED CONSTIPATION TYPE: ICD-10-CM

## 2021-01-12 PROCEDURE — 99214 OFFICE O/P EST MOD 30 MIN: CPT | Mod: GC | Performed by: STUDENT IN AN ORGANIZED HEALTH CARE EDUCATION/TRAINING PROGRAM

## 2021-01-12 NOTE — PROGRESS NOTES
Preceptor Attestation:   Patient seen, evaluated and discussed with the resident. I have verified the content of the note, which accurately reflects my assessment of the patient and the plan of care.   Supervising Physician:  Tatyana Ricks MD

## 2021-01-12 NOTE — PATIENT INSTRUCTIONS
Talk to Dr. Hedrick about the small black bits in your urine and urine discomfort. It could be stones again and he should know.     For your stomach pain try metamucil 1 teaspoon in water every day ; also try the maalox at night when your pain happens.       Will wait for 2-3 weeks to get blood work before your upcoming surgeries.     Thank you for coming to [facility].    **If you had lab testing today and your results are reassuring or normal they will be be mailed to you or sent to your MyChart and you will be notified by email within 7 days.   **If the lab tests need quick action we will call you with the results.  The phone number we will call with results is # [phone] (home)    (home) . If this is not the best number please call our clinic and change the number.  **If any referrals were ordered today you should be getting a call in the next week.  If you don't hear about this within a week please call one of the following numbers   If your referral is for [facility] please call [phone]  If your referral is to outside [facility] please call the clinic number ([phone]) and ask for your team care coordinator.  If you need any refills please call your pharmacy and they will contact us.  If you have any concerns about today's visit or wish to schedule another appointment  please call our office during normal business hours  [phone] (8-5:00 M-F)  If you have urgent medical concerns please call [phone] at any time of the day.  If you have a medical emergency please call [phone]    Again thank you for choosing [facility] and please let us know how we can best partner with you to improve your and your family's health.

## 2021-01-12 NOTE — PROGRESS NOTES
Assessment & Plan     Reviewed most recent urology note 12/22/2020; pt scheduled for left nephrectomy 2/17. Will need pre op visit. Explains left sided pain but diagnosis still unclear on right sided pain. Potentially GI related given it sometimes worsens w food. PPI didn't help. Has upcoming EGD on 1/25 that will help clarify this piece. He does state he has passed small black bits on occasion but has had testing of his right renal system multiple times without stones. Could this be small bits of his left stone breaking into urine? Unlikely given my understanding that kidney is non functional. Asked patient to get Dr. Armenta opinion on this matter. Would appreciate their input given how many CT scans and procedures he has had this year.     Did have constipation on ROS. Unlikely to be soul cause of pain but will treat w metamucil to see if this helps. Also today did psoas testing that was negative.     Renal atrophy, left  Reviewed his left-sided nephrectomy date, will need pre op visit.     Renal stone      Abdominal pain, generalized      Constipation, unspecified constipation type    - psyllium (METAMUCIL/KONSYL) 58.6 % powder; Take 1 teaspoon everyday    Review of external notes as documented above     Diagnosis or treatment significantly limited by social determinants of health - language barrier and signifgicant health care anxiety.             Return in about 27 days (around 2/8/2021), or Pre op visit 40 minutes any provdier left nephrectomy.    Livan Castro MD  [facility] EARL Dunaway is a 31 year old who presents to clinic today for the following health issues  accompanied by his  on phone     HPI     Has been passing small black stones in urine for past couple weeks. Has upcoming left nephrectomy in about 1 month. Since last time his chronic right sided pain (below his liver along axillary line) pain has gotten acutely mildly better but still always  present. Notices it most when lying down or sitting and mind is off of it. See multiple prior notes from myself, GI, urology. No obvious muscular component. Occasionally with deep breath but seldom and pulmonlogy thought unrelated to bronchiectic changes he has. Still possibility unusual presentation of dyspepsia or functional abd pain egd pending. PPI trial and biliary tree uneffective and unremarkable.       Review of Systems   Constitutional, HEENT, cardiovascular, pulmonary, gi and gu systems are negative, except as otherwise noted.      Objective    /75   Pulse 70   Temp 98.7  F (37.1  C) (Oral)   Resp 16   Wt 61.2 kg (135 lb)   SpO2 99%   BMI 20.53 kg/m    Body mass index is 20.53 kg/m .  Physical Exam   GENERAL: healthy, alert and no distress, mildly anxious   RESP: lungs clear to auscultation - no rales, rhonchi or wheezes  CV: regular rate and rhythm, normal S1 S2, no S3 or S4, no murmur, click or rub, no peripheral edema and peripheral pulses strong  ABDOMEN: similar to last time, has deeply palpable reproducible pain 5-8 cm below his liver edge in his right axillary line. Pinches skin and says he can feel it right under there. Nothing on palpation to reflect anything. No worsening w percussion of flanks, Stretching psoas active and passively doesn't reproduce the pain. 's negative.   MS: no gross musculoskeletal defects noted, no edema. Full forced extension of Rhip and resisted flexion did not illicite patient's pain

## 2021-01-12 NOTE — H&P (VIEW-ONLY)
Assessment & Plan     Reviewed most recent urology note 12/22/2020; pt scheduled for left nephrectomy 2/17. Will need pre op visit. Explains left sided pain but diagnosis still unclear on right sided pain. Potentially GI related given it sometimes worsens w food. PPI didn't help. Has upcoming EGD on 1/25 that will help clarify this piece. He does state he has passed small black bits on occasion but has had testing of his right renal system multiple times without stones. Could this be small bits of his left stone breaking into urine? Unlikely given my understanding that kidney is non functional. Asked patient to get Dr. Armenta opinion on this matter. Would appreciate their input given how many CT scans and procedures he has had this year.     Did have constipation on ROS. Unlikely to be soul cause of pain but will treat w metamucil to see if this helps. Also today did psoas testing that was negative.     Renal atrophy, left  Reviewed his left-sided nephrectomy date, will need pre op visit.     Renal stone      Abdominal pain, generalized      Constipation, unspecified constipation type    - psyllium (METAMUCIL/KONSYL) 58.6 % powder; Take 1 teaspoon everyday    Review of external notes as documented above     Diagnosis or treatment significantly limited by social determinants of health - language barrier and signifgicant health care anxiety.             Return in about 27 days (around 2/8/2021), or Pre op visit 40 minutes any provdier left nephrectomy.    Livan Castro MD  [facility] EARL Dunaway is a 31 year old who presents to clinic today for the following health issues  accompanied by his  on phone     HPI     Has been passing small black stones in urine for past couple weeks. Has upcoming left nephrectomy in about 1 month. Since last time his chronic right sided pain (below his liver along axillary line) pain has gotten acutely mildly better but still always  present. Notices it most when lying down or sitting and mind is off of it. See multiple prior notes from myself, GI, urology. No obvious muscular component. Occasionally with deep breath but seldom and pulmonlogy thought unrelated to bronchiectic changes he has. Still possibility unusual presentation of dyspepsia or functional abd pain egd pending. PPI trial and biliary tree uneffective and unremarkable.       Review of Systems   Constitutional, HEENT, cardiovascular, pulmonary, gi and gu systems are negative, except as otherwise noted.      Objective    /75   Pulse 70   Temp 98.7  F (37.1  C) (Oral)   Resp 16   Wt 61.2 kg (135 lb)   SpO2 99%   BMI 20.53 kg/m    Body mass index is 20.53 kg/m .  Physical Exam   GENERAL: healthy, alert and no distress, mildly anxious   RESP: lungs clear to auscultation - no rales, rhonchi or wheezes  CV: regular rate and rhythm, normal S1 S2, no S3 or S4, no murmur, click or rub, no peripheral edema and peripheral pulses strong  ABDOMEN: similar to last time, has deeply palpable reproducible pain 5-8 cm below his liver edge in his right axillary line. Pinches skin and says he can feel it right under there. Nothing on palpation to reflect anything. No worsening w percussion of flanks, Stretching psoas active and passively doesn't reproduce the pain. 's negative.   MS: no gross musculoskeletal defects noted, no edema. Full forced extension of Rhip and resisted flexion did not illicite patient's pain

## 2021-01-22 ENCOUNTER — ANESTHESIA EVENT (OUTPATIENT)
Dept: SURGERY | Facility: AMBULATORY SURGERY CENTER | Age: 31
End: 2021-01-22

## 2021-01-22 NOTE — ANESTHESIA PREPROCEDURE EVALUATION
"Anesthesia Pre-Procedure Evaluation    Patient: Emelyn Marmolejo   MRN: [id number] : 1990        Preoperative Diagnosis: Abdominal pain, generalized [R10.84]   Procedure : Procedure(s):  ESOPHAGOGASTRODUODENOSCOPY (EGD)     Past Medical History:   Diagnosis Date     Prostate infection       Past Surgical History:   Procedure Laterality Date     COMBINED CYSTOSCOPY, URETEROSCOPY, LASER HOLMIUM LITHOTRIPSY URETER(S) Right 2020    Procedure: CYSTOSCOPY, RIGHT RETROGRADE PYELOGRAM, RIGHT diagnostic URETEROSCOPY;  Surgeon: Aj Hedrick MD;  Location: [facility] OR      Allergies   Allergen Reactions     Nsaids      Not true allergy. But be sparing with patient as he has single kidney       Social History     Tobacco Use     Smoking status: Former Smoker     Types: Cigarettes     Quit date: 2019     Years since quittin.3     Smokeless tobacco: Never Used   Substance Use Topics     Alcohol use: Never     Frequency: Never      Wt Readings from Last 1 Encounters:   21 61.2 kg (135 lb)        Anesthesia Evaluation   Pt has had prior anesthetic. Type: General.    No history of anesthetic complications       ROS/MED HX  ENT/Pulmonary: Comment: PFT 2020: \"The FVC is reduced, but the FEV1 and  FEV1/FVC ratio are normal.  The inspiratory flow rates are reduced.  Total lung capacity is reduced. The diffusing capacity is normal.  However, the diffusing capacity was not corrected for the patient's hemoglobin.   IMPRESSION:   Mild  Restriction.   Normal diffusion. \"    Right middle lobe cystic malformation      Neurologic:       Cardiovascular:     (+) -----Previous cardiac testing   Echo: Date: 2020 Results:  \"Interpretation Summary  Normal exercise echocardiogram without evidence of inducible ischemia.  Target heart rate was achieved. Heart rate and blood pressure response to  exercise were normal. Normal LV function and wall motion at rest. With stress,  the left ventricular ejection fraction increased " "from 55-60% to greater than  65% and the left ventricular size decreased appropriately. No regional wall  motion abnormality with stress.  Poor functional capacity. No subjective symptoms to suggest ischemia.  There was no ECG evidence of ischemia.  No significant valve disease on screening doppler evaluation. The aortic root  and visualized ascending aorta are normal.\"  Stress Test: Date: Results:    ECG Reviewed: Date: Results:    Cath: Date: Results:      METS/Exercise Tolerance: >4 METS    Hematologic:       Musculoskeletal:       GI/Hepatic: Comment: Right sided abdominal pain, potentially GI related. Denies nausea/vomiting   (-) GERD   Renal/Genitourinary: Comment: Left atrophic kidney. Cr wnl      Endo:       Psychiatric/Substance Use:       Infectious Disease:       Malignancy:       Other:            Physical Exam    Airway        Mallampati: I   TM distance: > 3 FB   Neck ROM: full   Mouth opening: > 3 cm    Respiratory Devices and Support         Dental       (+) missing    B=Bridge, C=Chipped, L=Loose, M=Missing    Cardiovascular          Rhythm and rate: regular and normal     Pulmonary           breath sounds clear to auscultation           OUTSIDE LABS:  CBC:   Lab Results   Component Value Date    HGB 13.4 03/26/2020    HGB 15.6 12/30/2019    HCT 44.5 03/26/2020    HCT 52.6 12/30/2019     BMP:   Lab Results   Component Value Date     12/18/2020     09/04/2020    POTASSIUM 3.8 12/18/2020    POTASSIUM 3.7 09/04/2020    CHLORIDE 106 12/18/2020    CHLORIDE 106 09/04/2020    CO2 29 12/18/2020    CO2 29 09/04/2020    BUN 12 12/18/2020    BUN 15 09/04/2020    CR 0.97 12/18/2020    CR 1.19 09/04/2020    GLC 82 12/18/2020    GLC 92 09/04/2020     COAGS: No results found for: PTT, INR, FIBR  POC: No results found for: BGM, HCG, HCGS  HEPATIC:   Lab Results   Component Value Date    ALBUMIN 4.1 12/18/2020    PROTTOTAL 8.1 12/18/2020    ALT 22 12/18/2020    AST 21 12/18/2020    ALKPHOS 82 12/18/2020 "    BILITOTAL 0.4 12/18/2020     OTHER:   Lab Results   Component Value Date    A1C 5.4 06/11/2020    DARRELL 9.5 12/18/2020    CRP 8.2 (H) 05/15/2020       Anesthesia Plan     History & Physical Review      ASA Status:  1. NPO Status:  NPO Appropriate. .  Plan for MAC Reason for MAC:  Deep or markedly invasive procedure (G8).           Comments: Discussed plan for MAC, including risk of aspiration pneumonia, backup plan of general anesthesia and risks of general anesthesia, including sore throat/hoarse voice, abrasions/damage to lips/tongue/teeth, nausea, rare complications (including medication reactions, cardiac, pulmonary).  .       Consents  Anesthesia Plan(s) and associated risks, benefits, and realistic alternatives discussed.    Questions answered and patient/representative(s) expressed understanding.    Discussed with:  Patient.             Postoperative Care            April Gomez MD

## 2021-01-25 ENCOUNTER — HOSPITAL ENCOUNTER (OUTPATIENT)
Facility: AMBULATORY SURGERY CENTER | Age: 31
Discharge: HOME | End: 2021-01-25
Attending: INTERNAL MEDICINE | Admitting: INTERNAL MEDICINE
Payer: COMMERCIAL

## 2021-01-25 ENCOUNTER — ANESTHESIA (OUTPATIENT)
Dept: SURGERY | Facility: AMBULATORY SURGERY CENTER | Age: 31
End: 2021-01-25

## 2021-01-25 VITALS
SYSTOLIC BLOOD PRESSURE: 99 MMHG | DIASTOLIC BLOOD PRESSURE: 63 MMHG | RESPIRATION RATE: 16 BRPM | TEMPERATURE: 97 F | HEART RATE: 72 BPM | WEIGHT: 137 LBS | BODY MASS INDEX: 20.29 KG/M2 | OXYGEN SATURATION: 100 % | HEIGHT: 69 IN

## 2021-01-25 VITALS — HEART RATE: 66 BPM

## 2021-01-25 LAB — UPPER GI ENDOSCOPY: NORMAL

## 2021-01-25 PROCEDURE — 88342 IMHCHEM/IMCYTCHM 1ST ANTB: CPT | Mod: GC | Performed by: PATHOLOGY

## 2021-01-25 PROCEDURE — 43239 EGD BIOPSY SINGLE/MULTIPLE: CPT

## 2021-01-25 PROCEDURE — 88305 TISSUE EXAM BY PATHOLOGIST: CPT | Mod: GC | Performed by: PATHOLOGY

## 2021-01-25 RX ORDER — GLYCOPYRROLATE 0.2 MG/ML
INJECTION, SOLUTION INTRAMUSCULAR; INTRAVENOUS PRN
Status: DISCONTINUED | OUTPATIENT
Start: 2021-01-25 | End: 2021-01-25

## 2021-01-25 RX ORDER — LIDOCAINE 40 MG/G
CREAM TOPICAL
Status: DISCONTINUED | OUTPATIENT
Start: 2021-01-25 | End: 2021-01-26 | Stop reason: HOSPADM

## 2021-01-25 RX ORDER — SIMETHICONE
LIQUID (ML) MISCELLANEOUS PRN
Status: DISCONTINUED | OUTPATIENT
Start: 2021-01-25 | End: 2021-01-25 | Stop reason: HOSPADM

## 2021-01-25 RX ORDER — ONDANSETRON 2 MG/ML
4 INJECTION INTRAMUSCULAR; INTRAVENOUS
Status: COMPLETED | OUTPATIENT
Start: 2021-01-25 | End: 2021-01-25

## 2021-01-25 RX ORDER — PROPOFOL 10 MG/ML
INJECTION, EMULSION INTRAVENOUS PRN
Status: DISCONTINUED | OUTPATIENT
Start: 2021-01-25 | End: 2021-01-25

## 2021-01-25 RX ORDER — LIDOCAINE HYDROCHLORIDE 20 MG/ML
INJECTION, SOLUTION INFILTRATION; PERINEURAL PRN
Status: DISCONTINUED | OUTPATIENT
Start: 2021-01-25 | End: 2021-01-25

## 2021-01-25 RX ORDER — PROPOFOL 10 MG/ML
INJECTION, EMULSION INTRAVENOUS CONTINUOUS PRN
Status: DISCONTINUED | OUTPATIENT
Start: 2021-01-25 | End: 2021-01-25

## 2021-01-25 RX ORDER — SODIUM CHLORIDE, SODIUM LACTATE, POTASSIUM CHLORIDE, CALCIUM CHLORIDE 600; 310; 30; 20 MG/100ML; MG/100ML; MG/100ML; MG/100ML
500 INJECTION, SOLUTION INTRAVENOUS CONTINUOUS
Status: DISCONTINUED | OUTPATIENT
Start: 2021-01-25 | End: 2021-01-26 | Stop reason: HOSPADM

## 2021-01-25 RX ADMIN — PROPOFOL 70 MG: 10 INJECTION, EMULSION INTRAVENOUS at 14:58

## 2021-01-25 RX ADMIN — PROPOFOL 50 MG: 10 INJECTION, EMULSION INTRAVENOUS at 15:00

## 2021-01-25 RX ADMIN — PROPOFOL 200 MCG/KG/MIN: 10 INJECTION, EMULSION INTRAVENOUS at 14:58

## 2021-01-25 RX ADMIN — GLYCOPYRROLATE 0.2 MG: 0.2 INJECTION, SOLUTION INTRAMUSCULAR; INTRAVENOUS at 14:55

## 2021-01-25 RX ADMIN — SODIUM CHLORIDE, SODIUM LACTATE, POTASSIUM CHLORIDE, CALCIUM CHLORIDE 500 ML: 600; 310; 30; 20 INJECTION, SOLUTION INTRAVENOUS at 13:56

## 2021-01-25 RX ADMIN — LIDOCAINE HYDROCHLORIDE 60 MG: 20 INJECTION, SOLUTION INFILTRATION; PERINEURAL at 14:58

## 2021-01-25 RX ADMIN — ONDANSETRON 4 MG: 2 INJECTION INTRAMUSCULAR; INTRAVENOUS at 14:55

## 2021-01-25 ASSESSMENT — MIFFLIN-ST. JEOR: SCORE: 1556.81

## 2021-01-25 NOTE — ANESTHESIA CARE TRANSFER NOTE
Patient: Emelyn Marmolejo    Procedure(s):  ESOPHAGOGASTRODUODENOSCOPY, WITH BIOPSY    Diagnosis: Abdominal pain, generalized [R10.84]  Diagnosis Additional Information: No value filed.    Anesthesia Type:   MAC     Note:    Oropharynx: oropharynx clear of all foreign objects  Level of Consciousness: awake  Oxygen Supplementation: room air    Independent Airway: airway patency satisfactory and stable  Dentition: dentition unchanged  Vital Signs Stable: post-procedure vital signs reviewed and stable  Report to RN Given: handoff report given  Patient transferred to: Phase II  Comments: Vital signs per nursing documentation.   Handoff Report: Identifed the Patient, Identified the Reponsible Provider, Reviewed the pertinent medical history, Discussed the surgical course, Reviewed Intra-OP anesthesia mangement and issues during anesthesia, Set expectations for post-procedure period and Allowed opportunity for questions and acknowledgement of understanding      Vitals: (Last set prior to Anesthesia Care Transfer)  CRNA VITALS  1/25/2021 1444 - 1/25/2021 1518      1/25/2021             Pulse:  67    SpO2:  98 %        Electronically Signed By: CORRINE Daugherty CRNA  January 25, 2021  3:18 PM

## 2021-01-25 NOTE — H&P
Emelyn Marmolejo  [id number]  male  31 year old      Reason for procedure/surgery: generalized abdominal pain    Patient Active Problem List   Diagnosis     Abnormal CT of the chest     Renal atrophy, left     Renal stone     H. pylori infection     History of renal stone     Abdominal pain, generalized     2019 novel coronavirus disease (COVID-19)     Atrophy of left kidney       Past Surgical History:    Past Surgical History:   Procedure Laterality Date     COMBINED CYSTOSCOPY, URETEROSCOPY, LASER HOLMIUM LITHOTRIPSY URETER(S) Right 2020    Procedure: CYSTOSCOPY, RIGHT RETROGRADE PYELOGRAM, RIGHT diagnostic URETEROSCOPY;  Surgeon: Aj Hedrick MD;  Location: [facility] OR       Past Medical History:   Past Medical History:   Diagnosis Date     Prostate infection        Social History:   Social History     Tobacco Use     Smoking status: Former Smoker     Types: Cigarettes     Quit date: 2019     Years since quittin.3     Smokeless tobacco: Never Used   Substance Use Topics     Alcohol use: Never     Frequency: Never       Family History:   Family History   Problem Relation Age of Onset     Crohn's Disease No family hx of      Ulcerative Colitis No family hx of      GERD No family hx of      Stomach Cancer No family hx of        Allergies:   Allergies   Allergen Reactions     Nsaids      Not true allergy. But be sparing with patient as he has single kidney        Active Medications:   Current Outpatient Medications   Medication Sig Dispense Refill     acetaminophen (TYLENOL) 325 MG tablet Take 1-2 tablets (325-650 mg) by mouth every 6 hours as needed for mild pain 100 tablet 3     alum & mag hydroxide-simethicone (MAALOX) 200-200-20 MG/5ML SUSP suspension Take 30 mLs by mouth every 4 hours as needed for indigestion 355 mL 0     diclofenac (VOLTAREN) 1 % topical gel Place 2 g onto the skin 4 times daily 100 g 3     melatonin 3 MG tablet Take 2 hours before bedtime for the next 10 days. And then as needed  "after for sleep. (Patient not taking: Reported on 1/12/2021) 30 tablet 0     psyllium (METAMUCIL/KONSYL) 58.6 % powder Take 1 teaspoon everyday 174 g 3     simethicone (MYLICON) 80 MG chewable tablet Take 1 tablet (80 mg) by mouth every 6 hours as needed for flatulence or cramping (Patient not taking: Reported on 1/12/2021) 15 tablet 0     sodium chloride (OCEAN) 0.65 % nasal spray Spray in each nose twice a day 104 mL 1     tamsulosin (FLOMAX) 0.4 MG capsule Take 1 capsule (0.4 mg) by mouth daily 30 capsule 0       Systemic Review:   CONSTITUTIONAL: NEGATIVE for fever, chills, change in weight  ENT/MOUTH: NEGATIVE for ear, mouth and throat problems  RESP: NEGATIVE for significant cough or SOB  CV: NEGATIVE for chest pain, palpitations or peripheral edema    Physical Examination:   Vital Signs: /63   Pulse 72   Temp 97.8  F (36.6  C) (Temporal)   Resp 18   Ht 1.753 m (5' 9\")   Wt 62.1 kg (137 lb)   SpO2 99%   BMI 20.23 kg/m    GENERAL: healthy, alert and no distress  NECK: no adenopathy, no asymmetry, masses, or scars  RESP: lungs clear to auscultation - no rales, rhonchi or wheezes  CV: regular rate and rhythm, normal S1 S2, no S3 or S4, no murmur, click or rub, no peripheral edema and peripheral pulses strong  ABDOMEN: soft, nontender, no hepatosplenomegaly, no masses and bowel sounds normal  MS: no gross musculoskeletal defects noted, no edema    Plan: Appropriate to proceed as scheduled.      Rip Her MD  1/25/2021    PCP:  Livan Castro    "

## 2021-01-25 NOTE — ANESTHESIA POSTPROCEDURE EVALUATION
Patient: Emelyn Marmolejo    Procedure(s):  ESOPHAGOGASTRODUODENOSCOPY, WITH BIOPSY    Diagnosis:Abdominal pain, generalized [R10.84]  Diagnosis Additional Information: No value filed.    Anesthesia Type:  MAC    Note:  Disposition: Outpatient   Postop Pain Control: Uneventful            Sign Out: Well controlled pain   PONV: No   Neuro/Psych: Uneventful            Sign Out: Acceptable/Baseline neuro status   Airway/Respiratory: Uneventful            Sign Out: Acceptable/Baseline resp. status   CV/Hemodynamics: Uneventful            Sign Out: Acceptable CV status   Other NRE: NONE   DID A NON-ROUTINE EVENT OCCUR? No         Last vitals:  Vitals:    01/25/21 1324 01/25/21 1523   BP: 105/63 99/63   Pulse: 72    Resp: 18 16   Temp: 36.6  C (97.8  F) 36.1  C (97  F)   SpO2: 99% 100%       Electronically Signed By: April Gomez MD  January 25, 2021  3:34 PM

## 2021-01-28 ENCOUNTER — PATIENT OUTREACH (OUTPATIENT)
Dept: GASTROENTEROLOGY | Facility: CLINIC | Age: 31
End: 2021-01-28

## 2021-01-29 LAB — COPATH REPORT: NORMAL

## 2021-02-02 ENCOUNTER — OFFICE VISIT (OUTPATIENT)
Dept: FAMILY MEDICINE | Facility: CLINIC | Age: 31
End: 2021-02-02
Payer: COMMERCIAL

## 2021-02-02 VITALS
TEMPERATURE: 98.7 F | DIASTOLIC BLOOD PRESSURE: 71 MMHG | SYSTOLIC BLOOD PRESSURE: 103 MMHG | HEART RATE: 76 BPM | WEIGHT: 132.4 LBS | BODY MASS INDEX: 19.55 KG/M2 | OXYGEN SATURATION: 99 % | RESPIRATION RATE: 16 BRPM

## 2021-02-02 DIAGNOSIS — Z11.59 ENCOUNTER FOR SCREENING FOR OTHER VIRAL DISEASES: Primary | ICD-10-CM

## 2021-02-02 DIAGNOSIS — N26.1 RENAL ATROPHY, LEFT: ICD-10-CM

## 2021-02-02 DIAGNOSIS — E53.8 VITAMIN B 12 DEFICIENCY: ICD-10-CM

## 2021-02-02 DIAGNOSIS — R53.83 FATIGUE, UNSPECIFIED TYPE: Primary | ICD-10-CM

## 2021-02-02 LAB
BUN SERPL-MCNC: 11.4 MG/DL (ref 7–21)
CALCIUM SERPL-MCNC: 9.8 MG/DL (ref 8.5–10.1)
CHLORIDE SERPLBLD-SCNC: 102.4 MMOL/L (ref 98–110)
CO2 SERPL-SCNC: 29.1 MMOL/L (ref 20–32)
CREAT SERPL-MCNC: 0.9 MG/DL (ref 0.7–1.3)
GFR SERPL CREATININE-BSD FRML MDRD: >90 ML/MIN/1.7 M2
GLUCOSE SERPL-MCNC: 87.9 MG'DL (ref 70–99)
POTASSIUM SERPL-SCNC: 3.9 MMOL/L (ref 3.3–4.5)
SODIUM SERPL-SCNC: 140.5 MMOL/L (ref 132.6–141.4)
VIT B12 SERPL-MCNC: 209 PG/ML (ref 193–986)

## 2021-02-02 PROCEDURE — 99214 OFFICE O/P EST MOD 30 MIN: CPT | Mod: GC | Performed by: STUDENT IN AN ORGANIZED HEALTH CARE EDUCATION/TRAINING PROGRAM

## 2021-02-02 PROCEDURE — 80048 BASIC METABOLIC PNL TOTAL CA: CPT | Performed by: STUDENT IN AN ORGANIZED HEALTH CARE EDUCATION/TRAINING PROGRAM

## 2021-02-02 PROCEDURE — 82607 VITAMIN B-12: CPT | Performed by: FAMILY MEDICINE

## 2021-02-02 PROCEDURE — 36415 COLL VENOUS BLD VENIPUNCTURE: CPT | Performed by: FAMILY MEDICINE

## 2021-02-02 PROCEDURE — 82306 VITAMIN D 25 HYDROXY: CPT | Performed by: FAMILY MEDICINE

## 2021-02-02 RX ORDER — LANOLIN ALCOHOL/MO/W.PET/CERES
1000 CREAM (GRAM) TOPICAL DAILY
Qty: 30 TABLET | Refills: 1 | Status: SHIPPED | OUTPATIENT
Start: 2021-02-02 | End: 2021-02-03

## 2021-02-02 NOTE — PROGRESS NOTES
[address]  [address] [place] [id number]  Phone: [phone]  Fax: [phone]  Primary Provider: Robbin Castro  Pre-op Performing Provider: ROBBIN CASTRO    PREOPERATIVE EVALUATION:  Today's date: 2/2/2021    Emelyn Marmolejo is a 31 year old male who presents for a preoperative evaluation.    Surgical Information:  Surgery/Procedure:  Left Nephrectomy  Surgery Location: [facility]  Surgeon: Dr. Hedrick  Surgery Date: 02/17/21  Time of Surgery:   Where patient plans to recover: At home with family  Fax number for surgical facility: Note does not need to be faxed, will be available electronically in Epic.    Type of Anesthesia Anticipated: General    Subjective     HPI related to upcoming procedure:     Mom has low vitamin D and b12, can he get it checked too. Is very tired, doesn't sleep well.       Preop Questions 2/2/2021   1. Have you ever had a heart attack or stroke? No   2. Have you ever had surgery on your heart or blood vessels, such as a stent placement, a coronary artery bypass, or surgery on an artery in your head, neck, heart, or legs? No   3. Do you have chest pain with activity? YES -  With running see below    4. Do you have a history of  heart failure? No   5. Do you currently have a cold, bronchitis or symptoms of other infection? No   6. Do you have a cough, shortness of breath, or wheezing? No   7. Do you or anyone in your family have previous history of blood clots? No   8. Do you or does anyone in your family have a serious bleeding problem such as prolonged bleeding following surgeries or cuts? No   9. Have you ever had problems with anemia or been told to take iron pills? No   10. Have you had any abnormal blood loss such as black, tarry or bloody stools? No   11. Have you ever had a blood transfusion? No   12. Are you willing to have a blood transfusion if it is medically needed before, during, or after your surgery? Yes   13.  Have you or any of your relatives ever had problems with anesthesia? No   14. Do you have sleep apnea, excessive snoring or daytime drowsiness? No   15. Do you have any artifical heart valves or other implanted medical devices like a pacemaker, defibrillator, or continuous glucose monitor? No   16. Do you have artificial joints? No   17. Are you allergic to latex? No     Health Care Directive:  Patient does not have a Health Care Directive or Living Will: Discussed advance care planning with patient; however, patient declined at this time.    Preoperative Review of :   reviewed - no record of controlled substances prescribed.          Review of Systems  CONSTITUTIONAL: NEGATIVE for fever, chills, change in weight + for fatigue   INTEGUMENTARY/SKIN: NEGATIVE for worrisome rashes, moles or lesions  EYES: NEGATIVE for vision changes or irritation  ENT/MOUTH: NEGATIVE for ear, mouth and throat problems  RESP: NEGATIVE for significant cough or SOB  BREAST: NEGATIVE for masses, tenderness or discharge  CV: NEGATIVE for chest pain, palpitations or peripheral edema  GI: NEGATIVE for nausea, abdominal pain, heartburn, or change in bowel habits  : NEGATIVE for frequency, dysuria, or hematuria  MUSCULOSKELETAL: NEGATIVE for significant arthralgias or myalgia other than chronic right sided pain.   NEURO: NEGATIVE for weakness, dizziness or paresthesias  ENDOCRINE: NEGATIVE for temperature intolerance, skin/hair changes  HEME: NEGATIVE for bleeding problems  PSYCHIATRIC: NEGATIVE for changes in mood or affect    Patient Active Problem List    Diagnosis Date Noted     Atrophy of left kidney 12/29/2020     Priority: Medium     Added automatically from request for surgery [id number]       Abdominal pain, generalized 10/31/2020     Priority: Medium     2019 novel coronavirus disease (COVID-19) 10/31/2020     Priority: Medium     H. pylori infection 05/14/2020     Priority: Medium     History of renal stone 05/14/2020      Priority: Medium     Abnormal CT of the chest 10/09/2019     Priority: Medium     Renal atrophy, left 08/21/2019     Priority: Medium     Renal stone 08/21/2019     Priority: Medium      Past Medical History:   Diagnosis Date     Prostate infection      Past Surgical History:   Procedure Laterality Date     COMBINED CYSTOSCOPY, URETEROSCOPY, LASER HOLMIUM LITHOTRIPSY URETER(S) Right 8/13/2020    Procedure: CYSTOSCOPY, RIGHT RETROGRADE PYELOGRAM, RIGHT diagnostic URETEROSCOPY;  Surgeon: Aj Hedrick MD;  Location:  OR     ESOPHAGOSCOPY, GASTROSCOPY, DUODENOSCOPY (EGD), COMBINED N/A 1/25/2021    Procedure: ESOPHAGOGASTRODUODENOSCOPY, WITH BIOPSY;  Surgeon: Rip Her MD;  Location: [facility] OR     Current Outpatient Medications   Medication Sig Dispense Refill     cyanocobalamin (VITAMIN B-12) 1000 MCG tablet Take 1 tablet (1,000 mcg) by mouth daily Recheck levels in April 30 tablet 1     acetaminophen (TYLENOL) 325 MG tablet Take 1-2 tablets (325-650 mg) by mouth every 6 hours as needed for mild pain (Patient not taking: Reported on 2/2/2021) 100 tablet 3     alum & mag hydroxide-simethicone (MAALOX) 200-200-20 MG/5ML SUSP suspension Take 30 mLs by mouth every 4 hours as needed for indigestion (Patient not taking: Reported on 2/2/2021) 355 mL 0     diclofenac (VOLTAREN) 1 % topical gel Place 2 g onto the skin 4 times daily (Patient not taking: Reported on 2/2/2021) 100 g 3     melatonin 3 MG tablet Take 2 hours before bedtime for the next 10 days. And then as needed after for sleep. (Patient not taking: Reported on 1/12/2021) 30 tablet 0     psyllium (METAMUCIL/KONSYL) 58.6 % powder Take 1 teaspoon everyday (Patient not taking: Reported on 2/2/2021) 174 g 3     simethicone (MYLICON) 80 MG chewable tablet Take 1 tablet (80 mg) by mouth every 6 hours as needed for flatulence or cramping (Patient not taking: Reported on 1/12/2021) 15 tablet 0     sodium chloride (OCEAN) 0.65 % nasal spray Spray in each  nose twice a day (Patient not taking: Reported on 2021) 104 mL 1     tamsulosin (FLOMAX) 0.4 MG capsule Take 1 capsule (0.4 mg) by mouth daily (Patient not taking: Reported on 2021) 30 capsule 0       Allergies   Allergen Reactions     Nsaids      Not true allergy. But be sparing with patient as he has single kidney         Social History     Tobacco Use     Smoking status: Former Smoker     Types: Cigarettes     Quit date: 2019     Years since quittin.3     Smokeless tobacco: Never Used   Substance Use Topics     Alcohol use: Never     Frequency: Never     Family History   Problem Relation Age of Onset     Crohn's Disease No family hx of      Ulcerative Colitis No family hx of      GERD No family hx of      Stomach Cancer No family hx of      History   Drug Use Unknown         Objective     /71   Pulse 76   Temp 98.7  F (37.1  C) (Oral)   Resp 16   Wt 60.1 kg (132 lb 6.4 oz)   SpO2 99%   BMI 19.55 kg/m      Physical Exam    GENERAL APPEARANCE: healthy, alert and no distress     EYES: EOMI,  PERRL     HENT: ear canals and TM's normal and nose and mouth without ulcers or lesions     NECK: no adenopathy, no asymmetry, masses, or scars and thyroid normal to palpation     RESP: lungs clear to auscultation - no rales, rhonchi or wheezes     CV: regular rates and rhythm, normal S1 S2, no S3 or S4 and no murmur, click or rub     ABDOMEN:  soft, nontender, no HSM or masses and bowel sounds normal. Has right axillary line pain about 5 cm below rib line that is not reproducible w palpation.      MS: extremities normal- no gross deformities noted, no evidence of inflammation in joints, FROM in all extremities.     SKIN: no suspicious lesions or rashes     NEURO: Normal strength and tone, sensory exam grossly normal, mentation intact and speech normal     PSYCH: mentation appears normal. and affect normal/bright     LYMPHATICS: No cervical adenopathy    Recent Labs   Lab Test 20  1246  09/04/20  1544 06/11/20  1649 06/11/20  1649 03/26/20  1556 03/26/20  1556 02/27/20  1403 02/27/20  1403 12/30/19  1345 12/30/19  1345   HGB  --   --   --   --   --  13.4  --   --   --  15.6    140   < >  --    < >  --    < >  --    < >  --    POTASSIUM 3.8 3.7   < >  --    < >  --    < >  --    < >  --    CR 0.97 1.19   < >  --    < >  --    < > 0.9   < >  --    A1C  --   --   --  5.4  --   --   --  5.3  --   --     < > = values in this interval not displayed.        Diagnostics:  Labs pending at this time.  Results will be reviewed when available.       No EKG this visit, completed in the last 90 days.    Revised Cardiac Risk Index (RCRI):  The patient has the following serious cardiovascular risks for perioperative complications:   - No serious cardiac risks = 0 points      Assessment & Plan   The proposed surgical procedure is considered INTERMEDIATE risk.      Patient with exercise related chest pain but young and healthy. Does have stable bronchiectasis on that side see Dec note 2020 I reviewed again in detail with patient. Will discuss with him latent TB treatment once he recovers from nephrectomy. He had a normal ECHO stress test in November 2020. Does not need further workup/optimization for surgery.     Vit D and b12 reasonable to check due to long term PPI use.     Suspect fatigue from poor sleep with anxiety and chronic unresolved right sided pain.     Renal atrophy, left  BMP and vit D normal.   - Basic Metabolic Panel ([facility])  - Vitamin D Level    Fatigue, unspecified type  b12 lower end of normal. Will try oral b12 challenge and recheck 8 weeks.   - Vitamin B12    Vitamin B 12 deficiency    - cyanocobalamin (VITAMIN B-12) 1000 MCG tablet  Dispense: 30 tablet; Refill: 1        RECOMMENDATION:  APPROVAL GIVEN to proceed with proposed procedure, without further diagnostic evaluation.    Signed Electronically by: Livan Castro MD    Copy of this evaluation report is provided to requesting  physician.    Preop [facility] Preop Guidelines    Revised Cardiac Risk Index

## 2021-02-02 NOTE — PATIENT INSTRUCTIONS

## 2021-02-02 NOTE — NURSING NOTE
Due to patient being non-English speaking/uses sign language, an  was used for this visit. Only for face-to-face interpretation by an external agency, date and length of interpretation can be found on the scanned worksheet.     name: Grace Gil  Language: Mexican  Agency: [employer]  Phone number: [phone]  Type of interpretation: Face-to-face, spoken

## 2021-02-03 DIAGNOSIS — E53.8 VITAMIN B 12 DEFICIENCY: ICD-10-CM

## 2021-02-03 DIAGNOSIS — E55.9 VITAMIN D DEFICIENCY: Primary | ICD-10-CM

## 2021-02-03 LAB — DEPRECATED CALCIDIOL+CALCIFEROL SERPL-MC: 18 UG/L (ref 20–75)

## 2021-02-03 RX ORDER — LANOLIN ALCOHOL/MO/W.PET/CERES
1000 CREAM (GRAM) TOPICAL DAILY
Qty: 90 TABLET | Refills: 0 | Status: SHIPPED | OUTPATIENT
Start: 2021-02-03 | End: 2021-05-06

## 2021-02-03 RX ORDER — CHOLECALCIFEROL (VITAMIN D3) 50 MCG
1 TABLET ORAL DAILY
Qty: 90 TABLET | Refills: 0 | Status: SHIPPED | OUTPATIENT
Start: 2021-02-03 | End: 2021-08-16

## 2021-02-10 ENCOUNTER — APPOINTMENT (OUTPATIENT)
Dept: INTERPRETER SERVICES | Facility: CLINIC | Age: 31
End: 2021-02-10

## 2021-02-26 ENCOUNTER — VIRTUAL VISIT (OUTPATIENT)
Dept: GASTROENTEROLOGY | Facility: CLINIC | Age: 31
End: 2021-02-26
Payer: COMMERCIAL

## 2021-02-26 VITALS — HEIGHT: 69 IN | WEIGHT: 135 LBS | BODY MASS INDEX: 19.99 KG/M2

## 2021-02-26 DIAGNOSIS — R10.84 ABDOMINAL PAIN, GENERALIZED: Primary | ICD-10-CM

## 2021-02-26 PROCEDURE — 99212 OFFICE O/P EST SF 10 MIN: CPT | Mod: 95 | Performed by: PHYSICIAN ASSISTANT

## 2021-02-26 ASSESSMENT — MIFFLIN-ST. JEOR: SCORE: 1547.74

## 2021-02-26 NOTE — PATIENT INSTRUCTIONS
It was a pleasure taking care of you today.  I've included a brief summary of our discussion and care plan from today's visit below.  Please review this information with your primary care provider.  _______________________________________________________________________    My recommendations are summarized as follows:    -- Follow-up as needed  -- You may continue Pepcid 20 mg once to twice daily as needed for abdominal pain    _______________________________________________________________________    Who do I call with any questions after my visit?  Please be in touch if there are any further questions that arise following today's visit.  There are multiple ways to contact your gastroenterology care team.        During business hours, you may reach a Gastroenterology nurse at [phone] and choose option 3.         To schedule or reschedule an appointment, please call [phone].       You can always send a secure message through [employer].  [employer] messages are answered by your nurse or doctor typically within 24 hours.  Please allow extra time on weekends and holidays.        For urgent/emergent questions after business hours, you may reach the on-call GI Fellow by contacting the [facility]  at [phone].     How will I get the results of any tests ordered?    You will receive all of your results.  If you have signed up for [employer], any tests ordered at your visit will be available to you after your physician reviews them.  Typically this takes 1-2 weeks.  If there are urgent results that require a change in your care plan, your physician or nurse will call you to discuss the next steps.      What is [employer]?  [employer] is a secure way for you to access all of your healthcare records from the [facility].  It is a web based computer program, so you can sign on to it from any location.  It also allows you to send secure messages to your care team.  I recommend signing up for  [employer] access if you have not already done so and are comfortable with using a computer.      How to I schedule a follow-up visit?  If you did not schedule a follow-up visit today, please call [phone] to schedule a follow-up office visit.      How do I schedule any test(s) that were ordered?  -- If lab orders were placed: to schedule lab appointment here, use my chart or call [phone].   -- If imaging orders were placed: To schedule any imaging, please call [phone]   -- If an upper endoscopy and/or colonoscopy was ordered: If you have not heard from endoscopy within a week, please call [phone] to schedule.        Sincerely,  Rose Desai PA-C

## 2021-02-26 NOTE — NURSING NOTE
"Chief Complaint   Patient presents with     Follow Up     post EGD follow up       Vitals:    02/26/21 0639   Weight: 61.2 kg (135 lb)   Height: 1.753 m (5' 9\")       Body mass index is 19.94 kg/m .    Valerie Bucio CMA    "

## 2021-02-26 NOTE — LETTER
"    2/26/2021         RE: Emelyn Marmolejo  [address] [facility] [address] [address]        Dear Colleague,    Thank you for referring your patient, Emelyn Marmolejo, to the [facility] [place]. Please see a copy of my visit note below.    Emelyn Marmolejo is a 31 year old male who is being evaluated via a billable video visit.      Please send link to email:  [email or web address]    The patient has been notified of following:     \"This video visit will be conducted via a call between you and your physician/provider. We have found that certain health care needs can be provided without the need for an in-person physical exam.  This service lets us provide the care you need with a video conversation.  If a prescription is necessary we can send it directly to your pharmacy.  If lab work is needed we can place an order for that and you can then stop by our lab to have the test done at a later time.    If during the course of the call the physician/provider feels a video visit is not appropriate, you will not be charged for this service.\"     Patient confirmed that they are in [place] for today's visit yes.    Telephone visit; total time 9 minutes      GI CLINIC VISIT    CC/REFERRING PROVIDER: Referred Self  REASON FOR CONSULTATION: abdominal pain    HPI: 31 year old male with PMH of atrophic left kidney, kidney stones, COVID-19 presenting to GI clinic for follow-up of abdominal pain.    Please refer to initial consultation note from Dr. Armando Salazar (10/29/2020) for detailed history. Briefly, Emelyn presented with concern of abdominal pain. He had reported history of gastric ulcer in [place], though did not undergo endoscopy. At time of consultation, reported both diffuse abdominal pain, constant in nature, with improvement with eating oats and softer foods, as well as a more focal pain in the RUQ that occurred with eating meat. Associated symptoms included bloating with empty stomach, " "perceived at level of umbilicus. Reported bristol 1 stools. H pylori negative as of 9/21/2020 by stool antigen.  RUQ US was performed 11/19/2020 and unremarkable.  EGD 1/25/2021 also unremarkable. Gastric biopsies showing mild chronic antral gastritis, negative for IM or dyplasia, negative for H pylori. Duodenal biopsies with foveolar metaplasia consistent with peptic duodenitis, without evidence of celiac sprue.    Interval history:  Visit was completed with assistance of a [facility] .    Emelyn states that he made the appointment because he just wanted to know if he had H pylori or not. He states he is \"fine\" in regards to the abdominal pain and has no other concerns that he wishes to discuss at this time. Provider inquired about any assistance managing any other possible gastrointestinal symptoms he was having however he stated he had no concerns and would call if anything came up.  ROS: 10pt ROS performed and otherwise negative.    PAST MEDICAL HISTORY:  Past Medical History:   Diagnosis Date     Prostate infection        PREVIOUS ABDOMINAL/GYNECOLOGIC SURGERIES:    Past Surgical History:   Procedure Laterality Date     COMBINED CYSTOSCOPY, URETEROSCOPY, LASER HOLMIUM LITHOTRIPSY URETER(S) Right 8/13/2020    Procedure: CYSTOSCOPY, RIGHT RETROGRADE PYELOGRAM, RIGHT diagnostic URETEROSCOPY;  Surgeon: Aj Hedrick MD;  Location:  OR     ESOPHAGOSCOPY, GASTROSCOPY, DUODENOSCOPY (EGD), COMBINED N/A 1/25/2021    Procedure: ESOPHAGOGASTRODUODENOSCOPY, WITH BIOPSY;  Surgeon: Rip Her MD;  Location: [facility] OR         PERTINENT MEDICATIONS:  Current Outpatient Medications   Medication Sig Dispense Refill     cyanocobalamin (VITAMIN B-12) 1000 MCG tablet Take 1 tablet (1,000 mcg) by mouth daily 90 tablet 0     vitamin D3 (CHOLECALCIFEROL) 50 mcg (2000 units) tablet Take 1 tablet (50 mcg) by mouth daily 90 tablet 0     SOCIAL HISTORY:  Social History     Socioeconomic History     Marital status: " "Single     Spouse name: Not on file     Number of children: Not on file     Years of education: Not on file     Highest education level: Not on file   Occupational History     Not on file   Social Needs     Financial resource strain: Not on file     Food insecurity     Worry: Not on file     Inability: Not on file     Transportation needs     Medical: Not on file     Non-medical: Not on file   Tobacco Use     Smoking status: Former Smoker     Types: Cigarettes     Quit date: 2019     Years since quittin.4     Smokeless tobacco: Never Used   Substance and Sexual Activity     Alcohol use: Never     Frequency: Never     Drug use: Never     Sexual activity: Not Currently     Partners: Female     Birth control/protection: Condom   Lifestyle     Physical activity     Days per week: Not on file     Minutes per session: Not on file     Stress: Not on file   Relationships     Social connections     Talks on phone: Not on file     Gets together: Not on file     Attends Scientologist service: Not on file     Active member of club or organization: Not on file     Attends meetings of clubs or organizations: Not on file     Relationship status: Not on file     Intimate partner violence     Fear of current or ex partner: Not on file     Emotionally abused: Not on file     Physically abused: Not on file     Forced sexual activity: Not on file   Other Topics Concern     Parent/sibling w/ CABG, MI or angioplasty before 65F 55M? Not Asked   Social History Narrative     Not on file       FAMILY HISTORY:  Family History   Problem Relation Age of Onset     Crohn's Disease No family hx of      Ulcerative Colitis No family hx of      GERD No family hx of      Stomach Cancer No family hx of        PHYSICAL EXAMINATION:  Vitals reviewed  Ht 1.753 m (5' 9\")   Wt 61.2 kg (135 lb)   BMI 19.94 kg/m    Telephone visit  The rest of a comprehensive physical examination is deferred due to PHE (public health emergency) video " "restrictions      PERTINENT STUDIES Reviewed in EMR    ASSESSMENT/PLAN:   31 year old male with PMH of atrophic left kidney, kidney stones, COVID-19 presenting to GI clinic for follow-up of abdominal pain.    Emelyn reports long history of abdominal pain, with work-up thus far, including right upper quadrant ultrasound and recent EGD, has been negative for any evidence of organic disease including H pylori, PUD. There was some mild gastritis and duodenitis noted on endoscopy, in the absence of any NSAID use. He does have chronic renal stones, which may be confounding the picture. Differential at this juncture includes functional dyspepsia, reflux disease, functional abdominal pain.    In speaking with Emelyn today, he primarily wanted to confirm that he did not have H pylori infection. Once we reviewed his pathology report, he wished to complete the visit. He reported that the abdominal pain was \"fine\".  Further attempts to elucidate any ongoing symptomatology were unsuccessful, as he reported he needed no assistance and would call if needed in the future.    RTC PRN    Thank you for this consultation. It was a pleasure to participate in the care of this patient; please contact us with any further questions.    Rose Desai PA-C    17 minutes spent on the date of the encounter doing chart review, review of test results, patient visit and documentation        "

## 2021-02-26 NOTE — PROGRESS NOTES
"GI CLINIC VISIT    CC/REFERRING PROVIDER: Referred Self  REASON FOR CONSULTATION: abdominal pain    HPI: 31 year old male with PMH of atrophic left kidney, kidney stones, COVID-19 presenting to GI clinic for follow-up of abdominal pain.    Please refer to initial consultation note from Dr. Armando Salazar (10/29/2020) for detailed history. Briefly, Emelyn presented with concern of abdominal pain. He had reported history of gastric ulcer in [place], though did not undergo endoscopy. At time of consultation, reported both diffuse abdominal pain, constant in nature, with improvement with eating oats and softer foods, as well as a more focal pain in the RUQ that occurred with eating meat. Associated symptoms included bloating with empty stomach, perceived at level of umbilicus. Reported bristol 1 stools. H pylori negative as of 9/21/2020 by stool antigen.  RUQ US was performed 11/19/2020 and unremarkable.  EGD 1/25/2021 also unremarkable. Gastric biopsies showing mild chronic antral gastritis, negative for IM or dyplasia, negative for H pylori. Duodenal biopsies with foveolar metaplasia consistent with peptic duodenitis, without evidence of celiac sprue.    Interval history:  Visit was completed with assistance of a [facility] .    Emelyn states that he made the appointment because he just wanted to know if he had H pylori or not. He states he is \"fine\" in regards to the abdominal pain and has no other concerns that he wishes to discuss at this time. Provider inquired about any assistance managing any other possible gastrointestinal symptoms he was having however he stated he had no concerns and would call if anything came up.  ROS: 10pt ROS performed and otherwise negative.    PAST MEDICAL HISTORY:  Past Medical History:   Diagnosis Date     Prostate infection        PREVIOUS ABDOMINAL/GYNECOLOGIC SURGERIES:    Past Surgical History:   Procedure Laterality Date     COMBINED CYSTOSCOPY, URETEROSCOPY, LASER HOLMIUM " LITHOTRIPSY URETER(S) Right 2020    Procedure: CYSTOSCOPY, RIGHT RETROGRADE PYELOGRAM, RIGHT diagnostic URETEROSCOPY;  Surgeon: Aj Hedrick MD;  Location:  OR     ESOPHAGOSCOPY, GASTROSCOPY, DUODENOSCOPY (EGD), COMBINED N/A 2021    Procedure: ESOPHAGOGASTRODUODENOSCOPY, WITH BIOPSY;  Surgeon: Rip Her MD;  Location: [facility] OR         PERTINENT MEDICATIONS:  Current Outpatient Medications   Medication Sig Dispense Refill     cyanocobalamin (VITAMIN B-12) 1000 MCG tablet Take 1 tablet (1,000 mcg) by mouth daily 90 tablet 0     vitamin D3 (CHOLECALCIFEROL) 50 mcg (2000 units) tablet Take 1 tablet (50 mcg) by mouth daily 90 tablet 0     SOCIAL HISTORY:  Social History     Socioeconomic History     Marital status: Single     Spouse name: Not on file     Number of children: Not on file     Years of education: Not on file     Highest education level: Not on file   Occupational History     Not on file   Social Needs     Financial resource strain: Not on file     Food insecurity     Worry: Not on file     Inability: Not on file     Transportation needs     Medical: Not on file     Non-medical: Not on file   Tobacco Use     Smoking status: Former Smoker     Types: Cigarettes     Quit date: 2019     Years since quittin.4     Smokeless tobacco: Never Used   Substance and Sexual Activity     Alcohol use: Never     Frequency: Never     Drug use: Never     Sexual activity: Not Currently     Partners: Female     Birth control/protection: Condom   Lifestyle     Physical activity     Days per week: Not on file     Minutes per session: Not on file     Stress: Not on file   Relationships     Social connections     Talks on phone: Not on file     Gets together: Not on file     Attends Muslim service: Not on file     Active member of club or organization: Not on file     Attends meetings of clubs or organizations: Not on file     Relationship status: Not on file     Intimate partner violence      "Fear of current or ex partner: Not on file     Emotionally abused: Not on file     Physically abused: Not on file     Forced sexual activity: Not on file   Other Topics Concern     Parent/sibling w/ CABG, MI or angioplasty before 65F 55M? Not Asked   Social History Narrative     Not on file       FAMILY HISTORY:  Family History   Problem Relation Age of Onset     Crohn's Disease No family hx of      Ulcerative Colitis No family hx of      GERD No family hx of      Stomach Cancer No family hx of        PHYSICAL EXAMINATION:  Vitals reviewed  Ht 1.753 m (5' 9\")   Wt 61.2 kg (135 lb)   BMI 19.94 kg/m    Telephone visit  The rest of a comprehensive physical examination is deferred due to PHE (public health emergency) video restrictions      PERTINENT STUDIES Reviewed in EMR    ASSESSMENT/PLAN:   31 year old male with PMH of atrophic left kidney, kidney stones, COVID-19 presenting to GI clinic for follow-up of abdominal pain.    Emelyn reports long history of abdominal pain, with work-up thus far, including right upper quadrant ultrasound and recent EGD, has been negative for any evidence of organic disease including H pylori, PUD. There was some mild gastritis and duodenitis noted on endoscopy, in the absence of any NSAID use. He does have chronic renal stones, which may be confounding the picture. Differential at this juncture includes functional dyspepsia, reflux disease, functional abdominal pain.    In speaking with Emelyn today, he primarily wanted to confirm that he did not have H pylori infection. Once we reviewed his pathology report, he wished to complete the visit. He reported that the abdominal pain was \"fine\".  Further attempts to elucidate any ongoing symptomatology were unsuccessful, as he reported he needed no assistance and would call if needed in the future.    RTC PRN    Thank you for this consultation. It was a pleasure to participate in the care of this patient; please contact us with any further " questions.    Rose Desai PA-C    17 minutes spent on the date of the encounter doing chart review, review of test results, patient visit and documentation

## 2021-02-26 NOTE — PROGRESS NOTES
"Emelyn Marmolejo is a 31 year old male who is being evaluated via a billable video visit.      Please send link to email:  [email or web address]    The patient has been notified of following:     \"This video visit will be conducted via a call between you and your physician/provider. We have found that certain health care needs can be provided without the need for an in-person physical exam.  This service lets us provide the care you need with a video conversation.  If a prescription is necessary we can send it directly to your pharmacy.  If lab work is needed we can place an order for that and you can then stop by our lab to have the test done at a later time.    If during the course of the call the physician/provider feels a video visit is not appropriate, you will not be charged for this service.\"     Patient confirmed that they are in [place] for today's visit yes.    Telephone visit; total time 9 minutes    "

## 2021-03-01 ENCOUNTER — OFFICE VISIT (OUTPATIENT)
Dept: FAMILY MEDICINE | Facility: CLINIC | Age: 31
End: 2021-03-01
Payer: COMMERCIAL

## 2021-03-01 VITALS
SYSTOLIC BLOOD PRESSURE: 133 MMHG | OXYGEN SATURATION: 99 % | RESPIRATION RATE: 16 BRPM | TEMPERATURE: 98.6 F | WEIGHT: 134.6 LBS | HEART RATE: 71 BPM | BODY MASS INDEX: 19.88 KG/M2 | DIASTOLIC BLOOD PRESSURE: 70 MMHG

## 2021-03-01 DIAGNOSIS — N26.1 ATROPHY OF LEFT KIDNEY: ICD-10-CM

## 2021-03-01 DIAGNOSIS — J47.9 BRONCHIECTASIS, UNCOMPLICATED (H): ICD-10-CM

## 2021-03-01 DIAGNOSIS — M94.0 COSTOCHONDRITIS: Primary | ICD-10-CM

## 2021-03-01 PROCEDURE — 99214 OFFICE O/P EST MOD 30 MIN: CPT | Performed by: FAMILY MEDICINE

## 2021-03-01 RX ORDER — CAPSAICIN 0.025 %
CREAM (GRAM) TOPICAL
Qty: 45 G | Refills: 0 | Status: SHIPPED | OUTPATIENT
Start: 2021-03-01 | End: 2021-05-06

## 2021-03-01 NOTE — PROGRESS NOTES
Assessment & Plan     Costochondritis  Patient presenting with acute pain of his L chest wall. Long discussion about why I do not think this is cardiac in nature (reproducible on exam, no risk factors, atypical pain). Will treat with OTC tylenol and topical capsaicin as needed. Unable to take oral NSAIDs as he has a history of 1 kidney.     - capsaicin (ZOSTRIX) 0.025 % external cream; Apply topically three times a day for the next 3 days, then as needed.    Added for complexity:   Bronchiectasis  Reviewed pulm note from 12/2020 - no further follow-up needed. No evidence of bacterial infection/pneumonia on exam today as cause of his pain. X-ray not available today, but could get x-ray as next step if not improved with above plan.     Atrophy of left kidney   Avoid NSAIDs as above.     Review of prior external note(s) from - Outside records from pulmonology  Diagnosis or treatment significantly limited by social determinants of health - language barrier  No LOS data to display         No follow-ups on file.    Lillian Brasher DO  [facility] EARL Dunaway is a 31 year old who presents for the following health issues    HPI     Patient presents with 1 week of L sided chest wall pain. States it was intermittent at first but now constant since last night. Sharp pain, lasts about 5 seconds. Worse when laying on that side. No personal or family history of CAD, no h/o HTN, HLP. No associated fever, chills or cough. No recent injury or trauma. No new exercise. Not currently working.     Hasn't tried any medications. Mostly worried this could be a sign of a heart attack.       Review of Systems   Constitutional, HEENT, cardiovascular, pulmonary, gi and gu systems are negative, except as otherwise noted.      Objective    /70   Pulse 71   Temp 98.6  F (37  C) (Oral)   Resp 16   Wt 61.1 kg (134 lb 9.6 oz)   SpO2 99%   BMI 19.88 kg/m    Body mass index is 19.88 kg/m .  Physical  Exam  Constitutional:       Appearance: He is well-developed.   HENT:      Head: Normocephalic and atraumatic.      Right Ear: External ear normal.      Left Ear: External ear normal.   Neck:      Musculoskeletal: Normal range of motion. No edema.   Cardiovascular:      Rate and Rhythm: Normal rate and regular rhythm.      Heart sounds: S1 normal and S2 normal. No murmur.   Pulmonary:      Effort: Pulmonary effort is normal.      Breath sounds: Normal breath sounds.   Chest:      Chest wall: Tenderness (Tenderness to palpation of L intercostal muscles of ribs 8-10 which reproduces his pain ) present.   Musculoskeletal: Normal range of motion.   Skin:     General: Skin is warm and dry.   Neurological:      Mental Status: He is alert and oriented to person, place, and time.   Psychiatric:         Behavior: Behavior normal.         Thought Content: Thought content normal.

## 2021-03-09 PROBLEM — J47.9 BRONCHIECTASIS, UNCOMPLICATED (H): Status: ACTIVE | Noted: 2021-03-09

## 2021-03-11 ENCOUNTER — TELEPHONE (OUTPATIENT)
Dept: UROLOGY | Facility: CLINIC | Age: 31
End: 2021-03-11

## 2021-03-11 NOTE — TELEPHONE ENCOUNTER
[facility] Call Center    Phone Message    May a detailed message be left on voicemail: yes     Reason for Call: Pt is requesting a call back to schedule surgery with Dr. Hedrick.  Thanks.    Action Taken: Message routed to:  Adult Clinics: Urology p [id number]    Travel Screening: Not Applicable

## 2021-03-15 ENCOUNTER — PREP FOR PROCEDURE (OUTPATIENT)
Dept: UROLOGY | Facility: CLINIC | Age: 31
End: 2021-03-15

## 2021-03-15 DIAGNOSIS — R10.9 LEFT FLANK PAIN: ICD-10-CM

## 2021-03-15 DIAGNOSIS — N26.1 ATROPHY OF LEFT KIDNEY: Primary | ICD-10-CM

## 2021-03-15 NOTE — RESULT ENCOUNTER NOTE
[facility] Hospitalist Discharge Summary      Admission date:  3/6/2021 10:51 PM    Discharge date:  3/14/2021  2:12 PM    Primary care physician:  Sanna Chaparro MD    Consulting physicians: Dr. Quan - hematology oncology; pain service    HPI:    33-year-old male well-known to the service with a past medical history significant for sickle cell disease, avascular necrosis of the left shoulder and bilateral femoral head status post right total hip arthroplasty, chronic systolic heart failure with preserved EF of 50%, chronic opiate dependency who was recently discharged 2 days ago with acute sickle cell pain crisis.  Patient was initially started on Dilaudid and transition to oral regimen of oxycodone and Percocet which is his home regimen.  Patient stated he was ready to be discharged and was discharged home.  He returns with worsening pain.  At this time he appears comfortable while getting his Dilaudid.  He did have an elevated white count of 12.2 and was given prophylactic ceftriaxone however he has a negative procalcitonin level therefore will discontinue antibiotics    Hospital course:    Acute sickle cell pain crisis:  - Patient was readmitted shortly after a previous discharge with uncontrolled pain. His home pain regimen included percocet 5-325, 1-2 tabs Q6 PRN; oxycodone 10 mg daily.  Pain medications were gradually uptitrated and included percocet for short acting control, and scheduled oxycodone at 40 mg Q12.  Ultimately the pain service was consulted, patient was continued on the above regimen and symptoms did improve to the point that he was able to ambulate and perform adl's by the time of discharge.  He was discharged on percocet and a reduced dose of oxycodone, and instructed to gradually wean himself off of this regimen until back on his chronic pain regimen.     Avascular necrosis of the left hip:  - Patient is already s/p R total hip replacement.  Imaging this stay indicated avascular necrosis at the  Called patient and discussed results. See encounter notes. left hip for which he has been instructed to follow up with his regular orthopedist at [facility].     Acute on chronic anemia secondary to sickle cell  - Hemoglobin remained grossly stable in the 7's.     HF:  - With EF 50% from echocardiogram 2/9/21.  Patient remained stable and clinically euvolemic     Proph:  - Daily lovenox      Imaging:    Mri hip left 3/11/21:  Extensive patchy osteonecrosis throughout the imaged skeleton with   background conspicuous loss of normal fatty marrow presumably relating to   red marrow recruitment secondary to known hemolytic anemia.  No articular   surface collapse involving the left hip.    Region of markedly abnormal intramuscular signal intensity and minimal   enlargement involving the posterior gluteus yen, gluteus medius and   piriformis near the posterior ilium/SI joint.  These muscles enhance   slightly greater than the surrounding musculature indicating myositis   (presumably related to sickle cell disease).  No non-enhancement to   indicate myonecrosis.     Xr Chest Pa Or Ap 1 View  Impression: 1.  Slight blunting of the right costophrenic angle, which could be related to a trace right pleural effusion versus pleural scarring. 2.  Cardiomegaly. Electronically Signed by: ERA LINARES M.D. Signed on: 3/7/2021 8:29 AM      Xr Chest Pa Or Ap 1 View  Impression: FINDINGS AND IMPRESSION: Moderate to marked cardiomegaly.  No focal consolidation, pleural effusion or pneumothorax.  Remote right rib fractures noted. Electronically Signed by: ALEXANDREA TRUJILLO MD Signed on: 3/6/2021 1:10 PM      Xr Chest Ap Or Pa 1 View  Impression: FINDINGS AND IMPRESSION:  Cardiomegaly.  No infiltrate, pleural effusion or pneumothorax.  Status post cholecystectomy.  Skeletal changes vertebral bodies suggestive of sickle cell disease.  Correlate clinically. Electronically Signed by: GRISEL SON M.D. Signed on: 2/10/2021 6:14 AM      Xr Chest Pa Or Ap 1 View  Impression: FINDINGS AND  IMPRESSION: Surgical clips right upper abdomen.  Prominent cardiac silhouette.  Mild increased interstitial markings may represent chronic changes or multifocal infection.  No pleural effusion or pneumothorax.  Deformity of the proximal left humerus may represent an old injury. Electronically Signed by: CINDY MINAYA M.D. Signed on: 2/8/2021 6:38 PM      Xr Hip Left 2 Views W Pelvis 1 View  Impression: 1.  Mixed lucency and sclerosis within the left femoral head, suspicious for avascular necrosis. 2.  Right total hip arthroplasty in place. Electronically Signed by: ERA LINARES M.D. Signed on: 3/6/2021 1:12 PM      Cta Chest Pulmonary Embolism W Contrast  Impression: No central intraluminal filling defects in the pulmonary arteries to suggest pulmonary embolism.  Limited evaluation of segmental and subsegmental pulmonary artery secondary to contrast timing bolus and motion artifact.  No focal lung consolidations, pleural effusions, or pneumothorax.  Chronic findings including: Cardiomegaly, splenic atrophy/autoinfarction, skeletal manifestations of sickle cell (H shaped vertebrae and patchy sclerosis). I, DEMOND KIRAN M.D., have reviewed the images and report and concur with these findings interpreted by KIMBERLYN FAN MD. Electronically Signed by: DEMOND KIRAN M.D. Signed on: 2/9/2021 6:58 AM      Xr Knee 2 Views Right  Impression: FINDINGS AND IMPRESSION:  Ill-defined mottled sclerotic areas throughout the distal femur, proximal tibia and fibula suggestive of areas of osteonecrosis related to patient's underlying sickle cell disease.  No acute fracture or dislocation.  No joint effusion. Electronically Signed by: GRISEL SON M.D. Signed on: 3/2/2021 9:20 AM      Us Vasc Lower Extremity Venous Duplex Right  Impression: No evidence for right lower extremity DVT. Electronically Signed by: TANI JOHNSON D.O. Signed on: 3/2/2021 2:15 PM       Pertinent Physical Exam At Time of Discharge  General:  awake, alert; laying in bed in no distress  Eyes: sclera nonicteric  Neuro: moving extremities spontaneously  Lungs: normal respiratory rate, effort  Abdomen: nondistended  Ext: LE's nonedematous      Discharge medications:     Summary of your Discharge Medications      Take these Medications      Details   bumetanide 1 MG tablet  Commonly known as: BUMEX   Take 1 mg by mouth daily.     docusate sodium-sennosides 50-8.6 MG per tablet  Commonly known as: SENOKOT S   Take 1 tablet by mouth 2 times daily for 5 days.     escitalopram 10 MG tablet  Commonly known as: LEXAPRO   Take 10 mg by mouth daily.     hydroxyurea 500 MG capsule  Commonly known as: HYDREA   Take 1000 mg (2 pills) by mouth in the morning and 500 mg (1 pill) by mouth in the evening     lidocaine 4 % patch  Commonly known as: LIDOCARE   Place 1 patch onto the skin every 24 hours.     naLOXone 4 MG/0.1ML nasal spray  Commonly known as: NARCAN   Spray the content of 1 device into 1 nostril. Call 911. May repeat with 2nd device in alternate nostril if no response in 2-3 minutes. Use in case of suspected opioid overdose.     nicotine 7 MG/24HR patch  Commonly known as: NICODERM   Place 1 patch onto the skin every 24 hours.     oxyCODONE SR 10 MG 12 hr tablet  Commonly known as: OxyCONTIN   Take 3 tablets by mouth every 12 hours as needed for Pain. Take this to help with your chronic pain. Cut down your dose every few days as tolerated.     oxyCODONE-acetaminophen 5-325 MG per tablet  Commonly known as: PERCOCET   Take 1-2 tablets by mouth every 6 hours as needed for Pain.     spironolactone 25 MG tablet  Commonly known as: ALDACTONE   Take 25 mg by mouth daily.     Voxelotor 500 MG Tab   Take 1,500 mg by mouth daily.            Discharge Instructions:    Patient is to follow up with    Time spent with discharge: 40 minutes    Huan Soto  [phone]

## 2021-03-16 PROBLEM — N26.1 ATROPHY OF LEFT KIDNEY: Status: ACTIVE | Noted: 2020-12-29

## 2021-03-16 PROBLEM — R10.9 LEFT FLANK PAIN: Status: ACTIVE | Noted: 2021-03-16

## 2021-04-05 ENCOUNTER — TELEPHONE (OUTPATIENT)
Dept: UROLOGY | Facility: CLINIC | Age: 31
End: 2021-04-05

## 2021-04-05 ENCOUNTER — APPOINTMENT (OUTPATIENT)
Dept: INTERPRETER SERVICES | Facility: CLINIC | Age: 31
End: 2021-04-05

## 2021-04-05 NOTE — TELEPHONE ENCOUNTER
With [employer] [facility]  services, writer called and spoke to patient regarding note below. Informed patient that a message will be sent to Dr. Hedrick to review and advise on the upper right side pain and encouraged patient to keep appointment tomorrow with family practice as scheduled. Per patient, the appointment for tomorrow is for something else. Patient reports that he would also like an appointment to see Dr. Hedrick. Informed patient that he will be contacted with additional information.    Inez Mora RN, BSN

## 2021-04-05 NOTE — TELEPHONE ENCOUNTER
[facility] Call Center    Phone Message    May a detailed message be left on voicemail: yes     Reason for Call: Symptoms or Concerns     Right sided upper pain which he usually has issues w left side. Was about to make appt but cannot wait until 22nd as hes losing sleep. Wanting to know if he can see someone else sooner? Please advise.       Action Taken: Message routed to:  Adult Clinics: Urology p [id number]    Travel Screening: Not Applicable

## 2021-04-05 NOTE — TELEPHONE ENCOUNTER
"Per Dr. Hedrick, the right-sided pain has been ongoing and discussed in the past. Per Dr. Hedrick, the right-sided pain is unrelated to his kidney and he would not have much to offer to help his right-sided pain.    Patient could see PCP to discuss further.     With [facility]  services, called and spoke to patient who is aware of the above information. Patient reports that he had a problem on the right side a long time ago and then has the problem on the left that he is having surgery for. Per patient, over the past few days, he has had pain return to the right side. Patient stated, \"this is a new problem and I only have one kidney so I have to see the doctor right away.\" appointment with Dr. Hedrick scheduled for 4/8/21 at 1:00pm.    Inez Mora RN, BSN        "

## 2021-04-06 ENCOUNTER — OFFICE VISIT (OUTPATIENT)
Dept: FAMILY MEDICINE | Facility: CLINIC | Age: 31
End: 2021-04-06
Payer: COMMERCIAL

## 2021-04-06 VITALS
BODY MASS INDEX: 19.33 KG/M2 | SYSTOLIC BLOOD PRESSURE: 115 MMHG | HEART RATE: 71 BPM | WEIGHT: 135 LBS | OXYGEN SATURATION: 99 % | DIASTOLIC BLOOD PRESSURE: 77 MMHG | TEMPERATURE: 98.7 F | HEIGHT: 70 IN | RESPIRATION RATE: 16 BRPM

## 2021-04-06 DIAGNOSIS — E55.9 VITAMIN D DEFICIENCY: Primary | ICD-10-CM

## 2021-04-06 DIAGNOSIS — F51.02 ADJUSTMENT INSOMNIA: ICD-10-CM

## 2021-04-06 PROCEDURE — 99213 OFFICE O/P EST LOW 20 MIN: CPT | Performed by: FAMILY MEDICINE

## 2021-04-06 RX ORDER — ACETAMINOPHEN 500 MG
1000 TABLET ORAL DAILY PRN
COMMUNITY
Start: 2021-02-24 | End: 2021-08-16

## 2021-04-06 RX ORDER — ERGOCALCIFEROL 1.25 MG/1
50000 CAPSULE, LIQUID FILLED ORAL WEEKLY
Qty: 12 CAPSULE | Refills: 0 | Status: SHIPPED | OUTPATIENT
Start: 2021-04-06 | End: 2021-05-17

## 2021-04-06 RX ORDER — DIPHENHYDRAMINE HCL 25 MG
50 TABLET ORAL
Qty: 50 TABLET | Refills: 0 | Status: SHIPPED | OUTPATIENT
Start: 2021-04-06 | End: 2021-08-23

## 2021-04-06 ASSESSMENT — MIFFLIN-ST. JEOR: SCORE: 1564.86

## 2021-04-06 NOTE — PROGRESS NOTES
"    Assessment & Plan     31y M with chronic but acutely worsened insomnia. 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 Rx for vitamin D repletion, pt is working on a plan for pain which he believes is due to kidney stones, and we discussed getting into a normal sleep routine and appropriate sleep hygiene. No symptoms of amrit or even anxiety. For the short term because his insomnia has been so bothersome the past 2 weeks, ok to use benadryl prn. Discussed appropriate use.    Vitamin D deficiency  - vitamin D2 (ERGOCALCIFEROL) 66558 units (1250 mcg) capsule; Take 1 capsule (50,000 Units) by mouth once a week    Adjustment insomnia  - diphenhydrAMINE (BENADRYL) 25 MG tablet; Take 2 tablets (50 mg) by mouth nightly as needed for sleep                 No follow-ups on file.    DO PHONG [facility] EARL Dunaway is a 31 year old who presents for the following health issues:    HPI     Insomnia  - chronic, but worse in the last 2 weeks  - difficulty falling and staying asleep  - gets ~2 hours of sleep a night  - denies stress, racing thoughts  - feels back pain which keeps him up, he thinks it is from a kidney  stone  - has tried melatonin which did not help at all  - does not drink caffeine      Review of Systems   Constitutional, HEENT, cardiovascular, pulmonary, gi and gu systems are negative, except as otherwise noted.      Objective    /77   Pulse 71   Temp 98.7  F (37.1  C) (Oral)   Resp 16   Ht 1.78 m (5' 10.08\")   Wt 61.2 kg (135 lb)   SpO2 99%   BMI 19.33 kg/m    Body mass index is 19.33 kg/m .  Physical Exam   GENERAL: healthy, alert and no distress  NECK: no adenopathy, no asymmetry, masses, or scars and thyroid normal to palpation  RESP: lungs clear to auscultation - no rales, rhonchi or wheezes  CV: regular " rate and rhythm, normal S1 S2, no S3 or S4, no murmur, click or rub, no peripheral edema and peripheral pulses strong  MS: no gross musculoskeletal defects noted, no edema

## 2021-05-06 ENCOUNTER — OFFICE VISIT (OUTPATIENT)
Dept: FAMILY MEDICINE | Facility: CLINIC | Age: 31
End: 2021-05-06
Payer: COMMERCIAL

## 2021-05-06 VITALS
OXYGEN SATURATION: 99 % | HEART RATE: 64 BPM | SYSTOLIC BLOOD PRESSURE: 128 MMHG | DIASTOLIC BLOOD PRESSURE: 83 MMHG | TEMPERATURE: 98.1 F | HEIGHT: 70 IN | BODY MASS INDEX: 18.75 KG/M2 | WEIGHT: 131 LBS

## 2021-05-06 DIAGNOSIS — N26.1 RENAL ATROPHY, LEFT: ICD-10-CM

## 2021-05-06 DIAGNOSIS — R30.0 DYSURIA: Primary | ICD-10-CM

## 2021-05-06 LAB
ANION GAP SERPL CALCULATED.3IONS-SCNC: 5 MMOL/L (ref 3–14)
BACTERIA: NORMAL
BILIRUBIN UR: NEGATIVE MG/DL
BLOOD UR: ABNORMAL MG/DL
BUN SERPL-MCNC: 20 MG/DL (ref 7–30)
CALCIUM SERPL-MCNC: 9.4 MG/DL (ref 8.5–10.1)
CASTS: NORMAL /LPF
CHLORIDE SERPL-SCNC: 106 MMOL/L (ref 94–109)
CO2 SERPL-SCNC: 27 MMOL/L (ref 20–32)
CREAT SERPL-MCNC: 0.96 MG/DL (ref 0.66–1.25)
CRYSTAL URINE: NORMAL /LPF
EPITHELIAL CELLS UR: <2 /LPF (ref 0–2)
GFR SERPL CREATININE-BSD FRML MDRD: >90 ML/MIN/{1.73_M2}
GLUCOSE SERPL-MCNC: 98 MG/DL (ref 70–99)
GLUCOSE URINE: NEGATIVE
KETONES UR QL: NEGATIVE MG/DL
LEUKOCYTE ESTERASE UR: NEGATIVE
MUCOUS URINE: NORMAL LPF
NITRITE UR QL STRIP: NEGATIVE MG/DL
PH UR STRIP: 6.5 [PH] (ref 4.5–8)
POTASSIUM SERPL-SCNC: 4.2 MMOL/L (ref 3.4–5.3)
PROTEIN UR: NEGATIVE MG/DL
RBC URINE: <2 /HPF
SODIUM SERPL-SCNC: 138 MMOL/L (ref 133–144)
SP GR UR STRIP: 1.01 (ref 1–1.03)
UROBILINOGEN UR STRIP-ACNC: 0.2 E.U./DL
WBC URINE: NORMAL /HPF

## 2021-05-06 PROCEDURE — 81001 URINALYSIS AUTO W/SCOPE: CPT | Performed by: FAMILY MEDICINE

## 2021-05-06 PROCEDURE — 87086 URINE CULTURE/COLONY COUNT: CPT | Performed by: FAMILY MEDICINE

## 2021-05-06 PROCEDURE — 36415 COLL VENOUS BLD VENIPUNCTURE: CPT | Performed by: FAMILY MEDICINE

## 2021-05-06 PROCEDURE — 87491 CHLMYD TRACH DNA AMP PROBE: CPT | Performed by: FAMILY MEDICINE

## 2021-05-06 PROCEDURE — 80048 BASIC METABOLIC PNL TOTAL CA: CPT | Performed by: FAMILY MEDICINE

## 2021-05-06 PROCEDURE — 87591 N.GONORRHOEAE DNA AMP PROB: CPT | Performed by: FAMILY MEDICINE

## 2021-05-06 PROCEDURE — 99214 OFFICE O/P EST MOD 30 MIN: CPT | Performed by: FAMILY MEDICINE

## 2021-05-06 RX ORDER — SULFAMETHOXAZOLE/TRIMETHOPRIM 800-160 MG
1 TABLET ORAL 2 TIMES DAILY
Qty: 14 TABLET | Refills: 0 | Status: SHIPPED | OUTPATIENT
Start: 2021-05-06 | End: 2021-05-13

## 2021-05-06 RX ORDER — DOXYCYCLINE HYCLATE 100 MG
100 TABLET ORAL 2 TIMES DAILY
Qty: 60 TABLET | Refills: 3 | Status: SHIPPED | OUTPATIENT
Start: 2021-05-06 | End: 2021-05-20

## 2021-05-06 ASSESSMENT — MIFFLIN-ST. JEOR: SCORE: 1540.46

## 2021-05-06 NOTE — PROGRESS NOTES
[facility] Clinic Progress Note        Assessment & Plan     Dysuria  UTI vs prostatitis   Will treat for both until culture returns   Low risk for STI  - Urine Culture Aerobic Bacterial  - Urine Microscopic (UA) ([facility])  - NEISSERIA GONORRHOEA PCR  - CHLAMYDIA TRACHOMATIS PCR  - doxycycline hyclate (VIBRA-TABS) 100 MG tablet; Take 1 tablet (100 mg) by mouth 2 times daily for 14 days  - sulfamethoxazole-trimethoprim (BACTRIM DS) 800-160 MG tablet; Take 1 tablet by mouth 2 times daily for 7 days    Renal atrophy, left  Will assess renal function      I spent a total of 30 minutes on the day of the visit.   Time spent doing chart review, history and exam, documentation and further activities per the note         Return if symptoms worsen or fail to improve.    Urban Perry MD  [facility] EARL Dunaway is a 31 year old who presents for the following health issues   Patient presents with:  Polyuria: x 2 days with waking up to use the restroom at night      HPI     Genitourinary - Male  Onset/Duration: 2 days  Description:   Dysuria (painful urination): YES- no discharge  Hematuria (blood in urine): no  Frequency: YES-   Waking at night to urinate: YES  Hesitancy (delay in urine): no  Retention (unable to empty): no  Decrease in urinary flow: no  Incontinence: no  Progression of Symptoms:  worsening  Accompanying Signs & Symptoms:  Fever: no  Back/Flank pain: YES-  Has a kidney stone on the left side diagnosed 2 years ago, planned to have a procedure havidn pain bilaterally , has a dystrophic kidney on Left side scheduled for nephectomy on 5/24  Urethral discharge: no  Testicle lumps/masses/pain: no  Nausea and/or vomiting: no  Abdominal pain: no  History:   History of frequent UTI s: no  History of kidney stones: YES  History of hernias: no  Personal or Family history of Prostate problems: no  Sexually active: yes one partner,   Precipitating or alleviating factors:  "None  Therapies tried and outcome: none      Review of Systems         Objective    /83   Pulse 64   Temp 98.1  F (36.7  C) (Oral)   Ht 1.77 m (5' 9.69\")   Wt 59.4 kg (131 lb)   SpO2 99%   BMI 18.97 kg/m    Body mass index is 18.97 kg/m .  Physical Exam  Vitals signs and nursing note reviewed.   Constitutional:       General: He is not in acute distress.     Appearance: He is well-developed. He is not diaphoretic.   Cardiovascular:      Rate and Rhythm: Normal rate.   Genitourinary:     Penis: Normal. Penile tenderness:  uretrhal meatus.       Prostate: Tender ( mild).      Comments: Tender   Skin:     General: Skin is warm and dry.   Neurological:      Mental Status: He is alert and oriented to person, place, and time.   Psychiatric:         Behavior: Behavior normal.         Thought Content: Thought content normal.                        "

## 2021-05-06 NOTE — PATIENT INSTRUCTIONS
Here is the plan from today's visit    1. Screening for condition    - Urinalysis, Micro If (UA) ([facility])    2. Dysuria  Drink 2 liters of fluid a day  Take the medication as below   the results will be on [place] in the next 3-5 days  - doxycycline hyclate (VIBRA-TABS) 100 MG tablet; Take 1 tablet (100 mg) by mouth 2 times daily for 14 days  Dispense: 60 tablet; Refill: 3  - sulfamethoxazole-trimethoprim (BACTRIM DS) 800-160 MG tablet; Take 1 tablet by mouth 2 times daily for 7 days  Dispense: 14 tablet; Refill: 0  - Basic Metabolic Panel ([facility])      Please call or return to clinic if your symptoms don't go away.    Follow up plan  No follow-ups on file.     Thank you for coming to [facility] today.  Lab Testing:  **If you had lab testing today and your results are reassuring or normal they will be mailed to you or sent through [employer] within 7 days.   **If the lab tests need quick action we will call you with the results.  The phone number we will call with results is # [phone] (home) . If this is not the best number please call our clinic and change the number.  Medication Refills:  If you need any refills please call your pharmacy and they will contact us.   If you need to  your refill at a new pharmacy, please contact the new pharmacy directly. The new pharmacy will help you get your medications transferred faster.   Scheduling:  If you have any concerns about today's visit or wish to schedule another appointment please call our office during normal business hours [phone] (8-5:00 M-F)   eferrals to [facility] please call [phone].   Mammogram Scheduling [phone]     XRay/CT/Ultrasound/MRI Scheduling [phone]    Medical Concerns:  If you have urgent medical concerns please call [phone] at any time of the day.    Urban Perry MD

## 2021-05-14 NOTE — PROGRESS NOTES
"       HPI       Emelyn Marmolejo is a 30 year old man who presents for follow up of concern(s) listed below:.  His symptoms started 2-3 days ago. He has taken otc medications without improvement.  Chief Complaint   Patient presents with     Chest Pain     Pt complains of a cough, chest pain, and shortness of breath.     Problem, Medication and Allergy Lists were reviewed and updated if needed.    Patient is an established patient of this clinic.         Review of Systems:   Review of Systems     Constitutional:  Positive for fatigue. Negative for fever and chills.   Respiratory:   Positive for shortness of breath.                Physical Exam:     Vitals:    01/27/20 1402   BP: 132/83   BP Location: Right arm   Patient Position: Sitting   Cuff Size: Adult Regular   Pulse: 83   Resp: 16   Temp: 98.1  F (36.7  C)   TempSrc: Oral   SpO2: 98%   Weight: 69.9 kg (154 lb)   Height: 1.753 m (5' 9\")     Body mass index is 22.74 kg/m .  Vitals were reviewed and were normal.     Physical Exam  Constitutional:       Appearance: Normal appearance.   HENT:      Head: Normocephalic.   Cardiovascular:      Rate and Rhythm: Normal rate and regular rhythm.      Pulses: Normal pulses.      Heart sounds: Normal heart sounds.   Pulmonary:      Effort: Pulmonary effort is normal.      Breath sounds: Normal breath sounds.   Musculoskeletal: Normal range of motion.   Skin:     General: Skin is warm and dry.   Neurological:      General: No focal deficit present.      Mental Status: He is alert and oriented to person, place, and time.   Psychiatric:         Mood and Affect: Mood normal.         Behavior: Behavior normal.         Results:   Rapid strep and flu are pending.     Assessment and Plan     1. Throat pain    - Rapid strep screen with reflex to culture  - Influenza A/B antigen - Rapid Nasal Swab, Clinic Collect    2. Other fatigue    - Rapid strep screen with reflex to culture  - Influenza A/B antigen - Rapid Nasal Swab, Clinic " A (Gdwr Vasc Str Tip .[id number]) guidewire was removed.  Collect    3. Shortness of breath    - Rapid strep screen with reflex to culture  - Influenza A/B antigen - Rapid Nasal Swab, Clinic Collect  - albuterol (PROVENTIL) neb solution 2.5 mg  Strep and Flu tests are pending, care plan to be developed based on results. Return in 4 days for follow up on status. Options for treatment and follow-up care were reviewed with the patient. Emelyn Marmolejo  engaged in the decision making process and verbalized understanding of the options discussed and agreed with the final plan.  Bina Casey, APRN, CNP

## 2021-05-15 ENCOUNTER — APPOINTMENT (OUTPATIENT)
Dept: INTERPRETER SERVICES | Facility: CLINIC | Age: 31
End: 2021-05-15
Payer: COMMERCIAL

## 2021-05-17 ENCOUNTER — OFFICE VISIT (OUTPATIENT)
Dept: FAMILY MEDICINE | Facility: CLINIC | Age: 31
End: 2021-05-17
Payer: COMMERCIAL

## 2021-05-17 VITALS
TEMPERATURE: 98.7 F | RESPIRATION RATE: 16 BRPM | BODY MASS INDEX: 19.4 KG/M2 | OXYGEN SATURATION: 98 % | SYSTOLIC BLOOD PRESSURE: 123 MMHG | HEART RATE: 71 BPM | WEIGHT: 134 LBS | DIASTOLIC BLOOD PRESSURE: 85 MMHG

## 2021-05-17 DIAGNOSIS — N41.0 ACUTE PROSTATITIS: Primary | ICD-10-CM

## 2021-05-17 PROCEDURE — 99213 OFFICE O/P EST LOW 20 MIN: CPT | Performed by: FAMILY MEDICINE

## 2021-05-17 PROCEDURE — 87086 URINE CULTURE/COLONY COUNT: CPT | Performed by: FAMILY MEDICINE

## 2021-05-17 RX ORDER — LEVOFLOXACIN 500 MG/1
500 TABLET, FILM COATED ORAL DAILY
Qty: 42 TABLET | Refills: 0 | Status: SHIPPED | OUTPATIENT
Start: 2021-05-17 | End: 2021-05-24

## 2021-05-17 NOTE — PROGRESS NOTES
[facility] Progress Note        Assessment & Plan     Acute prostatitis  Has signs and symptoms of prostatitis got beter with previous treament -advised patient to call surgeons office to see if surgery should be delayed  - levofloxacin (LEVAQUIN) 500 MG tablet; Take 1 tablet (500 mg) by mouth daily for 6 weeks.  Even if culture is negative.  - Urine Culture Aerobic Bacterial      I spent a total of 22 minutes on the day of the visit.   Time spent doing chart review, history and exam, documentation and further activities per the note           Return if symptoms worsen or fail to improve.    Urban Perry MD  [facility] EARL Dunaway is a 31 year old who presents for the following health issues   Patient presents with:  UTI: Patient reports pain while urinating in Penial area,x2 weeks ago,got an antibotic that helped but went to have sexual intercourse with wife and noticed some blood at the tip and is wanting some medical advice.         HPI   Had recurrence  Of symptoms of burning and having to pee frequently, was much better with antibiotics.  Burning is worse than previous. Had intercourse with his wife and then had a recurrence of symptoms.  He has no concerns about his wife's and that all she onlyhas him as a sexual partner and he has not had any sexual partners.          Objective    /85   Pulse 71   Temp 98.7  F (37.1  C) (Oral)   Resp 16   Wt 60.8 kg (134 lb)   SpO2 98%   BMI 19.40 kg/m    Body mass index is 19.4 kg/m .  Physical Exam  Vitals signs and nursing note reviewed.   Constitutional:       General: He is not in acute distress.     Appearance: He is well-developed. He is not diaphoretic.   Cardiovascular:      Rate and Rhythm: Normal rate.   Genitourinary:     Penis: Normal and circumcised.       Testes: Normal.      Jhonathan stage (genital): 5.      Prostate: Tender:  Soft and boggy.      Rectum: Normal.   Skin:     General: Skin is warm and dry.    Neurological:      Mental Status: He is alert and oriented to person, place, and time.   Psychiatric:         Behavior: Behavior normal.         Thought Content: Thought content normal.

## 2021-05-17 NOTE — NURSING NOTE
Due to patient being non-English speaking/uses sign language, an  was used for this visit. Only for face-to-face interpretation by an external agency, date and length of interpretation can be found on the scanned worksheet.     name: Vickie  Agency: [employer]  Language: Swedish   Telephone number: [phone]  Type of interpretation: Telephone, spoken

## 2021-05-18 LAB
BACTERIA SPEC CULT: NO GROWTH
Lab: NORMAL
SPECIMEN SOURCE: NORMAL

## 2021-05-20 ENCOUNTER — OFFICE VISIT (OUTPATIENT)
Dept: FAMILY MEDICINE | Facility: CLINIC | Age: 31
End: 2021-05-20
Payer: COMMERCIAL

## 2021-05-20 VITALS
OXYGEN SATURATION: 98 % | DIASTOLIC BLOOD PRESSURE: 76 MMHG | RESPIRATION RATE: 16 BRPM | BODY MASS INDEX: 18.97 KG/M2 | WEIGHT: 131 LBS | HEART RATE: 68 BPM | TEMPERATURE: 98.7 F | SYSTOLIC BLOOD PRESSURE: 112 MMHG

## 2021-05-20 DIAGNOSIS — Z01.818 PREOP GENERAL PHYSICAL EXAM: ICD-10-CM

## 2021-05-20 DIAGNOSIS — N26.1 RENAL ATROPHY, LEFT: Primary | ICD-10-CM

## 2021-05-20 DIAGNOSIS — N20.0 RENAL STONE: ICD-10-CM

## 2021-05-20 DIAGNOSIS — N41.0 ACUTE PROSTATITIS: ICD-10-CM

## 2021-05-20 DIAGNOSIS — Z11.59 ENCOUNTER FOR SCREENING FOR OTHER VIRAL DISEASES: ICD-10-CM

## 2021-05-20 LAB
HEMOGLOBIN: 15.5 G/DL (ref 13.3–17.7)
SARS-COV-2 RNA RESP QL NAA+PROBE: NORMAL
SPECIMEN SOURCE: NORMAL

## 2021-05-20 PROCEDURE — 99214 OFFICE O/P EST MOD 30 MIN: CPT | Performed by: FAMILY MEDICINE

## 2021-05-20 PROCEDURE — 36416 COLLJ CAPILLARY BLOOD SPEC: CPT | Performed by: FAMILY MEDICINE

## 2021-05-20 PROCEDURE — U0003 INFECTIOUS AGENT DETECTION BY NUCLEIC ACID (DNA OR RNA); SEVERE ACUTE RESPIRATORY SYNDROME CORONAVIRUS 2 (SARS-COV-2) (CORONAVIRUS DISEASE [COVID-19]), AMPLIFIED PROBE TECHNIQUE, MAKING USE OF HIGH THROUGHPUT TECHNOLOGIES AS DESCRIBED BY CMS-2020-01-R: HCPCS | Mod: 90 | Performed by: PATHOLOGY

## 2021-05-20 PROCEDURE — U0005 INFEC AGEN DETEC AMPLI PROBE: HCPCS | Mod: 90 | Performed by: PATHOLOGY

## 2021-05-20 PROCEDURE — 85018 HEMOGLOBIN: CPT | Performed by: FAMILY MEDICINE

## 2021-05-20 NOTE — NURSING NOTE
Due to patient being non-English speaking/uses sign language, an  was used for this visit. Only for face-to-face interpretation by an external agency, date and length of interpretation can be found on the scanned worksheet.     name: Sanjuana  Agency: [employer]  Language: Qatari   ID number: [id number]  Type of interpretation: Telephone, spoken

## 2021-05-20 NOTE — H&P (VIEW-ONLY)
[facility]  [address]  [address] [place] [id number]  Phone: [phone]  Fax: [phone]  Primary Provider: Brittny Eid  Pre-op Performing Provider: JOSE LUIS BARR      PREOPERATIVE EVALUATION:  Today's date: 5/20/2021    Emelyn Marmolejo is a 31 year old male who presents for a preoperative evaluation.    Surgical Information:  Surgery/Procedure: Left Kidney removal  Surgery Location: [facility]  Surgeon:   Surgery Date: 05/24/2021  Time of Surgery:   Where patient plans to recover: At home with family  Fax number for surgical facility:     Type of Anesthesia Anticipated: General    Assessment & Plan     The proposed surgical procedure is considered INTERMEDIATE risk.    Renal atrophy, left  Plan is to have a renal nephrectomy I have reached out with message with the surgeon to discuss whether this needs to be delayed because of the presumed prostatitis.    Renal stone    Acute prostatitis recovering  Patient symptoms are much improved since starting levofloxacin because of the recurrence of symptoms previously as I am encouraged him to complete the course of 6 weeks.    Preop general physical exam  Will discuss with surgeon whether this should be postponed or not.  - Hemoglobin (HGB) ([facility])           Risks and Recommendations:  The patient has the following additional risks and recommendations for perioperative complications:      Medication Instructions:  Patient is to take all scheduled medications on the day of surgery    RECOMMENDATION:  APPROVAL GIVEN to proceed with proposed procedure, without further diagnostic evaluation.      I spent a total of 39 minutes on the day of the visit.   Time spent doing chart review, history and exam, documentation and further activities per the note         Subjective     HPI related to upcoming procedure: Has had pain, bleeding from this kidney and has a large stone and the kidney is very small  and not functional so it is recommended to have a nephrectomy. Patient reports that all symptoms related to prostatitis have resolved no dysuria     Preop Questions 5/20/2021   1. Have you ever had a heart attack or stroke? No   2. Have you ever had surgery on your heart or blood vessels, such as a stent placement, a coronary artery bypass, or surgery on an artery in your head, neck, heart, or legs? No   3. Do you have chest pain with activity? variable- consistent with chest wall pain   4. Do you have a history of  heart failure? No   5. Do you currently have a cold, bronchitis or symptoms of other infection? No   6. Do you have a cough, shortness of breath, or wheezing? No   7. Do you or anyone in your family have previous history of blood clots? No   8. Do you or does anyone in your family have a serious bleeding problem such as prolonged bleeding following surgeries or cuts? No   9. Have you ever had problems with anemia or been told to take iron pills? No   10. Have you had any abnormal blood loss such as black, tarry or bloody stools? No   11. Have you ever had a blood transfusion? No   12. Are you willing to have a blood transfusion if it is medically needed before, during, or after your surgery? Yes   13. Have you or any of your relatives ever had problems with anesthesia? No   14. Do you have sleep apnea, excessive snoring or daytime drowsiness? No   15. Do you have any artifical heart valves or other implanted medical devices like a pacemaker, defibrillator, or continuous glucose monitor? No   16. Do you have artificial joints? No   17. Are you allergic to latex? No       Health Care Directive:  Patient does not have a Health Care Directive or Living Will:     Preoperative Review of :   reviewed - controlled substances prescribed by other outside provider(s).       Status of Chronic Conditions:      Review of Systems  CONSTITUTIONAL: NEGATIVE for fever, chills, change in weight  INTEGUMENTARY/SKIN:  NEGATIVE for worrisome rashes, moles or lesions  EYES: NEGATIVE for vision changes or irritation  ENT/MOUTH: NEGATIVE for ear, mouth and throat problems  RESP: NEGATIVE for significant cough or SOB  BREAST: NEGATIVE for masses, tenderness or discharge  CV: NEGATIVE for chest pain, palpitations or peripheral edema  GI: NEGATIVE for nausea, abdominal pain, heartburn, or change in bowel habits  : NEGATIVE for frequency, dysuria, or hematuria  MUSCULOSKELETAL: NEGATIVE for significant arthralgias or myalgia  NEURO: NEGATIVE for weakness, dizziness or paresthesias  ENDOCRINE: NEGATIVE for temperature intolerance, skin/hair changes  HEME: NEGATIVE for bleeding problems  PSYCHIATRIC: NEGATIVE for changes in mood or affect    Patient Active Problem List    Diagnosis Date Noted     Left flank pain 03/16/2021     Priority: Medium     Added automatically from request for surgery [id number]       Bronchiectasis, uncomplicated (H) 03/09/2021     Priority: Medium     Atrophy of left kidney 12/29/2020     Priority: Medium     Added automatically from request for surgery [id number]       Abdominal pain, generalized 10/31/2020     Priority: Medium     2019 novel coronavirus disease (COVID-19) 10/31/2020     Priority: Medium     H. pylori infection 05/14/2020     Priority: Medium     History of renal stone 05/14/2020     Priority: Medium     Abnormal CT of the chest 10/09/2019     Priority: Medium     Renal atrophy, left 08/21/2019     Priority: Medium     Renal stone 08/21/2019     Priority: Medium      Past Medical History:   Diagnosis Date     Prostate infection      Past Surgical History:   Procedure Laterality Date     COMBINED CYSTOSCOPY, URETEROSCOPY, LASER HOLMIUM LITHOTRIPSY URETER(S) Right 8/13/2020    Procedure: CYSTOSCOPY, RIGHT RETROGRADE PYELOGRAM, RIGHT diagnostic URETEROSCOPY;  Surgeon: Aj Hedrick MD;  Location:  OR     ESOPHAGOSCOPY, GASTROSCOPY, DUODENOSCOPY (EGD), COMBINED N/A 1/25/2021    Procedure:  ESOPHAGOGASTRODUODENOSCOPY, WITH BIOPSY;  Surgeon: Rip Her MD;  Location: [facility] OR     Current Outpatient Medications   Medication Sig Dispense Refill     acetaminophen (TYLENOL) 500 MG tablet Take 1,000 mg by mouth       diphenhydrAMINE (BENADRYL) 25 MG tablet Take 2 tablets (50 mg) by mouth nightly as needed for sleep 50 tablet 0     levofloxacin (LEVAQUIN) 500 MG tablet Take 1 tablet (500 mg) by mouth daily 42 tablet 0     vitamin D3 (CHOLECALCIFEROL) 50 mcg (2000 units) tablet Take 1 tablet (50 mcg) by mouth daily 90 tablet 0     doxycycline hyclate (VIBRA-TABS) 100 MG tablet Take 1 tablet (100 mg) by mouth 2 times daily for 14 days (Patient not taking: Reported on 2021) 60 tablet 3       Allergies   Allergen Reactions     Nsaids      Not true allergy. But be sparing with patient as he has single kidney         Social History     Tobacco Use     Smoking status: Former Smoker     Types: Cigarettes     Quit date: 2019     Years since quittin.6     Smokeless tobacco: Never Used   Substance Use Topics     Alcohol use: Never     Frequency: Never       History   Drug Use Unknown         Objective     /76   Pulse 68   Temp 98.7  F (37.1  C) (Oral)   Resp 16   Wt 59.4 kg (131 lb)   SpO2 98%   BMI 18.97 kg/m      Physical Exam    GENERAL APPEARANCE: healthy, alert and no distress     EYES: EOMI,  PERRL     HENT: ear canals and TM's normal and nose and mouth without ulcers or lesions     NECK: no adenopathy, no asymmetry, masses, or scars and thyroid normal to palpation     RESP: lungs clear to auscultation - no rales, rhonchi or wheezes     CV: regular rates and rhythm, normal S1 S2, no S3 or S4 and no murmur, click or rub     ABDOMEN:  soft, nontender, no HSM or masses and bowel sounds normal     MS: extremities normal- no gross deformities noted, no evidence of inflammation in joints, FROM in all extremities.     SKIN: no suspicious lesions or rashes     NEURO: Normal strength  and tone, sensory exam grossly normal, mentation intact and speech normal     PSYCH: mentation appears normal. and affect normal/bright     LYMPHATICS: No cervical adenopathy    Recent Labs   Lab Test 05/06/21  1252 02/02/21  0907 06/11/20  1649 06/11/20  1649 03/26/20  1556 03/26/20  1556 02/27/20  1403 02/27/20  1403 12/30/19  1345 12/30/19  1345   HGB  --   --   --   --   --  13.4  --   --   --  15.6    140.5   < >  --    < >  --    < >  --    < >  --    POTASSIUM 4.2 3.9   < >  --    < >  --    < >  --    < >  --    CR 0.96 0.9   < >  --    < >  --    < > 0.9   < >  --    A1C  --   --   --  5.4  --   --   --  5.3  --   --     < > = values in this interval not displayed.        Diagnostics:  Recent Results (from the past 24 hour(s))   Hemoglobin (HGB) ([place])    Collection Time: 05/20/21 10:32 AM   Result Value Ref Range    Hemoglobin 15.5 13.3 - 17.7 g/dL      No EKG required, no history of coronary heart disease, significant arrhythmia, peripheral arterial disease or other structural heart disease.    Revised Cardiac Risk Index (RCRI):  The patient has the following serious cardiovascular risks for perioperative complications:   - No serious cardiac risks = 0 points     RCRI Interpretation: 0 points: Class I (very low risk - 0.4% complication rate)           Signed Electronically by: Urban Perry MD  Copy of this evaluation report is provided to requesting physician.

## 2021-05-20 NOTE — PROGRESS NOTES
[facility]  [address]  [address] [place] [address]  Phone: [phone]  Fax: [phone]  Primary Provider: Brittny Eid  Pre-op Performing Provider: JOSE LUIS BARR      PREOPERATIVE EVALUATION:  Today's date: 5/20/2021    Emelyn Marmolejo is a 31 year old male who presents for a preoperative evaluation.    Surgical Information:  Surgery/Procedure: Left Kidney removal  Surgery Location: [facility]  Surgeon:   Surgery Date: 05/24/2021  Time of Surgery:   Where patient plans to recover: At home with family  Fax number for surgical facility:     Type of Anesthesia Anticipated: General    Assessment & Plan     The proposed surgical procedure is considered INTERMEDIATE risk.    Renal atrophy, left  Plan is to have a renal nephrectomy I have reached out with message with the surgeon to discuss whether this needs to be delayed because of the presumed prostatitis.    Renal stone    Acute prostatitis recovering  Patient symptoms are much improved since starting levofloxacin because of the recurrence of symptoms previously as I am encouraged him to complete the course of 6 weeks.    Preop general physical exam  Will discuss with surgeon whether this should be postponed or not.  - Hemoglobin (HGB) ([place])           Risks and Recommendations:  The patient has the following additional risks and recommendations for perioperative complications:      Medication Instructions:  Patient is to take all scheduled medications on the day of surgery    RECOMMENDATION:  APPROVAL GIVEN to proceed with proposed procedure, without further diagnostic evaluation.      I spent a total of 39 minutes on the day of the visit.   Time spent doing chart review, history and exam, documentation and further activities per the note         Subjective     HPI related to upcoming procedure: Has had pain, bleeding from this kidney and has a large stone and the kidney is very small  and not functional so it is recommended to have a nephrectomy. Patient reports that all symptoms related to prostatitis have resolved no dysuria     Preop Questions 5/20/2021   1. Have you ever had a heart attack or stroke? No   2. Have you ever had surgery on your heart or blood vessels, such as a stent placement, a coronary artery bypass, or surgery on an artery in your head, neck, heart, or legs? No   3. Do you have chest pain with activity? variable- consistent with chest wall pain   4. Do you have a history of  heart failure? No   5. Do you currently have a cold, bronchitis or symptoms of other infection? No   6. Do you have a cough, shortness of breath, or wheezing? No   7. Do you or anyone in your family have previous history of blood clots? No   8. Do you or does anyone in your family have a serious bleeding problem such as prolonged bleeding following surgeries or cuts? No   9. Have you ever had problems with anemia or been told to take iron pills? No   10. Have you had any abnormal blood loss such as black, tarry or bloody stools? No   11. Have you ever had a blood transfusion? No   12. Are you willing to have a blood transfusion if it is medically needed before, during, or after your surgery? Yes   13. Have you or any of your relatives ever had problems with anesthesia? No   14. Do you have sleep apnea, excessive snoring or daytime drowsiness? No   15. Do you have any artifical heart valves or other implanted medical devices like a pacemaker, defibrillator, or continuous glucose monitor? No   16. Do you have artificial joints? No   17. Are you allergic to latex? No       Health Care Directive:  Patient does not have a Health Care Directive or Living Will:     Preoperative Review of :   reviewed - controlled substances prescribed by other outside provider(s).       Status of Chronic Conditions:      Review of Systems  CONSTITUTIONAL: NEGATIVE for fever, chills, change in weight  INTEGUMENTARY/SKIN:  NEGATIVE for worrisome rashes, moles or lesions  EYES: NEGATIVE for vision changes or irritation  ENT/MOUTH: NEGATIVE for ear, mouth and throat problems  RESP: NEGATIVE for significant cough or SOB  BREAST: NEGATIVE for masses, tenderness or discharge  CV: NEGATIVE for chest pain, palpitations or peripheral edema  GI: NEGATIVE for nausea, abdominal pain, heartburn, or change in bowel habits  : NEGATIVE for frequency, dysuria, or hematuria  MUSCULOSKELETAL: NEGATIVE for significant arthralgias or myalgia  NEURO: NEGATIVE for weakness, dizziness or paresthesias  ENDOCRINE: NEGATIVE for temperature intolerance, skin/hair changes  HEME: NEGATIVE for bleeding problems  PSYCHIATRIC: NEGATIVE for changes in mood or affect    Patient Active Problem List    Diagnosis Date Noted     Left flank pain 03/16/2021     Priority: Medium     Added automatically from request for surgery [id number]       Bronchiectasis, uncomplicated (H) 03/09/2021     Priority: Medium     Atrophy of left kidney 12/29/2020     Priority: Medium     Added automatically from request for surgery [id number]       Abdominal pain, generalized 10/31/2020     Priority: Medium     2019 novel coronavirus disease (COVID-19) 10/31/2020     Priority: Medium     H. pylori infection 05/14/2020     Priority: Medium     History of renal stone 05/14/2020     Priority: Medium     Abnormal CT of the chest 10/09/2019     Priority: Medium     Renal atrophy, left 08/21/2019     Priority: Medium     Renal stone 08/21/2019     Priority: Medium      Past Medical History:   Diagnosis Date     Prostate infection      Past Surgical History:   Procedure Laterality Date     COMBINED CYSTOSCOPY, URETEROSCOPY, LASER HOLMIUM LITHOTRIPSY URETER(S) Right 8/13/2020    Procedure: CYSTOSCOPY, RIGHT RETROGRADE PYELOGRAM, RIGHT diagnostic URETEROSCOPY;  Surgeon: Aj Hedrick MD;  Location:  OR     ESOPHAGOSCOPY, GASTROSCOPY, DUODENOSCOPY (EGD), COMBINED N/A 1/25/2021    Procedure:  ESOPHAGOGASTRODUODENOSCOPY, WITH BIOPSY;  Surgeon: Rip Her MD;  Location: [facility] OR     Current Outpatient Medications   Medication Sig Dispense Refill     acetaminophen (TYLENOL) 500 MG tablet Take 1,000 mg by mouth       diphenhydrAMINE (BENADRYL) 25 MG tablet Take 2 tablets (50 mg) by mouth nightly as needed for sleep 50 tablet 0     levofloxacin (LEVAQUIN) 500 MG tablet Take 1 tablet (500 mg) by mouth daily 42 tablet 0     vitamin D3 (CHOLECALCIFEROL) 50 mcg (2000 units) tablet Take 1 tablet (50 mcg) by mouth daily 90 tablet 0     doxycycline hyclate (VIBRA-TABS) 100 MG tablet Take 1 tablet (100 mg) by mouth 2 times daily for 14 days (Patient not taking: Reported on 2021) 60 tablet 3       Allergies   Allergen Reactions     Nsaids      Not true allergy. But be sparing with patient as he has single kidney         Social History     Tobacco Use     Smoking status: Former Smoker     Types: Cigarettes     Quit date: 2019     Years since quittin.6     Smokeless tobacco: Never Used   Substance Use Topics     Alcohol use: Never     Frequency: Never       History   Drug Use Unknown         Objective     /76   Pulse 68   Temp 98.7  F (37.1  C) (Oral)   Resp 16   Wt 59.4 kg (131 lb)   SpO2 98%   BMI 18.97 kg/m      Physical Exam    GENERAL APPEARANCE: healthy, alert and no distress     EYES: EOMI,  PERRL     HENT: ear canals and TM's normal and nose and mouth without ulcers or lesions     NECK: no adenopathy, no asymmetry, masses, or scars and thyroid normal to palpation     RESP: lungs clear to auscultation - no rales, rhonchi or wheezes     CV: regular rates and rhythm, normal S1 S2, no S3 or S4 and no murmur, click or rub     ABDOMEN:  soft, nontender, no HSM or masses and bowel sounds normal     MS: extremities normal- no gross deformities noted, no evidence of inflammation in joints, FROM in all extremities.     SKIN: no suspicious lesions or rashes     NEURO: Normal strength  and tone, sensory exam grossly normal, mentation intact and speech normal     PSYCH: mentation appears normal. and affect normal/bright     LYMPHATICS: No cervical adenopathy    Recent Labs   Lab Test 05/06/21  1252 02/02/21  0907 06/11/20  1649 06/11/20  1649 03/26/20  1556 03/26/20  1556 02/27/20  1403 02/27/20  1403 12/30/19  1345 12/30/19  1345   HGB  --   --   --   --   --  13.4  --   --   --  15.6    140.5   < >  --    < >  --    < >  --    < >  --    POTASSIUM 4.2 3.9   < >  --    < >  --    < >  --    < >  --    CR 0.96 0.9   < >  --    < >  --    < > 0.9   < >  --    A1C  --   --   --  5.4  --   --   --  5.3  --   --     < > = values in this interval not displayed.        Diagnostics:  Recent Results (from the past 24 hour(s))   Hemoglobin (HGB) ([place])    Collection Time: 05/20/21 10:32 AM   Result Value Ref Range    Hemoglobin 15.5 13.3 - 17.7 g/dL      No EKG required, no history of coronary heart disease, significant arrhythmia, peripheral arterial disease or other structural heart disease.    Revised Cardiac Risk Index (RCRI):  The patient has the following serious cardiovascular risks for perioperative complications:   - No serious cardiac risks = 0 points     RCRI Interpretation: 0 points: Class I (very low risk - 0.4% complication rate)           Signed Electronically by: Urban Perry MD  Copy of this evaluation report is provided to requesting physician.

## 2021-05-21 LAB
LABORATORY COMMENT REPORT: NORMAL
SARS-COV-2 RNA RESP QL NAA+PROBE: NEGATIVE
SPECIMEN SOURCE: NORMAL

## 2021-05-24 ENCOUNTER — ANESTHESIA EVENT (OUTPATIENT)
Dept: SURGERY | Facility: CLINIC | Age: 31
End: 2021-05-24
Payer: COMMERCIAL

## 2021-05-24 ENCOUNTER — ANESTHESIA (OUTPATIENT)
Dept: SURGERY | Facility: CLINIC | Age: 31
End: 2021-05-24
Payer: COMMERCIAL

## 2021-05-24 ENCOUNTER — HOSPITAL ENCOUNTER (INPATIENT)
Facility: CLINIC | Age: 31
LOS: 1 days | Discharge: HOME | End: 2021-05-26
Attending: UROLOGY | Admitting: UROLOGY
Payer: COMMERCIAL

## 2021-05-24 DIAGNOSIS — R10.9 LEFT FLANK PAIN: ICD-10-CM

## 2021-05-24 DIAGNOSIS — N26.1 ATROPHY OF LEFT KIDNEY: Primary | ICD-10-CM

## 2021-05-24 LAB
ABO + RH BLD: NORMAL
ABO + RH BLD: NORMAL
ANION GAP SERPL CALCULATED.3IONS-SCNC: 4 MMOL/L (ref 3–14)
BLD GP AB SCN SERPL QL: NORMAL
BLOOD BANK CMNT PATIENT-IMP: NORMAL
BUN SERPL-MCNC: 15 MG/DL (ref 7–30)
CALCIUM SERPL-MCNC: 8.6 MG/DL (ref 8.5–10.1)
CHLORIDE SERPL-SCNC: 108 MMOL/L (ref 94–109)
CO2 SERPL-SCNC: 27 MMOL/L (ref 20–32)
CREAT SERPL-MCNC: 0.98 MG/DL (ref 0.66–1.25)
ERYTHROCYTE [DISTWIDTH] IN BLOOD BY AUTOMATED COUNT: 12.1 % (ref 10–15)
GFR SERPL CREATININE-BSD FRML MDRD: >90 ML/MIN/{1.73_M2}
GLUCOSE SERPL-MCNC: 111 MG/DL (ref 70–99)
HCT VFR BLD AUTO: 41.8 % (ref 40–53)
HGB BLD-MCNC: 13.8 G/DL (ref 13.3–17.7)
MCH RBC QN AUTO: 29.1 PG (ref 26.5–33)
MCHC RBC AUTO-ENTMCNC: 33 G/DL (ref 31.5–36.5)
MCV RBC AUTO: 88 FL (ref 78–100)
PLATELET # BLD AUTO: 187 10E9/L (ref 150–450)
POTASSIUM SERPL-SCNC: 3.9 MMOL/L (ref 3.4–5.3)
RBC # BLD AUTO: 4.74 10E12/L (ref 4.4–5.9)
SODIUM SERPL-SCNC: 139 MMOL/L (ref 133–144)
SPECIMEN EXP DATE BLD: NORMAL
WBC # BLD AUTO: 11.8 10E9/L (ref 4–11)

## 2021-05-24 PROCEDURE — 710N000009 HC RECOVERY PHASE 1, LEVEL 1, PER MIN: Performed by: UROLOGY

## 2021-05-24 PROCEDURE — 86900 BLOOD TYPING SEROLOGIC ABO: CPT | Performed by: ANESTHESIOLOGY

## 2021-05-24 PROCEDURE — 86901 BLOOD TYPING SEROLOGIC RH(D): CPT | Performed by: ANESTHESIOLOGY

## 2021-05-24 PROCEDURE — 370N000017 HC ANESTHESIA TECHNICAL FEE, PER MIN: Performed by: UROLOGY

## 2021-05-24 PROCEDURE — 250N000011 HC RX IP 250 OP 636: Performed by: UROLOGY

## 2021-05-24 PROCEDURE — 0TT14ZZ RESECTION OF LEFT KIDNEY, PERCUTANEOUS ENDOSCOPIC APPROACH: ICD-10-PCS | Performed by: UROLOGY

## 2021-05-24 PROCEDURE — 86850 RBC ANTIBODY SCREEN: CPT | Performed by: ANESTHESIOLOGY

## 2021-05-24 PROCEDURE — 258N000001 HC RX 258: Performed by: UROLOGY

## 2021-05-24 PROCEDURE — 250N000011 HC RX IP 250 OP 636: Performed by: NURSE ANESTHETIST, CERTIFIED REGISTERED

## 2021-05-24 PROCEDURE — 250N000011 HC RX IP 250 OP 636: Performed by: STUDENT IN AN ORGANIZED HEALTH CARE EDUCATION/TRAINING PROGRAM

## 2021-05-24 PROCEDURE — 80048 BASIC METABOLIC PNL TOTAL CA: CPT | Performed by: STUDENT IN AN ORGANIZED HEALTH CARE EDUCATION/TRAINING PROGRAM

## 2021-05-24 PROCEDURE — 250N000009 HC RX 250: Performed by: NURSE ANESTHETIST, CERTIFIED REGISTERED

## 2021-05-24 PROCEDURE — 258N000003 HC RX IP 258 OP 636: Performed by: ANESTHESIOLOGY

## 2021-05-24 PROCEDURE — 250N000009 HC RX 250: Performed by: UROLOGY

## 2021-05-24 PROCEDURE — 36415 COLL VENOUS BLD VENIPUNCTURE: CPT | Performed by: STUDENT IN AN ORGANIZED HEALTH CARE EDUCATION/TRAINING PROGRAM

## 2021-05-24 PROCEDURE — 258N000003 HC RX IP 258 OP 636: Performed by: NURSE ANESTHETIST, CERTIFIED REGISTERED

## 2021-05-24 PROCEDURE — 88307 TISSUE EXAM BY PATHOLOGIST: CPT | Mod: TC | Performed by: UROLOGY

## 2021-05-24 PROCEDURE — 88307 TISSUE EXAM BY PATHOLOGIST: CPT | Mod: 26 | Performed by: PATHOLOGY

## 2021-05-24 PROCEDURE — 85027 COMPLETE CBC AUTOMATED: CPT | Performed by: STUDENT IN AN ORGANIZED HEALTH CARE EDUCATION/TRAINING PROGRAM

## 2021-05-24 PROCEDURE — 272N000001 HC OR GENERAL SUPPLY STERILE: Performed by: UROLOGY

## 2021-05-24 PROCEDURE — 360N000077 HC SURGERY LEVEL 4, PER MIN: Performed by: UROLOGY

## 2021-05-24 PROCEDURE — 50545 LAPARO RADICAL NEPHRECTOMY: CPT | Mod: LT | Performed by: UROLOGY

## 2021-05-24 PROCEDURE — 250N000025 HC SEVOFLURANE, PER MIN: Performed by: UROLOGY

## 2021-05-24 PROCEDURE — 250N000013 HC RX MED GY IP 250 OP 250 PS 637: Performed by: STUDENT IN AN ORGANIZED HEALTH CARE EDUCATION/TRAINING PROGRAM

## 2021-05-24 PROCEDURE — 36415 COLL VENOUS BLD VENIPUNCTURE: CPT | Performed by: ANESTHESIOLOGY

## 2021-05-24 PROCEDURE — 258N000003 HC RX IP 258 OP 636: Performed by: STUDENT IN AN ORGANIZED HEALTH CARE EDUCATION/TRAINING PROGRAM

## 2021-05-24 PROCEDURE — 999N000141 HC STATISTIC PRE-PROCEDURE NURSING ASSESSMENT: Performed by: UROLOGY

## 2021-05-24 RX ORDER — DEXAMETHASONE SODIUM PHOSPHATE 4 MG/ML
INJECTION, SOLUTION INTRA-ARTICULAR; INTRALESIONAL; INTRAMUSCULAR; INTRAVENOUS; SOFT TISSUE PRN
Status: DISCONTINUED | OUTPATIENT
Start: 2021-05-24 | End: 2021-05-24

## 2021-05-24 RX ORDER — GLYCOPYRROLATE 0.2 MG/ML
INJECTION, SOLUTION INTRAMUSCULAR; INTRAVENOUS PRN
Status: DISCONTINUED | OUTPATIENT
Start: 2021-05-24 | End: 2021-05-24

## 2021-05-24 RX ORDER — SODIUM CHLORIDE, SODIUM LACTATE, POTASSIUM CHLORIDE, CALCIUM CHLORIDE 600; 310; 30; 20 MG/100ML; MG/100ML; MG/100ML; MG/100ML
INJECTION, SOLUTION INTRAVENOUS CONTINUOUS
Status: DISCONTINUED | OUTPATIENT
Start: 2021-05-24 | End: 2021-05-24 | Stop reason: HOSPADM

## 2021-05-24 RX ORDER — NALOXONE HYDROCHLORIDE 0.4 MG/ML
0.4 INJECTION, SOLUTION INTRAMUSCULAR; INTRAVENOUS; SUBCUTANEOUS
Status: DISCONTINUED | OUTPATIENT
Start: 2021-05-24 | End: 2021-05-26 | Stop reason: HOSPADM

## 2021-05-24 RX ORDER — NALOXONE HYDROCHLORIDE 0.4 MG/ML
0.2 INJECTION, SOLUTION INTRAMUSCULAR; INTRAVENOUS; SUBCUTANEOUS
Status: DISCONTINUED | OUTPATIENT
Start: 2021-05-24 | End: 2021-05-26 | Stop reason: HOSPADM

## 2021-05-24 RX ORDER — ONDANSETRON 2 MG/ML
INJECTION INTRAMUSCULAR; INTRAVENOUS PRN
Status: DISCONTINUED | OUTPATIENT
Start: 2021-05-24 | End: 2021-05-24

## 2021-05-24 RX ORDER — ASPIRIN 81 MG
100 TABLET, DELAYED RELEASE (ENTERIC COATED) ORAL 2 TIMES DAILY
Qty: 60 TABLET | Refills: 0 | Status: SHIPPED | OUTPATIENT
Start: 2021-05-24 | End: 2021-09-07

## 2021-05-24 RX ORDER — ONDANSETRON 4 MG/1
4 TABLET, ORALLY DISINTEGRATING ORAL EVERY 30 MIN PRN
Status: DISCONTINUED | OUTPATIENT
Start: 2021-05-24 | End: 2021-05-24 | Stop reason: HOSPADM

## 2021-05-24 RX ORDER — HYDROMORPHONE HYDROCHLORIDE 1 MG/ML
.3-.5 INJECTION, SOLUTION INTRAMUSCULAR; INTRAVENOUS; SUBCUTANEOUS EVERY 5 MIN PRN
Status: DISCONTINUED | OUTPATIENT
Start: 2021-05-24 | End: 2021-05-24 | Stop reason: HOSPADM

## 2021-05-24 RX ORDER — PROPOFOL 10 MG/ML
INJECTION, EMULSION INTRAVENOUS PRN
Status: DISCONTINUED | OUTPATIENT
Start: 2021-05-24 | End: 2021-05-24

## 2021-05-24 RX ORDER — AMOXICILLIN 250 MG
1 CAPSULE ORAL AT BEDTIME
Status: DISCONTINUED | OUTPATIENT
Start: 2021-05-24 | End: 2021-05-26 | Stop reason: HOSPADM

## 2021-05-24 RX ORDER — FENTANYL CITRATE 50 UG/ML
25-50 INJECTION, SOLUTION INTRAMUSCULAR; INTRAVENOUS
Status: DISCONTINUED | OUTPATIENT
Start: 2021-05-24 | End: 2021-05-24 | Stop reason: HOSPADM

## 2021-05-24 RX ORDER — HEPARIN SODIUM 5000 [USP'U]/.5ML
5000 INJECTION, SOLUTION INTRAVENOUS; SUBCUTANEOUS EVERY 8 HOURS
Status: DISCONTINUED | OUTPATIENT
Start: 2021-05-25 | End: 2021-05-26 | Stop reason: HOSPADM

## 2021-05-24 RX ORDER — ONDANSETRON 2 MG/ML
4 INJECTION INTRAMUSCULAR; INTRAVENOUS EVERY 6 HOURS PRN
Status: DISCONTINUED | OUTPATIENT
Start: 2021-05-24 | End: 2021-05-26 | Stop reason: HOSPADM

## 2021-05-24 RX ORDER — BUPIVACAINE HYDROCHLORIDE AND EPINEPHRINE 2.5; 5 MG/ML; UG/ML
INJECTION, SOLUTION EPIDURAL; INFILTRATION; INTRACAUDAL; PERINEURAL
Status: DISCONTINUED
Start: 2021-05-24 | End: 2021-05-24 | Stop reason: WASHOUT

## 2021-05-24 RX ORDER — CEFAZOLIN SODIUM 2 G/100ML
2 INJECTION, SOLUTION INTRAVENOUS
Status: DISCONTINUED | OUTPATIENT
Start: 2021-05-24 | End: 2021-05-24 | Stop reason: HOSPADM

## 2021-05-24 RX ORDER — OXYCODONE HYDROCHLORIDE 5 MG/1
5 TABLET ORAL EVERY 6 HOURS PRN
Qty: 9 TABLET | Refills: 0 | Status: SHIPPED | OUTPATIENT
Start: 2021-05-24 | End: 2021-08-16

## 2021-05-24 RX ORDER — HYDROMORPHONE HCL IN WATER/PF 6 MG/30 ML
0.2 PATIENT CONTROLLED ANALGESIA SYRINGE INTRAVENOUS
Status: DISCONTINUED | OUTPATIENT
Start: 2021-05-24 | End: 2021-05-26 | Stop reason: HOSPADM

## 2021-05-24 RX ORDER — FENTANYL CITRATE 50 UG/ML
INJECTION, SOLUTION INTRAMUSCULAR; INTRAVENOUS PRN
Status: DISCONTINUED | OUTPATIENT
Start: 2021-05-24 | End: 2021-05-24

## 2021-05-24 RX ORDER — NEOSTIGMINE METHYLSULFATE 1 MG/ML
VIAL (ML) INJECTION PRN
Status: DISCONTINUED | OUTPATIENT
Start: 2021-05-24 | End: 2021-05-24

## 2021-05-24 RX ORDER — CYCLOBENZAPRINE HCL 10 MG
10 TABLET ORAL EVERY 8 HOURS PRN
Status: DISCONTINUED | OUTPATIENT
Start: 2021-05-24 | End: 2021-05-26 | Stop reason: HOSPADM

## 2021-05-24 RX ORDER — BUPIVACAINE HYDROCHLORIDE 2.5 MG/ML
INJECTION, SOLUTION EPIDURAL; INFILTRATION; INTRACAUDAL
Status: DISCONTINUED
Start: 2021-05-24 | End: 2021-05-24 | Stop reason: WASHOUT

## 2021-05-24 RX ORDER — OXYCODONE HYDROCHLORIDE 5 MG/1
5-10 TABLET ORAL
Status: DISCONTINUED | OUTPATIENT
Start: 2021-05-24 | End: 2021-05-26 | Stop reason: HOSPADM

## 2021-05-24 RX ORDER — CEFAZOLIN SODIUM 2 G/100ML
2 INJECTION, SOLUTION INTRAVENOUS SEE ADMIN INSTRUCTIONS
Status: DISCONTINUED | OUTPATIENT
Start: 2021-05-24 | End: 2021-05-24 | Stop reason: HOSPADM

## 2021-05-24 RX ORDER — ACETAMINOPHEN 325 MG/1
975 TABLET ORAL EVERY 6 HOURS
Status: DISCONTINUED | OUTPATIENT
Start: 2021-05-24 | End: 2021-05-26 | Stop reason: HOSPADM

## 2021-05-24 RX ORDER — ONDANSETRON 4 MG/1
4 TABLET, ORALLY DISINTEGRATING ORAL EVERY 6 HOURS PRN
Status: DISCONTINUED | OUTPATIENT
Start: 2021-05-24 | End: 2021-05-26 | Stop reason: HOSPADM

## 2021-05-24 RX ORDER — PROCHLORPERAZINE MALEATE 10 MG
10 TABLET ORAL EVERY 6 HOURS PRN
Status: DISCONTINUED | OUTPATIENT
Start: 2021-05-24 | End: 2021-05-26 | Stop reason: HOSPADM

## 2021-05-24 RX ORDER — EPHEDRINE SULFATE 50 MG/ML
INJECTION, SOLUTION INTRAMUSCULAR; INTRAVENOUS; SUBCUTANEOUS PRN
Status: DISCONTINUED | OUTPATIENT
Start: 2021-05-24 | End: 2021-05-24

## 2021-05-24 RX ORDER — ONDANSETRON 2 MG/ML
4 INJECTION INTRAMUSCULAR; INTRAVENOUS EVERY 30 MIN PRN
Status: DISCONTINUED | OUTPATIENT
Start: 2021-05-24 | End: 2021-05-24 | Stop reason: HOSPADM

## 2021-05-24 RX ORDER — BUPIVACAINE HYDROCHLORIDE 2.5 MG/ML
INJECTION, SOLUTION INFILTRATION; PERINEURAL PRN
Status: DISCONTINUED | OUTPATIENT
Start: 2021-05-24 | End: 2021-05-24 | Stop reason: HOSPADM

## 2021-05-24 RX ORDER — SODIUM CHLORIDE 9 MG/ML
INJECTION, SOLUTION INTRAVENOUS CONTINUOUS
Status: DISCONTINUED | OUTPATIENT
Start: 2021-05-24 | End: 2021-05-26

## 2021-05-24 RX ORDER — LIDOCAINE 40 MG/G
CREAM TOPICAL
Status: DISCONTINUED | OUTPATIENT
Start: 2021-05-24 | End: 2021-05-26 | Stop reason: HOSPADM

## 2021-05-24 RX ORDER — MAGNESIUM HYDROXIDE 1200 MG/15ML
LIQUID ORAL PRN
Status: DISCONTINUED | OUTPATIENT
Start: 2021-05-24 | End: 2021-05-24 | Stop reason: HOSPADM

## 2021-05-24 RX ORDER — POLYETHYLENE GLYCOL 3350 17 G/17G
17 POWDER, FOR SOLUTION ORAL DAILY
Status: DISCONTINUED | OUTPATIENT
Start: 2021-05-25 | End: 2021-05-26 | Stop reason: HOSPADM

## 2021-05-24 RX ADMIN — HYDROMORPHONE HYDROCHLORIDE 0.2 MG: 0.2 INJECTION, SOLUTION INTRAMUSCULAR; INTRAVENOUS; SUBCUTANEOUS at 20:34

## 2021-05-24 RX ADMIN — HYDROMORPHONE HYDROCHLORIDE 0.2 MG: 0.2 INJECTION, SOLUTION INTRAMUSCULAR; INTRAVENOUS; SUBCUTANEOUS at 18:05

## 2021-05-24 RX ADMIN — PHENYLEPHRINE HYDROCHLORIDE 100 MCG: 10 INJECTION INTRAVENOUS at 15:49

## 2021-05-24 RX ADMIN — ROCURONIUM BROMIDE 40 MG: 10 INJECTION INTRAVENOUS at 13:48

## 2021-05-24 RX ADMIN — PHENYLEPHRINE HYDROCHLORIDE 0.25 MCG/KG/MIN: 10 INJECTION INTRAVENOUS at 15:20

## 2021-05-24 RX ADMIN — PHENYLEPHRINE HYDROCHLORIDE 100 MCG: 10 INJECTION INTRAVENOUS at 15:37

## 2021-05-24 RX ADMIN — NEOSTIGMINE METHYLSULFATE 3 MG: 1 INJECTION, SOLUTION INTRAVENOUS at 15:44

## 2021-05-24 RX ADMIN — OXYCODONE HYDROCHLORIDE 5 MG: 5 TABLET ORAL at 20:01

## 2021-05-24 RX ADMIN — FENTANYL CITRATE 50 MCG: 50 INJECTION, SOLUTION INTRAMUSCULAR; INTRAVENOUS at 14:36

## 2021-05-24 RX ADMIN — PHENYLEPHRINE HYDROCHLORIDE 100 MCG: 10 INJECTION INTRAVENOUS at 13:53

## 2021-05-24 RX ADMIN — ROCURONIUM BROMIDE 20 MG: 10 INJECTION INTRAVENOUS at 14:47

## 2021-05-24 RX ADMIN — DEXMEDETOMIDINE HYDROCHLORIDE 12 MCG: 100 INJECTION, SOLUTION INTRAVENOUS at 14:17

## 2021-05-24 RX ADMIN — ACETAMINOPHEN 975 MG: 325 TABLET, FILM COATED ORAL at 17:49

## 2021-05-24 RX ADMIN — MIDAZOLAM HYDROCHLORIDE 2 MG: 1 INJECTION, SOLUTION INTRAMUSCULAR; INTRAVENOUS at 13:36

## 2021-05-24 RX ADMIN — Medication 5 MG: at 16:06

## 2021-05-24 RX ADMIN — SODIUM CHLORIDE, POTASSIUM CHLORIDE, SODIUM LACTATE AND CALCIUM CHLORIDE: 600; 310; 30; 20 INJECTION, SOLUTION INTRAVENOUS at 15:37

## 2021-05-24 RX ADMIN — GLYCOPYRROLATE 0.4 MG: 0.2 INJECTION, SOLUTION INTRAMUSCULAR; INTRAVENOUS at 15:44

## 2021-05-24 RX ADMIN — ONDANSETRON 4 MG: 2 INJECTION INTRAMUSCULAR; INTRAVENOUS at 15:42

## 2021-05-24 RX ADMIN — SODIUM CHLORIDE, POTASSIUM CHLORIDE, SODIUM LACTATE AND CALCIUM CHLORIDE: 600; 310; 30; 20 INJECTION, SOLUTION INTRAVENOUS at 11:43

## 2021-05-24 RX ADMIN — PHENYLEPHRINE HYDROCHLORIDE 50 MCG: 10 INJECTION INTRAVENOUS at 14:25

## 2021-05-24 RX ADMIN — PROPOFOL 200 MG: 10 INJECTION, EMULSION INTRAVENOUS at 13:48

## 2021-05-24 RX ADMIN — HYDROMORPHONE HYDROCHLORIDE 0.5 MG: 1 INJECTION, SOLUTION INTRAMUSCULAR; INTRAVENOUS; SUBCUTANEOUS at 15:01

## 2021-05-24 RX ADMIN — CEFAZOLIN SODIUM 2 G: 2 INJECTION, SOLUTION INTRAVENOUS at 13:54

## 2021-05-24 RX ADMIN — FENTANYL CITRATE 100 MCG: 50 INJECTION, SOLUTION INTRAMUSCULAR; INTRAVENOUS at 13:48

## 2021-05-24 RX ADMIN — PHENYLEPHRINE HYDROCHLORIDE 50 MCG: 10 INJECTION INTRAVENOUS at 15:07

## 2021-05-24 RX ADMIN — ROCURONIUM BROMIDE 10 MG: 10 INJECTION INTRAVENOUS at 13:58

## 2021-05-24 RX ADMIN — HYDROMORPHONE HYDROCHLORIDE 0.5 MG: 1 INJECTION, SOLUTION INTRAMUSCULAR; INTRAVENOUS; SUBCUTANEOUS at 14:30

## 2021-05-24 RX ADMIN — SODIUM CHLORIDE: 9 INJECTION, SOLUTION INTRAVENOUS at 17:40

## 2021-05-24 RX ADMIN — DEXAMETHASONE SODIUM PHOSPHATE 4 MG: 4 INJECTION, SOLUTION INTRA-ARTICULAR; INTRALESIONAL; INTRAMUSCULAR; INTRAVENOUS; SOFT TISSUE at 14:07

## 2021-05-24 RX ADMIN — MIDAZOLAM HYDROCHLORIDE 1 MG: 1 INJECTION, SOLUTION INTRAMUSCULAR; INTRAVENOUS at 13:42

## 2021-05-24 RX ADMIN — SENNOSIDES AND DOCUSATE SODIUM 1 TABLET: 8.6; 5 TABLET ORAL at 21:50

## 2021-05-24 ASSESSMENT — MIFFLIN-ST. JEOR: SCORE: 1534.13

## 2021-05-24 ASSESSMENT — LIFESTYLE VARIABLES: TOBACCO_USE: 1

## 2021-05-24 NOTE — ANESTHESIA POSTPROCEDURE EVALUATION
Patient: Emelyn Marmolejo    Procedure(s):  LEFT NEPHRECTOMY, TOTAL, LAPAROSCOPIC    Diagnosis:Atrophy of left kidney [N26.1]  Left flank pain [R10.9]  Diagnosis Additional Information: No value filed.    Anesthesia Type:  General    Note:  Disposition: Admission   Postop Pain Control: Uneventful            Sign Out: Well controlled pain   PONV: No   Neuro/Psych: Uneventful            Sign Out: Acceptable/Baseline neuro status   Airway/Respiratory: Uneventful            Sign Out: Acceptable/Baseline resp. status   CV/Hemodynamics: Uneventful            Sign Out: Acceptable CV status; No obvious hypovolemia; No obvious fluid overload   Other NRE: NONE   DID A NON-ROUTINE EVENT OCCUR? No           Last vitals:  Vitals:    05/24/21 1050 05/24/21 1614 05/24/21 1620   BP: 99/76 106/66 97/56   Pulse:  64 53   Resp: 14 10 9   Temp: 36.3  C (97.4  F) 36.3  C (97.3  F)    SpO2: 100% 100% 100%       Last vitals prior to Anesthesia Care Transfer:  CRNA VITALS  5/24/2021 1541 - 5/24/2021 1632      5/24/2021             Resp Rate (observed):  14    EKG:  Sinus rhythm          Electronically Signed By: Kyler Rocha MD  May 24, 2021  4:32 PM

## 2021-05-24 NOTE — DISCHARGE INSTRUCTIONS
POSTOPERATIVE INSTRUCTIONS    Diagnosis-------------------------------   LEFT atrophic kidney    Procedure-------------------------------  Procedure(s) (LRB):  LEFT NEPHRECTOMY, TOTAL, LAPAROSCOPIC (Left)      Findings--------------------------------  LEFT kidney removed without complications     Home-going instructions-----------------         Activity Limitation:     - No driving or operating heavy machinery while on narcotic pain medication.   - No strenuous exercise for 4 weeks.   - No lifting, pushing, pulling more than 10 pounds for 4 weeks. Take care when pushing with your arms to stand up.   - Do not strain your belly area. When you bend, sit up or twice, you could strain the area around your incision.   - Do not strain with bowel movements.   - Do not drive until you can press the brake pedal quickly and fully without pain.   - Do not operate a motor vehicle while taking narcotic pain medications    FOLLOW THESE INSTRUCTIONS AS INDICATED BELOW:  - Observe operative area for signs of excessive bleeding.  - You may shower.  - Increase fluid intake to promote clear urine.  - Resume usual diet as tolerated    What to expect while recovering----------- WOUND CARE:  - You may shower and get incisions wet starting 48 hrs after surgery.   - Remove wound dressing 48 hours after surgery if already not removed.   - If purple dermabond glue was used, avoid applying any lotions or ointments.   - Leave incision open to air. Cover with gauze only if needed for comfort or to protect clothing from drainage.    Discharge Medications/instructions:   1) PAIN: Oxycodone is a narcotic medication that has been prescribed for pain. Narcotics will cause sleepiness and constipation, therefore it is best to stop or reduce them as soon as you can and switch to using acetaminophen (Tylenol) and/or ibuprofen (Advil/Motrin), taken as directed on the packaging. Keep in mind that certain narcotics can contain acetaminophen, also called  "\"APAP\" on prescription bottles. Do not take more than 4,000mg of Tylenol (acetaminophen/ APAP) from all sources in any 24 hour period since this can cause liver damage. Never drive, operate machinery or drink alcoholic beverages while you are taking narcotic pain medications.     2) CONSTIPATION: Pericolace (senna/docusate sodium) can be taken twice daily for prevention of constipation since surgery, pain medications and bladder spasm medications can all make you constipated. Please reduce or stop pericolace if you develop loose stools. Other over the counter solutions such as prune juice, miralax, fiber products, senna, and dulcolax can also be used. If you are taking the pericolace but still have not had a bowel movement in 3 days, start over-the-counter Milk of Magnesia taken twice daily until you have a nice bowel movement. Call the office with any concerns.       Questions/concerns------------------------  [facility]: [phone]  [facility]: [phone]    Future appointments  You will be contacted to arrange follow up in 2-4 weeks for a post operative visit.    Aj Hedrick MD      "

## 2021-05-24 NOTE — ANESTHESIA PREPROCEDURE EVALUATION
Prior to Admission medications    Medication Sig Start Date End Date Taking? Authorizing Provider   acetaminophen (TYLENOL) 500 MG tablet Take 1,000 mg by mouth daily as needed for mild pain (2 X 500 mg) 21  Yes Reported, Patient   vitamin D3 (CHOLECALCIFEROL) 50 mcg (2000 units) tablet Take 1 tablet (50 mcg) by mouth daily  Patient taking differently: Take 50 mcg by mouth once a week . 2/3/21  Yes Bina Daniels MD   diphenhydrAMINE (BENADRYL) 25 MG tablet Take 2 tablets (50 mg) by mouth nightly as needed for sleep 21   Brittny Eid, DO     Current Facility-Administered Medications Ordered in Epic   Medication Dose Route Frequency Last Rate Last Admin     ceFAZolin (ANCEF) intermittent infusion 2 g in 100 mL dextrose PRE-MIX  2 g Intravenous Pre-Op/Pre-procedure x 1 dose         ceFAZolin (ANCEF) intermittent infusion 2 g in 100 mL dextrose PRE-MIX  2 g Intravenous See Admin Instructions         lactated ringers infusion   Intravenous Continuous         lidocaine 1 % 0.1-1 mL  0.1-1 mL Other Q1H PRN         No current [facility]-ordered outpatient medications on file.       lactated ringers       Recent Labs   Lab Test 21  1057   ABO O   RH Pos     No results for input(s): HCG in the last 37820 hours.  No results found for this or any previous visit (from the past 744 hour(s)).Anesthesia Pre-Procedure Evaluation    Patient: Emelyn Marmolejo   MRN: [id number] : 1990        Preoperative Diagnosis: Atrophy of left kidney [N26.1]  Left flank pain [R10.9]   Procedure : Procedure(s):  LEFT NEPHRECTOMY, TOTAL, LAPAROSCOPIC     Past Medical History:   Diagnosis Date     Prostate infection       Past Surgical History:   Procedure Laterality Date     COMBINED CYSTOSCOPY, URETEROSCOPY, LASER HOLMIUM LITHOTRIPSY URETER(S) Right 2020    Procedure: CYSTOSCOPY, RIGHT RETROGRADE PYELOGRAM, RIGHT diagnostic URETEROSCOPY;  Surgeon: Aj Hedrick MD;  Location: [facility] OR     ESOPHAGOSCOPY,  GASTROSCOPY, DUODENOSCOPY (EGD), COMBINED N/A 2021    Procedure: ESOPHAGOGASTRODUODENOSCOPY, WITH BIOPSY;  Surgeon: Rip Her MD;  Location: [facility] OR      Allergies   Allergen Reactions     Nsaids      Not true allergy. But be sparing with patient as he has single kidney       Social History     Tobacco Use     Smoking status: Former Smoker     Types: Cigarettes     Quit date: 2019     Years since quittin.6     Smokeless tobacco: Never Used   Substance Use Topics     Alcohol use: Never     Frequency: Never      Wt Readings from Last 1 Encounters:   21 59.9 kg (132 lb)        Anesthesia Evaluation   Pt has had prior anesthetic.     No history of anesthetic complications       ROS/MED HX  ENT/Pulmonary:     (+) tobacco use, Past use,     Neurologic:       Cardiovascular:       METS/Exercise Tolerance:     Hematologic:       Musculoskeletal:       GI/Hepatic:    (-) GERD   Renal/Genitourinary: Comment: Nonfunctioning kidney    (+) renal disease,     Endo:       Psychiatric/Substance Use:       Infectious Disease:       Malignancy:       Other:            Physical Exam    Airway        Mallampati: I    Neck ROM: full     Respiratory Devices and Support         Dental  no notable dental history         Cardiovascular   cardiovascular exam normal          Pulmonary   pulmonary exam normal                OUTSIDE LABS:  CBC:   Lab Results   Component Value Date    HGB 15.5 2021    HGB 13.4 2020    HCT 44.5 2020    HCT 52.6 2019     BMP:   Lab Results   Component Value Date     2021    .5 2021    POTASSIUM 4.2 2021    POTASSIUM 3.9 2021    CHLORIDE 106 2021    CHLORIDE 102.4 2021    CO2 27 2021    CO2 29.1 2021    BUN 20 2021    BUN 11.4 2021    CR 0.96 2021    CR 0.9 2021    GLC 98 2021    GLC 87.9 2021     COAGS: No results found for: PTT, INR, FIBR  POC: No results  found for: BGM, HCG, HCGS  HEPATIC:   Lab Results   Component Value Date    ALBUMIN 4.1 12/18/2020    PROTTOTAL 8.1 12/18/2020    ALT 22 12/18/2020    AST 21 12/18/2020    ALKPHOS 82 12/18/2020    BILITOTAL 0.4 12/18/2020     OTHER:   Lab Results   Component Value Date    A1C 5.4 06/11/2020    DARRELL 9.4 05/06/2021    CRP 8.2 (H) 05/15/2020       Anesthesia Plan    ASA Status:  2   NPO Status:  NPO Appropriate    Anesthesia Type: General.     - Airway: ETT   Induction: Intravenous.   Maintenance: Balanced.   Techniques and Equipment:     - Airway: Video-Laryngoscope         Consents    Anesthesia Plan(s) and associated risks, benefits, and realistic alternatives discussed. Questions answered and patient/representative(s) expressed understanding.     - Discussed with:  Patient         Postoperative Care    Pain management: IV analgesics.   PONV prophylaxis: Ondansetron (or other 5HT-3)     Comments:                Kyler Rocha MD

## 2021-05-24 NOTE — PROGRESS NOTES
PTA medications updated by Medication Scribe prior to surgery via phone call with patient (last doses completed by Nurse)     Medication history sources: Patient, Surescripts and H&P  In the past week, patient estimated taking medication this percent of the time: Greater than 90%  Adherence assessment: N/A Not Observed    Significant changes made to the medication list:  Patient reports no longer taking the following meds (med scribe removed from PTA med list): Levofloxacin, Doxycycline Hyclate      Additional medication history information:   None    The information provided in this note is only as accurate as the sources available at the time of update(s)      Prior to Admission medications    Medication Sig Last Dose Taking? Auth Provider   acetaminophen (TYLENOL) 500 MG tablet Take 1,000 mg by mouth daily as needed for mild pain (2 X 500 mg) MORE THAN A WEEK at PRN Yes Reported, Patient   diphenhydrAMINE (BENADRYL) 25 MG tablet Take 2 tablets (50 mg) by mouth nightly as needed for sleep More than a month at PRN  Brittny Eid,    vitamin D3 (CHOLECALCIFEROL) 50 mcg (2000 units) tablet Take 1 tablet (50 mcg) by mouth daily  Patient taking differently: Take 50 mcg by mouth once a week Tuesdays. 5/18/2021 at AM  Bina Daniels MD Gerard Burgess, Lexa  Medication Scribe  [facility]

## 2021-05-24 NOTE — ANESTHESIA CARE TRANSFER NOTE
Patient: Emelyn Marmolejo    Procedure(s):  LEFT NEPHRECTOMY, TOTAL, LAPAROSCOPIC    Diagnosis: Atrophy of left kidney [N26.1]  Left flank pain [R10.9]  Diagnosis Additional Information: No value filed.    Anesthesia Type:   General     Note:    Oropharynx: oropharynx clear of all foreign objects  Level of Consciousness: awake  Oxygen Supplementation: face mask  Level of Supplemental Oxygen (L/min / FiO2): 6  Independent Airway: airway patency satisfactory and stable  Dentition: dentition unchanged  Vital Signs Stable: post-procedure vital signs reviewed and stable  Report to RN Given: handoff report given  Patient transferred to: PACU  Comments: Neuromuscular blockade reversed after TOF 3/4, spontaneous respirations, adequate tidal volumes, followed commands to voice, oropharynx suctioned with soft flexible catheter, extubated atraumatically, extubated with suction, airway patent after extubation.  Oxygen via facemask at 6 liters per minute to PACU. Oxygen tubing connected to wall O2 in PACU, SpO2, NiBP, and EKG monitors and alarms on and functioning, report on patient's clinical status given to PACU RN, RN questions answered.     Handoff Report: Identifed the Patient, Identified the Reponsible Provider, Reviewed the pertinent medical history, Discussed the surgical course, Reviewed Intra-OP anesthesia mangement and issues during anesthesia, Set expectations for post-procedure period and Allowed opportunity for questions and acknowledgement of understanding      Vitals: (Last set prior to Anesthesia Care Transfer)  CRNA VITALS  5/24/2021 1541 - 5/24/2021 1618      5/24/2021             Pulse:  62    Ht Rate:  62    SpO2:  100 %    Resp Rate (set):  10        Electronically Signed By: CORRINE Nelson CRNA  May 24, 2021  4:18 PM

## 2021-05-24 NOTE — ANESTHESIA PROCEDURE NOTES
Airway       Patient location during procedure: OR  Staff -        Anesthesiologist:  Kyler Rocha MD       CRNA: Mery Mukherjee       Performed By: CRNA  Consent for Airway        Urgency: elective  Indications and Patient Condition       Indications for airway management: saloni-procedural       Induction type:intravenous       Mask difficulty assessment: 2 - vent by mask + OA or adjuvant +/- NMBA    Final Airway Details       Final airway type: endotracheal airway       Successful airway: ETT - single and Oral  Endotracheal Airway Details        ETT size (mm): 8.0       Cuffed: yes       Successful intubation technique: video laryngoscopy       VL Blade Size: Cobb 3       Grade View of Cords: 1       Adjucts: stylet       Position: Right       Measured from: gums/teeth       Secured at (cm): 23       Bite block used: None    Post intubation assessment        Placement verified by: capnometry, equal breath sounds and chest rise        Number of attempts at approach: 1       Secured with: pink tape       Ease of procedure: easy       Dentition: Intact and Unchanged    Medication(s) Administered   Medication Administration Time: 5/24/2021 1:51 PM

## 2021-05-24 NOTE — OP NOTE
OPERATIVE REPORT  DATE OF SURGERY: 05/24/21  LOCATION OF SURGERY: [facility] OR  PREOPERATIVE DIAGNOSIS:  (R10.9) Left flank pain  (N26.1) Atrophy of left kidney  POSTOPERATIVE DIAGNOSIS: (R10.9) Left flank pain  (N26.1) Atrophy of left kidney    START TIME: 2:14 PM  END TIME: 3:51 PM    PROCEDURE PERFORMED:   LAPAROSCOPIC RADICAL NEPHRECTOMY     STAFF SURGEON: Aj Hedrick MD  RESIDENT SURGEON: Ramos Head MD  ASSISTANT: Raven Cain  ANESTHESIA: General.   ESTIMATED BLOOD LOSS: 10 mL.   DRAINS AND TUBES: 16fr reilly catheter  COMPLICATIONS: None.   DISPOSITION: PACU.   SPECIMENS OBTAINED:   ID Type Source Tests Collected by Time Destination   A : LEFT KIDNEY Tissue Kidney, Left SURGICAL PATHOLOGY EXAM Aj Hedrick MD 5/24/2021  3:31 PM      SIGNIFICANT FINDINGS: LEFT atrophic kidney removed without complications     HISTORY OF PRESENT ILLNESS: Emelyn Marmolejo is a 31 year old man who I initially saw on 3/31/2020 as a referral for left atrophic kidney secondary to a large 2.8 cm stone.  This was found on work-up for right upper quadrant abdominal pain.  Nuclear medicine renal scan indicated a split function of 14% on the left and 86% on the right.  His surgery was delayed due to COVID-19.  Since that time he has continued his work-up for his right upper quadrant abdominal pain with diagnosis of H. pylori.  At our office visit on 8/25/2020 he noted some intermittent left flank pain.  At my last office visit with him on 12/22/2020 he again indicated his desire to proceed with a laparoscopic nephrectomy.  Since that visit he has been a no-show for 2 other visits but presents today for surgery.  He presents with his uncle and a long discussion was had with  answering all her questions.  We again discussed that this would not impact his right upper quadrant abdominal pain.  We discussed that given his poorly functioning kidney intervention such as a PCNL for stone removal would have a  greater risk than benefit.  He and his uncle agree to proceed with the above surgery informed consent completed.    OPERATION PERFORMED:   Informed consent was obtained and the patient was brought to the operating room where general anesthesia was induced. The patient was given appropriate preoperative antibiotics and positioned right lateral decubitus with the LEFT side up. We then performed a timeout, verifying the correct patient's site and procedure to be performed.    Access was gained to the abdomen for CO2 insufflation with a Veress needle of the lateral border of the rectus muscle in line with the tip of the 11th rib in the camera port site on the left. After access was obtained, the 12-mm port was inserted and the camera was inserted. No adhesions were noted. Another 12 mm port was placed in the usual spot for the inferior hand in line with our planned Anderson extraction site as well as an 5-mm port medial to the left costal margin. Atraumatic bowel graspers and the hot laparoscopic scissors were used to find the plane was found between the mesentery and Gerota's fascia. The omentum and mesentary was carefully release from the retroperitoneum. Dissection was carried out with the use of sharp dissection and cautery from the splenic flexure down to the level of the left common iliac vessels. Compliments of the gonadal vein, the renal vein was identified. Dissection was carried to the hilum and a window above the left renal artery was exposed. The renal artery was controlled with Weck clips, with 3 clips on the stay side, and divided sharply. The renal vein was controlled with a single load of the 45 mm vascular stapler. The adrenal vein and gonadal vein were spared. Once this was done the superior and lateral attachments of the kidney was released with hook and scissor cautery, again sparing the adrenal gland. The ureter was transected with hem-o-lock clips. The entire kidney was mobilized and freed. The  kidney was placed in a endo-catch bag. A 0 Vicryl tie was placed at the 12 mm camera port site with a Jonh-Chris. A Anderson incision was made in a muscle sparing fashion and the specimen was extracted. The muscle was reapproximated with 0 Vicryl and the external fascia was closed with 0 PDS. At that point the patient's abdomen was then re-insuflated and the camera was replaced to re-assess the renal fossa and interior edge of our fascial closure. The closure was intact and free of bowel or mesentary. The renal fossa was dry. The port sites were closed in a running fashion with 4-0 Monocylr suture. The Anderson closed with 4-0 Monocryl.   The patient was awoken from anesthesia and taken to PACU in stable condition.    Aj Hedrick MD   Urology  [facility]  Clinic Phone [phone]

## 2021-05-25 LAB
ANION GAP SERPL CALCULATED.3IONS-SCNC: 8 MMOL/L (ref 3–14)
BUN SERPL-MCNC: 12 MG/DL (ref 7–30)
CALCIUM SERPL-MCNC: 9.2 MG/DL (ref 8.5–10.1)
CHLORIDE SERPL-SCNC: 104 MMOL/L (ref 94–109)
CO2 SERPL-SCNC: 24 MMOL/L (ref 20–32)
CREAT SERPL-MCNC: 0.96 MG/DL (ref 0.66–1.25)
ERYTHROCYTE [DISTWIDTH] IN BLOOD BY AUTOMATED COUNT: 12 % (ref 10–15)
GFR SERPL CREATININE-BSD FRML MDRD: >90 ML/MIN/{1.73_M2}
GLUCOSE BLDC GLUCOMTR-MCNC: 115 MG/DL (ref 70–99)
GLUCOSE BLDC GLUCOMTR-MCNC: 123 MG/DL (ref 70–99)
GLUCOSE SERPL-MCNC: 106 MG/DL (ref 70–99)
HCT VFR BLD AUTO: 41.2 % (ref 40–53)
HGB BLD-MCNC: 13.8 G/DL (ref 13.3–17.7)
MCH RBC QN AUTO: 28.9 PG (ref 26.5–33)
MCHC RBC AUTO-ENTMCNC: 33.5 G/DL (ref 31.5–36.5)
MCV RBC AUTO: 86 FL (ref 78–100)
PLATELET # BLD AUTO: 209 10E9/L (ref 150–450)
POTASSIUM SERPL-SCNC: 4 MMOL/L (ref 3.4–5.3)
RBC # BLD AUTO: 4.77 10E12/L (ref 4.4–5.9)
SODIUM SERPL-SCNC: 136 MMOL/L (ref 133–144)
WBC # BLD AUTO: 12.7 10E9/L (ref 4–11)

## 2021-05-25 PROCEDURE — 85027 COMPLETE CBC AUTOMATED: CPT | Performed by: STUDENT IN AN ORGANIZED HEALTH CARE EDUCATION/TRAINING PROGRAM

## 2021-05-25 PROCEDURE — 258N000003 HC RX IP 258 OP 636: Performed by: STUDENT IN AN ORGANIZED HEALTH CARE EDUCATION/TRAINING PROGRAM

## 2021-05-25 PROCEDURE — 250N000011 HC RX IP 250 OP 636: Performed by: STUDENT IN AN ORGANIZED HEALTH CARE EDUCATION/TRAINING PROGRAM

## 2021-05-25 PROCEDURE — 36415 COLL VENOUS BLD VENIPUNCTURE: CPT | Performed by: STUDENT IN AN ORGANIZED HEALTH CARE EDUCATION/TRAINING PROGRAM

## 2021-05-25 PROCEDURE — 250N000013 HC RX MED GY IP 250 OP 250 PS 637: Performed by: STUDENT IN AN ORGANIZED HEALTH CARE EDUCATION/TRAINING PROGRAM

## 2021-05-25 PROCEDURE — 96372 THER/PROPH/DIAG INJ SC/IM: CPT | Performed by: STUDENT IN AN ORGANIZED HEALTH CARE EDUCATION/TRAINING PROGRAM

## 2021-05-25 PROCEDURE — 120N000001 HC R&B MED SURG/OB

## 2021-05-25 PROCEDURE — 999N001017 HC STATISTIC GLUCOSE BY METER IP

## 2021-05-25 PROCEDURE — 80048 BASIC METABOLIC PNL TOTAL CA: CPT | Performed by: STUDENT IN AN ORGANIZED HEALTH CARE EDUCATION/TRAINING PROGRAM

## 2021-05-25 RX ORDER — SIMETHICONE 80 MG
80 TABLET,CHEWABLE ORAL EVERY 6 HOURS PRN
Status: DISCONTINUED | OUTPATIENT
Start: 2021-05-25 | End: 2021-05-26 | Stop reason: HOSPADM

## 2021-05-25 RX ADMIN — HYDROMORPHONE HYDROCHLORIDE 0.2 MG: 0.2 INJECTION, SOLUTION INTRAMUSCULAR; INTRAVENOUS; SUBCUTANEOUS at 02:53

## 2021-05-25 RX ADMIN — OXYCODONE HYDROCHLORIDE 10 MG: 5 TABLET ORAL at 03:45

## 2021-05-25 RX ADMIN — HEPARIN SODIUM 5000 UNITS: 5000 INJECTION INTRAVENOUS; SUBCUTANEOUS at 13:28

## 2021-05-25 RX ADMIN — HEPARIN SODIUM 5000 UNITS: 5000 INJECTION INTRAVENOUS; SUBCUTANEOUS at 06:28

## 2021-05-25 RX ADMIN — SIMETHICONE 80 MG: 80 TABLET, CHEWABLE ORAL at 10:27

## 2021-05-25 RX ADMIN — HEPARIN SODIUM 5000 UNITS: 5000 INJECTION INTRAVENOUS; SUBCUTANEOUS at 21:49

## 2021-05-25 RX ADMIN — ACETAMINOPHEN 650 MG: 325 TABLET, FILM COATED ORAL at 16:19

## 2021-05-25 RX ADMIN — OXYCODONE HYDROCHLORIDE 5 MG: 5 TABLET ORAL at 09:30

## 2021-05-25 RX ADMIN — ACETAMINOPHEN 975 MG: 325 TABLET, FILM COATED ORAL at 00:03

## 2021-05-25 RX ADMIN — OXYCODONE HYDROCHLORIDE 5 MG: 5 TABLET ORAL at 06:45

## 2021-05-25 RX ADMIN — ACETAMINOPHEN 600 MG: 325 TABLET, FILM COATED ORAL at 09:38

## 2021-05-25 RX ADMIN — POLYETHYLENE GLYCOL 3350 17 G: 17 POWDER, FOR SOLUTION ORAL at 09:38

## 2021-05-25 RX ADMIN — SENNOSIDES AND DOCUSATE SODIUM 1 TABLET: 8.6; 5 TABLET ORAL at 21:49

## 2021-05-25 RX ADMIN — SODIUM CHLORIDE: 9 INJECTION, SOLUTION INTRAVENOUS at 06:27

## 2021-05-25 RX ADMIN — SIMETHICONE 80 MG: 80 TABLET, CHEWABLE ORAL at 19:47

## 2021-05-25 RX ADMIN — SODIUM CHLORIDE: 9 INJECTION, SOLUTION INTRAVENOUS at 19:31

## 2021-05-25 RX ADMIN — OXYCODONE HYDROCHLORIDE 5 MG: 5 TABLET ORAL at 19:59

## 2021-05-25 RX ADMIN — ACETAMINOPHEN 975 MG: 325 TABLET, FILM COATED ORAL at 21:48

## 2021-05-25 ASSESSMENT — ACTIVITIES OF DAILY LIVING (ADL)
ADLS_ACUITY_SCORE: 18
ADLS_ACUITY_SCORE: 16

## 2021-05-25 NOTE — PLAN OF CARE
POD 1 left nephrectomy. A&Ox4. VSS, on RA. C/o pain in left ABD and in bilat upper shoulders. PRN IV Dilaudid given, but patient stated didn't help. Was in a lot of pain after and was moaning/grimacing. PRN 10 mg Oxycodone given; much more effective for pain. Patient hesitant to take medications and needs things explained to him. 3 lap sites to ABD covered with liquid bandage, BREANN. Burgos patent with good UOP overnight. Tolerating CLD. BS faint. No reports of passing flatus per patient quite yet. Denied any nausea. Up SBA w/ gb, ambulated halls x1. Discharge pending recovery.

## 2021-05-25 NOTE — PROGRESS NOTES
"Urology  Progress Note    No acute issues overnight  Pain moderately controlled  Has not yet ambulated   Tolerating CLD     Exam  /71 (BP Location: Left arm)   Pulse 82   Temp 98.9  F (37.2  C) (Oral)   Resp 19   Ht 1.753 m (5' 9\")   Wt 59.9 kg (132 lb)   SpO2 100%   BMI 19.49 kg/m    No acute distress  Unlabored breathing  Abdomen soft, nt/nd, incisions CDI with skin adhesive   Lower extremities non-edematous bilaterally  Burgos clear yellow in appearance    UOP 1725/-    Labs  WBC 12.7  Hgb 13.8  Cr 0.96    Assessment/Plan  31 year old y/o male POD#1 s/p left laparoscopic nephrectomy for left atrophic kidney and stone.     Neuro: APAP, Dilaudid, Flexeril, Oxycodone for pain control. Will add simethicone for gas pain.   CV: HDS  Pulm: incentive spirometry while awake  FEN/GI: CLDt, MIVF @ 75cc/hr  Endo: DAT  : Will remove catheter this AM   Heme/ID: Margo-op abx completed  Activity: Up ad marina  PPx: SCDs, SQH    Seen and examined with the chief resident. Will discuss with Dr. Hedrick.    Ramos Head MD, PGY-5  Urology Resident      "

## 2021-05-25 NOTE — PLAN OF CARE
POD1 L nephrectomy. A&Ox4. VSS on RA, removed capno ~1700--values WDL, pt continued to take it off. C/o pain in L abd & shoulders, controlled with scheduled tylenol, PRN oxy 5mg & simethicone for gas pain, ice pack. Attempted administering 10mg oxy per request when pt c/o 8/10 pain, however, both tabs were found spit out at bedside. Patient continues to be hesitant to take medications & frequently needs explanation. Provided abdominal binder. Denies passing gas, hypoactive BS. Tolerates FLD, denies nausea. Burgos removed, good UOP post removal. 3 abd lap sites covered with liquid bandage, BREANN. Up SBA w/gb, ambulated in hallway. PIV infusing NS @75. Possible discharge tmrw.

## 2021-05-26 VITALS
DIASTOLIC BLOOD PRESSURE: 80 MMHG | WEIGHT: 132 LBS | TEMPERATURE: 98.7 F | HEIGHT: 69 IN | OXYGEN SATURATION: 99 % | RESPIRATION RATE: 16 BRPM | BODY MASS INDEX: 19.55 KG/M2 | HEART RATE: 82 BPM | SYSTOLIC BLOOD PRESSURE: 121 MMHG

## 2021-05-26 LAB
ANION GAP SERPL CALCULATED.3IONS-SCNC: 6 MMOL/L (ref 3–14)
BUN SERPL-MCNC: 11 MG/DL (ref 7–30)
CALCIUM SERPL-MCNC: 9.4 MG/DL (ref 8.5–10.1)
CHLORIDE SERPL-SCNC: 104 MMOL/L (ref 94–109)
CO2 SERPL-SCNC: 27 MMOL/L (ref 20–32)
COPATH REPORT: NORMAL
CREAT SERPL-MCNC: 1.03 MG/DL (ref 0.66–1.25)
ERYTHROCYTE [DISTWIDTH] IN BLOOD BY AUTOMATED COUNT: 12.5 % (ref 10–15)
GFR SERPL CREATININE-BSD FRML MDRD: >90 ML/MIN/{1.73_M2}
GLUCOSE SERPL-MCNC: 95 MG/DL (ref 70–99)
HCT VFR BLD AUTO: 43.9 % (ref 40–53)
HGB BLD-MCNC: 14.6 G/DL (ref 13.3–17.7)
MCH RBC QN AUTO: 29.5 PG (ref 26.5–33)
MCHC RBC AUTO-ENTMCNC: 33.3 G/DL (ref 31.5–36.5)
MCV RBC AUTO: 89 FL (ref 78–100)
PLATELET # BLD AUTO: 195 10E9/L (ref 150–450)
POTASSIUM SERPL-SCNC: 3.9 MMOL/L (ref 3.4–5.3)
RBC # BLD AUTO: 4.95 10E12/L (ref 4.4–5.9)
SODIUM SERPL-SCNC: 137 MMOL/L (ref 133–144)
WBC # BLD AUTO: 9.9 10E9/L (ref 4–11)

## 2021-05-26 PROCEDURE — 36415 COLL VENOUS BLD VENIPUNCTURE: CPT | Performed by: STUDENT IN AN ORGANIZED HEALTH CARE EDUCATION/TRAINING PROGRAM

## 2021-05-26 PROCEDURE — 250N000011 HC RX IP 250 OP 636: Performed by: STUDENT IN AN ORGANIZED HEALTH CARE EDUCATION/TRAINING PROGRAM

## 2021-05-26 PROCEDURE — 258N000003 HC RX IP 258 OP 636: Performed by: STUDENT IN AN ORGANIZED HEALTH CARE EDUCATION/TRAINING PROGRAM

## 2021-05-26 PROCEDURE — 85027 COMPLETE CBC AUTOMATED: CPT | Performed by: STUDENT IN AN ORGANIZED HEALTH CARE EDUCATION/TRAINING PROGRAM

## 2021-05-26 PROCEDURE — 250N000013 HC RX MED GY IP 250 OP 250 PS 637: Performed by: STUDENT IN AN ORGANIZED HEALTH CARE EDUCATION/TRAINING PROGRAM

## 2021-05-26 PROCEDURE — 80048 BASIC METABOLIC PNL TOTAL CA: CPT | Performed by: STUDENT IN AN ORGANIZED HEALTH CARE EDUCATION/TRAINING PROGRAM

## 2021-05-26 RX ORDER — BISACODYL 10 MG
10 SUPPOSITORY, RECTAL RECTAL DAILY PRN
Status: DISCONTINUED | OUTPATIENT
Start: 2021-05-26 | End: 2021-05-26 | Stop reason: HOSPADM

## 2021-05-26 RX ADMIN — HEPARIN SODIUM 5000 UNITS: 5000 INJECTION INTRAVENOUS; SUBCUTANEOUS at 13:20

## 2021-05-26 RX ADMIN — SODIUM CHLORIDE: 9 INJECTION, SOLUTION INTRAVENOUS at 06:14

## 2021-05-26 RX ADMIN — HEPARIN SODIUM 5000 UNITS: 5000 INJECTION INTRAVENOUS; SUBCUTANEOUS at 06:14

## 2021-05-26 RX ADMIN — SIMETHICONE 80 MG: 80 TABLET, CHEWABLE ORAL at 02:57

## 2021-05-26 RX ADMIN — POLYETHYLENE GLYCOL 3350 17 G: 17 POWDER, FOR SOLUTION ORAL at 07:46

## 2021-05-26 RX ADMIN — SIMETHICONE 80 MG: 80 TABLET, CHEWABLE ORAL at 08:44

## 2021-05-26 RX ADMIN — ACETAMINOPHEN 975 MG: 325 TABLET, FILM COATED ORAL at 04:29

## 2021-05-26 RX ADMIN — BISACODYL 10 MG: 10 SUPPOSITORY RECTAL at 08:40

## 2021-05-26 RX ADMIN — SODIUM CHLORIDE: 9 INJECTION, SOLUTION INTRAVENOUS at 06:18

## 2021-05-26 RX ADMIN — OXYCODONE HYDROCHLORIDE 10 MG: 5 TABLET ORAL at 11:54

## 2021-05-26 RX ADMIN — OXYCODONE HYDROCHLORIDE 5 MG: 5 TABLET ORAL at 02:57

## 2021-05-26 ASSESSMENT — ACTIVITIES OF DAILY LIVING (ADL)
ADLS_ACUITY_SCORE: 18
ADLS_ACUITY_SCORE: 16
ADLS_ACUITY_SCORE: 18

## 2021-05-26 NOTE — PLAN OF CARE
Patient discharged at 4 PM to home.  IV was discontinued. VSS. Denies pain at time of discharge. Ambulating independently in room. Belongings returned to patient.  Discharge instructions and medications reviewed with patient.  Patient verbalized understanding and all questions were answered.  Discharge meds given to patient.  Security belongings given to patient. At time of discharge, patient condition was stable and left the unit on WC escorted by MARVIN.

## 2021-05-26 NOTE — PLAN OF CARE
POD #2.  Pt up walking ind in room/hallway.  Having much gas pain.  Simethicone, dulcolax supp and oxycodone all administered.  Port sites c/d/I with liquid bandage.  Voiding good amounts.  Pt finally able to pass gas this afternoon and feeling better.  Plan for discharge to home this afternoon.

## 2021-05-26 NOTE — PROGRESS NOTES
"Urology  Progress Note    No acute issues overnight  Pain better controlled  Ambulating  Tolerating regular diet  No flatus/BM     Exam  /69 (BP Location: Left arm)   Pulse 77   Temp 98.9  F (37.2  C) (Oral)   Resp 16   Ht 1.753 m (5' 9\")   Wt 59.9 kg (132 lb)   SpO2 99%   BMI 19.49 kg/m    No acute distress  Unlabored breathing  Abdomen soft, nt/nd, incisions CDI with skin adhesive   Lower extremities non-edematous bilaterally  Burgos clear yellow in appearance    UOP 3700/-    Labs  WBC 9.9  Hgb 14.6  Cr 1.03    Assessment/Plan  31 year old y/o male POD#2 s/p left laparoscopic nephrectomy for left atrophic kidney and stone.     Neuro: APAP, Dilaudid, Flexeril, Oxycodone for pain control. Simethicone for gas pain.   CV: HDS  Pulm: incentive spirometry while awake  FEN/GI: Regular diet, SLIV  Endo: DAT  : Burgos removed,voiding per BR  Heme/ID: Margo-op abx completed  Activity: Up ad marina  PPx: SCDs, SQH  Dispo: Home    Seen and examined with the chief resident. Will discuss with Dr. Hedrick.    Ramos Head MD, PGY-5  Urology Resident      "

## 2021-05-26 NOTE — PLAN OF CARE
POD#2 L nephrectomy. A&Ox4. VSS on RA, WDL, . C/o pain in L abd & shoulders, controlled with scheduled tylenol, PRN oxy 5mg & simethicone for gas pain, ice pack.   Provided abdominal binder. Denies passing gas, hypoactive BS.  denies nausea.  good UOP  3 abd lap sites covered with liquid bandage, BREANN. Up SBA w/gb, ambulated in hallway. PIV infusing NS @75.  Ambulated the penn independently discharge today.

## 2021-05-27 NOTE — DISCHARGE SUMMARY
[facility]    Discharge Summary  Urology    Date of Admission:  5/24/2021  Date of Discharge:  5/26/2021  4:09 PM  Discharging Provider: Aj Hedrick    Discharge Diagnoses   Patient Active Problem List   Diagnosis     Abnormal CT of the chest     Renal atrophy, left     Renal stone     H. pylori infection     History of renal stone     Abdominal pain, generalized     2019 novel coronavirus disease (COVID-19)     Atrophy of left kidney     Bronchiectasis, uncomplicated (H)     Left flank pain       Procedure/Surgery Information   Procedure: Procedure(s):  LEFT NEPHRECTOMY, TOTAL, LAPAROSCOPIC   Surgeon(s): Surgeon(s) and Role:     * Aj Hedrick MD - Primary   Specimens: ID Type Source Tests Collected by Time Destination   A : LEFT KIDNEY Tissue Kidney, Left SURGICAL PATHOLOGY EXAM Aj Hedrick MD 5/24/2021  3:31 PM       Non-operative procedures None performed     History of Present Illness   Emelyn Marmolejo is a 31 year old male who presented with an atrophic left kidney.     Hospital Course   Emelyn Marmolejo was admitted on 5/24/2021.  He underwent the above surgery without complications. On POD #2 he was deemed fit for discharge to home.     Post-operative pain control: included Hydromorphone (dilaudid) IV and will be Oxycodone on discharge.     Medications discontinued or adjusted during this hospitalization: No change     Antibiotics prescribed at discharge: None prescribed     Imaging study follow up needs:   -None performed    Significant Findings: Left atrophic kidney removed    Aj Hedrick MD    Discharge Disposition   Discharged to home   Condition at discharge: Stable    Pending Results   Final pathology results: Pending at time of discharge  These results will be followed up by Aj Hedrick M.D.   Unresulted Labs Ordered in the Past 30 Days of this Admission     No orders found from 4/24/2021 to 5/25/2021.          Primary Care Physician   Brittny HENDRICKS  [place]        Consultations This Hospital Stay   None    Time Spent on this Encounter   I have spent greater than 30 minutes on this discharge.    Discharge Orders      Reason for your hospital stay    Removal of left kidney     Follow-up and recommended labs and tests     Follow up with Dr. Hedrick as scheduled     Activity    Your activity upon discharge: activity as tolerated     Diet    Follow this diet upon discharge: Regular     Discharge Medications   Discharge Medication List as of 5/26/2021  3:08 PM      START taking these medications    Details   docusate sodium (COLACE) 100 MG tablet Take 1 tablet (100 mg) by mouth 2 times daily Take while using narcotics., Disp-60 tablet, R-0, E-Prescribe      oxyCODONE (ROXICODONE) 5 MG tablet Take 1 tablet (5 mg) by mouth every 6 hours as needed for breakthrough pain, Disp-9 tablet, R-0, E-Prescribe         CONTINUE these medications which have NOT CHANGED    Details   acetaminophen (TYLENOL) 500 MG tablet Take 1,000 mg by mouth daily as needed for mild pain (2 X 500 mg), Historical      vitamin D3 (CHOLECALCIFEROL) 50 mcg (2000 units) tablet Take 1 tablet (50 mcg) by mouth daily, Disp-90 tablet, R-0, E-Prescribe      diphenhydrAMINE (BENADRYL) 25 MG tablet Take 2 tablets (50 mg) by mouth nightly as needed for sleep, Disp-50 tablet, R-0, E-Prescribe           Allergies   Allergies   Allergen Reactions     Nsaids      Not true allergy. But be sparing with patient as he has single kidney      Data   Most Recent 3 CBC's:  Recent Labs   Lab Test 05/26/21  0638 05/25/21  0645 05/24/21  1639   WBC 9.9 12.7* 11.8*   HGB 14.6 13.8 13.8   MCV 89 86 88    209 187      Most Recent 3 BMP's:  Recent Labs   Lab Test 05/26/21  0638 05/25/21  0645 05/24/21  1639    136 139   POTASSIUM 3.9 4.0 3.9   CHLORIDE 104 104 108   CO2 27 24 27   BUN 11 12 15   CR 1.03 0.96 0.98   ANIONGAP 6 8 4   DARRELL 9.4 9.2 8.6   GLC 95 106* 111*     Most Recent 2 LFT's:  Recent Labs   Lab Test  12/18/20  1246 03/26/20  1507   AST 21 21.9   ALT 22 27.3   ALKPHOS 82 86.5   BILITOTAL 0.4 <0.4     Most Recent INR's and Anticoagulation Dosing History:  Anticoagulation Dose History     There is no flowsheet data to display.        Most Recent 3 Troponin's:No lab results found.  Most Recent Cholesterol Panel:No lab results found.  Most Recent 6 Bacteria Isolates From Any Culture (See EPIC Reports for Culture Details):  Recent Labs   Lab Test 05/17/21  1641 05/06/21  1252 12/18/20  1246 05/07/20  1550 03/26/20  1608 02/06/20  1450   CULT No growth No growth No growth No growth No growth No growth     Most Recent TSH, T4 and A1c Labs:  Recent Labs   Lab Test 06/11/20  1649   A1C 5.4

## 2021-06-03 ENCOUNTER — OFFICE VISIT (OUTPATIENT)
Dept: UROLOGY | Facility: CLINIC | Age: 31
End: 2021-06-03
Payer: COMMERCIAL

## 2021-06-03 DIAGNOSIS — N26.1 ATROPHY OF LEFT KIDNEY: Primary | ICD-10-CM

## 2021-06-03 DIAGNOSIS — Z90.5 S/P NEPHRECTOMY: ICD-10-CM

## 2021-06-03 PROCEDURE — 99024 POSTOP FOLLOW-UP VISIT: CPT | Performed by: UROLOGY

## 2021-06-03 NOTE — PROGRESS NOTES
PROVIDER NOTE:  [facility]   CHIEF COMPLAINT   It was my pleasure to see Emelyn Marmolejo who is a 31 year old male for follow-up of right flank pain, left atrophic kidney. With the assistance of a      HPI   Emelyn Marmolejo is a very pleasant 31 year old male who presents with a history of an atrophic left kidney due to a large obstructing stone.  I had seen him previously for work-up for possible left simple nephrectomy.  At that time he was noting some right-sided flank and upper abdominal pain and is very concerned that this is related to his right kidney.  Imaging from February was reviewed with no evidence of pathology on the right side.  He subsequently been diagnosed with H. pylori and is undergoing treatment for this.  He notes continued right-sided flank and abdominal pain especially with intake of food and also prolonged sitting with driving.  He denies any fevers, chills, or hematuria.    7/3/20:  Follow-up today to discuss the results of his CT scan which was completed yesterday which demonstrates a small 3 mm distal ureteral stone.  He is feeling okay today with minimal right-sided flank pain.  He denies any fevers, chills, nausea, or vomiting.  He denies noting any stone passage    8/6/20:  Follow-up today after his CT scan last week which demonstrated persistence of the 3 mm distal ureteral stone  He notes that he is continued to have ongoing right-sided flank pain    8/25/20:  He is now s/p diagnostic Ureteroscopy on 8/13/20. No stone identified at that time with complete visualization of the entire right collecting system.  The last 3-4 days he has been doing well with resolution of the RIGHT flank pain  He is having some intermittent LEFT flank pain    Developed COVID-19, now recovered     12/22/20:  With the assistance of a    He notes that he has been having increase LEFT sided pain - especially in the morning  He continues to have right abdominal pain and burning  with unknown cause  He wants to move forward with left nephrectomy    TODAY 6/3/21:  He is status post a laparoscopic left nephrectomy on 5/24/2021  He is doing well after surgery and healing well  No left-sided pain and incisions are healing well  He notes some continued intermittent right upper back and side pain up near his ribs      PHYSICAL EXAM  Patient is a 31 year old  male   Vitals: There were no vitals taken for this visit.  There is no height or weight on file to calculate BMI.  General Appearance Adult:   Alert, no acute distress, oriented  HENT: throat/mouth:normal, good dentition  Lungs: no respiratory distress, or pursed lip breathing  Heart: No obvious jugular venous distension present  Abdomen: soft, nontender, no organomegaly or masses  Incisions: Healing well with peeling skin glue in place.  No evidence of hernia  Musculoskeltal: extremities normal, no peripheral edema  Skin: no suspicious lesions or rashes  Neuro: Alert, oriented, speech and mentation normal  Psych: affect and mood normal  Gait: Normal  : No right-sided CVA tenderness    Creatinine   Date Value Ref Range Status   05/26/2021 1.03 0.66 - 1.25 mg/dL Final   05/25/2021 0.96 0.66 - 1.25 mg/dL Final   05/24/2021 0.98 0.66 - 1.25 mg/dL Final   05/06/2021 0.96 0.66 - 1.25 mg/dL Final   02/02/2021 0.9 0.7 - 1.3 mg/dL Final       IMAGING:  All pertinent imaging reviewed:    All imaging studies reviewed by me.  I personally reviewed these imaging films.  A formal report from radiology will follow.    CT ABD/PEL 2/28/20  FINDINGS:     Abdomen and pelvis: No significant change in 2.8 x 1.5 cm calculus  within the left renal pelvis, with unchanged atrophy appearance of the  left kidney. No significant left hydronephrosis. Density of this left  renal calculus measures 343 Hounsfield units. Unremarkable right  kidney, without nephrolithiasis or hydroureteronephrosis. Unremarkable  urinary bladder. Unremarkable prostate gland. Symmetric  seminal  vesicles.     No abnormally dilated or thickened loops of small and large bowel. No  intraperitoneal free fluid or free air. No pneumatosis or portal  venous gas. No infrarenal abdominal aortic aneurysm. No pathologically  enlarged lymph nodes within the abdomen or pelvis.     Unremarkable noncontrast appearance of the liver, gallbladder,  pancreas, spleen, and adrenal glands.     Lung bases:  Visualized lung bases are clear, without pleural  effusion. No significant change in the extensive cystic changes within  the right middle lobe and cystic changes within the anterior aspect of  the bilateral lower lobes and lingula. The heart is normal in size,  without pericardial effusion.     Bones and soft tissues: No acute or aggressive appearing osseous  lesion. Mild degenerative changes of the spine. Stable sclerosis  involving the inferior endplates of T8, T11, T12. Tiny fat-containing  umbilical hernia.                                                         IMPRESSION:   1. No significant change in large left renal calculus measuring 2.8 x  1.5 cm, with unchanged atrophy of the left kidney. No right  nephrolithiasis or hydroureteronephrosis.   2. Stable multicystic changes of the right middle lobe and anterior  aspects of the bilateral lower lobes. Recommend correlation with  clinical history and any outside dedicated chest imaging.    RENAL U/S 6/30/20:  FINDINGS:  Right kidney: Measures 11.8 cm in length.   Left kidney: Measures 8.4 cm in length. There is a persistent  shadowing calculus in the left kidney, 2.2 cm.     There is bilateral hydronephrosis.  No cystic or solid renal mass.     Bladder: Partially distended without abnormality.     The prostate and seminal vesical are visualized.  The prostate  measures 3.4 x 2.4 x 3.3 cm. There is a small simple prostate cyst, 6  mm.                                                             IMPRESSION:  1.  Bilateral hydronephrosis without obstructing calculus  visualized.  2.  The prostate measures within normal limits.    CT ABD/PEL 7/2/20:  IMPRESSION:   1. Sub-3 mm stone in the right ureter at the level of the pelvis, new.  2. Mild prominence of the right renal collecting system, without overt  hydronephrosis.   3. Stable large left renal calculus at the renal pelvis, with  associated atrophy of the left kidney.  4. Partially visualized multicystic changes of the lung bases appear  stable from prior CT.    CT ABD/PEL 7/29/20:  FINDINGS:     Abdomen and pelvis:   Liver: Normal noncontrast appearance of the liver.  Gallbladder: No gallstones. No evidence of acute cholecystitis.  Spleen: Normal size.  Pancreas: No suspicious pancreatic lesions. The pancreatic duct is not  dilated.  Adrenal glands: No adrenal nodules.  Kidneys: No significant change in the 2.8 x 1.5 cm calculus in the  left renal pelvis but stable atrophic appearance of the left kidney.  Mild left hydronephrosis, similar in appearance. Mildly decreased mild  right pelvocaliectasis without overt hydronephrosis. There is again  mild prominence of the right ureter up 7 mm, which may be secondary to  a stable 3 mm stone in similar position in the region of the right  distal ureter although location within the ureter is difficult to  confirm and this calculus has been present across multiple prior exams  dating back to January 2020 (series 5, image 280). Urinary bladder is  partially distended. There is a 4 mm calculus located medial and  posterior to the left ureterovesical junction, which likely represents  a migrated IVC seen left distal ureteral calculus (series 5, image  344).  Bowel: No bowel wall thickening. The appendix is unremarkable.  Lymph nodes: No retroperitoneal, mesenteric, or pelvic  lymphadenopathy.  Fluid: No free fluid within the abdomen.  Vessels: No infrarenal aortic aneurysm.      Lung bases: Stable cystic changes within the right middle lobe and  anterior aspect of the lower lungs  bilaterally. No pleural effusion.  Heart size is normal without pericardial effusion.     Bones and soft tissues: No suspicious osseous lesions. Stable  sclerosis of the inferior endplates of T11 and T12.                                                            IMPRESSION:   1.  Stable large left renal calculus within the pelvis, with  associated atrophy of left kidney.  2.  The previously seen calculus within the left distal ureter appears  to have migrated and is now located within the posterior aspect of the  urinary bladder, medial to the left ureterovesical junction.  3.  Improved right pelvocaliectasis. Persistent mild dilation of the  right ureter, which may be secondary to a 3 mm calculus in the region  of the right distal ureter. However, this 3mm calculus in the region  of the right distal ureter has been seen on multiple prior exams  dating back to January 2020 and may represent an adjacent vascular  calcification.  4.  Redemonstration of previously visualized multicystic changes of  the lung bases.            ASSESSMENT and PLAN  31-year-old man with a left atrophic kidney secondary now status post a laparoscopic left nephrectomy    Functional solitary right kidney   -His right kidney is functioning well with normal renal function  -We discussed that his right-sided upper back and side pain is not related to his kidney as this is more thoracic in nature    Left flank pain  -He is doing well after his laparoscopic nephrectomy  -We discussed 2 remaining weeks of a 10 pound weight lifting restriction  -He may follow-up on a as needed basis    Time spent: 15 minutes spent on the date of the encounter doing chart review, history and exam, documentation and further activities as noted above.     Aj Hedrick MD   Urology  [facility]  [facility] Phone: [phone]  [facility] Phone: [phone]

## 2021-06-03 NOTE — NURSING NOTE
Emelyn Marmolejo's goals for this visit include:   Chief Complaint   Patient presents with     Follow Up     2 week post op       He requests these members of his care team be copied on today's visit information:     PCP: Brittny Eid    Referring Provider:  No referring provider defined for this encounter.    There were no vitals taken for this visit.    Do you need any medication refills at today's visit?     Kylee Mena LPN on 6/3/2021 at 10:13 AM

## 2021-06-07 ENCOUNTER — OFFICE VISIT (OUTPATIENT)
Dept: FAMILY MEDICINE | Facility: CLINIC | Age: 31
End: 2021-06-07
Payer: COMMERCIAL

## 2021-06-07 VITALS
TEMPERATURE: 99.1 F | HEART RATE: 73 BPM | OXYGEN SATURATION: 100 % | RESPIRATION RATE: 16 BRPM | BODY MASS INDEX: 18.67 KG/M2 | DIASTOLIC BLOOD PRESSURE: 73 MMHG | WEIGHT: 130.4 LBS | SYSTOLIC BLOOD PRESSURE: 106 MMHG | HEIGHT: 70 IN

## 2021-06-07 DIAGNOSIS — M54.6 RIGHT-SIDED THORACIC BACK PAIN, UNSPECIFIED CHRONICITY: Primary | ICD-10-CM

## 2021-06-07 DIAGNOSIS — Z90.5 H/O UNILATERAL NEPHRECTOMY: ICD-10-CM

## 2021-06-07 PROCEDURE — 99213 OFFICE O/P EST LOW 20 MIN: CPT | Mod: GC | Performed by: STUDENT IN AN ORGANIZED HEALTH CARE EDUCATION/TRAINING PROGRAM

## 2021-06-07 RX ORDER — CYCLOBENZAPRINE HCL 5 MG
5-10 TABLET ORAL AT BEDTIME
Qty: 15 TABLET | Refills: 0 | Status: SHIPPED | OUTPATIENT
Start: 2021-06-07 | End: 2021-08-23

## 2021-06-07 ASSESSMENT — MIFFLIN-ST. JEOR: SCORE: 1537.74

## 2021-06-07 NOTE — PROGRESS NOTES
"Assessment & Plan     Right-sided thoracic back pain, unspecified chronicity  H/O unilateral nephrectomy  s/p lap L nephrectomy on 5/24 for atrophic kidney. Had f/u with uroloist on 6/3. Patient with R sided flank pain, per urology, no concern for R kidney issue due to normal imaging from 2/2021. Pain likely related to subacute muscle pain related to recent surgery, limited mobility and lack of sleep. Low concern for infection given lack of urinary symptoms and normal vitals. Will try flexeril and follow up in a week if still continuing. Encouraged sleep regimen and hydration.  Patient is very concerned that there is an issue with his right kidney. Will order labwork for future visit as lab has closed for the day. Reassurance given that history, exam, recent urology visit and prior workup is reassuring that right kidney is okay.  - cyclobenzaprine (FLEXERIL) 5 MG tablet  Dispense: 15 tablet; Refill: 0  - Urinalysis, Micro If (UA) ([facility])  - Basic metabolic panel      Return in about 1 week (around 6/14/2021).    Patrice Ordonez MD  [facility] EARL Dunaway is a 31 year old who presents for the following health issues    HPI   Patient reports R thoracic back pain over the last 90 hours. Also intermittently feeling light-headed, nausea, feverish.   Took tylenol this morning.  Eating well except for today.     Review of Systems   Constitutional, HEENT, cardiovascular, pulmonary, GI, , musculoskeletal, neuro, skin, endocrine and psych systems are negative, except as otherwise noted.      Objective    /73   Pulse 73   Temp 99.1  F (37.3  C) (Oral)   Resp 16   Ht 1.77 m (5' 9.69\")   Wt 59.1 kg (130 lb 6.4 oz)   SpO2 100%   BMI 18.88 kg/m    Body mass index is 18.88 kg/m .  Physical Exam  Vitals signs reviewed.   Constitutional:       General: He is not in acute distress.     Appearance: Normal appearance. He is not ill-appearing.   HENT:      Head: Normocephalic and " atraumatic.   Eyes:      Conjunctiva/sclera: Conjunctivae normal.   Pulmonary:      Effort: Pulmonary effort is normal. No respiratory distress.   Musculoskeletal: Normal range of motion.         General: No deformity.      Comments: Fullness and tenderness to right thoracic paraspinal muscles near rib 8-10. ROM not tested due to recent surgery, patient concern for movement with surgical wounds.   Skin:     General: Skin is warm and dry.   Neurological:      General: No focal deficit present.      Mental Status: He is alert.      Motor: No weakness.      Gait: Gait normal.   Psychiatric:         Behavior: Behavior normal.         Thought Content: Thought content normal.

## 2021-06-09 ENCOUNTER — TRANSFERRED RECORDS (OUTPATIENT)
Dept: HEALTH INFORMATION MANAGEMENT | Facility: CLINIC | Age: 31
End: 2021-06-09

## 2021-06-17 ENCOUNTER — OFFICE VISIT (OUTPATIENT)
Dept: FAMILY MEDICINE | Facility: CLINIC | Age: 31
End: 2021-06-17
Payer: COMMERCIAL

## 2021-06-17 VITALS
OXYGEN SATURATION: 97 % | TEMPERATURE: 99.1 F | BODY MASS INDEX: 19.11 KG/M2 | SYSTOLIC BLOOD PRESSURE: 109 MMHG | HEART RATE: 71 BPM | DIASTOLIC BLOOD PRESSURE: 75 MMHG | WEIGHT: 132 LBS | RESPIRATION RATE: 16 BRPM

## 2021-06-17 DIAGNOSIS — F51.01 PRIMARY INSOMNIA: Primary | ICD-10-CM

## 2021-06-17 LAB
DEPRECATED CALCIDIOL+CALCIFEROL SERPL-MC: 30 UG/L (ref 20–75)
TSH SERPL DL<=0.005 MIU/L-ACNC: 1.61 MU/L (ref 0.4–4)
VIT B12 SERPL-MCNC: 462 PG/ML (ref 193–986)

## 2021-06-17 PROCEDURE — 99213 OFFICE O/P EST LOW 20 MIN: CPT | Mod: 24 | Performed by: FAMILY MEDICINE

## 2021-06-17 PROCEDURE — 36415 COLL VENOUS BLD VENIPUNCTURE: CPT | Performed by: FAMILY MEDICINE

## 2021-06-17 PROCEDURE — 82607 VITAMIN B-12: CPT | Performed by: FAMILY MEDICINE

## 2021-06-17 PROCEDURE — 84443 ASSAY THYROID STIM HORMONE: CPT | Performed by: FAMILY MEDICINE

## 2021-06-17 PROCEDURE — 82306 VITAMIN D 25 HYDROXY: CPT | Performed by: FAMILY MEDICINE

## 2021-06-17 RX ORDER — TRAZODONE HYDROCHLORIDE 100 MG/1
100 TABLET ORAL AT BEDTIME
Qty: 30 TABLET | Refills: 3 | Status: SHIPPED | OUTPATIENT
Start: 2021-06-17 | End: 2021-08-23

## 2021-06-17 NOTE — PROGRESS NOTES
Assessment & Plan     Primary insomnia  Start trazodone  - Vitamin D Level  - Vitamin B12  - TSH with free T4 reflex  - traZODone (DESYREL) 100 MG tablet; Take 1 tablet (100 mg) by mouth At Bedtime      No follow-ups on file.    Brittny Eid DO  [facility] EARL Dunaway is a 31 year old who presents for the following health issues:    HPI     P/f insomnia  He was treated with prn benadryl for severe insomnia and started on vitamin D in the past, which helped initially  He was recently discharged 5/26 after a L nephrectomy for atrophic kidney  Since discharge, has not been able to sleep despite using benadryl and melatonin  He believes insomnia is primary, and not related to anxiety/depression or other mood disorder      Review of Systems   Constitutional, HEENT, cardiovascular, pulmonary, gi and gu systems are negative, except as otherwise noted.      Objective    /75   Pulse 71   Temp 99.1  F (37.3  C) (Oral)   Resp 16   Wt 59.9 kg (132 lb)   SpO2 97%   BMI 19.11 kg/m    Body mass index is 19.11 kg/m .  Physical Exam   GENERAL: healthy, alert and no distress  RESP: lungs clear to auscultation - no rales, rhonchi or wheezes  CV: regular rate and rhythm, normal S1 S2, no S3 or S4, no murmur, click or rub, no peripheral edema and peripheral pulses strong  MS: no gross musculoskeletal defects noted, no edema

## 2021-06-17 NOTE — NURSING NOTE
Due to patient being non-English speaking/uses sign language, an  was used for this visit. Only for face-to-face interpretation by an external agency, date and length of interpretation can be found on the scanned worksheet.     name: Frances Aguilera  Agency: [employer]  Language: Vincentian   Telephone number: [phone]  Type of interpretation: Telephone, spoken    Declined int for this visit

## 2021-07-01 ENCOUNTER — APPOINTMENT (OUTPATIENT)
Dept: CT IMAGING | Facility: CLINIC | Age: 31
End: 2021-07-01
Attending: EMERGENCY MEDICINE
Payer: COMMERCIAL

## 2021-07-01 ENCOUNTER — HOSPITAL ENCOUNTER (EMERGENCY)
Facility: CLINIC | Age: 31
Discharge: HOME | End: 2021-07-01
Attending: EMERGENCY MEDICINE | Admitting: EMERGENCY MEDICINE
Payer: COMMERCIAL

## 2021-07-01 VITALS
BODY MASS INDEX: 18.74 KG/M2 | SYSTOLIC BLOOD PRESSURE: 118 MMHG | WEIGHT: 126.5 LBS | TEMPERATURE: 97.7 F | HEIGHT: 69 IN | RESPIRATION RATE: 16 BRPM | OXYGEN SATURATION: 98 % | DIASTOLIC BLOOD PRESSURE: 74 MMHG | HEART RATE: 72 BPM

## 2021-07-01 DIAGNOSIS — R10.9 RIGHT FLANK PAIN: ICD-10-CM

## 2021-07-01 LAB
ALBUMIN SERPL-MCNC: 4.2 G/DL (ref 3.4–5)
ALBUMIN UR-MCNC: NEGATIVE MG/DL
ALP SERPL-CCNC: 68 U/L (ref 40–150)
ALT SERPL W P-5'-P-CCNC: 18 U/L (ref 0–70)
ANION GAP SERPL CALCULATED.3IONS-SCNC: 9 MMOL/L (ref 3–14)
APPEARANCE UR: CLEAR
AST SERPL W P-5'-P-CCNC: 21 U/L (ref 0–45)
BACTERIA #/AREA URNS HPF: ABNORMAL /HPF
BASOPHILS # BLD AUTO: 0 10E9/L (ref 0–0.2)
BASOPHILS NFR BLD AUTO: 0.3 %
BILIRUB SERPL-MCNC: 0.6 MG/DL (ref 0.2–1.3)
BILIRUB UR QL STRIP: NEGATIVE
BUN SERPL-MCNC: 13 MG/DL (ref 7–30)
CALCIUM SERPL-MCNC: 9.6 MG/DL (ref 8.5–10.1)
CHLORIDE SERPL-SCNC: 102 MMOL/L (ref 94–109)
CO2 SERPL-SCNC: 25 MMOL/L (ref 20–32)
COLOR UR AUTO: ABNORMAL
CREAT SERPL-MCNC: 1.07 MG/DL (ref 0.66–1.25)
DIFFERENTIAL METHOD BLD: NORMAL
EOSINOPHIL # BLD AUTO: 0.3 10E9/L (ref 0–0.7)
EOSINOPHIL NFR BLD AUTO: 3.9 %
ERYTHROCYTE [DISTWIDTH] IN BLOOD BY AUTOMATED COUNT: 12.2 % (ref 10–15)
GFR SERPL CREATININE-BSD FRML MDRD: >90 ML/MIN/{1.73_M2}
GLUCOSE SERPL-MCNC: 83 MG/DL (ref 70–99)
GLUCOSE UR STRIP-MCNC: NEGATIVE MG/DL
HCT VFR BLD AUTO: 44.7 % (ref 40–53)
HGB BLD-MCNC: 15.3 G/DL (ref 13.3–17.7)
HGB UR QL STRIP: ABNORMAL
IMM GRANULOCYTES # BLD: 0 10E9/L (ref 0–0.4)
IMM GRANULOCYTES NFR BLD: 0.2 %
KETONES UR STRIP-MCNC: NEGATIVE MG/DL
LEUKOCYTE ESTERASE UR QL STRIP: NEGATIVE
LIPASE SERPL-CCNC: 117 U/L (ref 73–393)
LYMPHOCYTES # BLD AUTO: 3.1 10E9/L (ref 0.8–5.3)
LYMPHOCYTES NFR BLD AUTO: 48.5 %
MCH RBC QN AUTO: 29.5 PG (ref 26.5–33)
MCHC RBC AUTO-ENTMCNC: 34.2 G/DL (ref 31.5–36.5)
MCV RBC AUTO: 86 FL (ref 78–100)
MONOCYTES # BLD AUTO: 0.6 10E9/L (ref 0–1.3)
MONOCYTES NFR BLD AUTO: 8.8 %
MUCOUS THREADS #/AREA URNS LPF: PRESENT /LPF
NEUTROPHILS # BLD AUTO: 2.5 10E9/L (ref 1.6–8.3)
NEUTROPHILS NFR BLD AUTO: 38.3 %
NITRATE UR QL: NEGATIVE
NRBC # BLD AUTO: 0 10*3/UL
NRBC BLD AUTO-RTO: 0 /100
PH UR STRIP: 6 PH (ref 5–7)
PLATELET # BLD AUTO: 193 10E9/L (ref 150–450)
POTASSIUM SERPL-SCNC: 4 MMOL/L (ref 3.4–5.3)
PROT SERPL-MCNC: 8.4 G/DL (ref 6.8–8.8)
RBC # BLD AUTO: 5.18 10E12/L (ref 4.4–5.9)
RBC #/AREA URNS AUTO: 1 /HPF (ref 0–2)
SODIUM SERPL-SCNC: 136 MMOL/L (ref 133–144)
SOURCE: ABNORMAL
SP GR UR STRIP: 1.01 (ref 1–1.03)
SQUAMOUS #/AREA URNS AUTO: <1 /HPF (ref 0–1)
UROBILINOGEN UR STRIP-MCNC: NORMAL MG/DL (ref 0–2)
WBC # BLD AUTO: 6.4 10E9/L (ref 4–11)
WBC #/AREA URNS AUTO: <1 /HPF (ref 0–5)

## 2021-07-01 PROCEDURE — 80053 COMPREHEN METABOLIC PANEL: CPT | Performed by: EMERGENCY MEDICINE

## 2021-07-01 PROCEDURE — 85025 COMPLETE CBC W/AUTO DIFF WBC: CPT | Performed by: EMERGENCY MEDICINE

## 2021-07-01 PROCEDURE — 74176 CT ABD & PELVIS W/O CONTRAST: CPT

## 2021-07-01 PROCEDURE — 83690 ASSAY OF LIPASE: CPT | Performed by: EMERGENCY MEDICINE

## 2021-07-01 PROCEDURE — 81001 URINALYSIS AUTO W/SCOPE: CPT | Performed by: EMERGENCY MEDICINE

## 2021-07-01 PROCEDURE — 99284 EMERGENCY DEPT VISIT MOD MDM: CPT | Performed by: EMERGENCY MEDICINE

## 2021-07-01 PROCEDURE — 99284 EMERGENCY DEPT VISIT MOD MDM: CPT | Mod: 25

## 2021-07-01 ASSESSMENT — MIFFLIN-ST. JEOR: SCORE: 1509.18

## 2021-07-01 NOTE — ED PROVIDER NOTES
ED Provider Note  [facility]      History     Chief Complaint   Patient presents with     Back Pain     c/o his back right side below shoulder blade. Onset 3 days ago and the pain is getting worse.  Patient is concerned since he has only 1 kidney. Pt reported his urine has foul odor.      Nausea     c/o nausea secondary to pain     HPI  Emelyn Marmolejo is a 31 year old male with history of left kidney atrophy status post resection 1 month ago, presents to the ED with complaints of right flank pain.  He states he is had pain in the right flank and some in the right upper abdomen intermittently for 3 days, but tonight was constant for most of the night.  No vomiting, diarrhea, dysuria, hematuria.  No fever, cough, shortness of breath.  He is not short makes it better or worse.  He states currently it seems to be gone.  He does report that at times when he was having the pain he was a little bit lightheaded and felt nauseated, but this is also gone.  He did have previous history of kidney stones on the left kidney.  Given that he only has a single kidney he want to be cautious.    Past Medical History  Past Medical History:   Diagnosis Date     Prostate infection      Past Surgical History:   Procedure Laterality Date     COMBINED CYSTOSCOPY, URETEROSCOPY, LASER HOLMIUM LITHOTRIPSY URETER(S) Right 8/13/2020    Procedure: CYSTOSCOPY, RIGHT RETROGRADE PYELOGRAM, RIGHT diagnostic URETEROSCOPY;  Surgeon: Aj Hedrick MD;  Location:  OR     ESOPHAGOSCOPY, GASTROSCOPY, DUODENOSCOPY (EGD), COMBINED N/A 1/25/2021    Procedure: ESOPHAGOGASTRODUODENOSCOPY, WITH BIOPSY;  Surgeon: Rip Her MD;  Location: [facility] OR     LAPAROSCOPIC NEPHRECTOMY Left 5/24/2021    Procedure: LEFT NEPHRECTOMY, TOTAL, LAPAROSCOPIC;  Surgeon: Aj Hedrick MD;  Location:  OR     acetaminophen (TYLENOL) 500 MG tablet  cyclobenzaprine (FLEXERIL) 5 MG tablet  diphenhydrAMINE (BENADRYL) 25 MG  "tablet  docusate sodium (COLACE) 100 MG tablet  oxyCODONE (ROXICODONE) 5 MG tablet  traZODone (DESYREL) 100 MG tablet  vitamin D3 (CHOLECALCIFEROL) 50 mcg (2000 units) tablet      Allergies   Allergen Reactions     Nsaids      Not true allergy. But be sparing with patient as he has single kidney      Family History  Family History   Problem Relation Age of Onset     Crohn's Disease No family hx of      Ulcerative Colitis No family hx of      GERD No family hx of      Stomach Cancer No family hx of      Social History   Social History     Tobacco Use     Smoking status: Former Smoker     Types: Cigarettes     Quit date: 2019     Years since quittin.7     Smokeless tobacco: Never Used   Substance Use Topics     Alcohol use: Never     Frequency: Never     Drug use: Never      Past medical history, past surgical history, medications, allergies, family history, and social history were reviewed with the patient. No additional pertinent items.       Review of Systems  A complete review of systems was performed with pertinent positives and negatives noted in the HPI, and all other systems negative.    Physical Exam   BP: 118/74  Pulse: 72  Temp: 97.7  F (36.5  C)  Resp: 16  Height: 175.3 cm (5' 9\")  Weight: 57.4 kg (126 lb 8 oz)  SpO2: 98 %  Physical Exam  Constitutional:       General: He is not in acute distress.     Appearance: He is not diaphoretic.   HENT:      Head: Atraumatic.   Eyes:      General: No scleral icterus.     Pupils: Pupils are equal, round, and reactive to light.   Cardiovascular:      Heart sounds: Normal heart sounds.   Pulmonary:      Effort: No respiratory distress.      Breath sounds: Normal breath sounds.   Abdominal:      Palpations: Abdomen is soft.      Tenderness: There is no abdominal tenderness.   Musculoskeletal:         General: No tenderness.   Skin:     General: Skin is warm.         ED Course      Procedures               No results found for any visits on " 07/01/21.  Medications - No data to display     Assessments & Plan (with Medical Decision Making)   Basic labs were checked and were unremarkable including UA, creatinine.  We will get CT to make sure there is no obstructing stone causing his symptoms.  He is currently symptom-free.  If negative I do think he can be discharged with a plan to follow-up as an outpatient.  He will be signed out to the oncoming provider at shift change.    Dictation Disclaimer: Some of this Note has been completed with voice-recognition dictation software. Although errors are generally corrected real-time, there is the potential for a rare error to be present in the completed chart.      I have reviewed the nursing notes. I have reviewed the findings, diagnosis, plan and need for follow up with the patient.    New Prescriptions    No medications on file       Final diagnoses:   None       --  Inez Bone  [facility] EMERGENCY DEPARTMENT  7/1/2021     Inez Bone MD  07/01/21 0853

## 2021-07-01 NOTE — DISCHARGE INSTRUCTIONS
Please make an appointment to follow up with Your Primary Care Provider within 7 days.   Your creatinine is 1.07 with a GFR of >90  This is normal.

## 2021-07-01 NOTE — ED NOTES
Sign out note: Emelyn Marmolejo is a 31 year old male was signed out to me by Dr. Bone at 0700 am.  Please see initial dictation for full details and history.  Patient has a history of a left nephrectomy and presented with right flank pain.  Laboratory studies were unremarkable and an abdominal and pelvic CT scan was ordered.  Plan at time of sign out is check the results of the abdominal and pelvic CT scan.  If this is unremarkable, the patient can be discharged home.       Course in the ED:  CT of the abdomen and pelvis shows IMPRESSION:   1.  No acute findings in the abdomen and pelvis.  2.  No right hydronephrosis or perinephric stranding. No right renal  calculi. Stable 3 mm calcification in the right pelvis that appears to  be atherosclerotic calcification in the right internal iliac artery  rather than a right ureteral calculus. If confirmation is desired, a  CT urogram with an nonemergent basis can be considered.  3.  Interval left nephrectomy.  4.  Cylindrical bronchiectasis in the lung bases is stable.    I reviewed these findings with the patient prior to discharge.  His creatinine today is 0.07 with a GFR of greater than 90.  I did recommend that he follow-up with his regular physician for reevaluation within a week.    Clinical impression: Right flank pain    This note was created in part by the use of Dragon voice recognition dictation system. Inadvertent grammatical errors and typographical errors may still exist.  MD Flavio Madrid Alda L, MD  07/01/21 7480

## 2021-08-16 ENCOUNTER — OFFICE VISIT (OUTPATIENT)
Dept: FAMILY MEDICINE | Facility: CLINIC | Age: 31
End: 2021-08-16
Payer: COMMERCIAL

## 2021-08-16 VITALS
SYSTOLIC BLOOD PRESSURE: 124 MMHG | BODY MASS INDEX: 19.43 KG/M2 | OXYGEN SATURATION: 95 % | WEIGHT: 131.6 LBS | RESPIRATION RATE: 16 BRPM | DIASTOLIC BLOOD PRESSURE: 77 MMHG | HEART RATE: 87 BPM | TEMPERATURE: 99.1 F

## 2021-08-16 DIAGNOSIS — Z90.5 H/O UNILATERAL NEPHRECTOMY: ICD-10-CM

## 2021-08-16 DIAGNOSIS — R10.9 FLANK PAIN: Primary | ICD-10-CM

## 2021-08-16 DIAGNOSIS — E55.9 VITAMIN D DEFICIENCY: ICD-10-CM

## 2021-08-16 LAB
ALBUMIN UR-MCNC: NEGATIVE MG/DL
ANION GAP SERPL CALCULATED.3IONS-SCNC: 6 MMOL/L (ref 3–14)
APPEARANCE UR: CLEAR
BILIRUB UR QL STRIP: NEGATIVE
BUN SERPL-MCNC: 20 MG/DL (ref 7–30)
CALCIUM SERPL-MCNC: 8.8 MG/DL (ref 8.5–10.1)
CHLORIDE BLD-SCNC: 107 MMOL/L (ref 94–109)
CO2 SERPL-SCNC: 25 MMOL/L (ref 20–32)
COLOR UR AUTO: YELLOW
CREAT SERPL-MCNC: 1.01 MG/DL (ref 0.66–1.25)
GFR SERPL CREATININE-BSD FRML MDRD: >90 ML/MIN/1.73M2
GLUCOSE BLD-MCNC: 95 MG/DL (ref 70–99)
GLUCOSE UR STRIP-MCNC: NEGATIVE MG/DL
HGB UR QL STRIP: ABNORMAL
KETONES UR STRIP-MCNC: NEGATIVE MG/DL
LEUKOCYTE ESTERASE UR QL STRIP: NEGATIVE
NITRATE UR QL: NEGATIVE
PH UR STRIP: 6.5 [PH] (ref 5–8)
POTASSIUM BLD-SCNC: 4.1 MMOL/L (ref 3.4–5.3)
RBC #/AREA URNS AUTO: ABNORMAL /HPF
SODIUM SERPL-SCNC: 138 MMOL/L (ref 133–144)
SP GR UR STRIP: 1.01 (ref 1–1.03)
UROBILINOGEN UR STRIP-ACNC: 0.2 E.U./DL
WBC #/AREA URNS AUTO: ABNORMAL /HPF

## 2021-08-16 PROCEDURE — 81001 URINALYSIS AUTO W/SCOPE: CPT | Performed by: STUDENT IN AN ORGANIZED HEALTH CARE EDUCATION/TRAINING PROGRAM

## 2021-08-16 PROCEDURE — 36415 COLL VENOUS BLD VENIPUNCTURE: CPT | Performed by: STUDENT IN AN ORGANIZED HEALTH CARE EDUCATION/TRAINING PROGRAM

## 2021-08-16 PROCEDURE — 99214 OFFICE O/P EST MOD 30 MIN: CPT | Mod: GC | Performed by: STUDENT IN AN ORGANIZED HEALTH CARE EDUCATION/TRAINING PROGRAM

## 2021-08-16 PROCEDURE — 80048 BASIC METABOLIC PNL TOTAL CA: CPT | Performed by: STUDENT IN AN ORGANIZED HEALTH CARE EDUCATION/TRAINING PROGRAM

## 2021-08-16 RX ORDER — CHOLECALCIFEROL (VITAMIN D3) 50 MCG
1 TABLET ORAL DAILY
Qty: 90 TABLET | Refills: 0 | Status: SHIPPED | OUTPATIENT
Start: 2021-08-16 | End: 2021-08-23

## 2021-08-16 NOTE — PROGRESS NOTES
Assessment & Plan     Right flank/thoracic pain   Foul smelling urine   Solitary right kidney  31 year old male with solitary right kidney following lap nephrectomy ~3 months ago, presenting with right flank/thoracic back pain. He has had similar right flank/back pain even prior to his nephrectomy. He has been evaluated in clinic, ER and by Urology and etiology has been felt to not be related to his kidney and rather musculoskeletal in nature-- this is consistent with my exam today. Recent imaging CT 6 weeks ago reviewed and was normal other than a 3 mm calcification in R pelvis that appeared to be posterior to the R ureterer. Baseline Cr ~1. Given his solitary right kidney, certainly important to be conservative, therefore, BMP, UA were obtained today. Spent significant amount of time discussing his numerous reassuring work ups and all the reassuring findings. He is understandably quite concerned about the loss of his left kidney and expressed significant anxiety surrounding worrying about his right kidney.  Would recommend close follow-up with PCP in 1-2 weeks for continued reassurance (pending lab results) and an attempt to prevent unnecessary ER visits and seeing multiple/new providers regarding a chronic issue.  Did discuss some interventions for musculoskeletal pain, though patient very focused on making sure his creatinine and urine testing is normal before considering alternative etiologies.  This can be discussed more when results are relayed and at his follow-up appointment  - UA reflex to Microscopic and Culture; Future  - Basic metabolic panel; Future  - follow up in 1-2 weeks (with myself or his PCP, Dr. Eid)     Vitamin D deficiency  Reviewed prior labs with patient-- had low vitamin D 18 in February 2021, prescribed vitamin D supplement, with follow up vitamin D level improved to 30. Patient unclear about continuing-- did encourage him to continue at current dose which was refilled today.  -  "vitamin D3 (CHOLECALCIFEROL) 50 mcg (2000 units) tablet; Take 1 tablet (50 mcg) by mouth daily    Return in about 2 weeks (around 8/30/2021) for Follow up, with me or Dr. Eid.    Bel Braun MD  [facility] EARL Dunaway is a 31 year old with history of left kidney atrophy due to large obstructing stone s/p unilateral nephrectomy (5/24/2021) who presents for the following health issues accompanied by his  (telephone):    HPI     Abdominal/Flank Pain- RUQ  Onset/Duration: 2 weeks --  Though off and on for months   Description:   Character: \"something stuck there\"  Location: right upper quadrant  Radiation: None  Intensity: moderate  Progression of Symptoms:  intermittent  Accompanying Signs & Symptoms:  Fever/Chills: no  Gas/Bloating: no  Nausea: no  Vomiting: no  Diarrhea: no  Constipation: no  Dysuria or Hematuria: no    Worse when sitting for prolonged periods of time, for 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 Improves with walking.     Review of the medical record patient has been struggling with right-sided flank pain for many weeks even before his initial surgery    7/01/2021 seen in [facility] for right mid back pain x3 days. CT A/P at that time showed no R renal stone, hydronephrosis or hydrouretere. There was a 3 mm calcification in R pelvis appeared to be posterior to the R ureter (R internal iliac arterial atherosclerotic calcification) -- note that could confirm with CT urogram.     6/09/2021 seen in [facility] for right flank pain. [place] MSK in nature, UA, Cr normal. Recommended lidocaine patches.    6/07/2021 seen in clinic for R thoracic back pain- felt MSK.     6/03/2021 Urology clinic for follow up, R upper back/side pain NOT felt to be related to his kidney, more thoracic in nature. Follow up as needed.    Insomnia  - prescribed trazodone, \"sometimes it works sometimes it doesn't\"... \"I think I have a vitamin " "Deficiency\"    Review of Systems    ROS: 10 point ROS neg other than the symptoms noted above in the HPI.       Objective    /77   Pulse 87   Temp 99.1  F (37.3  C) (Oral)   Resp 16   Wt 59.7 kg (131 lb 9.6 oz)   SpO2 95%   BMI 19.43 kg/m    Body mass index is 19.43 kg/m .  Physical Exam  Constitutional:       General: He is not in acute distress.     Appearance: He is well-developed. He is not diaphoretic.   Cardiovascular:      Rate and Rhythm: Normal rate.   Pulmonary:      Effort: Pulmonary effort is normal. No respiratory distress.   Abdominal:      General: There is no distension.      Palpations: Abdomen is soft.      Tenderness: There is no abdominal tenderness. There is no right CVA tenderness or left CVA tenderness.   Musculoskeletal:         General: Tenderness (localized to the inferior R shoulder blader (consistent with prior documented exams)) present. No swelling or deformity.   Skin:     General: Skin is warm.      Findings: No bruising or erythema.   Neurological:      Mental Status: He is alert and oriented to person, place, and time.   Psychiatric:         Mood and Affect: Mood is anxious.          Results for orders placed or performed in visit on 08/16/21 (from the past 24 hour(s))   UA reflex to Microscopic and Culture    Specimen: Urine, Midstream   Result Value Ref Range    Color Urine Yellow Colorless, Straw, Light Yellow, Yellow    Appearance Urine Clear Clear    Glucose Urine Negative Negative mg/dL    Bilirubin Urine Negative Negative    Ketones Urine Negative Negative mg/dL    Specific Gravity Urine 1.015 1.005 - 1.030    Blood Urine Small (A) Negative    pH Urine 6.5 5.0 - 8.0    Protein Albumin Urine Negative Negative mg/dL    Urobilinogen Urine 0.2 0.2, 1.0 E.U./dL    Nitrite Urine Negative Negative    Leukocyte Esterase Urine Negative Negative   Urine Microscopic   Result Value Ref Range    RBC Urine 2-5 (A) 0-2 /HPF /HPF    WBC Urine None Seen 0-5 /HPF /HPF    Narrative "    Urine Culture not indicated

## 2021-08-23 ENCOUNTER — OFFICE VISIT (OUTPATIENT)
Dept: FAMILY MEDICINE | Facility: CLINIC | Age: 31
End: 2021-08-23
Payer: COMMERCIAL

## 2021-08-23 VITALS
HEART RATE: 100 BPM | WEIGHT: 134.2 LBS | RESPIRATION RATE: 16 BRPM | HEIGHT: 70 IN | DIASTOLIC BLOOD PRESSURE: 75 MMHG | TEMPERATURE: 98.8 F | SYSTOLIC BLOOD PRESSURE: 110 MMHG | BODY MASS INDEX: 19.21 KG/M2 | OXYGEN SATURATION: 98 %

## 2021-08-23 DIAGNOSIS — R31.29 MICROSCOPIC HEMATURIA: Primary | ICD-10-CM

## 2021-08-23 PROCEDURE — 99213 OFFICE O/P EST LOW 20 MIN: CPT | Performed by: FAMILY MEDICINE

## 2021-08-23 ASSESSMENT — MIFFLIN-ST. JEOR: SCORE: 1554.36

## 2021-08-23 NOTE — PATIENT INSTRUCTIONS
Please call [facility] if you cannot be seen by a urologist in a timely fashion.     Lillian Brasher, DO

## 2021-08-23 NOTE — PROGRESS NOTES
"    Assessment & Plan     Microscopic hematuria  Patient presenting with persistent/intermittent microscopic hematuria. Per patient's report, he has had this intermittently for a \"long time\" and remains worried this is a sign something is wrong with his remaining right kidney.   We reviewed prior imaging and testing - reassurance provided.   We discussed that if he wants to pursue further evaluation of the microscopic hematuria, the next step could include further imaging and/or cystoscopy. He does wish to pursue this, so referral was placed to urology again for discussion and decision regarding evaluation.   - Adult Urology Referral    Solitary right kidney  S/p L nephrectomy 5/2021.       Return in about 4 weeks (around 9/20/2021), or if symptoms worsen or fail to improve.    Lillian Brasher [facility] EARL Dunaway is a 31 year old who presents for the following health issues     HPI     Patient presents with:  RECHECK: Follow up blood in urine. Pt. reports don't have  any blood or pain in urine.    Patient states they have seen small blood in his urine many times.   Thought to be related to kidney stone. Has always been told this the case.   Worries about what the cause of the blood in the urine.     Also worried about his one single kidney - worries he could have cancer there or have damage that will cause him to lose this kidney also.     He otherwise feels well. Notes foul odor to his urine. Denies gross hematuria, frequency or urgency.     Review of Systems   Constitutional, HEENT, cardiovascular, pulmonary, gi and gu systems are negative, except as otherwise noted.      Objective    /75   Pulse 100   Temp 98.8  F (37.1  C) (Oral)   Resp 16   Ht 1.769 m (5' 9.65\")   Wt 60.9 kg (134 lb 3.2 oz)   SpO2 98%   BMI 19.45 kg/m    Body mass index is 19.45 kg/m .  Physical Exam  Constitutional:       Appearance: He is well-developed.   HENT:      Head: Normocephalic and " atraumatic.   Eyes:      Conjunctiva/sclera: Conjunctivae normal.   Pulmonary:      Effort: Pulmonary effort is normal.   Musculoskeletal:         General: Normal range of motion.      Cervical back: Normal range of motion and neck supple.   Skin:     General: Skin is warm and dry.   Neurological:      Mental Status: He is alert and oriented to person, place, and time.   Psychiatric:         Behavior: Behavior normal.         Thought Content: Thought content normal.         Judgment: Judgment normal.            Office Visit on 08/16/2021   Component Date Value Ref Range Status     Color Urine 08/16/2021 Yellow  Colorless, Straw, Light Yellow, Yellow Final     Appearance Urine 08/16/2021 Clear  Clear Final     Glucose Urine 08/16/2021 Negative  Negative mg/dL Final     Bilirubin Urine 08/16/2021 Negative  Negative Final     Ketones Urine 08/16/2021 Negative  Negative mg/dL Final     Specific Gravity Urine 08/16/2021 1.015  1.005 - 1.030 Final     Blood Urine 08/16/2021 Small* Negative Final     pH Urine 08/16/2021 6.5  5.0 - 8.0 Final     Protein Albumin Urine 08/16/2021 Negative  Negative mg/dL Final     Urobilinogen Urine 08/16/2021 0.2  0.2, 1.0 E.U./dL Final     Nitrite Urine 08/16/2021 Negative  Negative Final     Leukocyte Esterase Urine 08/16/2021 Negative  Negative Final     Sodium 08/16/2021 138  133 - 144 mmol/L Final     Potassium 08/16/2021 4.1  3.4 - 5.3 mmol/L Final     Chloride 08/16/2021 107  94 - 109 mmol/L Final     Carbon Dioxide (CO2) 08/16/2021 25  20 - 32 mmol/L Final     Anion Gap 08/16/2021 6  3 - 14 mmol/L Final     Urea Nitrogen 08/16/2021 20  7 - 30 mg/dL Final     Creatinine 08/16/2021 1.01  0.66 - 1.25 mg/dL Final     Calcium 08/16/2021 8.8  8.5 - 10.1 mg/dL Final     Glucose 08/16/2021 95  70 - 99 mg/dL Final     GFR Estimate 08/16/2021 >90  >60 mL/min/1.73m2 Final    As of July 11, 2021, eGFR is calculated by the CKD-EPI creatinine equation, without race adjustment. eGFR can be  influenced by muscle mass, exercise, and diet. The reported eGFR is an estimation only and is only applicable if the renal function is stable.     RBC Urine 08/16/2021 2-5* 0-2 /HPF /HPF Final     WBC Urine 08/16/2021 None Seen  0-5 /HPF /HPF Final     No results found for this or any previous visit (from the past 24 hour(s)).

## 2021-08-23 NOTE — NURSING NOTE
Due to patient being non-English speaking/uses sign language, an  was used for this visit. Only for face-to-face interpretation by an external agency, date and length of interpretation can be found on the scanned worksheet.     name: Randy Calderón  Agency: [employer]  Language: Kyrgyz   Telephone number: [phone]  Type of interpretation: telephone, spoken  Patient declined .

## 2021-08-27 NOTE — TELEPHONE ENCOUNTER
Called pt to relay diabetes test showed no diabetes or pre diabetes. But always important to keep exercise and eating healthy especially w family history.    Has started his 4 drug regimen for h pylori and prostatitis.     Livan Castro MD   
Detail Level: Simple
Detail Level: Zone
Include Location In Plan?: No
Additional Note: Discussed cosmetic shave and punch. Would recommend doing one at a time and during the winter. Patient to consider.

## 2021-09-07 ENCOUNTER — OFFICE VISIT (OUTPATIENT)
Dept: UROLOGY | Facility: CLINIC | Age: 31
End: 2021-09-07
Attending: FAMILY MEDICINE
Payer: COMMERCIAL

## 2021-09-07 DIAGNOSIS — Z90.5 S/P NEPHRECTOMY: ICD-10-CM

## 2021-09-07 DIAGNOSIS — R31.29 MICROSCOPIC HEMATURIA: Primary | ICD-10-CM

## 2021-09-07 PROCEDURE — 99213 OFFICE O/P EST LOW 20 MIN: CPT | Performed by: UROLOGY

## 2021-09-07 NOTE — PROGRESS NOTES
PROVIDER NOTE:  [facility]   CHIEF COMPLAINT   It was my pleasure to see Emelyn Marmolejo who is a 31 year old male for follow-up of right flank pain, left atrophic kidney. With the assistance of a      HPI   Emelyn Marmolejo is a very pleasant 31 year old male who presents with a history of an atrophic left kidney due to a large obstructing stone.  I had seen him previously for work-up for possible left simple nephrectomy.  At that time he was noting some right-sided flank and upper abdominal pain and is very concerned that this is related to his right kidney.  Imaging from February was reviewed with no evidence of pathology on the right side.  He subsequently been diagnosed with H. pylori and is undergoing treatment for this.  He notes continued right-sided flank and abdominal pain especially with intake of food and also prolonged sitting with driving.  He denies any fevers, chills, or hematuria.    7/3/20:  Follow-up today to discuss the results of his CT scan which was completed yesterday which demonstrates a small 3 mm distal ureteral stone.  He is feeling okay today with minimal right-sided flank pain.  He denies any fevers, chills, nausea, or vomiting.  He denies noting any stone passage    8/6/20:  Follow-up today after his CT scan last week which demonstrated persistence of the 3 mm distal ureteral stone  He notes that he is continued to have ongoing right-sided flank pain    8/25/20:  He is now s/p diagnostic Ureteroscopy on 8/13/20. No stone identified at that time with complete visualization of the entire right collecting system.  The last 3-4 days he has been doing well with resolution of the RIGHT flank pain  He is having some intermittent LEFT flank pain    Developed COVID-19, now recovered     12/22/20:  With the assistance of a    He notes that he has been having increase LEFT sided pain - especially in the morning  He continues to have right abdominal pain and burning  with unknown cause  He wants to move forward with left nephrectomy    6/3/21:  He is status post a laparoscopic left nephrectomy on 5/24/2021  He is doing well after surgery and healing well  No left-sided pain and incisions are healing well  He notes some continued intermittent right upper back and side pain up near his ribs    TODAY 9/7/21:  Follow-up today to discuss his recent urinalysis which demonstrated again persistent microscopic hematuria  He continues to have intermittent right-sided abdominal pain, but no kidney related flank pain    PHYSICAL EXAM  Patient is a 31 year old  male   Vitals: There were no vitals taken for this visit.  There is no height or weight on file to calculate BMI.  General Appearance Adult:   Alert, no acute distress, oriented  HENT: throat/mouth:normal, good dentition  Lungs: no respiratory distress, or pursed lip breathing  Heart: No obvious jugular venous distension present  Abdomen: soft, nontender, no organomegaly or masses  Incisions: Healing well with peeling skin glue in place.  No evidence of hernia  Musculoskeltal: extremities normal, no peripheral edema  Skin: no suspicious lesions or rashes  Neuro: Alert, oriented, speech and mentation normal  Psych: affect and mood normal  Gait: Normal  : No right-sided CVA tenderness    Creatinine   Date Value Ref Range Status   08/16/2021 1.01 0.66 - 1.25 mg/dL Final   07/01/2021 1.07 0.66 - 1.25 mg/dL Final   05/26/2021 1.03 0.66 - 1.25 mg/dL Final   05/25/2021 0.96 0.66 - 1.25 mg/dL Final   05/24/2021 0.98 0.66 - 1.25 mg/dL Final       IMAGING:  All pertinent imaging reviewed:    All imaging studies reviewed by me.  I personally reviewed these imaging films.  A formal report from radiology will follow.    CT ABD/PEL 7/1/21:  FINDINGS:   LOWER CHEST: Stable partly visualized cylindrical bronchiectasis in  the lung bases.     HEPATOBILIARY: Normal.     PANCREAS: Normal.     SPLEEN: Normal.     ADRENAL GLANDS:  Normal.     KIDNEYS/BLADDER: Interval left nephrectomy. No right renal calculus.  No right hydronephrosis or hydroureter. Stable 3 mm calcification in  the right pelvis (series 3, image 257), that appears to be posterior  to the right ureter and appears to be a right internal iliac arterial  atherosclerotic calcification. Stable layering calcification in the  urinary bladder. No right perinephric stranding.     BOWEL: No obstruction or inflammatory change. Normal appendix.     LYMPH NODES: No lymphadenopathy.     PELVIC ORGANS: No pelvic masses.     OTHER: No free fluid.     MUSCULOSKELETAL: Normal.                                                        IMPRESSION:   1.  No acute findings in the abdomen and pelvis.  2.  No right hydronephrosis or perinephric stranding. No right renal  calculi. Stable 3 mm calcification in the right pelvis that appears to  be atherosclerotic calcification in the right internal iliac artery  rather than a right ureteral calculus. If confirmation is desired, a  CT urogram with an nonemergent basis can be considered.  3.  Interval left nephrectomy.  4.  Cylindrical bronchiectasis in the lung bases is stable.    ASSESSMENT and PLAN  31-year-old man with a left atrophic kidney secondary now status post a laparoscopic left nephrectomy    Functional solitary right kidney   -His right kidney is functioning well with normal renal function  -We discussed that his right-sided upper back and side pain is not related to his kidney as this is more thoracic in nature  -I reviewed his CT scan from July which again demonstrates a stable calcification in the right pelvis likely related to an internal iliac artery calcification.  -I again reviewed my operative note from August 2020 when I performed cystoscopy and right diagnostic ureteroscopy with no evidence of stones or urothelial abnormalities in his right collecting system and bladder    Microscopic hematuria  -Reviewed his urinalysis results and  he has had intermittent microscopic hematuria for some time  -We again reviewed my prior operative notes and discussed that no further work-up is needed at this time given his normal cystoscopy and ureteroscopy of his right collecting system and bladder last year  -We discussed that a hematuria work-up would not need to be repeated for 5 years and that intermittent microscopic hematuria at this stage, is not a cause for concern    Time spent: 15 minutes spent on the date of the encounter doing chart review, history and exam, documentation and further activities as noted above.     Aj Hedrick MD   Urology  [facility]  [facility] Phone: [phone]  [facility] Phone: [phone]

## 2021-09-07 NOTE — NURSING NOTE
Emelyn Marmolejo's goals for this visit include:   Chief Complaint   Patient presents with     RECHECK     hematuria        He requests these members of his care team be copied on today's visit information: yes, PCP    PCP: Brittny Eid    Referring Provider:  Lillian Brasher DO  [facility]  [address] [place] [address]    There were no vitals taken for this visit.    Do you need any medication refills at today's visit? None    Inez Mora RN, BSN

## 2021-10-05 DIAGNOSIS — F51.01 PRIMARY INSOMNIA: ICD-10-CM

## 2021-10-07 RX ORDER — TRAZODONE HYDROCHLORIDE 100 MG/1
TABLET ORAL
Qty: 30 TABLET | Refills: 3 | Status: SHIPPED | OUTPATIENT
Start: 2021-10-07 | End: 2021-11-03

## 2021-10-10 ENCOUNTER — HEALTH MAINTENANCE LETTER (OUTPATIENT)
Age: 31
End: 2021-10-10

## 2021-10-12 ENCOUNTER — LAB (OUTPATIENT)
Dept: LAB | Facility: CLINIC | Age: 31
End: 2021-10-12
Payer: COMMERCIAL

## 2021-10-12 ENCOUNTER — TELEPHONE (OUTPATIENT)
Dept: FAMILY MEDICINE | Facility: CLINIC | Age: 31
End: 2021-10-12

## 2021-10-12 DIAGNOSIS — M54.6 RIGHT-SIDED THORACIC BACK PAIN, UNSPECIFIED CHRONICITY: ICD-10-CM

## 2021-10-12 DIAGNOSIS — Z11.1 SCREENING EXAMINATION FOR PULMONARY TUBERCULOSIS: Primary | ICD-10-CM

## 2021-10-12 DIAGNOSIS — Z90.5 H/O UNILATERAL NEPHRECTOMY: ICD-10-CM

## 2021-10-12 LAB
ANION GAP SERPL CALCULATED.3IONS-SCNC: 4 MMOL/L (ref 3–14)
BUN SERPL-MCNC: 21 MG/DL (ref 7–30)
CALCIUM SERPL-MCNC: 9.2 MG/DL (ref 8.5–10.1)
CHLORIDE BLD-SCNC: 106 MMOL/L (ref 94–109)
CO2 SERPL-SCNC: 29 MMOL/L (ref 20–32)
CREAT SERPL-MCNC: 0.98 MG/DL (ref 0.66–1.25)
GFR SERPL CREATININE-BSD FRML MDRD: >90 ML/MIN/1.73M2
GLUCOSE BLD-MCNC: 94 MG/DL (ref 70–99)
POTASSIUM BLD-SCNC: 4.1 MMOL/L (ref 3.4–5.3)
SODIUM SERPL-SCNC: 139 MMOL/L (ref 133–144)

## 2021-10-12 PROCEDURE — 86481 TB AG RESPONSE T-CELL SUSP: CPT

## 2021-10-12 PROCEDURE — 80048 BASIC METABOLIC PNL TOTAL CA: CPT

## 2021-10-12 PROCEDURE — 36415 COLL VENOUS BLD VENIPUNCTURE: CPT

## 2021-10-12 NOTE — TELEPHONE ENCOUNTER
Emelyn Marmolejo called to schedule an appointment for TB screening.        TB Screening questions  1. Have you had recent contact with a person with active tuberculosis (TB)?  No, continue to next question.  2. Have you ever been treated for tuberculosis (TB) or latent TB before?  No, continue to next question.  3. Has a county worker or another healthcare worker (not your employer) told you to come in to be tested for TB?  No, continue to next question.  4. Have you had a live vaccine (smallpox, flumist, MMR, varicella, oral polio and/or yellow fever) in the last 4 weeks?  No, lab visit scheduled.       Lab visit scheduled for 10/12/2021 at 1:00 pm.    Zaida Rios CMA

## 2021-10-13 LAB
GAMMA INTERFERON BACKGROUND BLD IA-ACNC: 0.15 IU/ML
M TB IFN-G BLD-IMP: POSITIVE
M TB IFN-G CD4+ BCKGRND COR BLD-ACNC: 9.85 IU/ML
MITOGEN IGNF BCKGRD COR BLD-ACNC: 3.79 IU/ML
MITOGEN IGNF BCKGRD COR BLD-ACNC: 6.22 IU/ML
QUANTIFERON MITOGEN: 10 IU/ML
QUANTIFERON NIL TUBE: 0.15 IU/ML
QUANTIFERON TB1 TUBE: 6.37 IU/ML
QUANTIFERON TB2 TUBE: 3.94

## 2021-11-03 ENCOUNTER — OFFICE VISIT (OUTPATIENT)
Dept: FAMILY MEDICINE | Facility: CLINIC | Age: 31
End: 2021-11-03
Payer: COMMERCIAL

## 2021-11-03 VITALS
SYSTOLIC BLOOD PRESSURE: 119 MMHG | DIASTOLIC BLOOD PRESSURE: 78 MMHG | WEIGHT: 139 LBS | RESPIRATION RATE: 16 BRPM | OXYGEN SATURATION: 99 % | BODY MASS INDEX: 20.15 KG/M2 | HEART RATE: 79 BPM | TEMPERATURE: 98.5 F

## 2021-11-03 DIAGNOSIS — G89.29 CHRONIC RIGHT-SIDED THORACIC BACK PAIN: ICD-10-CM

## 2021-11-03 DIAGNOSIS — K29.00 OTHER ACUTE GASTRITIS WITHOUT HEMORRHAGE: ICD-10-CM

## 2021-11-03 DIAGNOSIS — R10.9 ABDOMINAL WALL PAIN: ICD-10-CM

## 2021-11-03 DIAGNOSIS — Z22.7 LTBI (LATENT TUBERCULOSIS INFECTION): Primary | ICD-10-CM

## 2021-11-03 DIAGNOSIS — Z00.00 HEALTHCARE MAINTENANCE: ICD-10-CM

## 2021-11-03 DIAGNOSIS — Z23 NEED FOR PROPHYLACTIC VACCINATION AND INOCULATION AGAINST INFLUENZA: ICD-10-CM

## 2021-11-03 DIAGNOSIS — M54.6 CHRONIC RIGHT-SIDED THORACIC BACK PAIN: ICD-10-CM

## 2021-11-03 PROCEDURE — 90715 TDAP VACCINE 7 YRS/> IM: CPT | Performed by: FAMILY MEDICINE

## 2021-11-03 PROCEDURE — 90472 IMMUNIZATION ADMIN EACH ADD: CPT | Performed by: FAMILY MEDICINE

## 2021-11-03 PROCEDURE — 99214 OFFICE O/P EST MOD 30 MIN: CPT | Mod: 25 | Performed by: FAMILY MEDICINE

## 2021-11-03 PROCEDURE — 90686 IIV4 VACC NO PRSV 0.5 ML IM: CPT | Performed by: FAMILY MEDICINE

## 2021-11-03 PROCEDURE — 90471 IMMUNIZATION ADMIN: CPT | Performed by: FAMILY MEDICINE

## 2021-11-03 RX ORDER — OMEPRAZOLE 40 MG/1
40 CAPSULE, DELAYED RELEASE ORAL DAILY
Qty: 14 CAPSULE | Refills: 0 | Status: SHIPPED | OUTPATIENT
Start: 2021-11-03 | End: 2021-11-17

## 2021-11-03 NOTE — PROGRESS NOTES
Assessment & Plan     LTBI (latent tuberculosis infection)  - known LTBI  - negative CXR in the past  - no concerning symptoms for active infection  - discussed treatment options, pt will not do LTBI treatment at this time    Other acute gastritis without hemorrhage  H pylori treated 1 year ago with negative ABA  Will try short course PPI for reflux symptoms, discussed dietary changes  - omeprazole (PRILOSEC) 40 MG DR capsule; Take 1 capsule (40 mg) by mouth daily for 14 days    Chronic right-sided thoracic back pain  - Physical Therapy Referral; Future    Abdominal wall pain  workup in the past for organ related pain has been negative including labs and imaging  likely musculoskeletal and all related, possibly scar tissue from prior nephrectomy contributing to pain  Discussed that PT for back pain and abdominal wall strengthening would likely help  - Physical Therapy Referral; Future    No follow-ups on file.    Brittny Eid DO  [facility] EARL Dunaway is a 31 year old who presents for the following health issues:  HPI     LTBI   - known LTBI    Gastritis   - Hx of adequately treated H pylori   - EGD was normal Jan 2021   - Burning/reflux type pain when eating certain foods    Back pain/abdominal pain   - R sided   - upper/mid back, R flank, RUQ   - RUQ only hurts when pressing on it   - no associated nausea, dysuria, diarrhea/constipation    Review of Systems   Constitutional, HEENT, cardiovascular, pulmonary, gi and gu systems are negative, except as otherwise noted.      Objective    /78   Pulse 79   Temp 98.5  F (36.9  C) (Oral)   Resp 16   Wt 63 kg (139 lb)   SpO2 99%   BMI 20.15 kg/m    Body mass index is 20.15 kg/m .  Physical Exam   GENERAL: healthy, alert and no distress  NECK: no adenopathy, no asymmetry, masses, or scars and thyroid normal to palpation  RESP: lungs clear to auscultation - no rales, rhonchi or wheezes  CV: regular rate and rhythm, normal  S1 S2, no S3 or S4, no murmur, click or rub, no peripheral edema and peripheral pulses strong  ABDOMEN: soft, no hepatosplenomegaly, no masses and bowel sounds normal. Tenderness to deep palpation of RUQ and epigastric area. No rebound/guarding. No CVA tenderness.  MS: no gross musculoskeletal defects noted, no edema

## 2021-12-07 ENCOUNTER — OFFICE VISIT (OUTPATIENT)
Dept: FAMILY MEDICINE | Facility: CLINIC | Age: 31
End: 2021-12-07
Payer: COMMERCIAL

## 2021-12-07 VITALS — BODY MASS INDEX: 20.55 KG/M2 | WEIGHT: 141.8 LBS

## 2021-12-07 DIAGNOSIS — G62.9 NEUROPATHY: ICD-10-CM

## 2021-12-07 DIAGNOSIS — Z11.59 ENCOUNTER FOR HEPATITIS C SCREENING TEST FOR LOW RISK PATIENT: ICD-10-CM

## 2021-12-07 DIAGNOSIS — R30.0 DYSURIA: ICD-10-CM

## 2021-12-07 DIAGNOSIS — Z11.3 ROUTINE SCREENING FOR STI (SEXUALLY TRANSMITTED INFECTION): ICD-10-CM

## 2021-12-07 DIAGNOSIS — R35.0 FREQUENT URINATION: Primary | ICD-10-CM

## 2021-12-07 LAB
ALBUMIN UR-MCNC: NEGATIVE MG/DL
ANION GAP SERPL CALCULATED.3IONS-SCNC: 4 MMOL/L (ref 3–14)
APPEARANCE UR: CLEAR
BACTERIA #/AREA URNS HPF: NORMAL /HPF
BILIRUB UR QL STRIP: NEGATIVE
BUN SERPL-MCNC: 15 MG/DL (ref 7–30)
CALCIUM SERPL-MCNC: 9.4 MG/DL (ref 8.5–10.1)
CHLORIDE BLD-SCNC: 105 MMOL/L (ref 94–109)
CO2 SERPL-SCNC: 29 MMOL/L (ref 20–32)
COLOR UR AUTO: YELLOW
CREAT SERPL-MCNC: 0.99 MG/DL (ref 0.66–1.25)
GFR SERPL CREATININE-BSD FRML MDRD: >90 ML/MIN/1.73M2
GLUCOSE BLD-MCNC: 96 MG/DL (ref 70–99)
GLUCOSE UR STRIP-MCNC: NEGATIVE MG/DL
HGB UR QL STRIP: ABNORMAL
KETONES UR STRIP-MCNC: NEGATIVE MG/DL
LEUKOCYTE ESTERASE UR QL STRIP: NEGATIVE
NITRATE UR QL: NEGATIVE
PH UR STRIP: 6.5 [PH] (ref 5–8)
POTASSIUM BLD-SCNC: 4 MMOL/L (ref 3.4–5.3)
RBC #/AREA URNS AUTO: NORMAL /HPF
SODIUM SERPL-SCNC: 138 MMOL/L (ref 133–144)
SP GR UR STRIP: 1.01 (ref 1–1.03)
T PALLIDUM AB SER QL: NONREACTIVE
UROBILINOGEN UR STRIP-ACNC: 0.2 E.U./DL
WBC #/AREA URNS AUTO: NORMAL /HPF

## 2021-12-07 PROCEDURE — 86780 TREPONEMA PALLIDUM: CPT | Performed by: FAMILY MEDICINE

## 2021-12-07 PROCEDURE — 87591 N.GONORRHOEAE DNA AMP PROB: CPT | Performed by: FAMILY MEDICINE

## 2021-12-07 PROCEDURE — 86803 HEPATITIS C AB TEST: CPT | Performed by: FAMILY MEDICINE

## 2021-12-07 PROCEDURE — 36415 COLL VENOUS BLD VENIPUNCTURE: CPT | Performed by: FAMILY MEDICINE

## 2021-12-07 PROCEDURE — 87491 CHLMYD TRACH DNA AMP PROBE: CPT | Performed by: FAMILY MEDICINE

## 2021-12-07 PROCEDURE — 80048 BASIC METABOLIC PNL TOTAL CA: CPT | Performed by: FAMILY MEDICINE

## 2021-12-07 PROCEDURE — 99214 OFFICE O/P EST MOD 30 MIN: CPT | Performed by: FAMILY MEDICINE

## 2021-12-07 PROCEDURE — 87389 HIV-1 AG W/HIV-1&-2 AB AG IA: CPT | Performed by: FAMILY MEDICINE

## 2021-12-07 PROCEDURE — 81001 URINALYSIS AUTO W/SCOPE: CPT | Performed by: FAMILY MEDICINE

## 2021-12-08 LAB
C TRACH DNA SPEC QL NAA+PROBE: NEGATIVE
HCV AB SERPL QL IA: NONREACTIVE
HIV 1+2 AB+HIV1 P24 AG SERPL QL IA: NONREACTIVE
N GONORRHOEA DNA SPEC QL NAA+PROBE: NEGATIVE

## 2021-12-09 NOTE — PROGRESS NOTES
Assessment & Plan     Frequent urination  - UA reflex to Microscopic; Future  - UA reflex to Microscopic  - Urine Microscopic Exam    Dysuria  - Neisseria gonorrhoeae PCR; Future  - Chlamydia trachomatis PCR; Future  - Neisseria gonorrhoeae PCR  - Chlamydia trachomatis PCR    Neuropathy  - Basic metabolic panel; Future  - Basic metabolic panel    Encounter for hepatitis C screening test for low risk patient  - Hepatitis C Screen Reflex to HCV RNA Quant and Genotype; Future  - Hepatitis C Screen Reflex to HCV RNA Quant and Genotype    Routine screening for STI (sexually transmitted infection)  - HIV Antigen Antibody Combo; Future  - Treponema Abs w Reflex to RPR and Titer; Future  - HIV Antigen Antibody Combo  - Treponema Abs w Reflex to RPR and Titer    Will check STI screen, kidney function (hx of nephrectomy, solitary kidney), blood sugar. Possible dehydration for malodorous urine if UA/STI screen negative. Possible plantar fasciitis given location and description of foot pain. Pt would like to complete lab testing before other intervention.    No follow-ups on file.    Brittny Eid DO  [facility] EARL Dunaway is a 31 year old who presents for the following health issues:    HPI     Pt complains of increased urination and odor for the past month  Denies dysuria, change in urine color or stream. Denies nocturia. Has been sexually active. No discharge.  Also complains of pain and burning bottom of feet that bothers him at night for the past week. Denies numbness, weakness. No rash.    Review of Systems   Constitutional, HEENT, cardiovascular, pulmonary, gi and gu systems are negative, except as otherwise noted.      Objective    Wt 64.3 kg (141 lb 12.8 oz)   BMI 20.55 kg/m    Body mass index is 20.55 kg/m .  Physical Exam   GENERAL: healthy, alert and no distress  RESP: lungs clear to auscultation - no rales, rhonchi or wheezes  CV: regular rate and rhythm, normal S1 S2, no S3  or S4, no murmur, click or rub, no peripheral edema and peripheral pulses strong  ABDOMEN: soft, nontender, no hepatosplenomegaly, no masses and bowel sounds normal  MS: no gross musculoskeletal defects noted, no edema

## 2021-12-10 ENCOUNTER — APPOINTMENT (OUTPATIENT)
Dept: INTERPRETER SERVICES | Facility: CLINIC | Age: 31
End: 2021-12-10
Payer: COMMERCIAL

## 2021-12-10 ENCOUNTER — TELEPHONE (OUTPATIENT)
Dept: FAMILY MEDICINE | Facility: CLINIC | Age: 31
End: 2021-12-10
Payer: COMMERCIAL

## 2021-12-10 DIAGNOSIS — M79.671 BILATERAL FOOT PAIN: Primary | ICD-10-CM

## 2021-12-10 DIAGNOSIS — M79.672 BILATERAL FOOT PAIN: Primary | ICD-10-CM

## 2021-12-10 RX ORDER — GABAPENTIN 300 MG/1
300 CAPSULE ORAL 3 TIMES DAILY PRN
Qty: 30 CAPSULE | Refills: 0 | Status: SHIPPED | OUTPATIENT
Start: 2021-12-10 | End: 2022-04-06

## 2021-12-10 NOTE — TELEPHONE ENCOUNTER
"RN spoke to patient via  - relayed test results below from Dr. Eid. Patient verbalized understanding and would like to try the gabapentin for his foot pain. Requesting medication be sent to [facility].     Patient then requested follow up with Dr. Saul to discuss the \"blood they keep seeing in my urine\". Per chart review, appears patient saw Urology for this in September and was told it wasn't a concern at this time with recheck in 5 years. RN relayed this information to patient- he states he thinks it could be a prostate infection and wants to discuss this with Dr. Eid. Scheduled for 12/27/21 as patient only wanted PCP and visit on a Monday. Advised patient to contact us sooner if symptoms worsen and we can have him see someone on her team. He verbalized understanding. Routing to PCP to send medication.   PATRICIO Moore, Brittny JASON,   [facility] Triage Nurse  Team - please call patient with results.     His lab tests came back normal. He does not have an infection in the urine. His blood sugar and kidney tests were normal. Please ask if he would like a medication to help with foot pain, I could send him gabapentin if he desires.   Thanks   -Dr Eid     "

## 2021-12-27 ENCOUNTER — OFFICE VISIT (OUTPATIENT)
Dept: FAMILY MEDICINE | Facility: CLINIC | Age: 31
End: 2021-12-27
Payer: COMMERCIAL

## 2021-12-27 VITALS
HEART RATE: 91 BPM | HEIGHT: 70 IN | RESPIRATION RATE: 16 BRPM | WEIGHT: 144 LBS | OXYGEN SATURATION: 99 % | TEMPERATURE: 98.6 F | BODY MASS INDEX: 20.62 KG/M2 | DIASTOLIC BLOOD PRESSURE: 78 MMHG | SYSTOLIC BLOOD PRESSURE: 120 MMHG

## 2021-12-27 DIAGNOSIS — N53.12 PAINFUL EJACULATION: ICD-10-CM

## 2021-12-27 DIAGNOSIS — M54.6 CHRONIC RIGHT-SIDED THORACIC BACK PAIN: Primary | ICD-10-CM

## 2021-12-27 DIAGNOSIS — R31.29 MICROSCOPIC HEMATURIA: ICD-10-CM

## 2021-12-27 DIAGNOSIS — G89.29 CHRONIC RIGHT-SIDED THORACIC BACK PAIN: Primary | ICD-10-CM

## 2021-12-27 DIAGNOSIS — Z90.5 H/O UNILATERAL NEPHRECTOMY: ICD-10-CM

## 2021-12-27 PROCEDURE — 99213 OFFICE O/P EST LOW 20 MIN: CPT | Mod: GC | Performed by: STUDENT IN AN ORGANIZED HEALTH CARE EDUCATION/TRAINING PROGRAM

## 2021-12-27 ASSESSMENT — MIFFLIN-ST. JEOR: SCORE: 1599.43

## 2021-12-27 NOTE — PATIENT INSTRUCTIONS
Call to make an appointment with your urologist to discuss your pain:  Aj Hedrick MD   Urology  [facility] Phone: [phone]

## 2021-12-27 NOTE — PROGRESS NOTES
Preceptor Attestation:   Patient seen, evaluated and discussed with the resident. I have verified the content of the note, which accurately reflects my assessment of the patient and the plan of care.   Supervising Physician:  Urban Perry MD

## 2021-12-27 NOTE — PROGRESS NOTES
Assessment & Plan     Chronic right-sided thoracic back pain  Microscopic hematuria  H/O unilateral nephrectomy  Patient here to follow-up recent urinalysis and known history of microscopic hematuria. Had noted some urinary symptoms at the time of urinalysis (12/7) which have no resolved. Discussed results with patient UA macro showed small blood, micro with minimal to no blood. Had seen urology 9/2021, who noted that his work-up for microscopic hematuria was benign and no further work-up was needed. Reassuring patient against any current signs of urine infection and minimal amount of blood noted on test.     Painful ejaculation  Patient noting some painful ejaculatory episodes. Reports having been undertreated for prostatitis in April or May of 2021 (due to poor adherence to abx), though reassuringly CT abdomen and pelvis from 7/2021 without evidence of prostatitis. Patient's chronic abdominal pain not in lower abdomen and no signs/sx of infection at this time. Low concern for acute prostatitis at this time. Recommended follow-up with urology where he is established.         Return if symptoms worsen or fail to improve.    Tyesha Cooley MD  [facility] EARL Dunaway is a 31 year old who presents for the following health issues     HPI     Chief Complaint   Patient presents with     RECHECK     prostate infection f/u        Follow-up urine tests:  Symptoms from before (urinary frequency) are gone  History of nephrectomy 2/2 kidney stone  Has had ongoing blood in urine  Was supposed to take abx for prostate infection months ago, did not take  No pain with urination, does have pain in R abdomen and R flank (ongoing for some time, w/up unrevealing including CT and labs, recommended to see PT, has not yet)  Saw urology in September, 2021  Has had pain with ejaculation as well, going on for several months, every time he has sex (weekly). Feels hard to describe.     RUQ  "discomfort:  Some pain with deep pushing on his R abdomen  Dairy makes it worse  Sometimes comes with diarrhea/gas  Weight is up and down, no intentional weight loss  Dr. Eid had recommended PT, has not yet been started           Review of Systems   Negative except as noted above      Objective    /78   Pulse 91   Temp 98.6  F (37  C) (Oral)   Resp 16   Ht 1.77 m (5' 9.69\")   Wt 65.3 kg (144 lb)   SpO2 99%   BMI 20.85 kg/m    Body mass index is 20.85 kg/m .  Physical Exam   GEN: Alert, oriented person in NAD  HENT: normocephalic, atraumatic  NECK: full ROM  RESPIRATORY: no respiratory distress, no extra WOB, speaking in full sentences  CVS: regular rate, WWP  GI: Abdomen is soft, non-distended, mildly tender to deep palpation generally on R side (mostly RUQ and upper RLQ). No rebound or guarding.   MSK: full ROM, no obvious peripheral edema. Normal gait.  NEURO: no FND.         "

## 2022-01-23 ENCOUNTER — HOSPITAL ENCOUNTER (EMERGENCY)
Facility: CLINIC | Age: 32
Discharge: HOME | End: 2022-01-23
Attending: EMERGENCY MEDICINE | Admitting: EMERGENCY MEDICINE
Payer: COMMERCIAL

## 2022-01-23 ENCOUNTER — APPOINTMENT (OUTPATIENT)
Dept: ULTRASOUND IMAGING | Facility: CLINIC | Age: 32
End: 2022-01-23
Attending: EMERGENCY MEDICINE
Payer: COMMERCIAL

## 2022-01-23 VITALS
DIASTOLIC BLOOD PRESSURE: 83 MMHG | RESPIRATION RATE: 16 BRPM | HEIGHT: 68 IN | BODY MASS INDEX: 21.82 KG/M2 | WEIGHT: 144 LBS | SYSTOLIC BLOOD PRESSURE: 107 MMHG | TEMPERATURE: 98.3 F | OXYGEN SATURATION: 100 % | HEART RATE: 76 BPM

## 2022-01-23 DIAGNOSIS — R10.9 RIGHT FLANK PAIN: ICD-10-CM

## 2022-01-23 LAB
ALBUMIN SERPL-MCNC: 4.1 G/DL (ref 3.4–5)
ALBUMIN UR-MCNC: NEGATIVE MG/DL
ALP SERPL-CCNC: 74 U/L (ref 40–150)
ALT SERPL W P-5'-P-CCNC: 28 U/L (ref 0–70)
ANION GAP SERPL CALCULATED.3IONS-SCNC: 5 MMOL/L (ref 3–14)
APPEARANCE UR: CLEAR
AST SERPL W P-5'-P-CCNC: 24 U/L (ref 0–45)
BASOPHILS # BLD AUTO: 0 10E3/UL (ref 0–0.2)
BASOPHILS NFR BLD AUTO: 1 %
BILIRUB SERPL-MCNC: 0.4 MG/DL (ref 0.2–1.3)
BILIRUB UR QL STRIP: NEGATIVE
BUN SERPL-MCNC: 11 MG/DL (ref 7–30)
CALCIUM SERPL-MCNC: 9 MG/DL (ref 8.5–10.1)
CHLORIDE BLD-SCNC: 103 MMOL/L (ref 94–109)
CO2 SERPL-SCNC: 26 MMOL/L (ref 20–32)
COLOR UR AUTO: ABNORMAL
CREAT SERPL-MCNC: 1.07 MG/DL (ref 0.66–1.25)
EOSINOPHIL # BLD AUTO: 0.2 10E3/UL (ref 0–0.7)
EOSINOPHIL NFR BLD AUTO: 3 %
ERYTHROCYTE [DISTWIDTH] IN BLOOD BY AUTOMATED COUNT: 12.1 % (ref 10–15)
GFR SERPL CREATININE-BSD FRML MDRD: >90 ML/MIN/1.73M2
GLUCOSE BLD-MCNC: 89 MG/DL (ref 70–99)
GLUCOSE UR STRIP-MCNC: NEGATIVE MG/DL
HCT VFR BLD AUTO: 44.9 % (ref 40–53)
HGB BLD-MCNC: 15.1 G/DL (ref 13.3–17.7)
HGB UR QL STRIP: NEGATIVE
HOLD SPECIMEN: NORMAL
HOLD SPECIMEN: NORMAL
IMM GRANULOCYTES # BLD: 0 10E3/UL
IMM GRANULOCYTES NFR BLD: 0 %
KETONES UR STRIP-MCNC: NEGATIVE MG/DL
LEUKOCYTE ESTERASE UR QL STRIP: NEGATIVE
LYMPHOCYTES # BLD AUTO: 2.4 10E3/UL (ref 0.8–5.3)
LYMPHOCYTES NFR BLD AUTO: 38 %
MCH RBC QN AUTO: 29.2 PG (ref 26.5–33)
MCHC RBC AUTO-ENTMCNC: 33.6 G/DL (ref 31.5–36.5)
MCV RBC AUTO: 87 FL (ref 78–100)
MONOCYTES # BLD AUTO: 0.5 10E3/UL (ref 0–1.3)
MONOCYTES NFR BLD AUTO: 8 %
NEUTROPHILS # BLD AUTO: 3.2 10E3/UL (ref 1.6–8.3)
NEUTROPHILS NFR BLD AUTO: 50 %
NITRATE UR QL: NEGATIVE
NRBC # BLD AUTO: 0 10E3/UL
NRBC BLD AUTO-RTO: 0 /100
PH UR STRIP: 6.5 [PH] (ref 5–7)
PLATELET # BLD AUTO: 228 10E3/UL (ref 150–450)
POTASSIUM BLD-SCNC: 3.6 MMOL/L (ref 3.4–5.3)
PROT SERPL-MCNC: 8.1 G/DL (ref 6.8–8.8)
RBC # BLD AUTO: 5.17 10E6/UL (ref 4.4–5.9)
RBC URINE: 0 /HPF
SODIUM SERPL-SCNC: 134 MMOL/L (ref 133–144)
SP GR UR STRIP: 1 (ref 1–1.03)
UROBILINOGEN UR STRIP-MCNC: NORMAL MG/DL
WBC # BLD AUTO: 6.2 10E3/UL (ref 4–11)
WBC URINE: 0 /HPF

## 2022-01-23 PROCEDURE — 99284 EMERGENCY DEPT VISIT MOD MDM: CPT | Performed by: EMERGENCY MEDICINE

## 2022-01-23 PROCEDURE — 96360 HYDRATION IV INFUSION INIT: CPT

## 2022-01-23 PROCEDURE — 85025 COMPLETE CBC W/AUTO DIFF WBC: CPT | Performed by: EMERGENCY MEDICINE

## 2022-01-23 PROCEDURE — 36415 COLL VENOUS BLD VENIPUNCTURE: CPT | Performed by: EMERGENCY MEDICINE

## 2022-01-23 PROCEDURE — 250N000013 HC RX MED GY IP 250 OP 250 PS 637: Performed by: EMERGENCY MEDICINE

## 2022-01-23 PROCEDURE — 81001 URINALYSIS AUTO W/SCOPE: CPT | Performed by: EMERGENCY MEDICINE

## 2022-01-23 PROCEDURE — 82374 ASSAY BLOOD CARBON DIOXIDE: CPT | Performed by: EMERGENCY MEDICINE

## 2022-01-23 PROCEDURE — 76770 US EXAM ABDO BACK WALL COMP: CPT

## 2022-01-23 PROCEDURE — 76770 US EXAM ABDO BACK WALL COMP: CPT | Mod: 26 | Performed by: RADIOLOGY

## 2022-01-23 PROCEDURE — 80053 COMPREHEN METABOLIC PANEL: CPT | Performed by: EMERGENCY MEDICINE

## 2022-01-23 PROCEDURE — 258N000003 HC RX IP 258 OP 636: Performed by: EMERGENCY MEDICINE

## 2022-01-23 PROCEDURE — 99284 EMERGENCY DEPT VISIT MOD MDM: CPT | Mod: 25

## 2022-01-23 PROCEDURE — 96361 HYDRATE IV INFUSION ADD-ON: CPT

## 2022-01-23 RX ORDER — SODIUM CHLORIDE 9 MG/ML
INJECTION, SOLUTION INTRAVENOUS CONTINUOUS
Status: DISCONTINUED | OUTPATIENT
Start: 2022-01-23 | End: 2022-01-24 | Stop reason: HOSPADM

## 2022-01-23 RX ORDER — ACETAMINOPHEN 325 MG/1
975 TABLET ORAL ONCE
Status: COMPLETED | OUTPATIENT
Start: 2022-01-23 | End: 2022-01-23

## 2022-01-23 RX ORDER — ONDANSETRON 2 MG/ML
4 INJECTION INTRAMUSCULAR; INTRAVENOUS EVERY 30 MIN PRN
Status: DISCONTINUED | OUTPATIENT
Start: 2022-01-23 | End: 2022-01-24 | Stop reason: HOSPADM

## 2022-01-23 RX ADMIN — ACETAMINOPHEN 975 MG: 325 TABLET, FILM COATED ORAL at 19:23

## 2022-01-23 RX ADMIN — SODIUM CHLORIDE 1000 ML: 9 INJECTION, SOLUTION INTRAVENOUS at 19:22

## 2022-01-23 ASSESSMENT — ENCOUNTER SYMPTOMS
HEMATURIA: 0
HEADACHES: 1
NAUSEA: 1
DIFFICULTY URINATING: 0

## 2022-01-23 ASSESSMENT — MIFFLIN-ST. JEOR: SCORE: 1567.68

## 2022-01-23 NOTE — ED TRIAGE NOTES
"Pt arrives ambulatory to triage w/ c/o ha, r flank pain, and nausea. Pt attributes pain to kidney issues. Describes pain as non-radiating, \"10/10\" in intensity. Denies urinary symptoms, vision changes. Afebrile.   "

## 2022-01-23 NOTE — ED PROVIDER NOTES
"    [facility] EMERGENCY DEPARTMENT ([facility])  1/23/22      History     Chief Complaint   Patient presents with     Headache     The history is provided by the patient and medical records.     Emelyn Marmolejo is a 32 year old male w/ PMH of kidney stones with resultant atrophy of left kidney and left nephrectomy (5/2021) who presents to the Emergency Department for evaluation of right flank pain with associated headaches and nausea for the past week. He reports feeling \"pressure\" when urinating. He endorses a fever of 100. He denies hematuria. Patient has not taken medication for management of his symptoms at home. He attributes pain to his kidney issues. Pain is non-radiating and 10/10 in intensity. He denies urinary symptoms, or vision changes. Patient notes particular concern that his pain began after having sex with his wife. He also notes a significant kidney stone history. He required a left nephrectomy after atrophy of his left kidney due to kidney stones.     Per review of the patient's medical record, patient was seen for similar pain in July. His CT scan was unremarkable at this time.     Past Medical History:   Diagnosis Date     Prostate infection        Past Surgical History:   Procedure Laterality Date     COMBINED CYSTOSCOPY, URETEROSCOPY, LASER HOLMIUM LITHOTRIPSY URETER(S) Right 8/13/2020    Procedure: CYSTOSCOPY, RIGHT RETROGRADE PYELOGRAM, RIGHT diagnostic URETEROSCOPY;  Surgeon: Aj Hedrick MD;  Location:  OR     ESOPHAGOSCOPY, GASTROSCOPY, DUODENOSCOPY (EGD), COMBINED N/A 1/25/2021    Procedure: ESOPHAGOGASTRODUODENOSCOPY, WITH BIOPSY;  Surgeon: Rip Her MD;  Location: [facility] OR     LAPAROSCOPIC NEPHRECTOMY Left 5/24/2021    Procedure: LEFT NEPHRECTOMY, TOTAL, LAPAROSCOPIC;  Surgeon: Aj Hedrick MD;  Location:  OR       Family History   Problem Relation Age of Onset     Crohn's Disease No family hx of      Ulcerative Colitis No family hx of      GERD No family " "hx of      Stomach Cancer No family hx of        Social History     Tobacco Use     Smoking status: Former Smoker     Types: Cigarettes     Quit date: 2019     Years since quittin.3     Smokeless tobacco: Never Used   Substance Use Topics     Alcohol use: Never       Current Facility-Administered Medications   Medication     0.9% sodium chloride BOLUS    Followed by     sodium chloride 0.9% infusion     acetaminophen (TYLENOL) tablet 975 mg     ondansetron (ZOFRAN) injection 4 mg     Current Outpatient Medications   Medication     gabapentin (NEURONTIN) 300 MG capsule        Allergies   Allergen Reactions     Nsaids      Not true allergy. But be sparing with patient as he has single kidney        Review of Systems   Eyes: Negative for visual disturbance.   Gastrointestinal: Positive for nausea.   Genitourinary: Negative for difficulty urinating and hematuria.        Positive for right flank pain  Positive for \"pressure\" when urinating   Neurological: Positive for headaches.   All other systems reviewed and are negative.    A complete review of systems was performed with pertinent positives and negatives noted in the HPI, and all other systems negative.    Physical Exam   BP: 119/57  Pulse: 80  Temp: 98.3  F (36.8  C)  Resp: 16  Height: 172.7 cm (5' 8\")  Weight: 65.3 kg (144 lb)  SpO2: 100 %  Physical Exam  Constitutional:       General: He is not in acute distress.     Appearance: He is well-developed. He is not ill-appearing, toxic-appearing or diaphoretic.   HENT:      Head: Normocephalic and atraumatic.   Cardiovascular:      Rate and Rhythm: Normal rate and regular rhythm.      Heart sounds: Normal heart sounds.   Pulmonary:      Effort: Pulmonary effort is normal. No respiratory distress.      Breath sounds: No wheezing.   Abdominal:      General: There is no distension.      Palpations: Abdomen is soft. There is no mass.      Tenderness: There is no abdominal tenderness. There is no rebound.      " Hernia: No hernia is present.      Comments: No flank reproducible tenderness; mild pain on right flank but no skin changes   Musculoskeletal:      Cervical back: Normal range of motion and neck supple.   Skin:     General: Skin is warm.   Neurological:      Mental Status: He is alert and oriented to person, place, and time.   Psychiatric:         Behavior: Behavior normal.         Thought Content: Thought content normal.         ED Course     7:07 PM  The patient was seen and examined by Carmela Patel MD in Room ED24.     Procedures       The medical record was reviewed and interpreted.  Current labs reviewed and interpreted.  Previous labs reviewed and interpreted.       Results for orders placed or performed during the hospital encounter of 01/23/22   US Renal Complete     Status: None    Narrative    EXAMINATION: US RENAL COMPLETE, 1/23/2022 9:22 PM     COMPARISON: None.    HISTORY: Right flank pain    TECHNIQUE: The kidneys and bladder were scanned in the standard  fashion with specialized ultrasound transducer(s) using both gray  scale and limited color/spectral Doppler techniques.    FINDINGS:    Right kidney: Measures 12.1 cm in length. Parenchyma is of normal  thickness and echogenicity. No focal mass. No hydronephrosis. Normal  Doppler flow is demonstrated.    The left kidney is surgically absent.     Bladder: Distended urinary bladder without wall thickening or mass  lesion. There is a small amount of layering echoes/debris.      Impression    IMPRESSION:  1.  Unremarkable sonographic appearance of the right kidney, with  surgically absent left kidney.  2.  Minimal amount of nonspecific layering debris within the urinary  bladder. Recommend correlation with urinalysis to rule out infection.       I have personally reviewed the examination and initial interpretation  and I agree with the findings.    ARRON ALLEN MD         SYSTEM ID:  [id number]   Comprehensive metabolic panel     Status: Normal   Result  Value Ref Range    Sodium 134 133 - 144 mmol/L    Potassium 3.6 3.4 - 5.3 mmol/L    Chloride 103 94 - 109 mmol/L    Carbon Dioxide (CO2) 26 20 - 32 mmol/L    Anion Gap 5 3 - 14 mmol/L    Urea Nitrogen 11 7 - 30 mg/dL    Creatinine 1.07 0.66 - 1.25 mg/dL    Calcium 9.0 8.5 - 10.1 mg/dL    Glucose 89 70 - 99 mg/dL    Alkaline Phosphatase 74 40 - 150 U/L    AST 24 0 - 45 U/L    ALT 28 0 - 70 U/L    Protein Total 8.1 6.8 - 8.8 g/dL    Albumin 4.1 3.4 - 5.0 g/dL    Bilirubin Total 0.4 0.2 - 1.3 mg/dL    GFR Estimate >90 >60 mL/min/1.73m2   Extra Blue Top Tube     Status: None   Result Value Ref Range    Hold Specimen JIC    Extra Red Top Tube     Status: None   Result Value Ref Range    Hold Specimen JIC    CBC with platelets and differential     Status: None   Result Value Ref Range    WBC Count 6.2 4.0 - 11.0 10e3/uL    RBC Count 5.17 4.40 - 5.90 10e6/uL    Hemoglobin 15.1 13.3 - 17.7 g/dL    Hematocrit 44.9 40.0 - 53.0 %    MCV 87 78 - 100 fL    MCH 29.2 26.5 - 33.0 pg    MCHC 33.6 31.5 - 36.5 g/dL    RDW 12.1 10.0 - 15.0 %    Platelet Count 228 150 - 450 10e3/uL    % Neutrophils 50 %    % Lymphocytes 38 %    % Monocytes 8 %    % Eosinophils 3 %    % Basophils 1 %    % Immature Granulocytes 0 %    NRBCs per 100 WBC 0 <1 /100    Absolute Neutrophils 3.2 1.6 - 8.3 10e3/uL    Absolute Lymphocytes 2.4 0.8 - 5.3 10e3/uL    Absolute Monocytes 0.5 0.0 - 1.3 10e3/uL    Absolute Eosinophils 0.2 0.0 - 0.7 10e3/uL    Absolute Basophils 0.0 0.0 - 0.2 10e3/uL    Absolute Immature Granulocytes 0.0 <=0.4 10e3/uL    Absolute NRBCs 0.0 10e3/uL   UA with Microscopic reflex to Culture     Status: Abnormal    Specimen: Urine, Clean Catch   Result Value Ref Range    Color Urine Straw Colorless, Straw, Light Yellow, Yellow    Appearance Urine Clear Clear    Glucose Urine Negative Negative mg/dL    Bilirubin Urine Negative Negative    Ketones Urine Negative Negative mg/dL    Specific Gravity Urine 1.002 (L) 1.003 - 1.035    Blood Urine  Negative Negative    pH Urine 6.5 5.0 - 7.0    Protein Albumin Urine Negative Negative mg/dL    Urobilinogen Urine Normal Normal, 2.0 mg/dL    Nitrite Urine Negative Negative    Leukocyte Esterase Urine Negative Negative    RBC Urine 0 <=2 /HPF    WBC Urine 0 <=5 /HPF    Narrative    Urine Culture not indicated   [place] Draw     Status: None    Narrative    The following orders were created for panel order [place] Draw.  Procedure                               Abnormality         Status                     ---------                               -----------         ------                     Extra Blue Top Tube[[id number]]                              Final result               Extra Red Top Tube[[id number]]                               Final result                 Please view results for these tests on the individual orders.   CBC with platelets differential     Status: None    Narrative    The following orders were created for panel order CBC with platelets differential.  Procedure                               Abnormality         Status                     ---------                               -----------         ------                     CBC with platelets and d...[[id number]]                      Final result                 Please view results for these tests on the individual orders.     Medications   0.9% sodium chloride BOLUS (0 mLs Intravenous Stopped 1/23/22 2156)     Followed by   sodium chloride 0.9% infusion (has no administration in time range)   ondansetron (ZOFRAN) injection 4 mg (4 mg Intravenous Not Given 1/23/22 1951)   acetaminophen (TYLENOL) tablet 975 mg (975 mg Oral Given 1/23/22 1923)            Results for orders placed or performed during the hospital encounter of 01/23/22   Comprehensive metabolic panel     Status: Normal   Result Value Ref Range    Sodium 134 133 - 144 mmol/L    Potassium 3.6 3.4 - 5.3 mmol/L    Chloride 103 94 - 109 mmol/L    Carbon Dioxide (CO2) 26 20 - 32 mmol/L     Anion Gap 5 3 - 14 mmol/L    Urea Nitrogen 11 7 - 30 mg/dL    Creatinine 1.07 0.66 - 1.25 mg/dL    Calcium 9.0 8.5 - 10.1 mg/dL    Glucose 89 70 - 99 mg/dL    Alkaline Phosphatase 74 40 - 150 U/L    AST 24 0 - 45 U/L    ALT 28 0 - 70 U/L    Protein Total 8.1 6.8 - 8.8 g/dL    Albumin 4.1 3.4 - 5.0 g/dL    Bilirubin Total 0.4 0.2 - 1.3 mg/dL    GFR Estimate >90 >60 mL/min/1.73m2   Extra Blue Top Tube     Status: None   Result Value Ref Range    Hold Specimen JIC    Extra Red Top Tube     Status: None   Result Value Ref Range    Hold Specimen JIC    CBC with platelets and differential     Status: None   Result Value Ref Range    WBC Count 6.2 4.0 - 11.0 10e3/uL    RBC Count 5.17 4.40 - 5.90 10e6/uL    Hemoglobin 15.1 13.3 - 17.7 g/dL    Hematocrit 44.9 40.0 - 53.0 %    MCV 87 78 - 100 fL    MCH 29.2 26.5 - 33.0 pg    MCHC 33.6 31.5 - 36.5 g/dL    RDW 12.1 10.0 - 15.0 %    Platelet Count 228 150 - 450 10e3/uL    % Neutrophils 50 %    % Lymphocytes 38 %    % Monocytes 8 %    % Eosinophils 3 %    % Basophils 1 %    % Immature Granulocytes 0 %    NRBCs per 100 WBC 0 <1 /100    Absolute Neutrophils 3.2 1.6 - 8.3 10e3/uL    Absolute Lymphocytes 2.4 0.8 - 5.3 10e3/uL    Absolute Monocytes 0.5 0.0 - 1.3 10e3/uL    Absolute Eosinophils 0.2 0.0 - 0.7 10e3/uL    Absolute Basophils 0.0 0.0 - 0.2 10e3/uL    Absolute Immature Granulocytes 0.0 <=0.4 10e3/uL    Absolute NRBCs 0.0 10e3/uL   UA with Microscopic reflex to Culture     Status: Abnormal    Specimen: Urine, Clean Catch   Result Value Ref Range    Color Urine Straw Colorless, Straw, Light Yellow, Yellow    Appearance Urine Clear Clear    Glucose Urine Negative Negative mg/dL    Bilirubin Urine Negative Negative    Ketones Urine Negative Negative mg/dL    Specific Gravity Urine 1.002 (L) 1.003 - 1.035    Blood Urine Negative Negative    pH Urine 6.5 5.0 - 7.0    Protein Albumin Urine Negative Negative mg/dL    Urobilinogen Urine Normal Normal, 2.0 mg/dL    Nitrite Urine  Negative Negative    Leukocyte Esterase Urine Negative Negative    RBC Urine 0 <=2 /HPF    WBC Urine 0 <=5 /HPF    Narrative    Urine Culture not indicated   [place] Draw     Status: None    Narrative    The following orders were created for panel order [place] Draw.  Procedure                               Abnormality         Status                     ---------                               -----------         ------                     Extra Blue Top Tube[[id number]]                              Final result               Extra Red Top Tube[[id number]]                               Final result                 Please view results for these tests on the individual orders.   CBC with platelets differential     Status: None    Narrative    The following orders were created for panel order CBC with platelets differential.  Procedure                               Abnormality         Status                     ---------                               -----------         ------                     CBC with platelets and d...[[id number]]                      Final result                 Please view results for these tests on the individual orders.     Medications   0.9% sodium chloride BOLUS (has no administration in time range)     Followed by   sodium chloride 0.9% infusion (has no administration in time range)   ondansetron (ZOFRAN) injection 4 mg (has no administration in time range)   acetaminophen (TYLENOL) tablet 975 mg (has no administration in time range)        Assessments & Plan (with Medical Decision Making)   Patient is a very nice 32 year old male with a history of a solitary kidney after having atrophic left kidney that they removed surgically.  Patient presents to the ER due to some right flank pain that occurred after having sexual intercourse yesterday.  Patient says the pain has been more constant and he noticed more pain when he uriantes. Patient here has no pain on exam.  Patient's abdomen and flank are  both soft and nontender.  Patient does not appear in any acute distress.  We did check a UA that shows his urine is negative with no signs of infection and no microscopic blood. Patient also had a CMP that shows normal liver function tests, normal creatining. Patient has normal electrolytes.  Patient also has a normal WBC and normal hemoglobin.  Patient at this time has no signs of pyelonephritis, urinary tract infection, has normal kidney function.  I discussed all these results with the patient.  Did review patient's medical chart and he last had a CT abdomen pelvis in July of this year.  It was normal which showed no stones.  Patient wanted initially to get a CT scan done so I ordered one but afterwards he refused and said that he did not want to get this imaging done.  Therefore we will obtain an ultrasound for further evaluation of his right kidney.  Patient says he is very anxious since he only has one functioning kidney.  My concern for any acute abnormality is low.  I think his pain is likely musculoskeletal which is what has caused his pain in the past.  Patient was given Tylenol for pain.  Patient will likely be discharged home as long as his ultrasound is normal.      This part of the medical record was transcribed by Kylee Tracey, Medical Scribe, from a dictation done by Carmela Patel MD.     Renal ultrasound was complete. US is normal.      I have reviewed the nursing notes. I have reviewed the findings, diagnosis, plan and need for follow up with the patient.    New Prescriptions    No medications on file       Final diagnoses:   Right flank pain       IRema, am serving as a trained medical scribe to document services personally performed by Carmela Patel MD based on the provider's statements to me on January 23, 2022.  This document has been checked and approved by the attending provider.    I, Carmela Patel MD, was physically present and have reviewed and  verified the accuracy of this note documented by Rema Greenberg, medical scribe.      Carmela Patel MD  [facility] EMERGENCY DEPARTMENT  1/23/2022     Carmela Patel MD  01/23/22 2641

## 2022-01-24 NOTE — DISCHARGE INSTRUCTIONS
Your urine test is normal.     Your kidney ultrasound is normal.     Your blood work is stable.     There is no signs of infection or kidney stones.     Take tylenol as needed for pain.

## 2022-01-24 NOTE — ED NOTES
"Pt refused to go to CT, pt told Patient Transport to \"go tell the doctor i'm not going, I don't want a CT\", Dr. Patel aware   "

## 2022-01-30 ENCOUNTER — HEALTH MAINTENANCE LETTER (OUTPATIENT)
Age: 32
End: 2022-01-30

## 2022-03-01 PROBLEM — U07.1 2019 NOVEL CORONAVIRUS DISEASE (COVID-19): Status: RESOLVED | Noted: 2020-10-31 | Resolved: 2022-03-01

## 2022-03-04 NOTE — PROGRESS NOTES
ED Follow-up Visit         HPI       ED Follow-up Visit:    Hospital:  [facility]   Date of Visit: 1/21/22, 1/23/22  Reason(s) for Visit: Right Flank Pain, Headache, Nausea            Problems taking medications regularly:  None       Post Discharge Medication Reconciliation: Completed by PCS       Problems adhering to non-medication therapy:  None       Medications reviewed by: by myself.     Summary of ER visit:  [facility] discharge summary reviewed  Diagnostic Tests/Treatments reviewed.  Follow up needed: none    Update since discharge: stable.   Plan of care communicated with patient    Patient refused             Pain on the right side. Only has one kidney. From the upper right quadrant around toward r flank and down toward the groin. No pain with urination. No sexual activity dysfunction. Sometimes feels headache too. Pain has been persistent since ER visit.   Eating certain foods irritates the pain, milk & meat & other things. Not every meal though.              Review of Systems:   CONSTITUTIONAL: no fatigue, no unexpected change in weight  SKIN: no worrisome rashes, no worrisome moles, no worrisome lesions  EYES: no acute vision problems or changes  ENT: no ear problems, no mouth problems, no throat problems  RESP: no significant cough, no shortness of breath  CV: no chest pain, no palpitations, no new or worsening peripheral edema  GI: no nausea, no vomiting, no constipation, no diarrhea  : no frequency, no dysuria, no hematuria  MS: no claudication, no myalgias, no joint aches  PSYCHIATRIC: NEGATIVE for changes in mood or affect            Physical Exam:     Vitals:    03/07/22 1303   BP: 112/70   Pulse: 101   Resp: 16   Temp: 98.9  F (37.2  C)   TempSrc: Oral   SpO2: 99%   Weight: 64.1 kg (141 lb 6.4 oz)     Body mass index is 21.5 kg/m .    GENERAL: healthy, alert, well nourished, well hydrated, no distress  EYES: Eyes grossly normal to inspection,  extraocular movements - intact, and PERRL  ABDOMEN: soft, no  hepatosplenomegaly, no masses, normal bowel sounds. Tenderness to moderate palpation in upper and lower right quadrants. Negative CVA tenderness.  NEURO: strength and tone- normal, sensory exam- grossly normal, mentation- intact, speech- normal, reflexes- symmetric  BACK: no CVA tenderness, no paralumbar tenderness  PSYCH: Alert and oriented times 3; speech- coherent , normal rate and volume; able to articulate logical thoughts, able to abstract reason, no tangential thoughts, no hallucinations or delusions, affect- normal      Assessment and Plan      Emelyn was seen today for recheck.    Diagnoses and all orders for this visit:    Abdominal pain, right upper quadrant    Right flank pain  -     US Abdomen Limited - In Clinic; Future    Biliary colic symptom    Atrophy of left kidney    History of renal stone        1. Right upper/mid abdominal pain  Patient seen and evaluated in emergency room on 1-23-22.  Initially had right flank pain after sexual intercourse with partner, also having more pain when he urinates.  On ED presentation had no pain on exam and was not in acute distress.  UA negative with no signs of infection and no microscopic blood.  Normal liver function, normal creatinine, normal electrolytes, normal white blood cell count, normal hemoglobin.  Does have a history of kidney stones.  Last CT of abdomen pelvis in July without evidence of stones.  Renal ultrasound was completed and was unremarkable.  Patient was given Tylenol for pain and discharged home.    Given the unremarkable labs, physical examination and history not supporting kidney etiology, would recommend he schedule a RUQ ultrasound to investigate other causes of his pain, including gallbladder sources. Explained why I did not feel a UA or blood test was necessary today, but that may change with the results of his ultrasound. Would recommend that this patient is scheduled with  his PCP or designate one other provider that he could build a trusting relationship with, as it seems that there is a lot of anxiety surrounding his kidney history.     2. Social Determinants  Patient's primary language is Danish, declined  during visit. Language barriers exist and make communication regarding medical reasoning more challenging.     Options for treatment and follow-up care were reviewed with the patient  Emelyn Marmolejo   engaged in the decision making process and verbalized understanding of the options discussed and agreed with the final plan.      Melissa Garcia, DO

## 2022-03-07 ENCOUNTER — OFFICE VISIT (OUTPATIENT)
Dept: FAMILY MEDICINE | Facility: CLINIC | Age: 32
End: 2022-03-07
Payer: COMMERCIAL

## 2022-03-07 VITALS
OXYGEN SATURATION: 99 % | SYSTOLIC BLOOD PRESSURE: 112 MMHG | BODY MASS INDEX: 21.5 KG/M2 | HEART RATE: 101 BPM | WEIGHT: 141.4 LBS | DIASTOLIC BLOOD PRESSURE: 70 MMHG | TEMPERATURE: 98.9 F | RESPIRATION RATE: 16 BRPM

## 2022-03-07 DIAGNOSIS — N26.1 ATROPHY OF LEFT KIDNEY: ICD-10-CM

## 2022-03-07 DIAGNOSIS — Z87.442 HISTORY OF RENAL STONE: ICD-10-CM

## 2022-03-07 DIAGNOSIS — R10.9 RIGHT FLANK PAIN: ICD-10-CM

## 2022-03-07 DIAGNOSIS — K80.50 BILIARY COLIC SYMPTOM: ICD-10-CM

## 2022-03-07 DIAGNOSIS — R10.11 ABDOMINAL PAIN, RIGHT UPPER QUADRANT: Primary | ICD-10-CM

## 2022-03-07 PROCEDURE — 99214 OFFICE O/P EST MOD 30 MIN: CPT | Mod: GC | Performed by: STUDENT IN AN ORGANIZED HEALTH CARE EDUCATION/TRAINING PROGRAM

## 2022-03-07 NOTE — PATIENT INSTRUCTIONS
It was great to see you today! Thanks for coming to [facility]!      Here is the plan from today's visit    1. Right upper quadrant ultrasound. You can schedule it for here at the clinic!  2. No other blood tests unless the imaging ultrasound shows us something.      Thank you for coming to [facility] today.  Lab Testing:  **If you had lab testing today and your results are reassuring or normal they will be mailed to you or sent through [employer] within 7 days.   **If the lab tests need quick action we will call you with the results.  **If you are having labs done on a different day, please call [phone] to schedule at [facility] or [phone] for other [facility] locations. Labs do not offer walk-in appointments.  The phone number we will call with results is # [phone] (home) . If this is not the best number please call our clinic and change the number.    Medication Refills:  If you need any refills please call your pharmacy and they will contact us.   If you need to  your refill at a new pharmacy, please contact the new pharmacy directly. The new pharmacy will help you get your medications transferred faster.       Scheduling, Urgent Medical Concerns (24 hours a day!), or ultrasound [phone] (8-5:00 M-F)    Referrals: If a referral was made to an [facility] specialty provider and you do not get a call from central scheduling, please refer to directions on your visit summary or call our office during normal business hours for assistance.     If a Mammogram was ordered for you at the Breast Center call [phone] to schedule or change your appointment.  If you had an XRay/CT/Ultrasound/MRI ordered the number is [phone] to schedule or change your radiology appointment.       Melissa Garcia, DO  Pronouns: she/her/hers  Resident Physician  [facility]/ [facility]    Department of Family Medicine and Community  Health

## 2022-03-07 NOTE — PROGRESS NOTES
Preceptor Attestation:    I discussed the patient with the resident and evaluated the patient in person. I have verified the content of the note, which accurately reflects my assessment of the patient and the plan of care.   Supervising Physician:  Lillian Brasher DO.

## 2022-03-30 NOTE — PROGRESS NOTES
Preceptor Attestation:    Patient seen and evaluated in person. I discussed the patient with the resident. I have verified the content of the note, which accurately reflects my assessment of the patient and the plan of care.   Supervising Physician:  Liliana Ward MD.                    No

## 2022-04-04 ENCOUNTER — TELEPHONE (OUTPATIENT)
Dept: UROLOGY | Facility: CLINIC | Age: 32
End: 2022-04-04
Payer: COMMERCIAL

## 2022-04-04 NOTE — TELEPHONE ENCOUNTER
Spoke to pt. Pt states that he is participating in Ramadan where he is unable to drink or eat for 12 hours each day for 30 days. Pt would like input from provider about not being able to drink for that long daily. Sending message to Dr. Hedrick for input.    Raven Bunn, RN MSN

## 2022-04-04 NOTE — TELEPHONE ENCOUNTER
[facility] Call Center    Phone Message    May a detailed message be left on voicemail: yes     Reason for Call: Emelyn had a questions for Dr. Hedrick about him only having 1 kidney and it is ramadan with all the fasting    Action Taken: Message routed to:  Adult Clinics: Urology p [id number]    Travel Screening: Not Applicable

## 2022-04-05 NOTE — TELEPHONE ENCOUNTER
Aj Hedrick MD Bratsch, Angie J RN  Cc: [facility] [place]  Caller: Unspecified (Yesterday,  9:59 AM)  Having one kidney vs two does not impact his ability to fast.   Thanks,   Aj Hedrick M.D.     Spoke to pt. Relayed message from provider and pt verbalized understanding.     Raven Bunn, PATRICIO MSN

## 2022-04-06 ENCOUNTER — OFFICE VISIT (OUTPATIENT)
Dept: FAMILY MEDICINE | Facility: CLINIC | Age: 32
End: 2022-04-06
Payer: COMMERCIAL

## 2022-04-06 VITALS
BODY MASS INDEX: 21.46 KG/M2 | DIASTOLIC BLOOD PRESSURE: 81 MMHG | WEIGHT: 141.6 LBS | RESPIRATION RATE: 16 BRPM | TEMPERATURE: 98.2 F | OXYGEN SATURATION: 97 % | HEIGHT: 68 IN | HEART RATE: 72 BPM | SYSTOLIC BLOOD PRESSURE: 115 MMHG

## 2022-04-06 DIAGNOSIS — N26.1 ATROPHY OF LEFT KIDNEY: ICD-10-CM

## 2022-04-06 DIAGNOSIS — R20.2 PARESTHESIA: Primary | ICD-10-CM

## 2022-04-06 DIAGNOSIS — Z00.00 HEALTHCARE MAINTENANCE: ICD-10-CM

## 2022-04-06 DIAGNOSIS — R73.01 ELEVATED FASTING GLUCOSE: ICD-10-CM

## 2022-04-06 DIAGNOSIS — M79.671 BILATERAL FOOT PAIN: ICD-10-CM

## 2022-04-06 DIAGNOSIS — M79.672 BILATERAL FOOT PAIN: ICD-10-CM

## 2022-04-06 LAB
ANION GAP SERPL CALCULATED.3IONS-SCNC: 9 MMOL/L (ref 3–14)
BUN SERPL-MCNC: 16 MG/DL (ref 7–30)
CALCIUM SERPL-MCNC: 9.4 MG/DL (ref 8.5–10.1)
CHLORIDE BLD-SCNC: 107 MMOL/L (ref 94–109)
CO2 SERPL-SCNC: 24 MMOL/L (ref 20–32)
CREAT SERPL-MCNC: 1.12 MG/DL (ref 0.66–1.25)
GFR SERPL CREATININE-BSD FRML MDRD: 88 ML/MIN/1.73M2
GLUCOSE BLD-MCNC: 105 MG/DL (ref 70–99)
POTASSIUM BLD-SCNC: 4.2 MMOL/L (ref 3.4–5.3)
SODIUM SERPL-SCNC: 140 MMOL/L (ref 133–144)

## 2022-04-06 PROCEDURE — 36415 COLL VENOUS BLD VENIPUNCTURE: CPT | Performed by: FAMILY MEDICINE

## 2022-04-06 PROCEDURE — 80048 BASIC METABOLIC PNL TOTAL CA: CPT | Performed by: FAMILY MEDICINE

## 2022-04-06 PROCEDURE — 99214 OFFICE O/P EST MOD 30 MIN: CPT | Performed by: FAMILY MEDICINE

## 2022-04-06 RX ORDER — CHOLECALCIFEROL (VITAMIN D3) 50 MCG
1 TABLET ORAL DAILY
Qty: 90 TABLET | Refills: 3 | Status: SHIPPED | OUTPATIENT
Start: 2022-04-06 | End: 2023-01-26

## 2022-04-06 RX ORDER — GABAPENTIN 300 MG/1
300 CAPSULE ORAL
Qty: 30 CAPSULE | Refills: 11 | Status: SHIPPED | OUTPATIENT
Start: 2022-04-06 | End: 2023-01-26

## 2022-04-06 RX ORDER — MULTIVITAMIN,THERAPEUTIC
1 TABLET ORAL DAILY
Qty: 90 TABLET | Refills: 3 | Status: SHIPPED | OUTPATIENT
Start: 2022-04-06 | End: 2023-01-26

## 2022-04-06 NOTE — PROGRESS NOTES
"  Assessment & Plan     Paresthesia  Bilateral foot pain  Prescribed gabapentin for unexplained neuropathy in the past but didn't take it, interested in trying it now. Will also screen for metabolic disorders causing neuropathy and diabetes.   - Basic metabolic panel  - gabapentin (NEURONTIN) 300 MG capsule  Dispense: 30 capsule; Refill: 11    Elevated fasting glucose (105)  - Hemoglobin A1c    Atrophy of left kidney  Given single kidney and fasting no water for 15 hours a day, checking kidney function  - Basic metabolic panel      Healthcare maintenance  - vitamin D3 (CHOLECALCIFEROL) 50 mcg (2000 units) tablet  Dispense: 90 tablet; Refill: 3  - multivitamin, therapeutic (MULTIVITAMIN) TABS tablet  Dispense: 90 tablet; Refill: 3    Review of prior external note(s) from - Urology  Ordering of each unique test  Prescription drug management           No follow-ups on file.    Liliana Ward MD  [facility] EARL Dunaway is a 32 year old who presents for the following health issues     HPI   Chief Complaint   Patient presents with     Pain     pt here due to burning sensatation in both feet for last three days. has been getting worse. feet feel hot, hard to sleep at night due to this.        Burning in feet for 3 days, worst last night, especially in the morning. Preventing him from sleeping. Comes only at bedtime  Never been diagnosed with diabetes. No FHx Diabetes  Similar to foot pain in Dec 2021, gabapentin ordered but not filled/taken by patient.     Single kidney  Normally drinks 2L of water per day  15 hours per day of fasting for Ramadan  Urologist said it was ok.     Review of Systems         Objective    /81   Pulse 72   Temp 98.2  F (36.8  C) (Oral)   Resp 16   Ht 1.727 m (5' 7.99\")   Wt 64.2 kg (141 lb 9.6 oz)   SpO2 97%   BMI 21.54 kg/m    Body mass index is 21.54 kg/m .  Physical Exam  Vitals reviewed.   Constitutional:       Appearance: He is well-developed. "   HENT:      Head: Normocephalic and atraumatic.   Neck:      Thyroid: No thyromegaly.   Cardiovascular:      Rate and Rhythm: Normal rate.   Pulmonary:      Effort: Pulmonary effort is normal. No respiratory distress.   Musculoskeletal:         General: No tenderness. Normal range of motion.   Skin:     General: Skin is warm and dry.      Coloration: Skin is not pale.      Findings: No erythema or rash.   Neurological:      General: No focal deficit present.      Mental Status: He is oriented to person, place, and time.                 Sensation Testing done at all points on the diagram with monofilament     Right Foot: Sensation Normal at all points  Left Foot: Sensation Normal at all points     Risk Category: 0- No loss of protective sensation  Performed by Liliana Ward MD                Results for orders placed or performed in visit on 04/06/22 (from the past 24 hour(s))   Basic metabolic panel   Result Value Ref Range    Sodium 140 133 - 144 mmol/L    Potassium 4.2 3.4 - 5.3 mmol/L    Chloride 107 94 - 109 mmol/L    Carbon Dioxide (CO2) 24 20 - 32 mmol/L    Anion Gap 9 3 - 14 mmol/L    Urea Nitrogen 16 7 - 30 mg/dL    Creatinine 1.12 0.66 - 1.25 mg/dL    Calcium 9.4 8.5 - 10.1 mg/dL    Glucose 105 (H) 70 - 99 mg/dL    GFR Estimate 88 >60 mL/min/1.73m2

## 2022-04-07 NOTE — RESULT ENCOUNTER NOTE
Emelyn,   The results from your recent testing show normal kidney function (creatinine) and electrolytes. Your fasting blood sugar was a little high at 105. I would like to check a Hemoglobin A1c which is a better picture of your blood sugar over the past 3 months. You do not need to be fasting for this test. You can schedule a lab-only visit by calling [phone]. If you have any further questions feel free to contact me via [employer] message.     Sincerely,   Liliana Ward MD

## 2022-04-11 ENCOUNTER — LAB (OUTPATIENT)
Dept: LAB | Facility: CLINIC | Age: 32
End: 2022-04-11
Payer: COMMERCIAL

## 2022-04-11 DIAGNOSIS — R73.01 ELEVATED FASTING GLUCOSE: ICD-10-CM

## 2022-04-11 LAB — HBA1C MFR BLD: 5.3 % (ref 0–5.6)

## 2022-04-11 PROCEDURE — 83036 HEMOGLOBIN GLYCOSYLATED A1C: CPT

## 2022-04-11 PROCEDURE — 36415 COLL VENOUS BLD VENIPUNCTURE: CPT

## 2022-04-11 NOTE — RESULT ENCOUNTER NOTE
Your Hemoglobin A1c test is normal. There is no sign of you having diabetes. This is reassuring.. If you have any further questions feel free to contact me via [employer] message.     Sincerely,   Liliana Ward MD

## 2022-05-10 NOTE — TELEPHONE ENCOUNTER
Spoke with patient with the help of  services to discuss the precautions the clinic is taking due to COVID-19, including reducing foot traffic through the clinic. The patient agreed to changing their upcoming visit with Dr. Finney to a telephone visit at their previously scheduled time (3/24/2020 at 12:20 PM).      Dori Tabor, EMT  
PAST MEDICAL HISTORY:  Anxiety     Depression

## 2022-05-19 ENCOUNTER — APPOINTMENT (OUTPATIENT)
Dept: CT IMAGING | Facility: CLINIC | Age: 32
End: 2022-05-19
Attending: FAMILY MEDICINE
Payer: COMMERCIAL

## 2022-05-19 ENCOUNTER — HOSPITAL ENCOUNTER (EMERGENCY)
Facility: CLINIC | Age: 32
Discharge: HOME | End: 2022-05-19
Attending: FAMILY MEDICINE | Admitting: FAMILY MEDICINE
Payer: COMMERCIAL

## 2022-05-19 ENCOUNTER — NURSE TRIAGE (OUTPATIENT)
Dept: UROLOGY | Facility: CLINIC | Age: 32
End: 2022-05-19

## 2022-05-19 VITALS
SYSTOLIC BLOOD PRESSURE: 134 MMHG | TEMPERATURE: 98.3 F | HEART RATE: 70 BPM | DIASTOLIC BLOOD PRESSURE: 66 MMHG | BODY MASS INDEX: 21.29 KG/M2 | RESPIRATION RATE: 18 BRPM | WEIGHT: 140 LBS | OXYGEN SATURATION: 99 %

## 2022-05-19 DIAGNOSIS — R10.84 ABDOMINAL PAIN, GENERALIZED: ICD-10-CM

## 2022-05-19 DIAGNOSIS — M54.6 RIGHT-SIDED THORACIC BACK PAIN: ICD-10-CM

## 2022-05-19 LAB
ALBUMIN SERPL-MCNC: 4 G/DL (ref 3.4–5)
ALBUMIN UR-MCNC: NEGATIVE MG/DL
ALP SERPL-CCNC: 75 U/L (ref 40–150)
ALT SERPL W P-5'-P-CCNC: 21 U/L (ref 0–70)
ANION GAP SERPL CALCULATED.3IONS-SCNC: 7 MMOL/L (ref 3–14)
APPEARANCE UR: CLEAR
AST SERPL W P-5'-P-CCNC: 17 U/L (ref 0–45)
BASOPHILS # BLD AUTO: 0 10E3/UL (ref 0–0.2)
BASOPHILS NFR BLD AUTO: 1 %
BILIRUB SERPL-MCNC: 0.4 MG/DL (ref 0.2–1.3)
BILIRUB UR QL STRIP: NEGATIVE
BUN SERPL-MCNC: 17 MG/DL (ref 7–30)
CALCIUM SERPL-MCNC: 9.4 MG/DL (ref 8.5–10.1)
CHLORIDE BLD-SCNC: 107 MMOL/L (ref 94–109)
CO2 SERPL-SCNC: 25 MMOL/L (ref 20–32)
COLOR UR AUTO: NORMAL
CREAT SERPL-MCNC: 1.02 MG/DL (ref 0.66–1.25)
EOSINOPHIL # BLD AUTO: 0.1 10E3/UL (ref 0–0.7)
EOSINOPHIL NFR BLD AUTO: 2 %
ERYTHROCYTE [DISTWIDTH] IN BLOOD BY AUTOMATED COUNT: 12.1 % (ref 10–15)
GFR SERPL CREATININE-BSD FRML MDRD: >90 ML/MIN/1.73M2
GLUCOSE BLD-MCNC: 90 MG/DL (ref 70–99)
GLUCOSE UR STRIP-MCNC: NEGATIVE MG/DL
HCT VFR BLD AUTO: 45.2 % (ref 40–53)
HGB BLD-MCNC: 15.1 G/DL (ref 13.3–17.7)
HGB UR QL STRIP: NEGATIVE
HOLD SPECIMEN: NORMAL
IMM GRANULOCYTES # BLD: 0 10E3/UL
IMM GRANULOCYTES NFR BLD: 0 %
KETONES UR STRIP-MCNC: NEGATIVE MG/DL
LEUKOCYTE ESTERASE UR QL STRIP: NEGATIVE
LYMPHOCYTES # BLD AUTO: 2.4 10E3/UL (ref 0.8–5.3)
LYMPHOCYTES NFR BLD AUTO: 35 %
MCH RBC QN AUTO: 29.2 PG (ref 26.5–33)
MCHC RBC AUTO-ENTMCNC: 33.4 G/DL (ref 31.5–36.5)
MCV RBC AUTO: 87 FL (ref 78–100)
MONOCYTES # BLD AUTO: 0.6 10E3/UL (ref 0–1.3)
MONOCYTES NFR BLD AUTO: 9 %
NEUTROPHILS # BLD AUTO: 3.6 10E3/UL (ref 1.6–8.3)
NEUTROPHILS NFR BLD AUTO: 53 %
NITRATE UR QL: NEGATIVE
NRBC # BLD AUTO: 0 10E3/UL
NRBC BLD AUTO-RTO: 0 /100
PH UR STRIP: 6.5 [PH] (ref 5–7)
PLATELET # BLD AUTO: 224 10E3/UL (ref 150–450)
POTASSIUM BLD-SCNC: 3.8 MMOL/L (ref 3.4–5.3)
PROT SERPL-MCNC: 7.8 G/DL (ref 6.8–8.8)
RBC # BLD AUTO: 5.17 10E6/UL (ref 4.4–5.9)
RBC URINE: 1 /HPF
SODIUM SERPL-SCNC: 139 MMOL/L (ref 133–144)
SP GR UR STRIP: 1.01 (ref 1–1.03)
UROBILINOGEN UR STRIP-MCNC: NORMAL MG/DL
WBC # BLD AUTO: 6.8 10E3/UL (ref 4–11)
WBC URINE: <1 /HPF

## 2022-05-19 PROCEDURE — 36415 COLL VENOUS BLD VENIPUNCTURE: CPT | Performed by: FAMILY MEDICINE

## 2022-05-19 PROCEDURE — 96361 HYDRATE IV INFUSION ADD-ON: CPT

## 2022-05-19 PROCEDURE — 74176 CT ABD & PELVIS W/O CONTRAST: CPT | Mod: 26 | Performed by: RADIOLOGY

## 2022-05-19 PROCEDURE — 99284 EMERGENCY DEPT VISIT MOD MDM: CPT | Mod: 25

## 2022-05-19 PROCEDURE — 85025 COMPLETE CBC W/AUTO DIFF WBC: CPT | Performed by: FAMILY MEDICINE

## 2022-05-19 PROCEDURE — 80053 COMPREHEN METABOLIC PANEL: CPT | Performed by: FAMILY MEDICINE

## 2022-05-19 PROCEDURE — 258N000003 HC RX IP 258 OP 636: Performed by: FAMILY MEDICINE

## 2022-05-19 PROCEDURE — 96360 HYDRATION IV INFUSION INIT: CPT

## 2022-05-19 PROCEDURE — 81001 URINALYSIS AUTO W/SCOPE: CPT | Performed by: FAMILY MEDICINE

## 2022-05-19 PROCEDURE — 99284 EMERGENCY DEPT VISIT MOD MDM: CPT | Performed by: FAMILY MEDICINE

## 2022-05-19 PROCEDURE — 81001 URINALYSIS AUTO W/SCOPE: CPT | Performed by: EMERGENCY MEDICINE

## 2022-05-19 PROCEDURE — 74176 CT ABD & PELVIS W/O CONTRAST: CPT

## 2022-05-19 RX ORDER — SODIUM CHLORIDE 9 MG/ML
INJECTION, SOLUTION INTRAVENOUS CONTINUOUS
Status: DISCONTINUED | OUTPATIENT
Start: 2022-05-19 | End: 2022-05-19 | Stop reason: HOSPADM

## 2022-05-19 RX ADMIN — SODIUM CHLORIDE 1000 ML: 9 INJECTION, SOLUTION INTRAVENOUS at 10:17

## 2022-05-19 ASSESSMENT — ENCOUNTER SYMPTOMS
CHEST TIGHTNESS: 0
ACTIVITY CHANGE: 0
ABDOMINAL PAIN: 1
DYSPHORIC MOOD: 0
DIARRHEA: 0
ABDOMINAL DISTENTION: 0
BACK PAIN: 1
HEMATURIA: 0
CHILLS: 0
JOINT SWELLING: 0
DECREASED CONCENTRATION: 0
BLOOD IN STOOL: 0
COUGH: 0
NERVOUS/ANXIOUS: 0
MYALGIAS: 1
NAUSEA: 1
SHORTNESS OF BREATH: 0
DYSURIA: 0
BRUISES/BLEEDS EASILY: 0
FEVER: 0
NUMBNESS: 0
CONSTIPATION: 0
VOMITING: 0
WEAKNESS: 0

## 2022-05-19 NOTE — ED TRIAGE NOTES
"Pt c/o of right sided flank radiating towards his back. He states that the pain comes with activity and gets to be about an 8/10 intermittent. He describes it as an achey pain. He is worried about it because he states he only has 1 kidney. He states he has had kidney stones in the past as well with the last one being in 2020.   Denies any issues with urinating.   He does c/o of some nausea \"every now and then with the pain.\"   Pt denies CP/SOB, fevers or chills.     /78   Pulse 65   Temp 98.3  F (36.8  C) (Oral)   Resp 16   Wt 63.5 kg (140 lb)   SpO2 100%   BMI 21.29 kg/m         Triage Assessment     Row Name 05/19/22 0814       Triage Assessment (Adult)    Airway WDL WDL       Respiratory WDL    Respiratory WDL WDL       Skin Circulation/Temperature WDL    Skin Circulation/Temperature WDL WDL       Cardiac WDL    Cardiac WDL WDL       Peripheral/Neurovascular WDL    Peripheral Neurovascular WDL WDL       Cognitive/Neuro/Behavioral WDL    Cognitive/Neuro/Behavioral WDL WDL              "

## 2022-05-19 NOTE — ED PROVIDER NOTES
ED Provider Note  [facility]      History     Chief Complaint   Patient presents with     Flank Pain     Right sided radiating to his right back     HPI  Emelyn Marmolejo is a 32 year old male with PMH kidney stones with subsequent atrophic left kidney, s/p left nephrectomy (5/2021).  Patient presents to the emergency department for concerns of right-sided flank pain.    Patient notes that for the past ~2 weeks, he has had right upper back pain.  This initially started when he was walking normally.  The pain is dull and intermittent in nature.  At baseline he feels no pain, but with certain activities, including lifting heavy objects the pain can get up to 10/10.  He feels that walking normally does relieve the pain.  This pain is not similar to his prior renal stones.  Denies any pain with urination, or hematuria.    Patient also notes right and left lower quadrant abdominal pain that started around the same time as his back pain.  He has had associated nausea, but no vomiting.  Denies fevers, chills, constipation, diarrhea.      Past Medical History  Past Medical History:   Diagnosis Date     Prostate infection      Past Surgical History:   Procedure Laterality Date     COMBINED CYSTOSCOPY, URETEROSCOPY, LASER HOLMIUM LITHOTRIPSY URETER(S) Right 8/13/2020    Procedure: CYSTOSCOPY, RIGHT RETROGRADE PYELOGRAM, RIGHT diagnostic URETEROSCOPY;  Surgeon: Aj Hedrick MD;  Location:  OR     ESOPHAGOSCOPY, GASTROSCOPY, DUODENOSCOPY (EGD), COMBINED N/A 1/25/2021    Procedure: ESOPHAGOGASTRODUODENOSCOPY, WITH BIOPSY;  Surgeon: Rip Her MD;  Location: [facility] OR     LAPAROSCOPIC NEPHRECTOMY Left 5/24/2021    Procedure: LEFT NEPHRECTOMY, TOTAL, LAPAROSCOPIC;  Surgeon: Aj Hedrick MD;  Location:  OR     gabapentin (NEURONTIN) 300 MG capsule  multivitamin, therapeutic (MULTIVITAMIN) TABS tablet  vitamin D3 (CHOLECALCIFEROL) 50 mcg (2000 units) tablet      Allergies   Allergen  Reactions     Nsaids      Not true allergy. But be sparing with patient as he has single kidney      Family History  Family History   Problem Relation Age of Onset     Crohn's Disease No family hx of      Ulcerative Colitis No family hx of      GERD No family hx of      Stomach Cancer No family hx of      Social History   Social History     Tobacco Use     Smoking status: Former Smoker     Types: Cigarettes     Quit date: 2019     Years since quittin.6     Smokeless tobacco: Never Used   Vaping Use     Vaping Use: Never used   Substance Use Topics     Alcohol use: Never     Drug use: Never      Past medical history, past surgical history, medications, allergies, family history, and social history were reviewed with the patient. No additional pertinent items.       Review of Systems   Constitutional: Negative for activity change, chills and fever.   HENT: Negative for congestion.    Respiratory: Negative for cough, chest tightness and shortness of breath.    Gastrointestinal: Positive for abdominal pain (RLQ, LLQ) and nausea. Negative for abdominal distention, blood in stool, constipation, diarrhea and vomiting.   Genitourinary: Negative for dysuria and hematuria.   Musculoskeletal: Positive for back pain (right post chest subscapular tendernss) and myalgias. Negative for gait problem and joint swelling.   Skin: Negative for rash.   Allergic/Immunologic: Negative for immunocompromised state.   Neurological: Negative for weakness and numbness.   Hematological: Does not bruise/bleed easily.   Psychiatric/Behavioral: Negative for decreased concentration and dysphoric mood. The patient is not nervous/anxious.    All other systems reviewed and are negative.    A complete review of systems was performed with pertinent positives and negatives noted in the HPI, and all other systems negative.    Physical Exam   BP: 118/78  Pulse: 65  Temp: 98.3  F (36.8  C)  Resp: 16  Weight: 63.5 kg (140 lb)  SpO2: 100 %  Physical  Exam  Vitals and nursing note reviewed.   Constitutional:       Appearance: Normal appearance.   HENT:      Head: Normocephalic and atraumatic.      Mouth/Throat:      Mouth: Mucous membranes are moist.   Eyes:      Conjunctiva/sclera: Conjunctivae normal.   Cardiovascular:      Rate and Rhythm: Normal rate and regular rhythm.      Heart sounds: No murmur heard.  Pulmonary:      Effort: Pulmonary effort is normal.      Breath sounds: Normal breath sounds. No wheezing.   Abdominal:      General: Abdomen is flat.      Palpations: Abdomen is soft.      Tenderness: There is abdominal tenderness (RLQ, LLQ). There is no right CVA tenderness, left CVA tenderness, guarding or rebound.   Musculoskeletal:         General: No signs of injury. Normal range of motion.      Cervical back: Normal range of motion.      Right lower leg: No edema.      Left lower leg: No edema.   Skin:     General: Skin is warm and dry.   Neurological:      Mental Status: He is alert.   Psychiatric:         Mood and Affect: Mood normal.         Behavior: Behavior normal.         Thought Content: Thought content normal.         Judgment: Judgment normal.       ED Course      Procedures       11:09 - CT Abdomen/Pelvis w/o contrast - No acute abnormality. 5 mm stone in the left ureterovesical junction, unchanged compared  to 7/1/2021.    12:29 - Re-evaluated.  Pain is unclear change in location or severity.  Discussed imaging findings with the patient, as well as laboratory findings.  There is no acute renal or infectious or abdominal etiology at this time.     Results for orders placed or performed during the hospital encounter of 05/19/22   CT Abdomen Pelvis w/o Contrast     Status: None    Narrative    EXAMINATION: CT ABDOMEN PELVIS W/O CONTRAST, 5/19/2022 11:09 AM    TECHNIQUE:  Helical CT of the abdomen and pelvis without IV contrast.  Coronal and sagittal reformatted images were created, archived and  reviewed at the workstation for further  assessment.    COMPARISON: Ultrasound 1/23/2022, CT 7/1/2021    HISTORY: Abdominal pain, acute, nonlocalized; 32 M w/  hx renal  stones, left nephrectomy, now with RLQ, LLQ abdominal pain    FINDINGS:     Cylindrical bronchiectasis in the lower lobes is not significantly  changed compared to 7/1/2021, incompletely assessed. No consolidation  or pleural effusion.    The liver, spleen, and pancreas have a normal non-enhanced appearance.  No adrenal mass or nodules.    The left kidney is surgically absent. Unremarkable unenhanced  appearance of the right kidney. 5 mm stone in the left ureterovesical  junction, unchanged compared to 7/1/2021. No new radiodense stones  identified along the right urinary tract. As described previously  there is a calcification in the pelvis, series 5 image 279 which is  presumably posterior to the ureter related to atherosclerotic  calcifications, unchanged from prior study. No hydroureteronephrosis.    Normal bowel wall thickness and no abnormal dilation. Normal appendix.      No free fluid or gas is seen with the abdomen or pelvis. No  lymphadenopathy is identified. The vascular structures have a normal  non-enhanced appearance.    No acute or suspicious osseous abnormality. Partial transitional  vertebral body at the lumbosacral junction.      Impression    IMPRESSION:    1. 5 mm stone in the left ureterovesical junction, unchanged compared  to 7/1/2021.  2. No radiodense stones identified in the right urinary tract. No  hydroureteronephrosis.    I have personally reviewed the examination and initial interpretation  and I agree with the findings.    CAREN COHEN MD         SYSTEM ID:  [id number]   UA with Microscopic reflex to Culture     Status: Normal    Specimen: Urine, Midstream   Result Value Ref Range    Color Urine Straw Colorless, Straw, Light Yellow, Yellow    Appearance Urine Clear Clear    Glucose Urine Negative Negative mg/dL    Bilirubin Urine Negative Negative     Ketones Urine Negative Negative mg/dL    Specific Gravity Urine 1.006 1.003 - 1.035    Blood Urine Negative Negative    pH Urine 6.5 5.0 - 7.0    Protein Albumin Urine Negative Negative mg/dL    Urobilinogen Urine Normal Normal, 2.0 mg/dL    Nitrite Urine Negative Negative    Leukocyte Esterase Urine Negative Negative    RBC Urine 1 <=2 /HPF    WBC Urine <1 <=5 /HPF    Narrative    Urine Culture not indicated   [place] Draw     Status: None    Narrative    The following orders were created for panel order [place] Draw.  Procedure                               Abnormality         Status                     ---------                               -----------         ------                     Extra Blue Top Tube[[id number]]                              Final result               Extra Red Top Tube[[id number]]                               Final result               Extra Green Top (Lithium...[[id number]]                      Final result               Extra Purple Top Tube[[id number]]                            Final result                 Please view results for these tests on the individual orders.   Extra Blue Top Tube     Status: None   Result Value Ref Range    Hold Specimen JIC    Extra Red Top Tube     Status: None   Result Value Ref Range    Hold Specimen JIC    Extra Green Top (Lithium Heparin) Tube     Status: None   Result Value Ref Range    Hold Specimen JIC    Extra Purple Top Tube     Status: None   Result Value Ref Range    Hold Specimen JIC    Comprehensive metabolic panel     Status: Normal   Result Value Ref Range    Sodium 139 133 - 144 mmol/L    Potassium 3.8 3.4 - 5.3 mmol/L    Chloride 107 94 - 109 mmol/L    Carbon Dioxide (CO2) 25 20 - 32 mmol/L    Anion Gap 7 3 - 14 mmol/L    Urea Nitrogen 17 7 - 30 mg/dL    Creatinine 1.02 0.66 - 1.25 mg/dL    Calcium 9.4 8.5 - 10.1 mg/dL    Glucose 90 70 - 99 mg/dL    Alkaline Phosphatase 75 40 - 150 U/L    AST 17 0 - 45 U/L    ALT 21 0 - 70 U/L    Protein Total  7.8 6.8 - 8.8 g/dL    Albumin 4.0 3.4 - 5.0 g/dL    Bilirubin Total 0.4 0.2 - 1.3 mg/dL    GFR Estimate >90 >60 mL/min/1.73m2   CBC with platelets and differential     Status: None   Result Value Ref Range    WBC Count 6.8 4.0 - 11.0 10e3/uL    RBC Count 5.17 4.40 - 5.90 10e6/uL    Hemoglobin 15.1 13.3 - 17.7 g/dL    Hematocrit 45.2 40.0 - 53.0 %    MCV 87 78 - 100 fL    MCH 29.2 26.5 - 33.0 pg    MCHC 33.4 31.5 - 36.5 g/dL    RDW 12.1 10.0 - 15.0 %    Platelet Count 224 150 - 450 10e3/uL    % Neutrophils 53 %    % Lymphocytes 35 %    % Monocytes 9 %    % Eosinophils 2 %    % Basophils 1 %    % Immature Granulocytes 0 %    NRBCs per 100 WBC 0 <1 /100    Absolute Neutrophils 3.6 1.6 - 8.3 10e3/uL    Absolute Lymphocytes 2.4 0.8 - 5.3 10e3/uL    Absolute Monocytes 0.6 0.0 - 1.3 10e3/uL    Absolute Eosinophils 0.1 0.0 - 0.7 10e3/uL    Absolute Basophils 0.0 0.0 - 0.2 10e3/uL    Absolute Immature Granulocytes 0.0 <=0.4 10e3/uL    Absolute NRBCs 0.0 10e3/uL   CBC with platelets differential     Status: None    Narrative    The following orders were created for panel order CBC with platelets differential.  Procedure                               Abnormality         Status                     ---------                               -----------         ------                     CBC with platelets and d...[[id number]]                      Final result                 Please view results for these tests on the individual orders.     Medications   0.9% sodium chloride BOLUS (0 mLs Intravenous Stopped 5/19/22 1227)        Assessments & Plan (with Medical Decision Making)     I have reviewed the nursing notes. I have reviewed the findings, diagnosis, plan and need for follow up with the patient.    Emelyn Marmolejo is a 32 year old male with PMH kidney stones with subsequent atrophic left kidney, s/p left nephrectomy (5/2021).  Patient presents to the emergency department for concerns of right-sided flank pain.    On presentation  to the ED, patient was vitally stable.  He does have some tenderness to palpation of the right upper back, just below his scapula, but no CVA tenderness.  Patient also had tenderness to palpation of his left lower quadrant and right lower quadrant without rebound or guarding.  CBC and CMP are normal, renal function is intact.  Urinalysis was normal, no hematuria.  Performed a CT of the abdomen/pelvis without contrast, which showed an old 5 mm stone in the left ureterovesical junction (unchanged from 7/1/2021).  There is no other acute abdominal pathology of note.    Patient symptoms are likely secondary to musculoskeletal pain given the location below scapula.  Low concern for renal pathology or cholecystitis/appendicitis/diverticulitis or other abdominal pathology at this time.  Given that patient is overall vitally stable, with reassuring imaging, patient is overall safe for discharge home.    Recommended acetaminophen as needed for pain, in addition to warm compresses and lidocaine patches.  Patient should follow-up with his primary care provider in ~2 weeks.  Discussed return precautions to the emergency department including worsening fever, chills, hematuria, pain with urination, or other new or concerning symptoms.  Patient expressed understanding, all questions were answered.    Discharge Medication List as of 5/19/2022 12:50 PM          Final diagnoses:   Right-sided thoracic back pain   Abdominal pain, generalized       --  Mj Shelley MD  [facility] EMERGENCY DEPARTMENT  5/19/2022     Mj Shelley MD  Resident    --    ED Attending Physician Attestation    I Mao Wing MD, cared for this patient with the Resident. I have performed a history and physical examination of the patient and discussed management with the resident. I reviewed the resident's documentation above and agree with the documented findings and plan of care.    Summary of HPI, PE, ED Course   Patient is a  32 year old male evaluated in the emergency department for right post chest pain and ab pain. Exam notable for tenderness subscapular right without rash. ED course notable for neg labs and ct for acute findings. After the completion of care in the emergency department, the patient was discharged.    Critical Care & Procedures  Not applicable.    Medical Decision Making  The medical record was reviewed and interpreted.  Current labs reviewed and interpreted.  Previous labs reviewed and interpreted.      Mao Wing MD  Emergency Medicine       05/19/22 1315    This note was created at least in part by the use of dragon voice dictation system. Inadvertent typographical errors may still exist.  Mao Wing MD.    Patient evaluated in the emergency department during the COVID-19 pandemic period. Careful attention to patients safety was addressed throughout the evaluation. Evaluation and treatment management was initiated with disposition made efficiently and appropriate as possible to minimize any risk of potential exposure to patient during this evaluation.         Mao Wing MD  05/19/22 9406

## 2022-05-19 NOTE — TELEPHONE ENCOUNTER
Nurse Triage SBAR    Is this a 2nd Level Triage? YES, LICENSED PRACTITIONER REVIEW IS REQUIRED    Situation: Spoke to pt. Pt reports having pain to left flank.     Background: Pt does not have left kidney.     Had CT scan today that notes 5 mm stone in the left ureterovesical.     UA is negative.     Assessment: Pain started about 2 weeks ago. Pain rated 7/10, come and goes. Pt feels pressure discomfort when he urinates. He continues to feel the urge even when done urinating. Urine is yellow and clear. No fever. No aggravating factors noted. Pt feels that walking helps to relieve pain. Pt is drinking about 4-5 bottles of water daily. Pt feels the pain makes it difficult to stay asleep. Chart and problems list reviewed.    Protocol Recommended Disposition:   See Today In Office    Recommendation: Increase fluid intake.   Tylenol, ibuprofen.   Heating pad.      Routed to provider    Does the patient meet one of the following criteria for ADS visit consideration? No     Raven Bunn RN MSN      Reason for Disposition    MODERATE pain (e.g., interferes with normal activities or awakens from sleep)    Protocols used: FLANK PAIN-A-[place]

## 2022-05-19 NOTE — DISCHARGE INSTRUCTIONS
Were seen in the emergency department today because you had right sided back pain and lower abdominal pain.    While you are in the emergency department, we did laboratory testing.  Your kidney function is normal.  Your urine test did not show any abnormalities.  We also did a CT scan of your kidneys, abdomen, and pelvis.  This shows an old stone on your left side, which was seen back in 07/2021.  Your right kidney does not show any stones or other abnormalities.    We think that you are back pain is likely from musculoskeletal pain.  We recommend Tylenol as needed for the pain, in addition to warm compresses.  You can also get lidocaine patches over-the-counter which can help numb the area.    At this point, we feel that you are safe for discharge home.  You should follow-up with your primary care provider in ~2 weeks for additional management.  If you are having worsening pain, fevers, chills, new urinary symptoms, or other new or worsening symptoms, you should return to the emergency room.

## 2022-05-19 NOTE — TELEPHONE ENCOUNTER
[facility] Call Center    Phone Message    May a detailed message be left on voicemail: yes     Reason for Call: The patient is request a call to discuss pain near his L kidney.  The patient stated he had imaging and the stone is still there.  He is requesting a work in visit with Dr. Hedrick.  Please advise.  Thank you.      Action Taken: Message routed to:  Adult Clinics: Urology p [id number]    Travel Screening: Not Applicable

## 2022-05-20 DIAGNOSIS — N20.0 CALCULUS OF KIDNEY: Primary | ICD-10-CM

## 2022-05-20 RX ORDER — TAMSULOSIN HYDROCHLORIDE 0.4 MG/1
0.4 CAPSULE ORAL DAILY
Qty: 14 CAPSULE | Refills: 1 | Status: SHIPPED | OUTPATIENT
Start: 2022-05-20 | End: 2022-06-17

## 2022-05-20 NOTE — TELEPHONE ENCOUNTER
Per chart review, patient is scheduled for follow up visit with Dr. Hedrick on 5/25/22 at [facility].    Inez Mora RN, BSN

## 2022-05-20 NOTE — TELEPHONE ENCOUNTER
Called patient  Informed him that Dr Hedrick reviewed his  Films  Suggest he start Flomax and see him  Or a virtual  Visit  next week or following week. I sent in Flomax  Patient says he has taken this before . Will ask schedulers  To arrange visit and call patient . Tanisha or Michelle Guerrier

## 2022-05-25 ENCOUNTER — VIRTUAL VISIT (OUTPATIENT)
Dept: UROLOGY | Facility: CLINIC | Age: 32
End: 2022-05-25
Payer: COMMERCIAL

## 2022-05-25 VITALS — BODY MASS INDEX: 21.22 KG/M2 | WEIGHT: 140 LBS | HEIGHT: 68 IN

## 2022-05-25 DIAGNOSIS — R10.11 RIGHT UPPER QUADRANT PAIN: ICD-10-CM

## 2022-05-25 DIAGNOSIS — N20.1 LEFT URETERAL STONE: Primary | ICD-10-CM

## 2022-05-25 PROCEDURE — 99214 OFFICE O/P EST MOD 30 MIN: CPT | Mod: 95 | Performed by: UROLOGY

## 2022-05-25 ASSESSMENT — PAIN SCALES - GENERAL: PAINLEVEL: NO PAIN (0)

## 2022-05-25 NOTE — LETTER
5/25/2022       RE: Emelyn Marmolejo  [address] [facility] [address]     Dear Colleague,    Thank you for referring your patient, Emelyn Marmolejo, to the [facility] at [facility]. Please see a copy of my visit note below.    PROVIDER NOTE:  [facility]   CHIEF COMPLAINT   It was my pleasure to see Emelyn Marmolejo who is a 31 year old male for follow-up of right flank pain, left atrophic kidney. With the assistance of a      HPI   Emelyn Marmolejo is a very pleasant 32 year old male who presents with a history of an atrophic left kidney due to a large obstructing stone.  I had seen him previously for work-up for possible left simple nephrectomy.  At that time he was noting some right-sided flank and upper abdominal pain and is very concerned that this is related to his right kidney.  Imaging from February was reviewed with no evidence of pathology on the right side.  He subsequently been diagnosed with H. pylori and is undergoing treatment for this.  He notes continued right-sided flank and abdominal pain especially with intake of food and also prolonged sitting with driving.  He denies any fevers, chills, or hematuria.    7/3/20:  Follow-up today to discuss the results of his CT scan which was completed yesterday which demonstrates a small 3 mm distal ureteral stone.  He is feeling okay today with minimal right-sided flank pain.  He denies any fevers, chills, nausea, or vomiting.  He denies noting any stone passage    8/6/20:  Follow-up today after his CT scan last week which demonstrated persistence of the 3 mm distal ureteral stone  He notes that he is continued to have ongoing right-sided flank pain    8/25/20:  He is now s/p diagnostic Ureteroscopy on 8/13/20. No stone identified at that time with complete visualization of the entire right collecting system.  The last 3-4 days he has been doing well with resolution of the  "RIGHT flank pain  He is having some intermittent LEFT flank pain    Developed COVID-19, now recovered     12/22/20:  With the assistance of a    He notes that he has been having increase LEFT sided pain - especially in the morning  He continues to have right abdominal pain and burning with unknown cause  He wants to move forward with left nephrectomy    6/3/21:  He is status post a laparoscopic left nephrectomy on 5/24/2021  He is doing well after surgery and healing well  No left-sided pain and incisions are healing well  He notes some continued intermittent right upper back and side pain up near his ribs    9/7/21:  Follow-up today to discuss his recent urinalysis which demonstrated again persistent microscopic hematuria  He continues to have intermittent right-sided abdominal pain, but no kidney related flank pain    TODAY 5/25/2022:  He had been doing well  He continues to have intermittent right sided abdominal pain and was recently in the emergency room on 5/19/2022  CT scan performed does show a increased calcification size of the left UPJ and again possible right ureteral stone versus a phlebolith  His urinalysis was normal from that ER visit  He does note some increased urinary urgency and frequency    PHYSICAL EXAM  Patient is a 32 year old  male   Vitals: Height 1.727 m (5' 8\"), weight 63.5 kg (140 lb).  Body mass index is 21.29 kg/m .  General Appearance Adult:   Alert, no acute distress, oriented  HENT: throat/mouth:normal, good dentition  Lungs: no respiratory distress, or pursed lip breathing  Heart: No obvious jugular venous distension present  Abdomen: non - distended  Musculoskeltal: extremities normal, no peripheral edema  Skin: no suspicious lesions or rashes  Neuro: Alert, oriented, speech and mentation normal  Psych: affect and mood normal      Creatinine   Date Value Ref Range Status   05/19/2022 1.02 0.66 - 1.25 mg/dL Final   04/06/2022 1.12 0.66 - 1.25 mg/dL Final "   01/23/2022 1.07 0.66 - 1.25 mg/dL Final   12/07/2021 0.99 0.66 - 1.25 mg/dL Final   10/12/2021 0.98 0.66 - 1.25 mg/dL Final     UA RESULTS:  Recent Labs   Lab Test 05/19/22  0819 01/23/22  1537 12/07/21  1228   COLOR Straw   < > Yellow   APPEARANCE Clear   < > Clear   URINEGLC Negative   < > Negative   URINEBILI Negative   < > Negative   URINEKETONE Negative   < > Negative   SG 1.006   < > 1.015   UBLD Negative   < > Trace*   URINEPH 6.5   < > 6.5   PROTEIN Negative   < > Negative   UROBILINOGEN  --   --  0.2   NITRITE Negative   < > Negative   LEUKEST Negative   < > Negative   RBCU 1   < > 0-2   WBCU <1   < > None Seen    < > = values in this interval not displayed.      IMAGING:  All pertinent imaging reviewed:    All imaging studies reviewed by me.  I personally reviewed these imaging films.  A formal report from radiology will follow.    CT ABD/PEL 7/1/21:  FINDINGS:   LOWER CHEST: Stable partly visualized cylindrical bronchiectasis in  the lung bases.     HEPATOBILIARY: Normal.     PANCREAS: Normal.     SPLEEN: Normal.     ADRENAL GLANDS: Normal.     KIDNEYS/BLADDER: Interval left nephrectomy. No right renal calculus.  No right hydronephrosis or hydroureter. Stable 3 mm calcification in  the right pelvis (series 3, image 257), that appears to be posterior  to the right ureter and appears to be a right internal iliac arterial  atherosclerotic calcification. Stable layering calcification in the  urinary bladder. No right perinephric stranding.     BOWEL: No obstruction or inflammatory change. Normal appendix.     LYMPH NODES: No lymphadenopathy.     PELVIC ORGANS: No pelvic masses.     OTHER: No free fluid.     MUSCULOSKELETAL: Normal.                                                        IMPRESSION:   1.  No acute findings in the abdomen and pelvis.  2.  No right hydronephrosis or perinephric stranding. No right renal  calculi. Stable 3 mm calcification in the right pelvis that appears to  be atherosclerotic  calcification in the right internal iliac artery  rather than a right ureteral calculus. If confirmation is desired, a  CT urogram with an nonemergent basis can be considered.  3.  Interval left nephrectomy.  4.  Cylindrical bronchiectasis in the lung bases is stable.    CT ABD/PEL 5/19/2022:  FINDINGS:     Cylindrical bronchiectasis in the lower lobes is not significantly  changed compared to 7/1/2021, incompletely assessed. No consolidation  or pleural effusion.     The liver, spleen, and pancreas have a normal non-enhanced appearance.  No adrenal mass or nodules.     The left kidney is surgically absent. Unremarkable unenhanced  appearance of the right kidney. 5 mm stone in the left ureterovesical  junction, unchanged compared to 7/1/2021. No new radiodense stones  identified along the right urinary tract. As described previously  there is a calcification in the pelvis, series 5 image 279 which is  presumably posterior to the ureter related to atherosclerotic  calcifications, unchanged from prior study. No hydroureteronephrosis.     Normal bowel wall thickness and no abnormal dilation. Normal appendix.        No free fluid or gas is seen with the abdomen or pelvis. No  lymphadenopathy is identified. The vascular structures have a normal  non-enhanced appearance.     No acute or suspicious osseous abnormality. Partial transitional  vertebral body at the lumbosacral junction.                                                                      IMPRESSION:    1. 5 mm stone in the left ureterovesical junction, unchanged compared  to 7/1/2021.  2. No radiodense stones identified in the right urinary tract. No  hydroureteronephrosis.    ASSESSMENT and PLAN  32-year-old man with a left atrophic kidney secondary now status post a laparoscopic left nephrectomy    Functional solitary right kidney   -His right kidney is functioning well with normal renal function  -We discussed that his right-sided upper back and side pain  is not related to his kidney as this is more thoracic in nature  -I reviewed his CT scan from July which again demonstrates a stable calcification in the right pelvis likely related to an internal iliac artery calcification.  -I again reviewed my operative note from August 2020 when I performed cystoscopy and right diagnostic ureteroscopy with no evidence of stones or urothelial abnormalities in his right collecting system and bladder  - I reviewed his CT scan from May as well as his prior CT scans and there is one possible small calcification along the path of the right ureter  - We discussed that given that I recommend we remove the left UVJ stone, we will also plan for diagnostic ureteroscopy on the right at that time    Left UVJ stone  - He does have a small calcification at the left UVJ which has increased size over the last 2 years.  This was only a very faint calcification prior to his nephrectomy    Order for surgery placed    Time spent: 15 minutes spent on the date of the encounter doing chart review, history and exam, documentation and further activities as noted above.     Aj Hedrick MD   Urology  [facility]  [facility] Phone: [phone]  [facility] Phone: [phone]          Emelyn is a 32 year old who is being evaluated via a billable video visit.      How would you like to obtain your AVS? MyChart  If the video visit is dropped, the invitation should be resent by: Text to cell phone: [phone]  Will anyone else be joining your video visit? No        Video-Visit Details    Type of service:  Video Visit    Video Start Time: 10:35 AM    Video End Time:10:47 AM    Originating Location (pt. Location): Home    Distant Location (provider location):  [facility]     Platform used for Video Visit: John[employer]apa

## 2022-05-25 NOTE — PROGRESS NOTES
PROVIDER NOTE:  [facility]   CHIEF COMPLAINT   It was my pleasure to see Emelyn Marmolejo who is a 31 year old male for follow-up of right flank pain, left atrophic kidney. With the assistance of a      HPI   Emelyn Marmolejo is a very pleasant 32 year old male who presents with a history of an atrophic left kidney due to a large obstructing stone.  I had seen him previously for work-up for possible left simple nephrectomy.  At that time he was noting some right-sided flank and upper abdominal pain and is very concerned that this is related to his right kidney.  Imaging from February was reviewed with no evidence of pathology on the right side.  He subsequently been diagnosed with H. pylori and is undergoing treatment for this.  He notes continued right-sided flank and abdominal pain especially with intake of food and also prolonged sitting with driving.  He denies any fevers, chills, or hematuria.    7/3/20:  Follow-up today to discuss the results of his CT scan which was completed yesterday which demonstrates a small 3 mm distal ureteral stone.  He is feeling okay today with minimal right-sided flank pain.  He denies any fevers, chills, nausea, or vomiting.  He denies noting any stone passage    8/6/20:  Follow-up today after his CT scan last week which demonstrated persistence of the 3 mm distal ureteral stone  He notes that he is continued to have ongoing right-sided flank pain    8/25/20:  He is now s/p diagnostic Ureteroscopy on 8/13/20. No stone identified at that time with complete visualization of the entire right collecting system.  The last 3-4 days he has been doing well with resolution of the RIGHT flank pain  He is having some intermittent LEFT flank pain    Developed COVID-19, now recovered     12/22/20:  With the assistance of a    He notes that he has been having increase LEFT sided pain - especially in the morning  He continues to have right abdominal pain and burning  "with unknown cause  He wants to move forward with left nephrectomy    6/3/21:  He is status post a laparoscopic left nephrectomy on 5/24/2021  He is doing well after surgery and healing well  No left-sided pain and incisions are healing well  He notes some continued intermittent right upper back and side pain up near his ribs    9/7/21:  Follow-up today to discuss his recent urinalysis which demonstrated again persistent microscopic hematuria  He continues to have intermittent right-sided abdominal pain, but no kidney related flank pain    TODAY 5/25/2022:  He had been doing well  He continues to have intermittent right sided abdominal pain and was recently in the emergency room on 5/19/2022  CT scan performed does show a increased calcification size of the left UPJ and again possible right ureteral stone versus a phlebolith  His urinalysis was normal from that ER visit  He does note some increased urinary urgency and frequency    PHYSICAL EXAM  Patient is a 32 year old  male   Vitals: Height 1.727 m (5' 8\"), weight 63.5 kg (140 lb).  Body mass index is 21.29 kg/m .  General Appearance Adult:   Alert, no acute distress, oriented  HENT: throat/mouth:normal, good dentition  Lungs: no respiratory distress, or pursed lip breathing  Heart: No obvious jugular venous distension present  Abdomen: non - distended  Musculoskeltal: extremities normal, no peripheral edema  Skin: no suspicious lesions or rashes  Neuro: Alert, oriented, speech and mentation normal  Psych: affect and mood normal      Creatinine   Date Value Ref Range Status   05/19/2022 1.02 0.66 - 1.25 mg/dL Final   04/06/2022 1.12 0.66 - 1.25 mg/dL Final   01/23/2022 1.07 0.66 - 1.25 mg/dL Final   12/07/2021 0.99 0.66 - 1.25 mg/dL Final   10/12/2021 0.98 0.66 - 1.25 mg/dL Final     UA RESULTS:  Recent Labs   Lab Test 05/19/22  0819 01/23/22  1537 12/07/21  1228   COLOR Straw   < > Yellow   APPEARANCE Clear   < > Clear   URINEGLC Negative   < > Negative "   URINEBILI Negative   < > Negative   URINEKETONE Negative   < > Negative   SG 1.006   < > 1.015   UBLD Negative   < > Trace*   URINEPH 6.5   < > 6.5   PROTEIN Negative   < > Negative   UROBILINOGEN  --   --  0.2   NITRITE Negative   < > Negative   LEUKEST Negative   < > Negative   RBCU 1   < > 0-2   WBCU <1   < > None Seen    < > = values in this interval not displayed.      IMAGING:  All pertinent imaging reviewed:    All imaging studies reviewed by me.  I personally reviewed these imaging films.  A formal report from radiology will follow.    CT ABD/PEL 7/1/21:  FINDINGS:   LOWER CHEST: Stable partly visualized cylindrical bronchiectasis in  the lung bases.     HEPATOBILIARY: Normal.     PANCREAS: Normal.     SPLEEN: Normal.     ADRENAL GLANDS: Normal.     KIDNEYS/BLADDER: Interval left nephrectomy. No right renal calculus.  No right hydronephrosis or hydroureter. Stable 3 mm calcification in  the right pelvis (series 3, image 257), that appears to be posterior  to the right ureter and appears to be a right internal iliac arterial  atherosclerotic calcification. Stable layering calcification in the  urinary bladder. No right perinephric stranding.     BOWEL: No obstruction or inflammatory change. Normal appendix.     LYMPH NODES: No lymphadenopathy.     PELVIC ORGANS: No pelvic masses.     OTHER: No free fluid.     MUSCULOSKELETAL: Normal.                                                        IMPRESSION:   1.  No acute findings in the abdomen and pelvis.  2.  No right hydronephrosis or perinephric stranding. No right renal  calculi. Stable 3 mm calcification in the right pelvis that appears to  be atherosclerotic calcification in the right internal iliac artery  rather than a right ureteral calculus. If confirmation is desired, a  CT urogram with an nonemergent basis can be considered.  3.  Interval left nephrectomy.  4.  Cylindrical bronchiectasis in the lung bases is stable.    CT ABD/PEL  5/19/2022:  FINDINGS:     Cylindrical bronchiectasis in the lower lobes is not significantly  changed compared to 7/1/2021, incompletely assessed. No consolidation  or pleural effusion.     The liver, spleen, and pancreas have a normal non-enhanced appearance.  No adrenal mass or nodules.     The left kidney is surgically absent. Unremarkable unenhanced  appearance of the right kidney. 5 mm stone in the left ureterovesical  junction, unchanged compared to 7/1/2021. No new radiodense stones  identified along the right urinary tract. As described previously  there is a calcification in the pelvis, series 5 image 279 which is  presumably posterior to the ureter related to atherosclerotic  calcifications, unchanged from prior study. No hydroureteronephrosis.     Normal bowel wall thickness and no abnormal dilation. Normal appendix.        No free fluid or gas is seen with the abdomen or pelvis. No  lymphadenopathy is identified. The vascular structures have a normal  non-enhanced appearance.     No acute or suspicious osseous abnormality. Partial transitional  vertebral body at the lumbosacral junction.                                                                      IMPRESSION:    1. 5 mm stone in the left ureterovesical junction, unchanged compared  to 7/1/2021.  2. No radiodense stones identified in the right urinary tract. No  hydroureteronephrosis.    ASSESSMENT and PLAN  32-year-old man with a left atrophic kidney secondary now status post a laparoscopic left nephrectomy    Functional solitary right kidney   -His right kidney is functioning well with normal renal function  -We discussed that his right-sided upper back and side pain is not related to his kidney as this is more thoracic in nature  -I reviewed his CT scan from July which again demonstrates a stable calcification in the right pelvis likely related to an internal iliac artery calcification.  -I again reviewed my operative note from August 2020 when  I performed cystoscopy and right diagnostic ureteroscopy with no evidence of stones or urothelial abnormalities in his right collecting system and bladder  - I reviewed his CT scan from May as well as his prior CT scans and there is one possible small calcification along the path of the right ureter  - We discussed that given that I recommend we remove the left UVJ stone, we will also plan for diagnostic ureteroscopy on the right at that time    Left UVJ stone  - He does have a small calcification at the left UVJ which has increased size over the last 2 years.  This was only a very faint calcification prior to his nephrectomy    Order for surgery placed    Time spent: 15 minutes spent on the date of the encounter doing chart review, history and exam, documentation and further activities as noted above.     Aj Hedrick MD   Urology  [facility]  [facility] Phone: [phone]  [facility] Phone: [phone]          Emelyn is a 32 year old who is being evaluated via a billable video visit.      How would you like to obtain your AVS? MyChart  If the video visit is dropped, the invitation should be resent by: Text to cell phone: [phone]  Will anyone else be joining your video visit? No        Video-Visit Details    Type of service:  Video Visit    Video Start Time: 10:35 AM    Video End Time:10:47 AM    Originating Location (pt. Location): Home    Distant Location (provider location):  [facility]     Platform used for Video Visit: [employer]

## 2022-06-11 DIAGNOSIS — N20.0 CALCULUS OF KIDNEY: ICD-10-CM

## 2022-06-15 NOTE — TELEPHONE ENCOUNTER
Medication request: tamsulosin (FLOMAX) 0.4 MG capsule  Sig: Take 1 capsule (0.4 mg) by mouth daily   Prescription written: 5/20/22  Last Qty: 14  Pt's last office visit: 5/25/22  Next scheduled office visit: none, plan is for ureteroscopy of left ureteral stone.    Message sent to Dr. Hedrick to review and advise on this refill request.    Inez Mora RN, BSN

## 2022-06-17 RX ORDER — TAMSULOSIN HYDROCHLORIDE 0.4 MG/1
CAPSULE ORAL
Qty: 14 CAPSULE | Refills: 1 | Status: ON HOLD | OUTPATIENT
Start: 2022-06-17 | End: 2022-09-07

## 2022-08-01 ENCOUNTER — OFFICE VISIT (OUTPATIENT)
Dept: UROLOGY | Facility: CLINIC | Age: 32
End: 2022-08-01
Payer: COMMERCIAL

## 2022-08-01 VITALS
SYSTOLIC BLOOD PRESSURE: 115 MMHG | HEIGHT: 68 IN | BODY MASS INDEX: 21.22 KG/M2 | WEIGHT: 140 LBS | DIASTOLIC BLOOD PRESSURE: 70 MMHG

## 2022-08-01 DIAGNOSIS — Z90.5 S/P NEPHRECTOMY: ICD-10-CM

## 2022-08-01 DIAGNOSIS — R10.11 RIGHT UPPER QUADRANT PAIN: ICD-10-CM

## 2022-08-01 DIAGNOSIS — N20.1 LEFT URETERAL STONE: Primary | ICD-10-CM

## 2022-08-01 PROCEDURE — 99214 OFFICE O/P EST MOD 30 MIN: CPT | Performed by: UROLOGY

## 2022-08-01 ASSESSMENT — PAIN SCALES - GENERAL: PAINLEVEL: NO PAIN (0)

## 2022-08-01 NOTE — PROGRESS NOTES
PROVIDER NOTE:  AUGUSTINE  CHIEF COMPLAINT   It was my pleasure to see Emelyn Marmolejo who is a 32 year old male for follow-up of right flank pain, left atrophic kidney .     HPI   Emelyn Marmolejo is a very pleasant 32 year old male who presents with a history of an atrophic left kidney due to a large obstructing stone.  I had seen him previously for work-up for possible left simple nephrectomy.  At that time he was noting some right-sided flank and upper abdominal pain and is very concerned that this is related to his right kidney.  Imaging from February was reviewed with no evidence of pathology on the right side.  He subsequently been diagnosed with H. pylori and is undergoing treatment for this.  He notes continued right-sided flank and abdominal pain especially with intake of food and also prolonged sitting with driving.  He denies any fevers, chills, or hematuria.    7/3/20:  Follow-up today to discuss the results of his CT scan which was completed yesterday which demonstrates a small 3 mm distal ureteral stone.  He is feeling okay today with minimal right-sided flank pain.  He denies any fevers, chills, nausea, or vomiting.  He denies noting any stone passage    8/6/20:  Follow-up today after his CT scan last week which demonstrated persistence of the 3 mm distal ureteral stone  He notes that he is continued to have ongoing right-sided flank pain    8/25/20:  He is now s/p diagnostic Ureteroscopy on 8/13/20. No stone identified at that time with complete visualization of the entire right collecting system.  The last 3-4 days he has been doing well with resolution of the RIGHT flank pain  He is having some intermittent LEFT flank pain    Developed COVID-19, now recovered     12/22/20:  With the assistance of a    He notes that he has been having increase LEFT sided pain - especially in the morning  He continues to have right abdominal pain and burning with unknown cause  He wants to move forward  "with left nephrectomy    6/3/21:  He is status post a laparoscopic left nephrectomy on 5/24/2021  He is doing well after surgery and healing well  No left-sided pain and incisions are healing well  He notes some continued intermittent right upper back and side pain up near his ribs    9/7/21:  Follow-up today to discuss his recent urinalysis which demonstrated again persistent microscopic hematuria  He continues to have intermittent right-sided abdominal pain, but no kidney related flank pain    5/25/2022:  He had been doing well  He continues to have intermittent right sided abdominal pain and was recently in the emergency room on 5/19/2022  CT scan performed does show a increased calcification size of the left UVJ and again possible right ureteral stone versus a phlebolith  His urinalysis was normal from that ER visit  He does note some increased urinary urgency and frequency    TODAY 8/1/2022:  He had previously been scheduled for ureteroscopy on 6/17/2022, however canceled this as he was out of town  He continues to have intermittent vague right-sided flank and lower quadrant abdominal pain  He continues to have issues with urinary frequency and urgency    PHYSICAL EXAM  Patient is a 32 year old  male   Vitals: Blood pressure 115/70, height 1.727 m (5' 8\"), weight 63.5 kg (140 lb).  Body mass index is 21.29 kg/m .  General Appearance Adult:   Alert, no acute distress, oriented  HENT: throat/mouth:normal, good dentition  Lungs: no respiratory distress, or pursed lip breathing  Heart: No obvious jugular venous distension present  Abdomen: soft, nontender, no organomegaly or masses  Incisions: Well-healed with no evidence of a hernia or palpable issues with the scar  Musculoskeltal: extremities normal, no peripheral edema  Skin: no suspicious lesions or rashes  Neuro: Alert, oriented, speech and mentation normal  Psych: affect and mood normal  Gait: Normal      Creatinine   Date Value Ref Range Status   05/19/2022 " 1.02 0.66 - 1.25 mg/dL Final   04/06/2022 1.12 0.66 - 1.25 mg/dL Final   01/23/2022 1.07 0.66 - 1.25 mg/dL Final   12/07/2021 0.99 0.66 - 1.25 mg/dL Final   10/12/2021 0.98 0.66 - 1.25 mg/dL Final     UA RESULTS:  Recent Labs   Lab Test 05/19/22  0819 01/23/22  1537 12/07/21  1228   COLOR Straw   < > Yellow   APPEARANCE Clear   < > Clear   URINEGLC Negative   < > Negative   URINEBILI Negative   < > Negative   URINEKETONE Negative   < > Negative   SG 1.006   < > 1.015   UBLD Negative   < > Trace*   URINEPH 6.5   < > 6.5   PROTEIN Negative   < > Negative   UROBILINOGEN  --   --  0.2   NITRITE Negative   < > Negative   LEUKEST Negative   < > Negative   RBCU 1   < > 0-2   WBCU <1   < > None Seen    < > = values in this interval not displayed.      IMAGING:  All pertinent imaging reviewed:    All imaging studies reviewed by me.  I personally reviewed these imaging films.  A formal report from radiology will follow.    CT ABD/PEL 7/1/21:  FINDINGS:   LOWER CHEST: Stable partly visualized cylindrical bronchiectasis in  the lung bases.     HEPATOBILIARY: Normal.     PANCREAS: Normal.     SPLEEN: Normal.     ADRENAL GLANDS: Normal.     KIDNEYS/BLADDER: Interval left nephrectomy. No right renal calculus.  No right hydronephrosis or hydroureter. Stable 3 mm calcification in  the right pelvis (series 3, image 257), that appears to be posterior  to the right ureter and appears to be a right internal iliac arterial  atherosclerotic calcification. Stable layering calcification in the  urinary bladder. No right perinephric stranding.     BOWEL: No obstruction or inflammatory change. Normal appendix.     LYMPH NODES: No lymphadenopathy.     PELVIC ORGANS: No pelvic masses.     OTHER: No free fluid.     MUSCULOSKELETAL: Normal.                                                        IMPRESSION:   1.  No acute findings in the abdomen and pelvis.  2.  No right hydronephrosis or perinephric stranding. No right renal  calculi. Stable 3 mm  calcification in the right pelvis that appears to  be atherosclerotic calcification in the right internal iliac artery  rather than a right ureteral calculus. If confirmation is desired, a  CT urogram with an nonemergent basis can be considered.  3.  Interval left nephrectomy.  4.  Cylindrical bronchiectasis in the lung bases is stable.    CT ABD/PEL 5/19/2022:  FINDINGS:     Cylindrical bronchiectasis in the lower lobes is not significantly  changed compared to 7/1/2021, incompletely assessed. No consolidation  or pleural effusion.     The liver, spleen, and pancreas have a normal non-enhanced appearance.  No adrenal mass or nodules.     The left kidney is surgically absent. Unremarkable unenhanced  appearance of the right kidney. 5 mm stone in the left ureterovesical  junction, unchanged compared to 7/1/2021. No new radiodense stones  identified along the right urinary tract. As described previously  there is a calcification in the pelvis, series 5 image 279 which is  presumably posterior to the ureter related to atherosclerotic  calcifications, unchanged from prior study. No hydroureteronephrosis.     Normal bowel wall thickness and no abnormal dilation. Normal appendix.        No free fluid or gas is seen with the abdomen or pelvis. No  lymphadenopathy is identified. The vascular structures have a normal  non-enhanced appearance.     No acute or suspicious osseous abnormality. Partial transitional  vertebral body at the lumbosacral junction.                                                                      IMPRESSION:    1. 5 mm stone in the left ureterovesical junction, unchanged compared  to 7/1/2021.  2. No radiodense stones identified in the right urinary tract. No  hydroureteronephrosis.    ASSESSMENT and PLAN  32-year-old man with a left atrophic kidney secondary now status post a laparoscopic left nephrectomy    Functional solitary right kidney   -His right kidney is functioning well with normal renal  function  -We discussed that his right-sided upper back and side pain is not related to his kidney as this is more thoracic in nature  -I reviewed his CT scan from July which again demonstrates a stable calcification in the right pelvis likely related to an internal iliac artery calcification.  -I again reviewed my operative note from August 2020 when I performed cystoscopy and right diagnostic ureteroscopy with no evidence of stones or urothelial abnormalities in his right collecting system and bladder  - I reviewed his CT scan from May as well as his prior CT scans and there is one possible small calcification along the path of the right ureter  - We discussed that given that I recommend we remove the left UVJ stone, we will also plan for Retrograde Pyelogram and possible Ureteroscopy on the right at that time    Left UVJ stone  - He does have a small calcification at the left UVJ which has increased size over the last 2 years.  This was only a very faint calcification prior to his nephrectomy  -We discussed that this stone does appear to be within the intramural portion of the ureter which may be contributing to his urinary frequency and urgency      Time spent: 15 minutes spent on the date of the encounter doing chart review, history and exam, documentation and further activities as noted above.     Aj Hedrick MD   Urology  [facility]  [facility] Phone: [phone]  [facility] Phone: [phone]

## 2022-08-01 NOTE — NURSING NOTE
Chief Complaint   Patient presents with     Follow Up     Kidney stone      Patient has more questions about the surgery.    Gianna Garcia

## 2022-08-01 NOTE — LETTER
8/1/2022       RE: Emelyn Marmolejo  [address] [address]     Dear Colleague,    Thank you for referring your patient, Emelyn Marmolejo, to the [facility] at [facility]. Please see a copy of my visit note below.    PROVIDER NOTE:  AUGUSTINE  CHIEF COMPLAINT   It was my pleasure to see Emelyn Marmolejo who is a 32 year old male for follow-up of right flank pain, left atrophic kidney .     HPI   Emelyn Marmolejo is a very pleasant 32 year old male who presents with a history of an atrophic left kidney due to a large obstructing stone.  I had seen him previously for work-up for possible left simple nephrectomy.  At that time he was noting some right-sided flank and upper abdominal pain and is very concerned that this is related to his right kidney.  Imaging from February was reviewed with no evidence of pathology on the right side.  He subsequently been diagnosed with H. pylori and is undergoing treatment for this.  He notes continued right-sided flank and abdominal pain especially with intake of food and also prolonged sitting with driving.  He denies any fevers, chills, or hematuria.    7/3/20:  Follow-up today to discuss the results of his CT scan which was completed yesterday which demonstrates a small 3 mm distal ureteral stone.  He is feeling okay today with minimal right-sided flank pain.  He denies any fevers, chills, nausea, or vomiting.  He denies noting any stone passage    8/6/20:  Follow-up today after his CT scan last week which demonstrated persistence of the 3 mm distal ureteral stone  He notes that he is continued to have ongoing right-sided flank pain    8/25/20:  He is now s/p diagnostic Ureteroscopy on 8/13/20. No stone identified at that time with complete visualization of the entire right collecting system.  The last 3-4 days he has been doing well with resolution of the RIGHT flank pain  He is having some intermittent  "LEFT flank pain    Developed COVID-19, now recovered     12/22/20:  With the assistance of a    He notes that he has been having increase LEFT sided pain - especially in the morning  He continues to have right abdominal pain and burning with unknown cause  He wants to move forward with left nephrectomy    6/3/21:  He is status post a laparoscopic left nephrectomy on 5/24/2021  He is doing well after surgery and healing well  No left-sided pain and incisions are healing well  He notes some continued intermittent right upper back and side pain up near his ribs    9/7/21:  Follow-up today to discuss his recent urinalysis which demonstrated again persistent microscopic hematuria  He continues to have intermittent right-sided abdominal pain, but no kidney related flank pain    5/25/2022:  He had been doing well  He continues to have intermittent right sided abdominal pain and was recently in the emergency room on 5/19/2022  CT scan performed does show a increased calcification size of the left UVJ and again possible right ureteral stone versus a phlebolith  His urinalysis was normal from that ER visit  He does note some increased urinary urgency and frequency    TODAY 8/1/2022:  He had previously been scheduled for ureteroscopy on 6/17/2022, however canceled this as he was out of town  He continues to have intermittent vague right-sided flank and lower quadrant abdominal pain  He continues to have issues with urinary frequency and urgency    PHYSICAL EXAM  Patient is a 32 year old  male   Vitals: Blood pressure 115/70, height 1.727 m (5' 8\"), weight 63.5 kg (140 lb).  Body mass index is 21.29 kg/m .  General Appearance Adult:   Alert, no acute distress, oriented  HENT: throat/mouth:normal, good dentition  Lungs: no respiratory distress, or pursed lip breathing  Heart: No obvious jugular venous distension present  Abdomen: soft, nontender, no organomegaly or masses  Incisions: Well-healed with no " evidence of a hernia or palpable issues with the scar  Musculoskeltal: extremities normal, no peripheral edema  Skin: no suspicious lesions or rashes  Neuro: Alert, oriented, speech and mentation normal  Psych: affect and mood normal  Gait: Normal      Creatinine   Date Value Ref Range Status   05/19/2022 1.02 0.66 - 1.25 mg/dL Final   04/06/2022 1.12 0.66 - 1.25 mg/dL Final   01/23/2022 1.07 0.66 - 1.25 mg/dL Final   12/07/2021 0.99 0.66 - 1.25 mg/dL Final   10/12/2021 0.98 0.66 - 1.25 mg/dL Final     UA RESULTS:  Recent Labs   Lab Test 05/19/22  0819 01/23/22  1537 12/07/21  1228   COLOR Straw   < > Yellow   APPEARANCE Clear   < > Clear   URINEGLC Negative   < > Negative   URINEBILI Negative   < > Negative   URINEKETONE Negative   < > Negative   SG 1.006   < > 1.015   UBLD Negative   < > Trace*   URINEPH 6.5   < > 6.5   PROTEIN Negative   < > Negative   UROBILINOGEN  --   --  0.2   NITRITE Negative   < > Negative   LEUKEST Negative   < > Negative   RBCU 1   < > 0-2   WBCU <1   < > None Seen    < > = values in this interval not displayed.      IMAGING:  All pertinent imaging reviewed:    All imaging studies reviewed by me.  I personally reviewed these imaging films.  A formal report from radiology will follow.    CT ABD/PEL 7/1/21:  FINDINGS:   LOWER CHEST: Stable partly visualized cylindrical bronchiectasis in  the lung bases.     HEPATOBILIARY: Normal.     PANCREAS: Normal.     SPLEEN: Normal.     ADRENAL GLANDS: Normal.     KIDNEYS/BLADDER: Interval left nephrectomy. No right renal calculus.  No right hydronephrosis or hydroureter. Stable 3 mm calcification in  the right pelvis (series 3, image 257), that appears to be posterior  to the right ureter and appears to be a right internal iliac arterial  atherosclerotic calcification. Stable layering calcification in the  urinary bladder. No right perinephric stranding.     BOWEL: No obstruction or inflammatory change. Normal appendix.     LYMPH NODES: No  lymphadenopathy.     PELVIC ORGANS: No pelvic masses.     OTHER: No free fluid.     MUSCULOSKELETAL: Normal.                                                        IMPRESSION:   1.  No acute findings in the abdomen and pelvis.  2.  No right hydronephrosis or perinephric stranding. No right renal  calculi. Stable 3 mm calcification in the right pelvis that appears to  be atherosclerotic calcification in the right internal iliac artery  rather than a right ureteral calculus. If confirmation is desired, a  CT urogram with an nonemergent basis can be considered.  3.  Interval left nephrectomy.  4.  Cylindrical bronchiectasis in the lung bases is stable.    CT ABD/PEL 5/19/2022:  FINDINGS:     Cylindrical bronchiectasis in the lower lobes is not significantly  changed compared to 7/1/2021, incompletely assessed. No consolidation  or pleural effusion.     The liver, spleen, and pancreas have a normal non-enhanced appearance.  No adrenal mass or nodules.     The left kidney is surgically absent. Unremarkable unenhanced  appearance of the right kidney. 5 mm stone in the left ureterovesical  junction, unchanged compared to 7/1/2021. No new radiodense stones  identified along the right urinary tract. As described previously  there is a calcification in the pelvis, series 5 image 279 which is  presumably posterior to the ureter related to atherosclerotic  calcifications, unchanged from prior study. No hydroureteronephrosis.     Normal bowel wall thickness and no abnormal dilation. Normal appendix.        No free fluid or gas is seen with the abdomen or pelvis. No  lymphadenopathy is identified. The vascular structures have a normal  non-enhanced appearance.     No acute or suspicious osseous abnormality. Partial transitional  vertebral body at the lumbosacral junction.                                                                      IMPRESSION:    1. 5 mm stone in the left ureterovesical junction, unchanged compared  to  7/1/2021.  2. No radiodense stones identified in the right urinary tract. No  hydroureteronephrosis.    ASSESSMENT and PLAN  32-year-old man with a left atrophic kidney secondary now status post a laparoscopic left nephrectomy    Functional solitary right kidney   -His right kidney is functioning well with normal renal function  -We discussed that his right-sided upper back and side pain is not related to his kidney as this is more thoracic in nature  -I reviewed his CT scan from July which again demonstrates a stable calcification in the right pelvis likely related to an internal iliac artery calcification.  -I again reviewed my operative note from August 2020 when I performed cystoscopy and right diagnostic ureteroscopy with no evidence of stones or urothelial abnormalities in his right collecting system and bladder  - I reviewed his CT scan from May as well as his prior CT scans and there is one possible small calcification along the path of the right ureter  - We discussed that given that I recommend we remove the left UVJ stone, we will also plan for Retrograde Pyelogram and possible Ureteroscopy on the right at that time    Left UVJ stone  - He does have a small calcification at the left UVJ which has increased size over the last 2 years.  This was only a very faint calcification prior to his nephrectomy  -We discussed that this stone does appear to be within the intramural portion of the ureter which may be contributing to his urinary frequency and urgency      Time spent: 15 minutes spent on the date of the encounter doing chart review, history and exam, documentation and further activities as noted above.     Aj Hedrick MD   Urology  [facility]  [facility] Phone: [phone]  [facility] Phone: [phone]

## 2022-08-04 ENCOUNTER — HOSPITAL ENCOUNTER (EMERGENCY)
Facility: CLINIC | Age: 32
Discharge: HOME | End: 2022-08-04
Attending: EMERGENCY MEDICINE | Admitting: EMERGENCY MEDICINE
Payer: COMMERCIAL

## 2022-08-04 VITALS
DIASTOLIC BLOOD PRESSURE: 83 MMHG | OXYGEN SATURATION: 98 % | HEART RATE: 73 BPM | SYSTOLIC BLOOD PRESSURE: 121 MMHG | TEMPERATURE: 98.6 F | RESPIRATION RATE: 16 BRPM

## 2022-08-04 DIAGNOSIS — N48.89 PENILE PAIN: ICD-10-CM

## 2022-08-04 LAB
ALBUMIN SERPL-MCNC: 4.1 G/DL (ref 3.4–5)
ALBUMIN UR-MCNC: NEGATIVE MG/DL
ALP SERPL-CCNC: 86 U/L (ref 40–150)
ALT SERPL W P-5'-P-CCNC: 21 U/L (ref 0–70)
ANION GAP SERPL CALCULATED.3IONS-SCNC: 5 MMOL/L (ref 3–14)
APPEARANCE UR: CLEAR
AST SERPL W P-5'-P-CCNC: 20 U/L (ref 0–45)
BACTERIA #/AREA URNS HPF: ABNORMAL /HPF
BASOPHILS # BLD AUTO: 0 10E3/UL (ref 0–0.2)
BASOPHILS NFR BLD AUTO: 1 %
BILIRUB SERPL-MCNC: <0.1 MG/DL (ref 0.2–1.3)
BILIRUB UR QL STRIP: NEGATIVE
BUN SERPL-MCNC: 21 MG/DL (ref 7–30)
CALCIUM SERPL-MCNC: 9.2 MG/DL (ref 8.5–10.1)
CHLORIDE BLD-SCNC: 105 MMOL/L (ref 94–109)
CO2 SERPL-SCNC: 29 MMOL/L (ref 20–32)
COLOR UR AUTO: ABNORMAL
CREAT SERPL-MCNC: 1.16 MG/DL (ref 0.66–1.25)
EOSINOPHIL # BLD AUTO: 0.5 10E3/UL (ref 0–0.7)
EOSINOPHIL NFR BLD AUTO: 7 %
ERYTHROCYTE [DISTWIDTH] IN BLOOD BY AUTOMATED COUNT: 11.9 % (ref 10–15)
GFR SERPL CREATININE-BSD FRML MDRD: 85 ML/MIN/1.73M2
GLUCOSE BLD-MCNC: 93 MG/DL (ref 70–99)
GLUCOSE UR STRIP-MCNC: NEGATIVE MG/DL
HCT VFR BLD AUTO: 44.5 % (ref 40–53)
HGB BLD-MCNC: 15 G/DL (ref 13.3–17.7)
HGB UR QL STRIP: ABNORMAL
HOLD SPECIMEN: NORMAL
HOLD SPECIMEN: NORMAL
IMM GRANULOCYTES # BLD: 0 10E3/UL
IMM GRANULOCYTES NFR BLD: 0 %
KETONES UR STRIP-MCNC: NEGATIVE MG/DL
LEUKOCYTE ESTERASE UR QL STRIP: NEGATIVE
LYMPHOCYTES # BLD AUTO: 3 10E3/UL (ref 0.8–5.3)
LYMPHOCYTES NFR BLD AUTO: 40 %
MCH RBC QN AUTO: 29.1 PG (ref 26.5–33)
MCHC RBC AUTO-ENTMCNC: 33.7 G/DL (ref 31.5–36.5)
MCV RBC AUTO: 86 FL (ref 78–100)
MONOCYTES # BLD AUTO: 0.6 10E3/UL (ref 0–1.3)
MONOCYTES NFR BLD AUTO: 9 %
NEUTROPHILS # BLD AUTO: 3.2 10E3/UL (ref 1.6–8.3)
NEUTROPHILS NFR BLD AUTO: 43 %
NITRATE UR QL: NEGATIVE
NRBC # BLD AUTO: 0 10E3/UL
NRBC BLD AUTO-RTO: 0 /100
PH UR STRIP: 6.5 [PH] (ref 5–7)
PLATELET # BLD AUTO: 206 10E3/UL (ref 150–450)
POTASSIUM BLD-SCNC: 3.9 MMOL/L (ref 3.4–5.3)
PROT SERPL-MCNC: 8.1 G/DL (ref 6.8–8.8)
RBC # BLD AUTO: 5.15 10E6/UL (ref 4.4–5.9)
RBC URINE: 1 /HPF
SODIUM SERPL-SCNC: 139 MMOL/L (ref 133–144)
SP GR UR STRIP: 1.01 (ref 1–1.03)
T VAGINALIS DNA SPEC QL NAA+PROBE: NOT DETECTED
UROBILINOGEN UR STRIP-MCNC: NORMAL MG/DL
WBC # BLD AUTO: 7.4 10E3/UL (ref 4–11)
WBC URINE: <1 /HPF

## 2022-08-04 PROCEDURE — 87086 URINE CULTURE/COLONY COUNT: CPT | Performed by: EMERGENCY MEDICINE

## 2022-08-04 PROCEDURE — 81001 URINALYSIS AUTO W/SCOPE: CPT | Performed by: EMERGENCY MEDICINE

## 2022-08-04 PROCEDURE — 87491 CHLMYD TRACH DNA AMP PROBE: CPT | Performed by: EMERGENCY MEDICINE

## 2022-08-04 PROCEDURE — 36415 COLL VENOUS BLD VENIPUNCTURE: CPT | Performed by: STUDENT IN AN ORGANIZED HEALTH CARE EDUCATION/TRAINING PROGRAM

## 2022-08-04 PROCEDURE — 80053 COMPREHEN METABOLIC PANEL: CPT | Performed by: STUDENT IN AN ORGANIZED HEALTH CARE EDUCATION/TRAINING PROGRAM

## 2022-08-04 PROCEDURE — 87661 TRICHOMONAS VAGINALIS AMPLIF: CPT | Performed by: STUDENT IN AN ORGANIZED HEALTH CARE EDUCATION/TRAINING PROGRAM

## 2022-08-04 PROCEDURE — 99284 EMERGENCY DEPT VISIT MOD MDM: CPT | Performed by: EMERGENCY MEDICINE

## 2022-08-04 PROCEDURE — 87591 N.GONORRHOEAE DNA AMP PROB: CPT | Performed by: EMERGENCY MEDICINE

## 2022-08-04 PROCEDURE — 99284 EMERGENCY DEPT VISIT MOD MDM: CPT | Mod: GC | Performed by: EMERGENCY MEDICINE

## 2022-08-04 PROCEDURE — 99283 EMERGENCY DEPT VISIT LOW MDM: CPT | Performed by: EMERGENCY MEDICINE

## 2022-08-04 PROCEDURE — 85025 COMPLETE CBC W/AUTO DIFF WBC: CPT | Performed by: STUDENT IN AN ORGANIZED HEALTH CARE EDUCATION/TRAINING PROGRAM

## 2022-08-04 ASSESSMENT — ENCOUNTER SYMPTOMS
DIAPHORESIS: 0
FEVER: 0
DYSURIA: 0
VOMITING: 0
LIGHT-HEADEDNESS: 0
JOINT SWELLING: 0
DIFFICULTY URINATING: 0
PALPITATIONS: 0
HEMATURIA: 0
SORE THROAT: 0
CONSTIPATION: 0
DIZZINESS: 0
FLANK PAIN: 1
CHILLS: 0
APPETITE CHANGE: 0
RECTAL PAIN: 0
COUGH: 0
FREQUENCY: 1
WEAKNESS: 0
NUMBNESS: 0
BACK PAIN: 0
FATIGUE: 0
SHORTNESS OF BREATH: 0
HEADACHES: 0
BLOOD IN STOOL: 0
ABDOMINAL PAIN: 1
DIARRHEA: 0
NAUSEA: 0
ABDOMINAL DISTENTION: 1

## 2022-08-04 NOTE — ED TRIAGE NOTES
Triage Assessment     Row Name 08/04/22 1506       Triage Assessment (Adult)    Airway WDL WDL       Respiratory WDL    Respiratory WDL WDL       Skin Circulation/Temperature WDL    Skin Circulation/Temperature WDL WDL       Cardiac WDL    Cardiac WDL WDL       Peripheral/Neurovascular WDL    Peripheral Neurovascular WDL WDL       Cognitive/Neuro/Behavioral WDL    Cognitive/Neuro/Behavioral WDL WDL

## 2022-08-04 NOTE — ED PROVIDER NOTES
[facility] EMERGENCY DEPARTMENT ([facility])    8/04/22        History     Chief Complaint   Patient presents with     Penis/Scrotum Problem     Pain started today: increase in frequency; no change in sexual partners.     HPI  Emelyn Marmolejo is a 32 year old male with history of atrophic left kidney secondary to large obstructing stone s/p left nephrectomy in 2021 complicated by calcification at left UVJ, chronic right-sided flank pain lower quadrant pain who presents with 1 day of penile pain.    He says that he woke up this morning and has had intermittent sharp pain in the shaft of his penis.  He has had right-sided flank pain for several months, it is also worse today.  No penile discharge, scrotal pain, hematuria, lymphadenopathy, difficulty with urination, fevers, chills, nausea, or vomiting.  Does report that he like he always has to urinate, but this is been going on for several months, since the diagnosis of his left ureterovesicular stone.  has 1 sexual partner, and they do not use condoms.     States that he has never had penile pain like this before.  Sexually active 3 to 4 days ago, and did not have any issues with erection pain.     Epic records reviewed, he is followed by [facility] urology whom he last saw 3 days ago reporting right-sided flank pain, abdominal pain.    Past Medical History  Past Medical History:   Diagnosis Date     Prostate infection      Past Surgical History:   Procedure Laterality Date     COMBINED CYSTOSCOPY, URETEROSCOPY, LASER HOLMIUM LITHOTRIPSY URETER(S) Right 8/13/2020    Procedure: CYSTOSCOPY, RIGHT RETROGRADE PYELOGRAM, RIGHT diagnostic URETEROSCOPY;  Surgeon: Aj Hedrick MD;  Location:  OR     ESOPHAGOSCOPY, GASTROSCOPY, DUODENOSCOPY (EGD), COMBINED N/A 1/25/2021    Procedure: ESOPHAGOGASTRODUODENOSCOPY, WITH BIOPSY;  Surgeon: Rip Her MD;  Location: [facility] OR     LAPAROSCOPIC NEPHRECTOMY Left 5/24/2021    Procedure: LEFT  NEPHRECTOMY, TOTAL, LAPAROSCOPIC;  Surgeon: Aj Hedrick MD;  Location:  OR     gabapentin (NEURONTIN) 300 MG capsule  multivitamin, therapeutic (MULTIVITAMIN) TABS tablet  tamsulosin (FLOMAX) 0.4 MG capsule  vitamin D3 (CHOLECALCIFEROL) 50 mcg (2000 units) tablet      Allergies   Allergen Reactions     Nsaids      Not true allergy. But be sparing with patient as he has single kidney      Family History  Family History   Problem Relation Age of Onset     Crohn's Disease No family hx of      Ulcerative Colitis No family hx of      GERD No family hx of      Stomach Cancer No family hx of      Social History   Social History     Tobacco Use     Smoking status: Former Smoker     Types: Cigarettes     Quit date: 2019     Years since quittin.8     Smokeless tobacco: Never Used   Vaping Use     Vaping Use: Never used   Substance Use Topics     Alcohol use: Never     Drug use: Never      Past medical history, past surgical history, medications, allergies, family history, and social history were reviewed with the patient. No additional pertinent items.       Review of Systems   Constitutional: Negative for appetite change, chills, diaphoresis, fatigue and fever.   HENT: Negative for congestion and sore throat.    Respiratory: Negative for cough and shortness of breath.    Cardiovascular: Negative for chest pain, palpitations and leg swelling.   Gastrointestinal: Positive for abdominal distention and abdominal pain. Negative for blood in stool, constipation, diarrhea, nausea, rectal pain and vomiting.   Genitourinary: Positive for flank pain (right), frequency and penile pain. Negative for decreased urine volume, difficulty urinating, dysuria, genital sores, hematuria, penile discharge, penile swelling, scrotal swelling, testicular pain and urgency.   Musculoskeletal: Negative for back pain and joint swelling.   Skin: Negative for rash.   Neurological: Negative for dizziness, weakness, light-headedness, numbness  and headaches.   All other systems reviewed and are negative.        Physical Exam   BP: 122/74  Pulse: 73  Temp: 98.6  F (37  C)  Resp: 16  SpO2: 98 %  Physical Exam  Vitals and nursing note reviewed.   Constitutional:       General: He is not in acute distress.     Appearance: Normal appearance. He is well-developed. He is not ill-appearing or diaphoretic.   HENT:      Head: Normocephalic and atraumatic.      Nose: Nose normal. No congestion or rhinorrhea.      Mouth/Throat:      Mouth: Mucous membranes are moist.      Pharynx: Oropharynx is clear.   Eyes:      General: No scleral icterus.        Right eye: No discharge.         Left eye: No discharge.      Conjunctiva/sclera: Conjunctivae normal.   Cardiovascular:      Rate and Rhythm: Normal rate and regular rhythm.      Heart sounds: Normal heart sounds. No murmur heard.    No friction rub. No gallop.   Pulmonary:      Effort: Pulmonary effort is normal. No respiratory distress.      Breath sounds: Normal breath sounds. No stridor. No wheezing, rhonchi or rales.   Abdominal:      General: Abdomen is flat. Bowel sounds are normal. There is no distension.      Palpations: Abdomen is soft. There is no mass.      Tenderness: There is abdominal tenderness (mild epigastric and RLQ). There is left CVA tenderness. There is no right CVA tenderness, guarding or rebound.      Hernia: No hernia is present. There is no hernia in the ventral area, left inguinal area, right femoral area, left femoral area or right inguinal area.   Genitourinary:     Penis: Normal. No hypospadias, erythema, tenderness, discharge, swelling or lesions.       Testes: Normal.         Right: Mass, tenderness, swelling, testicular hydrocele or varicocele not present. Right testis is descended.         Left: Mass, tenderness, swelling, testicular hydrocele or varicocele not present. Left testis is descended.      Epididymis:      Right: Normal.      Left: Normal.      Comments: No inguinal hernia on  exam  Musculoskeletal:         General: No deformity or signs of injury. Normal range of motion.      Cervical back: Normal range of motion and neck supple. No rigidity.      Right lower leg: No edema.      Left lower leg: No edema.   Skin:     General: Skin is warm and dry.      Coloration: Skin is not jaundiced or pale.      Findings: No rash.   Neurological:      General: No focal deficit present.      Mental Status: He is alert and oriented to person, place, and time.   Psychiatric:         Mood and Affect: Mood normal.         Behavior: Behavior normal.         Thought Content: Thought content normal.         Judgment: Judgment normal.       ED Course      Procedures             Results for orders placed or performed during the hospital encounter of 08/04/22   UA with Microscopic reflex to Culture     Status: Abnormal    Specimen: Urine, Clean Catch   Result Value Ref Range    Color Urine Straw Colorless, Straw, Light Yellow, Yellow    Appearance Urine Clear Clear    Glucose Urine Negative Negative mg/dL    Bilirubin Urine Negative Negative    Ketones Urine Negative Negative mg/dL    Specific Gravity Urine 1.008 1.003 - 1.035    Blood Urine Small (A) Negative    pH Urine 6.5 5.0 - 7.0    Protein Albumin Urine Negative Negative mg/dL    Urobilinogen Urine Normal Normal, 2.0 mg/dL    Nitrite Urine Negative Negative    Leukocyte Esterase Urine Negative Negative    Bacteria Urine Few (A) None Seen /HPF    RBC Urine 1 <=2 /HPF    WBC Urine <1 <=5 /HPF    Narrative    Urine Culture not indicated   Comprehensive metabolic panel     Status: Abnormal   Result Value Ref Range    Sodium 139 133 - 144 mmol/L    Potassium 3.9 3.4 - 5.3 mmol/L    Chloride 105 94 - 109 mmol/L    Carbon Dioxide (CO2) 29 20 - 32 mmol/L    Anion Gap 5 3 - 14 mmol/L    Urea Nitrogen 21 7 - 30 mg/dL    Creatinine 1.16 0.66 - 1.25 mg/dL    Calcium 9.2 8.5 - 10.1 mg/dL    Glucose 93 70 - 99 mg/dL    Alkaline Phosphatase 86 40 - 150 U/L    AST 20 0 -  45 U/L    ALT 21 0 - 70 U/L    Protein Total 8.1 6.8 - 8.8 g/dL    Albumin 4.1 3.4 - 5.0 g/dL    Bilirubin Total <0.1 (L) 0.2 - 1.3 mg/dL    GFR Estimate 85 >60 mL/min/1.73m2   CBC with platelets and differential     Status: None   Result Value Ref Range    WBC Count 7.4 4.0 - 11.0 10e3/uL    RBC Count 5.15 4.40 - 5.90 10e6/uL    Hemoglobin 15.0 13.3 - 17.7 g/dL    Hematocrit 44.5 40.0 - 53.0 %    MCV 86 78 - 100 fL    MCH 29.1 26.5 - 33.0 pg    MCHC 33.7 31.5 - 36.5 g/dL    RDW 11.9 10.0 - 15.0 %    Platelet Count 206 150 - 450 10e3/uL    % Neutrophils 43 %    % Lymphocytes 40 %    % Monocytes 9 %    % Eosinophils 7 %    % Basophils 1 %    % Immature Granulocytes 0 %    NRBCs per 100 WBC 0 <1 /100    Absolute Neutrophils 3.2 1.6 - 8.3 10e3/uL    Absolute Lymphocytes 3.0 0.8 - 5.3 10e3/uL    Absolute Monocytes 0.6 0.0 - 1.3 10e3/uL    Absolute Eosinophils 0.5 0.0 - 0.7 10e3/uL    Absolute Basophils 0.0 0.0 - 0.2 10e3/uL    Absolute Immature Granulocytes 0.0 <=0.4 10e3/uL    Absolute NRBCs 0.0 10e3/uL   [place] Draw     Status: None    Narrative    The following orders were created for panel order [place] Draw.  Procedure                               Abnormality         Status                     ---------                               -----------         ------                     Extra Blue Top Tube[[id number]]                              Final result               Extra Red Top Tube[[id number]]                               Final result                 Please view results for these tests on the individual orders.   Extra Blue Top Tube     Status: None   Result Value Ref Range    Hold Specimen JIC    Extra Red Top Tube     Status: None   Result Value Ref Range    Hold Specimen JIC    Trichomonas vaginalis by PCR     Status: Normal    Specimen: Urine, Voided   Result Value Ref Range    Trichomonas vaginalis by PCR Not Detected Not Detected    Narrative    The [employer] Xpert TV Assay, performed on the [employer]   Instrument Systems, is a qualitative in vitro diagnostic test for the detection of Trichomonas vaginalis genomic DNA. The test utilizes automated real-time polymerase chain reaction (PCR). The Xpert TV Assay uses female and male urine specimens, endocervical swab specimens, or patient-collected vaginal swab specimens (collected in a clinical setting). The Xpert TV Assay is intended to aid in the diagnosis of trichomoniasis in symptomatic or asymptomatic individuals.   CBC with platelets differential     Status: None    Narrative    The following orders were created for panel order CBC with platelets differential.  Procedure                               Abnormality         Status                     ---------                               -----------         ------                     CBC with platelets and d...[[id number]]                      Final result                 Please view results for these tests on the individual orders.     Medications - No data to display     Assessments & Plan (with Medical Decision Making)   Emelyn Marmolejo is a 32 year old male with history of atrophic left kidney secondary to large obstructing stone s/p left nephrectomy in 2021 complicated by calcification at left UVJ, chronic right-sided flank pain lower quadrant pain who presents with 1 day of penile pain.    DDx: nephrolithiasis with radiating pain, UTI, pyelonephritis, STI, prostatitis, penile trauma, constipation    Initial work-up includes CBC, CMP, UA, urine culture, gonorrhea, chlamydia, and trichomoniasis.  CBC and CMP within normal limits.  UA without RBCs and no concern for infection.  Pain is most likely from his known stone at the left uterovesicular junction, although it is odd that he has been having this chronic right-sided flank pain, as he had no stones on the right.  Discussed repeat abdominal CT to rule out right-sided nephrolithiasis or other intra-abdominal process, but patient declined due to concerns for  radiation.  Offered renal ultrasound to rule out hydronephrosis, but patient would rather follow-up with urology outpatient.  Low concern for gonorrhea or chlamydia as he is not having any purulent discharge, and he only has 1 sexual partner.  Prostatitis unlikely as he is overall well-appearing, afebrile, and not having any pyuria.  Patient declined analgesia.     Patient will be contacted if urine culture, gonorrhea, or chlamydia are positive.  Follow-up with urology for stone removal.     I have reviewed the nursing notes. I have reviewed the findings, diagnosis, plan and need for follow up with the patient.    Discharge Medication List as of 8/4/2022  6:36 PM          Final diagnoses:   Penile pain     Bernadette Mcneil MD   [facility] Resident, G2    --    [facility] EMERGENCY DEPARTMENT  8/4/2022    --    ED Attending Physician Attestation    I Marguerite Rasmussen MD, cared for this patient with the Resident. I have performed a history and physical examination of the patient and discussed management with the resident. I reviewed the resident's documentation above and agree with the documented findings and plan of care.    Summary of HPI, PE, ED Course   Patient is a 32 year old male evaluated in the emergency department for acute on chronic abdominal pain and new penile shaft pain. Exam notable for unremarkable abdominal and  exam with very minimal tenderness and no guarding.  No hernia or testicular swelling or penile discharge or lesions.  Patient with known left UVJ stone.  Discussed possibility of referred pain from the stone causing some ureteral or urethral spasm.  Urinalysis without evidence of infection but a culture will be added on.  The patient is relatively low risk for STDs since he has been sexually active with 1 partner.  However, he states he is unsure of her STD status.  He would like to wait for the results of STD testing.  I think this is appropriate since he does  not have any penile discharge or lesions.  CMP normal.  CBC normal.  Sent a urethral swab for gonorrhea and chlamydia testing since this is not a first void urine specimen.  Trichomonas negative.  We offered the patient an ultrasound to evaluate for right ureteral lithiasis/hydronephrosis, bladder stone.  He was interested in avoiding additional radiation with a repeat CT.  We also offered the option to follow-up with urology for further work-up of his known stone which the patient has been intending to schedule.  Patient's pain was not significant enough for him to want any pain medication while in the emergency department.  He elected to discharge without further imaging or empiric antibiotics.  He is afebrile and nontoxic-appearing with penile pain and a bland UA.  Will call if the results of his STD or urine culture result abnormal and he requires antibiotics.  Patient intends to follow-up with urology for further management.  Return precautions provided.  After the completion of care in the emergency department, the patient was discharged.    Critical Care & Procedures  Not applicable.    Medical Decision Making  The medical record was reviewed and interpreted.  Current labs reviewed and interpreted.  Previous labs reviewed and interpreted.  Previous images reviewed and interpreted: UVJ stone on left.   utilized.      Marguerite Rasmussen MD  Emergency Medicine          Marguerite Rasmussen MD  08/05/22 1044

## 2022-08-04 NOTE — DISCHARGE INSTRUCTIONS
Please make an appointment to follow up with Urology Clinic (phone: [phone]) as soon as possible.    Your pain is most likely referred pain from your known kidney stone that is next to your bladder. Please follow up with Urology for further Urologic testing and treatment.    Return for worsening pain, inability to urinate or decreased urine output, blood in your urine, fever, or purulent drainage from your penis.     You will be called if the results of your urine culture grow a bacteria and you require treatment. [facility]     You will be called with the results of your STD testing. You should not have sexual contact (oral or genital) until your test results show you do not have an infection or until you have completed treatment for a known infection.     You should advise sexual partners to get STD testing, as well.     Use condoms to decrease risk of ynes STDs.

## 2022-08-05 LAB
C TRACH DNA SPEC QL NAA+PROBE: NEGATIVE
C TRACH DNA SPEC QL NAA+PROBE: NEGATIVE
N GONORRHOEA DNA SPEC QL NAA+PROBE: NEGATIVE
N GONORRHOEA DNA SPEC QL NAA+PROBE: NEGATIVE

## 2022-08-06 LAB — BACTERIA UR CULT: NO GROWTH

## 2022-08-25 NOTE — TELEPHONE ENCOUNTER
FUTURE VISIT INFORMATION      SURGERY INFORMATION:    Date: 22    Location:  or    Surgeon:  Aj Hedrick MD    Anesthesia Type:  general    Procedure: CYSTOSCOPY, BILATERAL RETROGRADE PYELOGRAM, BILATERAL URETEROSCOPY WITH LASER LITHOTRIOPSY AND BASKET REMOVAL OF STONES, POSSIBLE RIGHT URETERAL STENT PLACEMENT    RECORDS REQUESTED FROM:       Primary Care Provider: [facility]    Most recent EKG+ Tracin20    Most recent Cardiac Stress Test: 20    Most recent PFT's: 12/15/20

## 2022-08-30 ENCOUNTER — APPOINTMENT (OUTPATIENT)
Dept: ULTRASOUND IMAGING | Facility: CLINIC | Age: 32
End: 2022-08-30
Attending: EMERGENCY MEDICINE
Payer: COMMERCIAL

## 2022-08-30 ENCOUNTER — HOSPITAL ENCOUNTER (EMERGENCY)
Facility: CLINIC | Age: 32
Discharge: HOME | End: 2022-08-30
Attending: EMERGENCY MEDICINE | Admitting: EMERGENCY MEDICINE
Payer: COMMERCIAL

## 2022-08-30 VITALS
OXYGEN SATURATION: 97 % | DIASTOLIC BLOOD PRESSURE: 70 MMHG | HEART RATE: 66 BPM | TEMPERATURE: 98 F | SYSTOLIC BLOOD PRESSURE: 110 MMHG | RESPIRATION RATE: 18 BRPM

## 2022-08-30 DIAGNOSIS — R10.9 FLANK PAIN: ICD-10-CM

## 2022-08-30 LAB
ALBUMIN UR-MCNC: NEGATIVE MG/DL
ANION GAP SERPL CALCULATED.3IONS-SCNC: 4 MMOL/L (ref 3–14)
APPEARANCE UR: CLEAR
BASOPHILS # BLD AUTO: 0 10E3/UL (ref 0–0.2)
BASOPHILS NFR BLD AUTO: 1 %
BILIRUB UR QL STRIP: NEGATIVE
BUN SERPL-MCNC: 13 MG/DL (ref 7–30)
CALCIUM SERPL-MCNC: 9.4 MG/DL (ref 8.5–10.1)
CHLORIDE BLD-SCNC: 109 MMOL/L (ref 94–109)
CO2 SERPL-SCNC: 27 MMOL/L (ref 20–32)
COLOR UR AUTO: NORMAL
CREAT SERPL-MCNC: 1.04 MG/DL (ref 0.66–1.25)
EOSINOPHIL # BLD AUTO: 0.2 10E3/UL (ref 0–0.7)
EOSINOPHIL NFR BLD AUTO: 4 %
ERYTHROCYTE [DISTWIDTH] IN BLOOD BY AUTOMATED COUNT: 12.2 % (ref 10–15)
GFR SERPL CREATININE-BSD FRML MDRD: >90 ML/MIN/1.73M2
GLUCOSE BLD-MCNC: 95 MG/DL (ref 70–99)
GLUCOSE UR STRIP-MCNC: NEGATIVE MG/DL
HCT VFR BLD AUTO: 42.6 % (ref 40–53)
HGB BLD-MCNC: 14.5 G/DL (ref 13.3–17.7)
HGB UR QL STRIP: NEGATIVE
HOLD SPECIMEN: 0
HOLD SPECIMEN: NORMAL
IMM GRANULOCYTES # BLD: 0 10E3/UL
IMM GRANULOCYTES NFR BLD: 0 %
KETONES UR STRIP-MCNC: NEGATIVE MG/DL
LEUKOCYTE ESTERASE UR QL STRIP: NEGATIVE
LYMPHOCYTES # BLD AUTO: 2.2 10E3/UL (ref 0.8–5.3)
LYMPHOCYTES NFR BLD AUTO: 46 %
MCH RBC QN AUTO: 29.2 PG (ref 26.5–33)
MCHC RBC AUTO-ENTMCNC: 34 G/DL (ref 31.5–36.5)
MCV RBC AUTO: 86 FL (ref 78–100)
MONOCYTES # BLD AUTO: 0.4 10E3/UL (ref 0–1.3)
MONOCYTES NFR BLD AUTO: 9 %
NEUTROPHILS # BLD AUTO: 1.9 10E3/UL (ref 1.6–8.3)
NEUTROPHILS NFR BLD AUTO: 40 %
NITRATE UR QL: NEGATIVE
NRBC # BLD AUTO: 0 10E3/UL
NRBC BLD AUTO-RTO: 0 /100
PH UR STRIP: 6 [PH] (ref 5–7)
PLATELET # BLD AUTO: 205 10E3/UL (ref 150–450)
POTASSIUM BLD-SCNC: 3.9 MMOL/L (ref 3.4–5.3)
RBC # BLD AUTO: 4.97 10E6/UL (ref 4.4–5.9)
RBC URINE: <1 /HPF
SODIUM SERPL-SCNC: 140 MMOL/L (ref 133–144)
SP GR UR STRIP: 1.01 (ref 1–1.03)
UROBILINOGEN UR STRIP-MCNC: NORMAL MG/DL
WBC # BLD AUTO: 4.8 10E3/UL (ref 4–11)
WBC URINE: 0 /HPF

## 2022-08-30 PROCEDURE — 99284 EMERGENCY DEPT VISIT MOD MDM: CPT | Performed by: EMERGENCY MEDICINE

## 2022-08-30 PROCEDURE — 96361 HYDRATE IV INFUSION ADD-ON: CPT | Performed by: EMERGENCY MEDICINE

## 2022-08-30 PROCEDURE — 99284 EMERGENCY DEPT VISIT MOD MDM: CPT | Mod: 25 | Performed by: EMERGENCY MEDICINE

## 2022-08-30 PROCEDURE — 250N000013 HC RX MED GY IP 250 OP 250 PS 637: Performed by: EMERGENCY MEDICINE

## 2022-08-30 PROCEDURE — 258N000003 HC RX IP 258 OP 636: Performed by: EMERGENCY MEDICINE

## 2022-08-30 PROCEDURE — 81001 URINALYSIS AUTO W/SCOPE: CPT | Performed by: EMERGENCY MEDICINE

## 2022-08-30 PROCEDURE — 36415 COLL VENOUS BLD VENIPUNCTURE: CPT | Performed by: EMERGENCY MEDICINE

## 2022-08-30 PROCEDURE — 76770 US EXAM ABDO BACK WALL COMP: CPT

## 2022-08-30 PROCEDURE — 80048 BASIC METABOLIC PNL TOTAL CA: CPT | Performed by: EMERGENCY MEDICINE

## 2022-08-30 PROCEDURE — 96360 HYDRATION IV INFUSION INIT: CPT | Performed by: EMERGENCY MEDICINE

## 2022-08-30 PROCEDURE — 85025 COMPLETE CBC W/AUTO DIFF WBC: CPT | Performed by: EMERGENCY MEDICINE

## 2022-08-30 RX ORDER — HYDROCODONE BITARTRATE AND ACETAMINOPHEN 5; 325 MG/1; MG/1
1 TABLET ORAL ONCE
Status: COMPLETED | OUTPATIENT
Start: 2022-08-30 | End: 2022-08-30

## 2022-08-30 RX ORDER — SODIUM CHLORIDE 9 MG/ML
INJECTION, SOLUTION INTRAVENOUS CONTINUOUS
Status: DISCONTINUED | OUTPATIENT
Start: 2022-08-30 | End: 2022-08-30 | Stop reason: HOSPADM

## 2022-08-30 RX ADMIN — SODIUM CHLORIDE 1000 ML: 9 INJECTION, SOLUTION INTRAVENOUS at 13:54

## 2022-08-30 RX ADMIN — HYDROCODONE BITARTRATE AND ACETAMINOPHEN 1 TABLET: 5; 325 TABLET ORAL at 13:53

## 2022-08-30 ASSESSMENT — ENCOUNTER SYMPTOMS
FEVER: 0
ABDOMINAL PAIN: 1
VOMITING: 0
FLANK PAIN: 1

## 2022-08-30 ASSESSMENT — ACTIVITIES OF DAILY LIVING (ADL)
ADLS_ACUITY_SCORE: 35
ADLS_ACUITY_SCORE: 35

## 2022-08-30 NOTE — ED PROVIDER NOTES
ED Provider Note  [facility] EMERGENCY DEPARTMENT ([facility])    8/30/22          History     Chief Complaint   Patient presents with     Flank Pain     The history is provided by the patient and medical records.     Emelyn Marmolejo is a 32 year old male with history of atrophic left kidney secondary to large obstructing stone s/p left nephrectomy in 2021 complicated by calcification at left UVJ, right-sided flank pain who presents to the ED for evaluation of right flank pain since last night.  Patient reports that he frequently gets right-sided flank pain but the pain became worse last night.  He reports that it now radiates to the front of his abdomen.  He reports that he has had kidney stones before.  He has not taken anything for the pain today.  No vomiting or fevers.  He also reports malodorous urine.    EXAMINATION: CT ABDOMEN PELVIS W/O CONTRAST, 5/19/2022 11:09 AM  IMPRESSION:    1. 5 mm stone in the left ureterovesical junction, unchanged compared  to 7/1/2021.  2. No radiodense stones identified in the right urinary tract. No  Hydroureteronephrosis.    EXAMINATION: US RENAL COMPLETE, 1/23/2022 9:22 PM   IMPRESSION:  1.  Unremarkable sonographic appearance of the right kidney, with  surgically absent left kidney.  2.  Minimal amount of nonspecific layering debris within the urinary  bladder. Recommend correlation with urinalysis to rule out infection.     Past Medical History  Past Medical History:   Diagnosis Date     Prostate infection      Past Surgical History:   Procedure Laterality Date     COMBINED CYSTOSCOPY, URETEROSCOPY, LASER HOLMIUM LITHOTRIPSY URETER(S) Right 8/13/2020    Procedure: CYSTOSCOPY, RIGHT RETROGRADE PYELOGRAM, RIGHT diagnostic URETEROSCOPY;  Surgeon: Aj Hedrick MD;  Location:  OR     ESOPHAGOSCOPY, GASTROSCOPY, DUODENOSCOPY (EGD), COMBINED N/A 1/25/2021    Procedure: ESOPHAGOGASTRODUODENOSCOPY, WITH BIOPSY;  Surgeon: Arden  Rip Nguyen MD;  Location: [facility] OR     LAPAROSCOPIC NEPHRECTOMY Left 2021    Procedure: LEFT NEPHRECTOMY, TOTAL, LAPAROSCOPIC;  Surgeon: Aj Hedrick MD;  Location:  OR     gabapentin (NEURONTIN) 300 MG capsule  multivitamin, therapeutic (MULTIVITAMIN) TABS tablet  tamsulosin (FLOMAX) 0.4 MG capsule  vitamin D3 (CHOLECALCIFEROL) 50 mcg (2000 units) tablet      Allergies   Allergen Reactions     Nsaids      Not true allergy. But be sparing with patient as he has single kidney      Family History  Family History   Problem Relation Age of Onset     Crohn's Disease No family hx of      Ulcerative Colitis No family hx of      GERD No family hx of      Stomach Cancer No family hx of      Social History   Social History     Tobacco Use     Smoking status: Former Smoker     Types: Cigarettes     Quit date: 2019     Years since quittin.9     Smokeless tobacco: Never Used   Vaping Use     Vaping Use: Never used   Substance Use Topics     Alcohol use: Never     Drug use: Never      Past medical history, past surgical history, medications, allergies, family history, and social history were reviewed with the patient. No additional pertinent items.       Review of Systems   Constitutional: Negative for fever.   Gastrointestinal: Positive for abdominal pain (right). Negative for vomiting.   Genitourinary: Positive for flank pain (right).   All other systems reviewed and are negative.    A complete review of systems was performed with pertinent positives and negatives noted in the HPI, and all other systems negative.    Physical Exam   BP: 109/72  Pulse: 66  Temp: 98  F (36.7  C)  Resp: 18  SpO2: 96 %  Physical Exam  Constitutional:       General: He is not in acute distress.     Appearance: He is well-developed. He is not ill-appearing, toxic-appearing or diaphoretic.   HENT:      Head: Normocephalic and atraumatic.   Cardiovascular:      Rate and Rhythm: Normal rate and regular rhythm.      Heart sounds:  Normal heart sounds.   Pulmonary:      Effort: Pulmonary effort is normal. No respiratory distress.      Breath sounds: Normal breath sounds. No wheezing.   Abdominal:      General: There is no distension.      Palpations: Abdomen is soft.      Tenderness: There is no abdominal tenderness. There is no rebound.   Musculoskeletal:      Cervical back: Normal range of motion and neck supple.   Skin:     General: Skin is warm.   Neurological:      General: No focal deficit present.      Mental Status: He is alert and oriented to person, place, and time.      Cranial Nerves: No cranial nerve deficit.      Sensory: No sensory deficit.   Psychiatric:         Mood and Affect: Mood normal.         Behavior: Behavior normal.         Thought Content: Thought content normal.         ED Course     1:17 PM  The patient was seen and examined by Carmela Patel MD in Room ED03.     Procedures       The medical record was reviewed and interpreted.  Current labs reviewed and interpreted.  Previous labs reviewed and interpreted.     Results for orders placed or performed during the hospital encounter of 08/30/22   US Renal Complete     Status: None    Narrative    ULTRASOUND RENAL COMPLETE August 30, 2022 2:10 PM     HISTORY: Right-sided flank pain.    COMPARISON: CT abdomen and pelvis 5/19/2022.    FINDINGS:   Right Kidney: 11.4 x 5.3 x 5.7 cm, cortex thickness of 1.4 cm. Mild  hydronephrosis. No visible focal renal lesion identified. Right ureter  jet identified in the bladder.    Left Kidney: Absent.    Bladder: No specific abnormality.      Impression    IMPRESSION:   1. Mild right hydronephrosis.  2. Left kidney is absent.    LUIS A RIBERA MD         SYSTEM ID:  [id number]   UA with Microscopic reflex to Culture     Status: Normal    Specimen: Urine, Midstream   Result Value Ref Range    Color Urine Straw Colorless, Straw, Light Yellow, Yellow    Appearance Urine Clear Clear    Glucose Urine Negative Negative mg/dL    Bilirubin  Urine Negative Negative    Ketones Urine Negative Negative mg/dL    Specific Gravity Urine 1.006 1.003 - 1.035    Blood Urine Negative Negative    pH Urine 6.0 5.0 - 7.0    Protein Albumin Urine Negative Negative mg/dL    Urobilinogen Urine Normal Normal, 2.0 mg/dL    Nitrite Urine Negative Negative    Leukocyte Esterase Urine Negative Negative    RBC Urine <1 <=2 /HPF    WBC Urine 0 <=5 /HPF    Narrative    Urine Culture not indicated   [place] Draw     Status: None (In process)    Narrative    The following orders were created for panel order [place] Draw.  Procedure                               Abnormality         Status                     ---------                               -----------         ------                     Extra Blue Top Tube[[id number]]                                                         Extra Red Top Tube[[id number]]                                                          Extra Green Top (Lithium...[[id number]]                      Final result               Extra Purple Top Tube[[id number]]                            Final result                 Please view results for these tests on the individual orders.   Extra Green Top (Lithium Heparin) Tube     Status: None   Result Value Ref Range    Hold Specimen 0    Extra Purple Top Tube     Status: None   Result Value Ref Range    Hold Specimen [facility]    Basic metabolic panel     Status: Normal   Result Value Ref Range    Sodium 140 133 - 144 mmol/L    Potassium 3.9 3.4 - 5.3 mmol/L    Chloride 109 94 - 109 mmol/L    Carbon Dioxide (CO2) 27 20 - 32 mmol/L    Anion Gap 4 3 - 14 mmol/L    Urea Nitrogen 13 7 - 30 mg/dL    Creatinine 1.04 0.66 - 1.25 mg/dL    Calcium 9.4 8.5 - 10.1 mg/dL    Glucose 95 70 - 99 mg/dL    GFR Estimate >90 >60 mL/min/1.73m2   CBC with platelets and differential     Status: None   Result Value Ref Range    WBC Count 4.8 4.0 - 11.0 10e3/uL    RBC Count 4.97 4.40 - 5.90 10e6/uL    Hemoglobin 14.5 13.3 - 17.7 g/dL     Hematocrit 42.6 40.0 - 53.0 %    MCV 86 78 - 100 fL    MCH 29.2 26.5 - 33.0 pg    MCHC 34.0 31.5 - 36.5 g/dL    RDW 12.2 10.0 - 15.0 %    Platelet Count 205 150 - 450 10e3/uL    % Neutrophils 40 %    % Lymphocytes 46 %    % Monocytes 9 %    % Eosinophils 4 %    % Basophils 1 %    % Immature Granulocytes 0 %    NRBCs per 100 WBC 0 <1 /100    Absolute Neutrophils 1.9 1.6 - 8.3 10e3/uL    Absolute Lymphocytes 2.2 0.8 - 5.3 10e3/uL    Absolute Monocytes 0.4 0.0 - 1.3 10e3/uL    Absolute Eosinophils 0.2 0.0 - 0.7 10e3/uL    Absolute Basophils 0.0 0.0 - 0.2 10e3/uL    Absolute Immature Granulocytes 0.0 <=0.4 10e3/uL    Absolute NRBCs 0.0 10e3/uL   CBC with platelets differential     Status: None    Narrative    The following orders were created for panel order CBC with platelets differential.  Procedure                               Abnormality         Status                     ---------                               -----------         ------                     CBC with platelets and d...[[id number]]                      Final result                 Please view results for these tests on the individual orders.     Medications   0.9% sodium chloride BOLUS (0 mLs Intravenous Stopped 8/30/22 1602)     Followed by   sodium chloride 0.9% infusion (has no administration in time range)   HYDROcodone-acetaminophen (NORCO) 5-325 MG per tablet 1 tablet (1 tablet Oral Given 8/30/22 1353)              No results found for any visits on 08/30/22.  Medications - No data to display     Assessments & Plan (with Medical Decision Making)   Patient is a 32-year-old male with a history of kidney stones on his left side with subsequent left nephrectomy presents to the ER due to right flank pain.  Patient was having some mild right pain but was nontender on exam.  Patient's abdomen is soft and nontender.  We obtained labs that showed no signs of urinary tract infection.  His creatinine was stable.  Electrolytes are stable.  Due to concern  for possible right kidney pathology we obtain a kidney ultrasound which was stable.  Patient has mild indeterminate hydronephrosis of his right kidney.  Patient with no visible stone.  No visible obstruction.  Plan will be to discharge home with outpatient urology follow-up.  Patient agreed this plan of care.  Patient stable for discharge    I have reviewed the nursing notes. I have reviewed the findings, diagnosis, plan and need for follow up with the patient.    New Prescriptions    No medications on file       Final diagnoses:   Flank pain     I, Alma Johnson, am serving as a trained medical scribe to document services personally performed by Carmela Patel MD based on the provider's statements to me on August 30, 2022.  This document has been checked and approved by the attending provider.    I, Carmela Patel MD, was physically present and have reviewed and verified the accuracy of this note documented by Alma Johnson medical scribe.      --    [facility] EMERGENCY DEPARTMENT  8/30/2022     Carmela Patel MD  08/30/22 [id number]

## 2022-08-30 NOTE — DISCHARGE INSTRUCTIONS
Your blood work is stable.     Your urine test is normal.     Your pelvic US shows no kidney stone on the right side; there is mild nonspecific swelling of your right kidney for which we recommend you follow up with urology.     Please make an appointment to follow up with Urology Clinic (phone: [phone]) in 3-5 days even if entirely better.    Return to the ER if any other problems/concerns.

## 2022-08-31 ENCOUNTER — ANESTHESIA EVENT (OUTPATIENT)
Dept: SURGERY | Facility: CLINIC | Age: 32
End: 2022-08-31
Payer: COMMERCIAL

## 2022-08-31 ENCOUNTER — OFFICE VISIT (OUTPATIENT)
Dept: SURGERY | Facility: CLINIC | Age: 32
End: 2022-08-31
Payer: COMMERCIAL

## 2022-08-31 ENCOUNTER — PRE VISIT (OUTPATIENT)
Dept: SURGERY | Facility: CLINIC | Age: 32
End: 2022-08-31

## 2022-08-31 VITALS
TEMPERATURE: 97.7 F | RESPIRATION RATE: 16 BRPM | HEIGHT: 68 IN | SYSTOLIC BLOOD PRESSURE: 107 MMHG | HEART RATE: 61 BPM | WEIGHT: 140 LBS | DIASTOLIC BLOOD PRESSURE: 73 MMHG | BODY MASS INDEX: 21.22 KG/M2 | OXYGEN SATURATION: 97 %

## 2022-08-31 DIAGNOSIS — Z01.818 PREOP EXAMINATION: Primary | ICD-10-CM

## 2022-08-31 PROCEDURE — 99204 OFFICE O/P NEW MOD 45 MIN: CPT | Performed by: NURSE PRACTITIONER

## 2022-08-31 ASSESSMENT — PAIN SCALES - GENERAL: PAINLEVEL: NO PAIN (0)

## 2022-08-31 ASSESSMENT — LIFESTYLE VARIABLES: TOBACCO_USE: 1

## 2022-08-31 NOTE — H&P
Pre-Operative H & P     CC:  Preoperative exam to assess for increased cardiopulmonary risk while undergoing surgery and anesthesia.    Date of Encounter: 8/31/2022  Primary Care Physician:  Brittny Eid     Reason for visit:   Encounter Diagnosis   Name Primary?     Preop examination Yes       HPI  Emelyn Marmolejo is a 32 year old male who presents for pre-operative H & P in preparation for  Procedure Information     Case: [id number] Date/Time: 09/07/22 1120    Procedure: CYSTOSCOPY, BILATERAL RETROGRADE PYELOGRAM, BILATERAL URETEROSCOPY WITH LASER LITHOTRIOPSY AND BASKET REMOVAL OF STONES, POSSIBLE RIGHT URETERAL STENT PLACEMENT (Bilateral Urethra)    Anesthesia type: General    Diagnosis:       Left ureteral stone [N20.1]      Right upper quadrant pain [R10.11]    Pre-op diagnosis:       Left ureteral stone [N20.1]      Right upper quadrant pain [R10.11]    Location:  OR [address] OR    Providers: Aj Hedrick MD        Emelyn Marmolejo has a past medical history significant for an atrophic left kidney due to a large obstructing stone s/p left nephrectomy and a functional solitary right kidney.  The patient follows with Dr. Hedrick in urology. The patient has a small calcification at the left UVJ that  has increased in size over the last two years based on imaging. Records indicate that this may be contriiburing to the patient's urinary frequency and urgency.   Mr. Marmolejo presents to the PAC in person today in preparation for the above procedure.      Of significance, the patient was evaluated in the emergency department yesterday for complaints of right flank pain. Through the evaluation, patient's electrolytes were stable and UA was negative for infection. He had mild right hydronephrosis on imaging.   History is obtained from the patient and chart review    Hx of abnormal bleeding or anti-platelet use: denies      Past Medical History  Past Medical History:   Diagnosis Date     Nephrolithiasis      Prostate  infection        Past Surgical History  Past Surgical History:   Procedure Laterality Date     COMBINED CYSTOSCOPY, URETEROSCOPY, LASER HOLMIUM LITHOTRIPSY URETER(S) Right 8/13/2020    Procedure: CYSTOSCOPY, RIGHT RETROGRADE PYELOGRAM, RIGHT diagnostic URETEROSCOPY;  Surgeon: Aj Hedrick MD;  Location: [facility] OR     ESOPHAGOSCOPY, GASTROSCOPY, DUODENOSCOPY (EGD), COMBINED N/A 1/25/2021    Procedure: ESOPHAGOGASTRODUODENOSCOPY, WITH BIOPSY;  Surgeon: Rip Her MD;  Location: [facility] OR     LAPAROSCOPIC NEPHRECTOMY Left 5/24/2021    Procedure: LEFT NEPHRECTOMY, TOTAL, LAPAROSCOPIC;  Surgeon: Aj Hedrick MD;  Location:  OR       Prior to Admission Medications  Current Outpatient Medications   Medication Sig Dispense Refill     gabapentin (NEURONTIN) 300 MG capsule Take 1 capsule (300 mg) by mouth nightly as needed for neuropathic pain (Patient not taking: Reported on 8/1/2022) 30 capsule 11     multivitamin, therapeutic (MULTIVITAMIN) TABS tablet Take 1 tablet by mouth daily (Patient not taking: Reported on 8/1/2022) 90 tablet 3     tamsulosin (FLOMAX) 0.4 MG capsule TAKE ONE (1) CAPSULE (0.4 MG) BY MOUTH DAILY FOR 14 DAYS (Patient not taking: Reported on 8/1/2022) 14 capsule 1     vitamin D3 (CHOLECALCIFEROL) 50 mcg (2000 units) tablet Take 1 tablet (50 mcg) by mouth daily (Patient not taking: Reported on 8/1/2022) 90 tablet 3       Allergies  Allergies   Allergen Reactions     Nsaids      Not true allergy. But be sparing with patient as he has single kidney        Social History  Social History     Socioeconomic History     Marital status: Single     Spouse name: Not on file     Number of children: Not on file     Years of education: Not on file     Highest education level: Not on file   Occupational History     Not on file   Tobacco Use     Smoking status: Former Smoker     Packs/day: 0.50     Years: 5.00     Pack years: 2.50     Types: Cigarettes     Quit date: 9/17/2019     Years since quitting:  2.9     Smokeless tobacco: Never Used   Vaping Use     Vaping Use: Never used   Substance and Sexual Activity     Alcohol use: Never     Drug use: Never     Sexual activity: Not Currently     Partners: Female     Birth control/protection: Condom   Other Topics Concern     Parent/sibling w/ CABG, MI or angioplasty before 65F 55M? Not Asked   Social History Narrative     Not on file     Social Determinants of Health     Financial Resource Strain: Not on file   Food Insecurity: Not on file   Transportation Needs: Not on file   Physical Activity: Not on file   Stress: Not on file   Social Connections: Not on file   Intimate Partner Violence: Not on file   Housing Stability: Not on file       Family History  Family History   Problem Relation Age of Onset     Crohn's Disease No family hx of      Ulcerative Colitis No family hx of      GERD No family hx of      Stomach Cancer No family hx of        Review of Systems  The complete review of systems is negative other than noted in the HPI or here.   Anesthesia Evaluation   Pt has had prior anesthetic. Type: General and MAC.    No history of anesthetic complications       ROS/MED HX  ENT/Pulmonary:     (+) tobacco use (Quit 2019), Past use,     Neurologic:  - neg neurologic ROS     Cardiovascular: Comment: Chest pain with exertion for about a year. Denies shortness of breath or light headedness.  Does not wake him up at night.  When he manipulates his chest area in the clinic, he can reproduce the pain.     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    Hematologic:  - neg hematologic  ROS     Musculoskeletal: Comment: Known right thoracic pain       GI/Hepatic:  - neg GI/hepatic ROS     Renal/Genitourinary: Comment: Atrophy of left kidney s/p nephrectomy, 2021.  "Seen in ED yesterday for RUQ pain.  Mild right hydronephrosis.  Continues to have mild right upper quadrant pain referred laterally.     No NSAIDs given solitary right kidney.     (+) renal disease,     Endo:  - neg endo ROS     Psychiatric/Substance Use:  - neg psychiatric ROS     Infectious Disease: Comment: COVID (+) July 2022:  Symptoms for two days fever and low back pain, loss of taste and smell.  Denies any residual symptoms.       Malignancy:  - neg malignancy ROS     Other:  - neg other ROS          /73 (BP Location: Right arm, Patient Position: Chair, Cuff Size: Adult Regular)   Pulse 61   Temp 97.7  F (36.5  C) (Oral)   Resp 16   Ht 1.727 m (5' 8\")   Wt 63.5 kg (140 lb)   SpO2 97%   BMI 21.29 kg/m      Physical Exam   Constitutional: Awake, alert, cooperative, no apparent distress, and appears stated age.  Eyes: Pupils equal, round and reactive to light, extra ocular muscles intact, sclera clear, conjunctiva normal.  HENT: Normocephalic, oral pharynx with moist mucus membranes,poor dentition with evidence of chipped and missing teeth. No goiter appreciated.   Respiratory: Clear to auscultation bilaterally, no crackles or wheezing.  Cardiovascular: Regular rate and rhythm, normal S1 and S2, and no murmur noted.  Carotids +2, no bruits. No edema. Palpable pulses to radial  DP and PT arteries.   GI: Normal bowel sounds, soft, non-distended, non-tender, no masses palpated, no hepatosplenomegaly.    Lymph/Hematologic: No cervical lymphadenopathy and no supraclavicular lymphadenopathy.  Genitourinary:  deferred  Skin: Warm and dry.  No rashes at anticipated surgical site.   Musculoskeletal: Full ROM of neck. There is no redness, warmth, or swelling of the joints. Gross motor strength is normal.  Pain in the proximal intercostals and sternal area with palpation.  Neurologic: Awake, alert, oriented to name, place and time. Cranial nerves II-XII are grossly intact. Gait is normal.   Neuropsychiatric: " Calm, cooperative. Normal affect.     Prior Labs/Diagnostic Studies   All labs and imaging personally reviewed   Lab Results   Component Value Date    WBC 4.8 08/30/2022    WBC 6.4 07/01/2021     Lab Results   Component Value Date    RBC 4.97 08/30/2022    RBC 5.18 07/01/2021     Lab Results   Component Value Date    HGB 14.5 08/30/2022    HGB 15.3 07/01/2021     Lab Results   Component Value Date    HCT 42.6 08/30/2022    HCT 44.7 07/01/2021     Lab Results   Component Value Date    MCV 86 08/30/2022    MCV 86 07/01/2021     Lab Results   Component Value Date    MCH 29.2 08/30/2022    MCH 29.5 07/01/2021     Lab Results   Component Value Date    MCHC 34.0 08/30/2022    MCHC 34.2 07/01/2021     Lab Results   Component Value Date    RDW 12.2 08/30/2022    RDW 12.2 07/01/2021     Lab Results   Component Value Date     08/30/2022     07/01/2021       Last Comprehensive Metabolic Panel:  Sodium   Date Value Ref Range Status   08/30/2022 140 133 - 144 mmol/L Final   07/01/2021 136 133 - 144 mmol/L Final     Potassium   Date Value Ref Range Status   08/30/2022 3.9 3.4 - 5.3 mmol/L Final   07/01/2021 4.0 3.4 - 5.3 mmol/L Final     Comment:     Specimen slightly hemolyzed, potassium may be falsely elevated     Chloride   Date Value Ref Range Status   08/30/2022 109 94 - 109 mmol/L Final   07/01/2021 102 94 - 109 mmol/L Final     Carbon Dioxide   Date Value Ref Range Status   07/01/2021 25 20 - 32 mmol/L Final     Carbon Dioxide (CO2)   Date Value Ref Range Status   08/30/2022 27 20 - 32 mmol/L Final     Anion Gap   Date Value Ref Range Status   08/30/2022 4 3 - 14 mmol/L Final   07/01/2021 9 3 - 14 mmol/L Final     Glucose   Date Value Ref Range Status   08/30/2022 95 70 - 99 mg/dL Final   07/01/2021 83 70 - 99 mg/dL Final     Urea Nitrogen   Date Value Ref Range Status   08/30/2022 13 7 - 30 mg/dL Final   07/01/2021 13 7 - 30 mg/dL Final     Creatinine   Date Value Ref Range Status   08/30/2022 1.04 0.66 -  1.25 mg/dL Final   07/01/2021 1.07 0.66 - 1.25 mg/dL Final     GFR Estimate   Date Value Ref Range Status   08/30/2022 >90 >60 mL/min/1.73m2 Final     Comment:     Effective December 21, 2021 eGFRcr in adults is calculated using the 2021 CKD-EPI creatinine equation which includes age and gender (Emilia et al., [employer] [facility], DOI: 10.1056/NTZFax1102475)   07/01/2021 >90 >60 mL/min/[1.73_m2] Final     Comment:     Non  GFR Calc  Starting 12/18/2018, serum creatinine based estimated GFR (eGFR) will be   calculated using the Chronic Kidney Disease Epidemiology Collaboration   (CKD-EPI) equation.       Calcium   Date Value Ref Range Status   08/30/2022 9.4 8.5 - 10.1 mg/dL Final   07/01/2021 9.6 8.5 - 10.1 mg/dL Final       UA RESULTS:  Recent Labs   Lab Test 08/30/22  1246 01/23/22  1537 12/07/21  1228   COLOR Straw   < > Yellow   APPEARANCE Clear   < > Clear   URINEGLC Negative   < > Negative   URINEBILI Negative   < > Negative   URINEKETONE Negative   < > Negative   SG 1.006   < > 1.015   UBLD Negative   < > Trace*   URINEPH 6.0   < > 6.5   PROTEIN Negative   < > Negative   UROBILINOGEN  --   --  0.2   NITRITE Negative   < > Negative   LEUKEST Negative   < > Negative   RBCU <1   < > 0-2   WBCU 0   < > None Seen    < > = values in this interval not displayed.     Procedures  ULTRASOUND RENAL COMPLETE August 30, 2022 2:10 PM                             IMPRESSION:   1. Mild right hydronephrosis.  2. Left kidney is absent.     LUIS A RIBERA MD   CT ABDOMEN PELVIS W/O CONTRAST, 5/19/2022                                                                IMPRESSION:    1. 5 mm stone in the left ureterovesical junction, unchanged compared  to 7/1/2021.  2. No radiodense stones identified in the right urinary tract. No  hydroureteronephrosis.     I have personally reviewed the examination and initial interpretation  and I agree with the findings.     CAREN COHEN MD     Stress echo bike 2020    Interpretation  Summary  Normal exercise echocardiogram without evidence of inducible ischemia.  Target heart rate was achieved. Heart rate and blood pressure response to  exercise were normal. Normal LV function and wall motion at rest. With stress,  the left ventricular ejection fraction increased from 55-60% to greater than  65% and the left ventricular size decreased appropriately. No regional wall  motion abnormality with stress.  Poor functional capacity. No subjective symptoms to suggest ischemia.  There was no ECG evidence of ischemia.  No significant valve disease on screening doppler evaluation. The aortic root  and visualized ascending aorta are normal.    ECG 2020  Normal sinus rhythm   Nonspecific T wave abnormality   Abnormal ECG     PFT Latest Ref Rng & Units 12/15/2020   FVC L 3.85   FEV1 L 3.26   FVC% % 78   FEV1% % 80         The patient's records and results personally reviewed by this provider.     Outside records reviewed from: Care Everywhere    LAB/DIAGNOSTIC STUDIES TODAY:  Not indicated as seen in  ED yesterday, 8/30/2022.    Assessment  Emelyn Marmolejo is a 32 year old male seen as a PAC referral for risk assessment and optimization for anesthesia.    Plan/Recommendations  Pt will be optimized for the proposed procedure.  See below for details on the assessment, risk, and preoperative recommendations    NEUROLOGY  - No history of TIA, CVA or seizure  -Post Op delirium risk factors:  No risk identified    ENT  - No current airway concerns.  Will need to be reassessed day of surgery.  Mallampati: III  TM: > 3    CARDIAC  - Denies cardiac history with cardiac testing above  - Describes non-specific chest pain in the proximal intercostal area and sternum that he is able to reproduce upon palpation. This has unchanged over the past year.  Good exercise tolerance.  Denies associated symptoms such dyspnea, LE edema or palpitations. Appears to be musculoskeletal.   - METS (Metabolic Equivalents)>4 without  "symptoms  - RCRI-Very low risk: Class 1 0.4% complication rate            Total Score: 0        PULMONARY  FERNANDA Low Risk            Total Score: 1    FERNANDA: Male      - Denies asthma or inhaler use  - Tobacco History   History   Smoking Status     Former Smoker     Packs/day: 0.50     Years: 5.00     Types: Cigarettes     Quit date: 9/17/2019   Smokeless Tobacco     Never Used       GI  - denies GERD  PONV Low Risk  Total Score: 1           1 AN PONV: Patient is not a current smoker        /RENAL    Left ureteral stone and RUQ pain.  Evaluated in ED yesterday with mild right hydronephrosis, negative UA and creatinine normal. Messaged Dr. Hedrick re: recent ED visit.  Scheduled for above procedure.   Creatinine   Date Value Ref Range Status   08/30/2022 1.04 0.66 - 1.25 mg/dL Final   07/01/2021 1.07 0.66 - 1.25 mg/dL Final          ENDOCRINE    - BMI: Estimated body mass index is 21.29 kg/m  as calculated from the following:    Height as of this encounter: 1.727 m (5' 8\").    Weight as of this encounter: 63.5 kg (140 lb).  Healthy Weight (BMI 18.5-24.9)  - No history of Diabetes Mellitus    HEME  VTE Low Risk 0.5%            Total Score: 2    VTE: Male      - No history of abnormal bleeding or antiplatelet use.        The patient is optimized for their procedure. AVS with information on surgery time/arrival time, meds and NPO status given by nursing staff. No further diagnostic testing indicated.      On the day of service:     Prep time: 13 minutes  Visit time: 20 minutes  Documentation time: 13 minutes  Coordinating care: 4 minutes   ------------------------------------------  Total time: 50 minutes      CORRINE Mora CNP  Preoperative Assessment Center  [facility]  [facility]  Phone: [phone]  Fax: [phone]  "

## 2022-08-31 NOTE — H&P (VIEW-ONLY)
Pre-Operative H & P     CC:  Preoperative exam to assess for increased cardiopulmonary risk while undergoing surgery and anesthesia.    Date of Encounter: 8/31/2022  Primary Care Physician:  Brittny Eid     Reason for visit:   Encounter Diagnosis   Name Primary?     Preop examination Yes       HPI  Emelyn Marmolejo is a 32 year old male who presents for pre-operative H & P in preparation for  Procedure Information     Case: [id number] Date/Time: 09/07/22 1120    Procedure: CYSTOSCOPY, BILATERAL RETROGRADE PYELOGRAM, BILATERAL URETEROSCOPY WITH LASER LITHOTRIOPSY AND BASKET REMOVAL OF STONES, POSSIBLE RIGHT URETERAL STENT PLACEMENT (Bilateral Urethra)    Anesthesia type: General    Diagnosis:       Left ureteral stone [N20.1]      Right upper quadrant pain [R10.11]    Pre-op diagnosis:       Left ureteral stone [N20.1]      Right upper quadrant pain [R10.11]    Location:  OR [address] OR    Providers: Aj Hedrick MD        Emelyn Marmolejo has a past medical history significant for an atrophic left kidney due to a large obstructing stone s/p left nephrectomy and a functional solitary right kidney.  The patient follows with Dr. Hedrick in urology. The patient has a small calcification at the left UVJ that  has increased in size over the last two years based on imaging. Records indicate that this may be contriiburing to the patient's urinary frequency and urgency.   Mr. Marmolejo presents to the PAC in person today in preparation for the above procedure.      Of significance, the patient was evaluated in the emergency department yesterday for complaints of right flank pain. Through the evaluation, patient's electrolytes were stable and UA was negative for infection. He had mild right hydronephrosis on imaging.   History is obtained from the patient and chart review    Hx of abnormal bleeding or anti-platelet use: denies      Past Medical History  Past Medical History:   Diagnosis Date     Nephrolithiasis      Prostate  infection        Past Surgical History  Past Surgical History:   Procedure Laterality Date     COMBINED CYSTOSCOPY, URETEROSCOPY, LASER HOLMIUM LITHOTRIPSY URETER(S) Right 8/13/2020    Procedure: CYSTOSCOPY, RIGHT RETROGRADE PYELOGRAM, RIGHT diagnostic URETEROSCOPY;  Surgeon: Aj Hedrick MD;  Location: [facility] OR     ESOPHAGOSCOPY, GASTROSCOPY, DUODENOSCOPY (EGD), COMBINED N/A 1/25/2021    Procedure: ESOPHAGOGASTRODUODENOSCOPY, WITH BIOPSY;  Surgeon: Rip Her MD;  Location: [facility] OR     LAPAROSCOPIC NEPHRECTOMY Left 5/24/2021    Procedure: LEFT NEPHRECTOMY, TOTAL, LAPAROSCOPIC;  Surgeon: Aj Hedrick MD;  Location:  OR       Prior to Admission Medications  Current Outpatient Medications   Medication Sig Dispense Refill     gabapentin (NEURONTIN) 300 MG capsule Take 1 capsule (300 mg) by mouth nightly as needed for neuropathic pain (Patient not taking: Reported on 8/1/2022) 30 capsule 11     multivitamin, therapeutic (MULTIVITAMIN) TABS tablet Take 1 tablet by mouth daily (Patient not taking: Reported on 8/1/2022) 90 tablet 3     tamsulosin (FLOMAX) 0.4 MG capsule TAKE ONE (1) CAPSULE (0.4 MG) BY MOUTH DAILY FOR 14 DAYS (Patient not taking: Reported on 8/1/2022) 14 capsule 1     vitamin D3 (CHOLECALCIFEROL) 50 mcg (2000 units) tablet Take 1 tablet (50 mcg) by mouth daily (Patient not taking: Reported on 8/1/2022) 90 tablet 3       Allergies  Allergies   Allergen Reactions     Nsaids      Not true allergy. But be sparing with patient as he has single kidney        Social History  Social History     Socioeconomic History     Marital status: Single     Spouse name: Not on file     Number of children: Not on file     Years of education: Not on file     Highest education level: Not on file   Occupational History     Not on file   Tobacco Use     Smoking status: Former Smoker     Packs/day: 0.50     Years: 5.00     Pack years: 2.50     Types: Cigarettes     Quit date: 9/17/2019     Years since quitting:  2.9     Smokeless tobacco: Never Used   Vaping Use     Vaping Use: Never used   Substance and Sexual Activity     Alcohol use: Never     Drug use: Never     Sexual activity: Not Currently     Partners: Female     Birth control/protection: Condom   Other Topics Concern     Parent/sibling w/ CABG, MI or angioplasty before 65F 55M? Not Asked   Social History Narrative     Not on file     Social Determinants of Health     Financial Resource Strain: Not on file   Food Insecurity: Not on file   Transportation Needs: Not on file   Physical Activity: Not on file   Stress: Not on file   Social Connections: Not on file   Intimate Partner Violence: Not on file   Housing Stability: Not on file       Family History  Family History   Problem Relation Age of Onset     Crohn's Disease No family hx of      Ulcerative Colitis No family hx of      GERD No family hx of      Stomach Cancer No family hx of        Review of Systems  The complete review of systems is negative other than noted in the HPI or here.   Anesthesia Evaluation   Pt has had prior anesthetic. Type: General and MAC.    No history of anesthetic complications       ROS/MED HX  ENT/Pulmonary:     (+) tobacco use (Quit 2019), Past use,     Neurologic:  - neg neurologic ROS     Cardiovascular: Comment: Chest pain with exertion for about a year. Denies shortness of breath or light headedness.  Does not wake him up at night.  When he manipulates his chest area in the clinic, he can reproduce the pain.     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    Hematologic:  - neg hematologic  ROS     Musculoskeletal: Comment: Known right thoracic pain       GI/Hepatic:  - neg GI/hepatic ROS     Renal/Genitourinary: Comment: Atrophy of left kidney s/p nephrectomy, 2021.  "Seen in ED yesterday for RUQ pain.  Mild right hydronephrosis.  Continues to have mild right upper quadrant pain referred laterally.     No NSAIDs given solitary right kidney.     (+) renal disease,     Endo:  - neg endo ROS     Psychiatric/Substance Use:  - neg psychiatric ROS     Infectious Disease: Comment: COVID (+) July 2022:  Symptoms for two days fever and low back pain, loss of taste and smell.  Denies any residual symptoms.       Malignancy:  - neg malignancy ROS     Other:  - neg other ROS          /73 (BP Location: Right arm, Patient Position: Chair, Cuff Size: Adult Regular)   Pulse 61   Temp 97.7  F (36.5  C) (Oral)   Resp 16   Ht 1.727 m (5' 8\")   Wt 63.5 kg (140 lb)   SpO2 97%   BMI 21.29 kg/m      Physical Exam   Constitutional: Awake, alert, cooperative, no apparent distress, and appears stated age.  Eyes: Pupils equal, round and reactive to light, extra ocular muscles intact, sclera clear, conjunctiva normal.  HENT: Normocephalic, oral pharynx with moist mucus membranes,poor dentition with evidence of chipped and missing teeth. No goiter appreciated.   Respiratory: Clear to auscultation bilaterally, no crackles or wheezing.  Cardiovascular: Regular rate and rhythm, normal S1 and S2, and no murmur noted.  Carotids +2, no bruits. No edema. Palpable pulses to radial  DP and PT arteries.   GI: Normal bowel sounds, soft, non-distended, non-tender, no masses palpated, no hepatosplenomegaly.    Lymph/Hematologic: No cervical lymphadenopathy and no supraclavicular lymphadenopathy.  Genitourinary:  deferred  Skin: Warm and dry.  No rashes at anticipated surgical site.   Musculoskeletal: Full ROM of neck. There is no redness, warmth, or swelling of the joints. Gross motor strength is normal.  Pain in the proximal intercostals and sternal area with palpation.  Neurologic: Awake, alert, oriented to name, place and time. Cranial nerves II-XII are grossly intact. Gait is normal.   Neuropsychiatric: " Calm, cooperative. Normal affect.     Prior Labs/Diagnostic Studies   All labs and imaging personally reviewed   Lab Results   Component Value Date    WBC 4.8 08/30/2022    WBC 6.4 07/01/2021     Lab Results   Component Value Date    RBC 4.97 08/30/2022    RBC 5.18 07/01/2021     Lab Results   Component Value Date    HGB 14.5 08/30/2022    HGB 15.3 07/01/2021     Lab Results   Component Value Date    HCT 42.6 08/30/2022    HCT 44.7 07/01/2021     Lab Results   Component Value Date    MCV 86 08/30/2022    MCV 86 07/01/2021     Lab Results   Component Value Date    MCH 29.2 08/30/2022    MCH 29.5 07/01/2021     Lab Results   Component Value Date    MCHC 34.0 08/30/2022    MCHC 34.2 07/01/2021     Lab Results   Component Value Date    RDW 12.2 08/30/2022    RDW 12.2 07/01/2021     Lab Results   Component Value Date     08/30/2022     07/01/2021       Last Comprehensive Metabolic Panel:  Sodium   Date Value Ref Range Status   08/30/2022 140 133 - 144 mmol/L Final   07/01/2021 136 133 - 144 mmol/L Final     Potassium   Date Value Ref Range Status   08/30/2022 3.9 3.4 - 5.3 mmol/L Final   07/01/2021 4.0 3.4 - 5.3 mmol/L Final     Comment:     Specimen slightly hemolyzed, potassium may be falsely elevated     Chloride   Date Value Ref Range Status   08/30/2022 109 94 - 109 mmol/L Final   07/01/2021 102 94 - 109 mmol/L Final     Carbon Dioxide   Date Value Ref Range Status   07/01/2021 25 20 - 32 mmol/L Final     Carbon Dioxide (CO2)   Date Value Ref Range Status   08/30/2022 27 20 - 32 mmol/L Final     Anion Gap   Date Value Ref Range Status   08/30/2022 4 3 - 14 mmol/L Final   07/01/2021 9 3 - 14 mmol/L Final     Glucose   Date Value Ref Range Status   08/30/2022 95 70 - 99 mg/dL Final   07/01/2021 83 70 - 99 mg/dL Final     Urea Nitrogen   Date Value Ref Range Status   08/30/2022 13 7 - 30 mg/dL Final   07/01/2021 13 7 - 30 mg/dL Final     Creatinine   Date Value Ref Range Status   08/30/2022 1.04 0.66 -  1.25 mg/dL Final   07/01/2021 1.07 0.66 - 1.25 mg/dL Final     GFR Estimate   Date Value Ref Range Status   08/30/2022 >90 >60 mL/min/1.73m2 Final     Comment:     Effective December 21, 2021 eGFRcr in adults is calculated using the 2021 CKD-EPI creatinine equation which includes age and gender (Emilia et al., [facility], DOI: 10.1056/DTWHtp8956918)   07/01/2021 >90 >60 mL/min/[1.73_m2] Final     Comment:     Non  GFR Calc  Starting 12/18/2018, serum creatinine based estimated GFR (eGFR) will be   calculated using the Chronic Kidney Disease Epidemiology Collaboration   (CKD-EPI) equation.       Calcium   Date Value Ref Range Status   08/30/2022 9.4 8.5 - 10.1 mg/dL Final   07/01/2021 9.6 8.5 - 10.1 mg/dL Final       UA RESULTS:  Recent Labs   Lab Test 08/30/22  1246 01/23/22  1537 12/07/21  1228   COLOR Straw   < > Yellow   APPEARANCE Clear   < > Clear   URINEGLC Negative   < > Negative   URINEBILI Negative   < > Negative   URINEKETONE Negative   < > Negative   SG 1.006   < > 1.015   UBLD Negative   < > Trace*   URINEPH 6.0   < > 6.5   PROTEIN Negative   < > Negative   UROBILINOGEN  --   --  0.2   NITRITE Negative   < > Negative   LEUKEST Negative   < > Negative   RBCU <1   < > 0-2   WBCU 0   < > None Seen    < > = values in this interval not displayed.     Procedures  ULTRASOUND RENAL COMPLETE August 30, 2022 2:10 PM                             IMPRESSION:   1. Mild right hydronephrosis.  2. Left kidney is absent.     LUIS A RIBERA MD   CT ABDOMEN PELVIS W/O CONTRAST, 5/19/2022                                                                IMPRESSION:    1. 5 mm stone in the left ureterovesical junction, unchanged compared  to 7/1/2021.  2. No radiodense stones identified in the right urinary tract. No  hydroureteronephrosis.     I have personally reviewed the examination and initial interpretation  and I agree with the findings.     CAREN COHEN MD     Stress echo bike 2020    Interpretation  Summary  Normal exercise echocardiogram without evidence of inducible ischemia.  Target heart rate was achieved. Heart rate and blood pressure response to  exercise were normal. Normal LV function and wall motion at rest. With stress,  the left ventricular ejection fraction increased from 55-60% to greater than  65% and the left ventricular size decreased appropriately. No regional wall  motion abnormality with stress.  Poor functional capacity. No subjective symptoms to suggest ischemia.  There was no ECG evidence of ischemia.  No significant valve disease on screening doppler evaluation. The aortic root  and visualized ascending aorta are normal.    ECG 2020  Normal sinus rhythm   Nonspecific T wave abnormality   Abnormal ECG     PFT Latest Ref Rng & Units 12/15/2020   FVC L 3.85   FEV1 L 3.26   FVC% % 78   FEV1% % 80         The patient's records and results personally reviewed by this provider.     Outside records reviewed from: Care Everywhere    LAB/DIAGNOSTIC STUDIES TODAY:  Not indicated as seen in  ED yesterday, 8/30/2022.    Assessment  Emelyn Marmolejo is a 32 year old male seen as a PAC referral for risk assessment and optimization for anesthesia.    Plan/Recommendations  Pt will be optimized for the proposed procedure.  See below for details on the assessment, risk, and preoperative recommendations    NEUROLOGY  - No history of TIA, CVA or seizure  -Post Op delirium risk factors:  No risk identified    ENT  - No current airway concerns.  Will need to be reassessed day of surgery.  Mallampati: III  TM: > 3    CARDIAC  - Denies cardiac history with cardiac testing above  - Describes non-specific chest pain in the proximal intercostal area and sternum that he is able to reproduce upon palpation. This has unchanged over the past year.  Good exercise tolerance.  Denies associated symptoms such dyspnea, LE edema or palpitations. Appears to be musculoskeletal.   - METS (Metabolic Equivalents)>4 without  "symptoms  - RCRI-Very low risk: Class 1 0.4% complication rate            Total Score: 0        PULMONARY  FERNANDA Low Risk            Total Score: 1    FERNANDA: Male      - Denies asthma or inhaler use  - Tobacco History   History   Smoking Status     Former Smoker     Packs/day: 0.50     Years: 5.00     Types: Cigarettes     Quit date: 9/17/2019   Smokeless Tobacco     Never Used       GI  - denies GERD  PONV Low Risk  Total Score: 1           1 AN PONV: Patient is not a current smoker        /RENAL    Left ureteral stone and RUQ pain.  Evaluated in ED yesterday with mild right hydronephrosis, negative UA and creatinine normal. Messaged Dr. Hedrick re: recent ED visit.  Scheduled for above procedure.   Creatinine   Date Value Ref Range Status   08/30/2022 1.04 0.66 - 1.25 mg/dL Final   07/01/2021 1.07 0.66 - 1.25 mg/dL Final          ENDOCRINE    - BMI: Estimated body mass index is 21.29 kg/m  as calculated from the following:    Height as of this encounter: 1.727 m (5' 8\").    Weight as of this encounter: 63.5 kg (140 lb).  Healthy Weight (BMI 18.5-24.9)  - No history of Diabetes Mellitus    HEME  VTE Low Risk 0.5%            Total Score: 2    VTE: Male      - No history of abnormal bleeding or antiplatelet use.        The patient is optimized for their procedure. AVS with information on surgery time/arrival time, meds and NPO status given by nursing staff. No further diagnostic testing indicated.      On the day of service:     Prep time: 13 minutes  Visit time: 20 minutes  Documentation time: 13 minutes  Coordinating care: 4 minutes   ------------------------------------------  Total time: 50 minutes      CORRINE Mora CNP  Preoperative Assessment Center  [facility]  Phone: [phone]  Fax: [phone]  "

## 2022-09-06 ASSESSMENT — LIFESTYLE VARIABLES: TOBACCO_USE: 1

## 2022-09-06 NOTE — ANESTHESIA PREPROCEDURE EVALUATION
Anesthesia Pre-Procedure Evaluation    Patient: Emelyn Marmolejo   MRN: [id number] : 1990        Procedure : Procedure(s):  CYSTOSCOPY, BILATERAL RETROGRADE PYELOGRAM, BILATERAL URETEROSCOPY WITH LASER LITHOTRIOPSY AND BASKET REMOVAL OF STONES, POSSIBLE RIGHT URETERAL STENT PLACEMENT          Past Medical History:   Diagnosis Date     Nephrolithiasis      Prostate infection       Past Surgical History:   Procedure Laterality Date     COMBINED CYSTOSCOPY, URETEROSCOPY, LASER HOLMIUM LITHOTRIPSY URETER(S) Right 2020    Procedure: CYSTOSCOPY, RIGHT RETROGRADE PYELOGRAM, RIGHT diagnostic URETEROSCOPY;  Surgeon: Aj Hedrick MD;  Location: [facility] OR     ESOPHAGOSCOPY, GASTROSCOPY, DUODENOSCOPY (EGD), COMBINED N/A 2021    Procedure: ESOPHAGOGASTRODUODENOSCOPY, WITH BIOPSY;  Surgeon: Rip Her MD;  Location: [facility] OR     LAPAROSCOPIC NEPHRECTOMY Left 2021    Procedure: LEFT NEPHRECTOMY, TOTAL, LAPAROSCOPIC;  Surgeon: Aj Hedrick MD;  Location: [facility] OR      Allergies   Allergen Reactions     Nsaids      Not true allergy. But be sparing with patient as he has single kidney       Social History     Tobacco Use     Smoking status: Former Smoker     Packs/day: 0.50     Years: 5.00     Pack years: 2.50     Types: Cigarettes     Quit date: 2019     Years since quittin.9     Smokeless tobacco: Never Used   Substance Use Topics     Alcohol use: Never      Wt Readings from Last 1 Encounters:   22 63.5 kg (140 lb)        Anesthesia Evaluation   Pt has had prior anesthetic.     No history of anesthetic complications       ROS/MED HX  ENT/Pulmonary:     (+) tobacco use, Past use,     Neurologic:  - neg neurologic ROS     Cardiovascular: Comment:  stress echo  Interpretation Summary  Normal exercise echocardiogram without evidence of inducible ischemia.  Target heart rate was achieved. Heart rate and blood pressure response to  exercise were normal. Normal LV function and wall  motion at rest. With stress,  the left ventricular ejection fraction increased from 55-60% to greater than  65% and the left ventricular size decreased appropriately. No regional wall  motion abnormality with stress.  Poor functional capacity. No subjective symptoms to suggest ischemia.  There was no ECG evidence of ischemia.  No significant valve disease on screening doppler evaluation. The aortic root  and visualized ascending aorta are normal.    H/o musculoskeletal chest pain      METS/Exercise Tolerance: 4 - Raking leaves, gardening    Hematologic:  - neg hematologic  ROS     Musculoskeletal:  - neg musculoskeletal ROS     GI/Hepatic:     (+) GERD,     Renal/Genitourinary: Comment: H/o LAPAROSCOPIC NEPHRECTOMY    5/22 CT  IMPRESSION:    1. 5 mm stone in the left ureterovesical junction, unchanged compared  to 7/1/2021.  2. No radiodense stones identified in the right urinary tract. No  hydroureteronephrosis.    (+) Nephrolithiasis ,     Endo:  - neg endo ROS     Psychiatric/Substance Use:  - neg psychiatric ROS     Infectious Disease: Comment: COVID (+) July 2022:  Symptoms for two days fever and low back pain, loss of taste and smell.  Denies any residual symptoms.        Malignancy:   (+) Malignancy, History of Prostate.    Other:            Physical Exam    Airway  airway exam normal      Mallampati: II   TM distance: > 3 FB   Neck ROM: full   Mouth opening: > 3 cm    Respiratory Devices and Support         Dental  no notable dental history         Cardiovascular   cardiovascular exam normal          Pulmonary   pulmonary exam normal                OUTSIDE LABS:  CBC:   Lab Results   Component Value Date    WBC 4.8 08/30/2022    WBC 7.4 08/04/2022    HGB 14.5 08/30/2022    HGB 15.0 08/04/2022    HCT 42.6 08/30/2022    HCT 44.5 08/04/2022     08/30/2022     08/04/2022     BMP:   Lab Results   Component Value Date     08/30/2022     08/04/2022    POTASSIUM 3.9 08/30/2022    POTASSIUM  3.9 08/04/2022    CHLORIDE 109 08/30/2022    CHLORIDE 105 08/04/2022    CO2 27 08/30/2022    CO2 29 08/04/2022    BUN 13 08/30/2022    BUN 21 08/04/2022    CR 1.04 08/30/2022    CR 1.16 08/04/2022    GLC 95 08/30/2022    GLC 93 08/04/2022     COAGS: No results found for: PTT, INR, FIBR  POC:   Lab Results   Component Value Date     (H) 05/25/2021     HEPATIC:   Lab Results   Component Value Date    ALBUMIN 4.1 08/04/2022    PROTTOTAL 8.1 08/04/2022    ALT 21 08/04/2022    AST 20 08/04/2022    ALKPHOS 86 08/04/2022    BILITOTAL <0.1 (L) 08/04/2022     OTHER:   Lab Results   Component Value Date    A1C 5.3 04/11/2022    DARRELL 9.4 08/30/2022    LIPASE 117 07/01/2021    TSH 1.61 06/17/2021    CRP 8.2 (H) 05/15/2020       Anesthesia Plan    ASA Status:  2      Anesthesia Type: General.     - Airway: ETT   Induction: Intravenous.   Maintenance: Balanced.   Techniques and Equipment:     - Airway: Video-Laryngoscope         Consents    Anesthesia Plan(s) and associated risks, benefits, and realistic alternatives discussed. Questions answered and patient/representative(s) expressed understanding.    - Discussed:     - Discussed with:  Patient         Postoperative Care    Pain management: IV analgesics, Multi-modal analgesia.   PONV prophylaxis: Ondansetron (or other 5HT-3), Dexamethasone or Solumedrol     Comments:                Maulik Donohue MD

## 2022-09-07 ENCOUNTER — ANESTHESIA (OUTPATIENT)
Dept: SURGERY | Facility: CLINIC | Age: 32
End: 2022-09-07
Payer: COMMERCIAL

## 2022-09-07 ENCOUNTER — APPOINTMENT (OUTPATIENT)
Dept: GENERAL RADIOLOGY | Facility: CLINIC | Age: 32
End: 2022-09-07
Attending: UROLOGY
Payer: COMMERCIAL

## 2022-09-07 ENCOUNTER — HOSPITAL ENCOUNTER (OUTPATIENT)
Facility: CLINIC | Age: 32
Discharge: HOME | End: 2022-09-07
Attending: UROLOGY | Admitting: UROLOGY
Payer: COMMERCIAL

## 2022-09-07 VITALS
WEIGHT: 138.1 LBS | HEIGHT: 70 IN | TEMPERATURE: 97.8 F | BODY MASS INDEX: 19.77 KG/M2 | DIASTOLIC BLOOD PRESSURE: 68 MMHG | SYSTOLIC BLOOD PRESSURE: 114 MMHG | HEART RATE: 58 BPM | RESPIRATION RATE: 16 BRPM | OXYGEN SATURATION: 99 %

## 2022-09-07 DIAGNOSIS — N20.1 LEFT URETERAL STONE: Primary | ICD-10-CM

## 2022-09-07 DIAGNOSIS — R10.9 RIGHT FLANK PAIN: ICD-10-CM

## 2022-09-07 PROCEDURE — 250N000009 HC RX 250: Performed by: NURSE ANESTHETIST, CERTIFIED REGISTERED

## 2022-09-07 PROCEDURE — 250N000011 HC RX IP 250 OP 636: Performed by: NURSE ANESTHETIST, CERTIFIED REGISTERED

## 2022-09-07 PROCEDURE — 250N000025 HC SEVOFLURANE, PER MIN: Performed by: UROLOGY

## 2022-09-07 PROCEDURE — 710N000012 HC RECOVERY PHASE 2, PER MINUTE: Performed by: UROLOGY

## 2022-09-07 PROCEDURE — 258N000003 HC RX IP 258 OP 636: Performed by: NURSE ANESTHETIST, CERTIFIED REGISTERED

## 2022-09-07 PROCEDURE — 82365 CALCULUS SPECTROSCOPY: CPT | Performed by: UROLOGY

## 2022-09-07 PROCEDURE — 258N000003 HC RX IP 258 OP 636: Performed by: ANESTHESIOLOGY

## 2022-09-07 PROCEDURE — 74420 UROGRAPHY RTRGR +-KUB: CPT | Mod: 26 | Performed by: UROLOGY

## 2022-09-07 PROCEDURE — 370N000017 HC ANESTHESIA TECHNICAL FEE, PER MIN: Performed by: UROLOGY

## 2022-09-07 PROCEDURE — 272N000001 HC OR GENERAL SUPPLY STERILE: Performed by: UROLOGY

## 2022-09-07 PROCEDURE — 710N000009 HC RECOVERY PHASE 1, LEVEL 1, PER MIN: Performed by: UROLOGY

## 2022-09-07 PROCEDURE — 360N000076 HC SURGERY LEVEL 3, PER MIN: Performed by: UROLOGY

## 2022-09-07 PROCEDURE — 258N000001 HC RX 258: Performed by: UROLOGY

## 2022-09-07 PROCEDURE — 52352 CYSTOURETERO W/STONE REMOVE: CPT | Mod: LT | Performed by: UROLOGY

## 2022-09-07 PROCEDURE — 999N000179 XR SURGERY CARM FLUORO LESS THAN 5 MIN W STILLS: Mod: TC

## 2022-09-07 PROCEDURE — 999N000141 HC STATISTIC PRE-PROCEDURE NURSING ASSESSMENT: Performed by: UROLOGY

## 2022-09-07 PROCEDURE — 255N000002 HC RX 255 OP 636: Performed by: UROLOGY

## 2022-09-07 PROCEDURE — 250N000011 HC RX IP 250 OP 636: Performed by: UROLOGY

## 2022-09-07 PROCEDURE — C1769 GUIDE WIRE: HCPCS | Performed by: UROLOGY

## 2022-09-07 RX ORDER — SODIUM CHLORIDE, SODIUM LACTATE, POTASSIUM CHLORIDE, CALCIUM CHLORIDE 600; 310; 30; 20 MG/100ML; MG/100ML; MG/100ML; MG/100ML
INJECTION, SOLUTION INTRAVENOUS CONTINUOUS
Status: DISCONTINUED | OUTPATIENT
Start: 2022-09-07 | End: 2022-09-07 | Stop reason: HOSPADM

## 2022-09-07 RX ORDER — ONDANSETRON 4 MG/1
4 TABLET, ORALLY DISINTEGRATING ORAL EVERY 30 MIN PRN
Status: DISCONTINUED | OUTPATIENT
Start: 2022-09-07 | End: 2022-09-07 | Stop reason: HOSPADM

## 2022-09-07 RX ORDER — FENTANYL CITRATE 0.05 MG/ML
25 INJECTION, SOLUTION INTRAMUSCULAR; INTRAVENOUS EVERY 5 MIN PRN
Status: DISCONTINUED | OUTPATIENT
Start: 2022-09-07 | End: 2022-09-07 | Stop reason: HOSPADM

## 2022-09-07 RX ORDER — FENTANYL CITRATE 50 UG/ML
INJECTION, SOLUTION INTRAMUSCULAR; INTRAVENOUS PRN
Status: DISCONTINUED | OUTPATIENT
Start: 2022-09-07 | End: 2022-09-07

## 2022-09-07 RX ORDER — MEPERIDINE HYDROCHLORIDE 25 MG/ML
12.5 INJECTION INTRAMUSCULAR; INTRAVENOUS; SUBCUTANEOUS
Status: DISCONTINUED | OUTPATIENT
Start: 2022-09-07 | End: 2022-09-07 | Stop reason: HOSPADM

## 2022-09-07 RX ORDER — FENTANYL CITRATE 0.05 MG/ML
25 INJECTION, SOLUTION INTRAMUSCULAR; INTRAVENOUS
Status: CANCELLED | OUTPATIENT
Start: 2022-09-07

## 2022-09-07 RX ORDER — LIDOCAINE 40 MG/G
CREAM TOPICAL
Status: DISCONTINUED | OUTPATIENT
Start: 2022-09-07 | End: 2022-09-07 | Stop reason: HOSPADM

## 2022-09-07 RX ORDER — ONDANSETRON 2 MG/ML
INJECTION INTRAMUSCULAR; INTRAVENOUS PRN
Status: DISCONTINUED | OUTPATIENT
Start: 2022-09-07 | End: 2022-09-07

## 2022-09-07 RX ORDER — DEXAMETHASONE SODIUM PHOSPHATE 4 MG/ML
INJECTION, SOLUTION INTRA-ARTICULAR; INTRALESIONAL; INTRAMUSCULAR; INTRAVENOUS; SOFT TISSUE PRN
Status: DISCONTINUED | OUTPATIENT
Start: 2022-09-07 | End: 2022-09-07

## 2022-09-07 RX ORDER — PROPOFOL 10 MG/ML
INJECTION, EMULSION INTRAVENOUS PRN
Status: DISCONTINUED | OUTPATIENT
Start: 2022-09-07 | End: 2022-09-07

## 2022-09-07 RX ORDER — LIDOCAINE HYDROCHLORIDE 20 MG/ML
INJECTION, SOLUTION INFILTRATION; PERINEURAL PRN
Status: DISCONTINUED | OUTPATIENT
Start: 2022-09-07 | End: 2022-09-07

## 2022-09-07 RX ORDER — ONDANSETRON 2 MG/ML
4 INJECTION INTRAMUSCULAR; INTRAVENOUS EVERY 30 MIN PRN
Status: DISCONTINUED | OUTPATIENT
Start: 2022-09-07 | End: 2022-09-07 | Stop reason: HOSPADM

## 2022-09-07 RX ORDER — CEFAZOLIN SODIUM/WATER 2 G/20 ML
2 SYRINGE (ML) INTRAVENOUS
Status: COMPLETED | OUTPATIENT
Start: 2022-09-07 | End: 2022-09-07

## 2022-09-07 RX ORDER — HYDROMORPHONE HCL IN WATER/PF 6 MG/30 ML
0.2 PATIENT CONTROLLED ANALGESIA SYRINGE INTRAVENOUS EVERY 5 MIN PRN
Status: DISCONTINUED | OUTPATIENT
Start: 2022-09-07 | End: 2022-09-07 | Stop reason: HOSPADM

## 2022-09-07 RX ORDER — CEFAZOLIN SODIUM/WATER 2 G/20 ML
2 SYRINGE (ML) INTRAVENOUS SEE ADMIN INSTRUCTIONS
Status: DISCONTINUED | OUTPATIENT
Start: 2022-09-07 | End: 2022-09-07 | Stop reason: HOSPADM

## 2022-09-07 RX ORDER — IOPAMIDOL 612 MG/ML
INJECTION, SOLUTION INTRAVASCULAR PRN
Status: DISCONTINUED | OUTPATIENT
Start: 2022-09-07 | End: 2022-09-07 | Stop reason: HOSPADM

## 2022-09-07 RX ORDER — PROPOFOL 10 MG/ML
INJECTION, EMULSION INTRAVENOUS CONTINUOUS PRN
Status: DISCONTINUED | OUTPATIENT
Start: 2022-09-07 | End: 2022-09-07

## 2022-09-07 RX ADMIN — MIDAZOLAM 2 MG: 1 INJECTION INTRAMUSCULAR; INTRAVENOUS at 12:29

## 2022-09-07 RX ADMIN — FENTANYL CITRATE 50 MCG: 50 INJECTION, SOLUTION INTRAMUSCULAR; INTRAVENOUS at 12:34

## 2022-09-07 RX ADMIN — DEXAMETHASONE SODIUM PHOSPHATE 4 MG: 4 INJECTION, SOLUTION INTRA-ARTICULAR; INTRALESIONAL; INTRAMUSCULAR; INTRAVENOUS; SOFT TISSUE at 12:44

## 2022-09-07 RX ADMIN — LIDOCAINE HYDROCHLORIDE 100 MG: 20 INJECTION, SOLUTION INFILTRATION; PERINEURAL at 12:34

## 2022-09-07 RX ADMIN — PHENYLEPHRINE HYDROCHLORIDE 50 MCG: 10 INJECTION INTRAVENOUS at 12:43

## 2022-09-07 RX ADMIN — Medication 2 G: at 12:26

## 2022-09-07 RX ADMIN — PROPOFOL 30 MCG/KG/MIN: 10 INJECTION, EMULSION INTRAVENOUS at 12:45

## 2022-09-07 RX ADMIN — ROCURONIUM BROMIDE 30 MG: 50 INJECTION, SOLUTION INTRAVENOUS at 12:34

## 2022-09-07 RX ADMIN — PHENYLEPHRINE HYDROCHLORIDE 100 MCG: 10 INJECTION INTRAVENOUS at 12:59

## 2022-09-07 RX ADMIN — SUGAMMADEX 200 MG: 100 INJECTION, SOLUTION INTRAVENOUS at 13:08

## 2022-09-07 RX ADMIN — PROPOFOL 200 MG: 10 INJECTION, EMULSION INTRAVENOUS at 12:34

## 2022-09-07 RX ADMIN — SODIUM CHLORIDE, POTASSIUM CHLORIDE, SODIUM LACTATE AND CALCIUM CHLORIDE: 600; 310; 30; 20 INJECTION, SOLUTION INTRAVENOUS at 12:26

## 2022-09-07 RX ADMIN — PHENYLEPHRINE HYDROCHLORIDE 100 MCG: 10 INJECTION INTRAVENOUS at 12:49

## 2022-09-07 RX ADMIN — ONDANSETRON 4 MG: 2 INJECTION INTRAMUSCULAR; INTRAVENOUS at 12:44

## 2022-09-07 ASSESSMENT — ACTIVITIES OF DAILY LIVING (ADL)
ADLS_ACUITY_SCORE: 35

## 2022-09-07 NOTE — ANESTHESIA POSTPROCEDURE EVALUATION
Patient: Emelyn Marmolejo    Procedure: Procedure(s):  CYSTOSCOPY, BILATERAL RETROGRADE PYELOGRAM, LEFT URETEROSCOPY, BASKET REMOVAL OF STONE       Anesthesia Type:  General    Note:  Disposition: Outpatient   Postop Pain Control: Uneventful            Sign Out: Well controlled pain   PONV: No   Neuro/Psych: Uneventful            Sign Out: Acceptable/Baseline neuro status   Airway/Respiratory: Uneventful            Sign Out: Acceptable/Baseline resp. status   CV/Hemodynamics: Uneventful            Sign Out: Acceptable CV status   Other NRE: NONE   DID A NON-ROUTINE EVENT OCCUR? No           Last vitals:  Vitals Value Taken Time   /77 09/07/22 1415   Temp 36.3  C (97.4  F) 09/07/22 1325   Pulse 65 09/07/22 1420   Resp 18 09/07/22 1420   SpO2 100 % 09/07/22 1418   Vitals shown include unvalidated device data.    Electronically Signed By: Maulik Donohue MD  September 7, 2022  2:53 PM

## 2022-09-07 NOTE — OP NOTE
OPERATIVE REPORT  DATE OF SURGERY: 09/07/22  LOCATION OF SURGERY: [facility]  PREOPERATIVE DIAGNOSIS:  (N20.1) Left ureteral stone  (primary encounter diagnosis)  (R10.9) Right flank pain  POSTOPERATIVE DIAGNOSIS: (N20.1) Left ureteral stone  (primary encounter diagnosis)  (R10.9) Right flank pain     START TIME: 12:46 PM  END TIME: 1:09 PM    PROCEDURE PERFORMED:   1. Cystoscopy  2. LEFT retrograde pyelogram  3. LEFT ureteroscopy with basketing of stones  4. RIGHT Retrograde Pyelogram   5. <1hr physician fluoroscopy time      STAFF SURGEON: Aj Hedrick MD  ANESTHESIA: General.   ESTIMATED BLOOD LOSS: 0 mL.   DRAINS AND TUBES: None  COMPLICATIONS: None.   DISPOSITION: PACU.   SPECIMENS OBTAINED:   ID Type Source Tests Collected by Time Destination   A : LEFT URETERAL STONE Calculus/Stone Ureter, Left STONE ANALYSIS Aj Hedrick MD 9/7/2022  1:01 PM      SIGNIFICANT FINDINGS: Cystoscopy with no evidence of stone in the bladder.  Left retrograde pyelogram with evidence of the transected ureter at the distal ureter from his prior nephrectomy.  Ureteroscopy with removal of the left distal ureteral stone.  Right retrograde pyelogram with no evidence of filling defect no evidence of hydronephrosis and good urine drainage.  No ureteral stent placed.     HISTORY OF PRESENT ILLNESS: Emelyn Marmolejo is a 32 year old man with history of bilateral nephrolithiasis with a large left kidney stone which obstructed and caused atrophy of the left kidney and he is now status post a laparoscopic left nephrectomy on 5/24/2021.  He had persistent urinary urgency and frequency and repeat CT scan showed a small distal left ureteral stone and intramural portion of the ureter.  He also has intermittent right-sided abdominal and flank pain which has been previously worked up with no evidence of obstruction, however had a recent ultrasound with mild hydronephrosis and we discussed that we will plan for a retrograde  pyelogram.    OPERATION PERFORMED:   Informed consent was obtained and the patient was brought to the operating room where general anesthesia was induced. The patient was given appropriate preoperative antibiotics and positioned supine. The patient was then repositioned in dorsal lithotomy with all pressure points padded. We then performed a timeout, verifying the correct patient's site and procedure to be performed.    A 22 Burmese cystoscope was inserted atraumatically into the bladder.  Cystoscopy was performed with no evidence of stone in the bladder.  The left ureteral orifice was identified and cannulated with a 0.035 sensor wire and a 5 Burmese open-ended catheter.  The open-ended catheter was advanced just a few centimeters into the distal ureter and the wire was removed.  A gentle retrograde pyelogram was performed with opacification of the last 5 to 6 cm of the ureter up to the point at which it had previously been transected during his left nephrectomy.  The wire was kept in place and the scope was removed.  Semirigid ureteroscope was inserted into the bladder and using Amplatz superstiff wire the ureteral orifice was cannulated using a railroad technique.  There was evidence of the 3 to 4 mm stone just within the UVJ.  This was basketed with a halo basket and removed intact.  The wire was removed.  The right ureteral orifice was then identified and cannulated with a 5 Burmese open-ended catheter and a gentle retrograde pyelogram was performed with no evidence of hydronephrosis, filling defects within the ureter, or other abnormalities.  The open-ended catheter was removed and there was good drainage from the right kidney.  No ureteral stent was placed.  He was emerged from anesthesia and taken to the PACU in stable condition.      Aj Hedrick MD   Urology  [facility]  Clinic Phone [phone]

## 2022-09-07 NOTE — ANESTHESIA PROCEDURE NOTES
Airway       Patient location during procedure: OR       Procedure Start/Stop Times: 9/7/2022 12:38 PM  Staff -        Performed By: CRNA  Consent for Airway        Urgency: elective  Indications and Patient Condition       Indications for airway management: saloni-procedural       Induction type:intravenous       Mask difficulty assessment: 2 - vent by mask + OA or adjuvant +/- NMBA    Final Airway Details       Final airway type: endotracheal airway       Successful airway: ETT - single and Oral  Endotracheal Airway Details        ETT size (mm): 8.0       Successful intubation technique: video laryngoscopy       VL Blade Size: Cobb 4       Grade View of Cords: 1       Adjucts: stylet       Position: Right       Measured from: lips       Secured at (cm): 23       Bite block used: Soft    Post intubation assessment        Placement verified by: capnometry, equal breath sounds and chest rise        Number of attempts at approach: 1       Secured with: silk tape       Ease of procedure: easy       Dentition: Intact and Unchanged    Medication(s) Administered   Medication Administration Time: 9/7/2022 12:38 PM

## 2022-09-07 NOTE — OR NURSING
Instructed to do home Covid test.   
Patient brought a picture of home covid antigen test on phone as directed.  I personally saw this result and it was time stamped 7/7/20202 @ 7531.  Result was negative.    
Pt dressed, up in recliner and transported to Phase 2.   
DC instructions

## 2022-09-07 NOTE — ANESTHESIA CARE TRANSFER NOTE
Patient: Emelyn Marmolejo    Procedure: Procedure(s):  CYSTOSCOPY, BILATERAL RETROGRADE PYELOGRAM, LEFT URETEROSCOPY, BASKET REMOVAL OF STONE       Diagnosis: Left ureteral stone [N20.1]  Right upper quadrant pain [R10.11]  Diagnosis Additional Information: No value filed.    Anesthesia Type:   General     Note:    Oropharynx: oropharynx clear of all foreign objects  Level of Consciousness: awake and drowsy  Oxygen Supplementation: face mask  Level of Supplemental Oxygen (L/min / FiO2): 6  Independent Airway: airway patency satisfactory and stable  Dentition: dentition unchanged  Vital Signs Stable: post-procedure vital signs reviewed and stable  Report to RN Given: handoff report given  Patient transferred to: PACU  Comments: To PACU: Arouses easily, good airway, 02 face mask, VSS  Report to RN  Handoff Report: Identifed the Patient, Identified the Reponsible Provider, Reviewed the pertinent medical history, Discussed the surgical course, Reviewed Intra-OP anesthesia mangement and issues during anesthesia, Set expectations for post-procedure period and Allowed opportunity for questions and acknowledgement of understanding      Vitals:  Vitals Value Taken Time   BP 95/55 09/07/22 1325   Temp     Pulse 82 09/07/22 1327   Resp 13 09/07/22 1327   SpO2 100 % 09/07/22 1327   Vitals shown include unvalidated device data.    Electronically Signed By: CORRINE Haider CRNA  September 7, 2022  1:28 PM

## 2022-09-07 NOTE — DISCHARGE INSTRUCTIONS
POSTOPERATIVE INSTRUCTIONS    Diagnosis-------------------------------   LEFT ureteral stone    Procedure-------------------------------  Procedure(s) (LRB):  CYSTOSCOPY, BILATERAL RETROGRADE PYELOGRAM, LEFT URETEROSCOPY, BASKET REMOVAL OF STONE (Bilateral)      Findings--------------------------------  Cystoscopy with no evidence of stone in the bladder.  Left retrograde pyelogram with evidence of the transected ureter at the distal ureter from his prior nephrectomy.  Ureteroscopy with removal of the left distal ureteral stone.  Right retrograde pyelogram with no evidence of filling defect no evidence of hydronephrosis and good urine drainage.  No ureteral stent placed.    Home-going instructions-----------------         Activity Limitation:     - No driving or operating heavy machinery while on narcotic pain medication.     FOLLOW THESE INSTRUCTIONS AS INDICATED BELOW:  - Observe operative area for signs of excessive bleeding.  - You may shower.  - Increase fluid intake to promote clear urine.  - Resume usual diet as tolerated    What to expect while recovering-----------  - You may experience some intermittent bleeding that makes your urine pink or cherry colored. This is normal.  - However, if you are unable to urinate, passing large amount of clots, have brittni blood in your urine, or have a temperature >101 degrees, call the urology nurse on call, or present to your nearest emergency department.  - You are encouraged to walk daily, and have no activity restrictions.     Discharge Medications/instructions:   - Take Tylenol 1000mg every 6 hours for pain      Questions/concerns------------------------  [facility]: [phone]  [facility]: [phone]    Future appointments  You may follow up as needed with Dr. Hedrick.       Aj Hedrick MD         Same Day Surgery Discharge Instructions for  Sedation and General Anesthesia     It's not unusual to feel dizzy, light-headed  or faint for up to 24 hours after surgery or while taking pain medication.  If you have these symptoms: sit for a few minutes before standing and have someone assist you when you get up to walk or use the bathroom.    You should rest and relax for the next 24 hours. We recommend you make arrangements to have an adult stay with you for at least 24 hours after your discharge.  Avoid hazardous and strenuous activity.    DO NOT DRIVE any vehicle or operate mechanical equipment for 24 hours following the end of your surgery.  Even though you may feel normal, your reactions may be affected by the medication you have received.    Do not drink alcoholic beverages for 24 hours following surgery.     Slowly progress to your regular diet as you feel able. It's not unusual to feel nauseated and/or vomit after receiving anesthesia.  If you develop these symptoms, drink clear liquids (apple juice, ginger ale, broth, 7-up, etc. ) until you feel better.  If your nausea and vomiting persists for 24 hours, please notify your surgeon.      All narcotic pain medications, along with inactivity and anesthesia, can cause constipation. Drinking plenty of liquids and increasing fiber intake will help.    For any questions of a medical nature, call your surgeon.    Do not make important decisions for 24 hours.    If you had general anesthesia, you may have a sore throat for a couple of days related to the breathing tube used during surgery.  You may use Cepacol lozenges to help with this discomfort.  If it worsens or if you develop a fever, contact your surgeon.     If you feel your pain is not well managed with the pain medications prescribed by your surgeon, please contact your surgeon's office to let them know so they can address your concerns.         ** If you have questions or concerns about your procedure,   call Dr. Hedrick at [phone] **

## 2022-09-09 LAB
APPEARANCE STONE: NORMAL
COMPN STONE: NORMAL
SPECIMEN WT: 13 MG

## 2022-09-14 NOTE — PROGRESS NOTES
Assessment & Plan     1. Need for prophylactic vaccination and inoculation against influenza  - INFLUENZA VACCINE IM > 6 MONTHS VALENT IIV4 (AFLURIA/FLUZONE)    2. Upper back pain on right side - likely musculoskeletal given chronicity. Could have some residual irritation from the cystoscopy and dye, maybe re-irritated by intercourse.   - Heat (heating pad, shower, bath)  - Icy hot/ or capsaicin cream (has at home)  - Tylenol PRN  - Physical Therapy Referral; Future    3. Atrophy of left kidney s/p nephrectomy  4. History of renal stone - recent L ureter  Described to patient his op note from recent procedure, no R hydronephrosis seen during the procedure. Pt plans to follow up with Urology.   Transferring care to closer provider, preference for male provider.   - Adult Urology  Referral; [place] - [facility]    I spent a total of 30 minutes on the day of the visit.   Time spent doing chart review, history and exam, documentation and further activities per the note    No follow-ups on file.    Liliana Ward MD  [facility] EARL Dunaway is a 32 year old, presenting for the following health issues:  Pain (Complains of right sided flank and groin pain. Pain started having pain on his right side after having kidney stone removed on left side. Pain when sitting and driving. ) and Imm/Inj (Flu Shot)      HPI       Follow up back pain and kidney stones  9/7 had cystoscopy, and left ureteroscopy & basket removal of stone.  Had R sided pain, which improved after the procedure, and then 3 days later hehad sex, and the pain returned. Still having right mid-back pain,   Back pain has been ongoing for a long time, but fluctuates. Since he has single kidney, and history of stones, he is always very concerned it is a stone.   Has followed with Dr. Hedrick ([facility]) previously, interested in finding a Urologist that is closer to [facility].       9/7/22 Op note  "excerpt:  HISTORY OF PRESENT ILLNESS: Emelyn Marmolejo is a 32 year old man with history of bilateral nephrolithiasis with a large left kidney stone which obstructed and caused atrophy of the left kidney and he is now status post a laparoscopic left nephrectomy on 5/24/2021.  He had persistent urinary urgency and frequency and repeat CT scan showed a small distal left ureteral stone and intramural portion of the ureter.  He also has intermittent right-sided abdominal and flank pain which has been previously worked up with no evidence of obstruction, however had a recent ultrasound with mild hydronephrosis and we discussed that we will plan for a retrograde pyelogram    SIGNIFICANT FINDINGS: Cystoscopy with no evidence of stone in the bladder.  Left retrograde pyelogram with evidence of the transected ureter at the distal ureter from his prior nephrectomy.  Ureteroscopy with removal of the left distal ureteral stone.  Right retrograde pyelogram with no evidence of filling defect no evidence of hydronephrosis and good urine drainage.  No ureteral stent placed.    Review of Systems         Objective    /75   Pulse 68   Temp 98.7  F (37.1  C) (Oral)   Resp 16   Ht 1.765 m (5' 9.5\")   Wt 62 kg (136 lb 9.6 oz)   SpO2 98%   BMI 19.88 kg/m    Body mass index is 19.88 kg/m .  Physical Exam  Musculoskeletal:        Back:                       "

## 2022-09-15 ENCOUNTER — OFFICE VISIT (OUTPATIENT)
Dept: FAMILY MEDICINE | Facility: CLINIC | Age: 32
End: 2022-09-15
Payer: COMMERCIAL

## 2022-09-15 VITALS
BODY MASS INDEX: 19.56 KG/M2 | HEIGHT: 70 IN | OXYGEN SATURATION: 98 % | TEMPERATURE: 98.7 F | SYSTOLIC BLOOD PRESSURE: 111 MMHG | RESPIRATION RATE: 16 BRPM | HEART RATE: 68 BPM | WEIGHT: 136.6 LBS | DIASTOLIC BLOOD PRESSURE: 75 MMHG

## 2022-09-15 DIAGNOSIS — M54.9 UPPER BACK PAIN ON RIGHT SIDE: ICD-10-CM

## 2022-09-15 DIAGNOSIS — Z23 NEED FOR PROPHYLACTIC VACCINATION AND INOCULATION AGAINST INFLUENZA: Primary | ICD-10-CM

## 2022-09-15 DIAGNOSIS — Z87.442 HISTORY OF RENAL STONE: ICD-10-CM

## 2022-09-15 DIAGNOSIS — N26.1 RENAL ATROPHY, LEFT: ICD-10-CM

## 2022-09-15 DIAGNOSIS — N20.0 RENAL STONE: ICD-10-CM

## 2022-09-15 DIAGNOSIS — N26.1 ATROPHY OF LEFT KIDNEY: ICD-10-CM

## 2022-09-15 PROCEDURE — 90686 IIV4 VACC NO PRSV 0.5 ML IM: CPT | Performed by: FAMILY MEDICINE

## 2022-09-15 PROCEDURE — 99214 OFFICE O/P EST MOD 30 MIN: CPT | Mod: 25 | Performed by: FAMILY MEDICINE

## 2022-09-15 PROCEDURE — 90471 IMMUNIZATION ADMIN: CPT | Performed by: FAMILY MEDICINE

## 2022-09-15 NOTE — PATIENT INSTRUCTIONS
Patient Education   Here is the plan from today's visit    1. Need for prophylactic vaccination and inoculation against influenza  - INFLUENZA VACCINE IM > 6 MONTHS VALENT IIV4 (AFLURIA/FLUZONE)    2. Upper back pain on right side  - Heat (heating pad, shower, bath)  - Icy hot/ or capsaicin cream (has at home)  - Tylenol PRN  - Physical Therapy Referral; Future    3. Atrophy of left kidney  4. History of renal stone  6. Renal stone  Transferring care to closer provider, preference for male provider.   - Adult Urology  Referral; Future    Thank you for coming to [facility] today.  Lab Testing:  **If you had lab testing today and your results are reassuring or normal they will be mailed to you or sent through [employer] within 7 days.   **If the lab tests need quick action we will call you with the results.  **If you are having labs done on a different day, please call [phone] to schedule at [facility] or [phone] for other [facility] locations. Labs do not offer walk-in appointments.  The phone number we will call with results is # [phone] (home) . If this is not the best number please call our clinic and change the number.  Medication Refills:  If you need any refills please call your pharmacy and they will contact us.   If you need to  your refill at a new pharmacy, please contact the new pharmacy directly. The new pharmacy will help you get your medications transferred faster.   Scheduling:  If you have any concerns about today's visit or wish to schedule another appointment please call our office during normal business hours [phone] (8-5:00 M-F)  If a referral was made to an [facility] specialty provider and you do not get a call from central scheduling, please refer to directions on your visit summary or call our office during normal business hours for assistance.   If a Mammogram was ordered for you at the Breast Center call [phone] to  schedule or change your appointment.  If you had an XRay/CT/Ultrasound/MRI ordered the number is [phone] to schedule or change your radiology appointment.   [facility] has limited ultrasound appointments available on Wednesdays, if you would like your ultrasound at [facility], please call [phone] to schedule.   Medical Concerns:  If you have urgent medical concerns please call [phone] at any time of the day.    Liliana Ward MD

## 2022-10-05 ENCOUNTER — VIRTUAL VISIT (OUTPATIENT)
Dept: UROLOGY | Facility: CLINIC | Age: 32
End: 2022-10-05
Attending: FAMILY MEDICINE
Payer: COMMERCIAL

## 2022-10-05 DIAGNOSIS — N20.0 RENAL STONE: ICD-10-CM

## 2022-10-05 DIAGNOSIS — N26.1 ATROPHY OF LEFT KIDNEY: ICD-10-CM

## 2022-10-05 DIAGNOSIS — R10.9 FLANK PAIN: Primary | ICD-10-CM

## 2022-10-05 DIAGNOSIS — N26.1 RENAL ATROPHY, LEFT: ICD-10-CM

## 2022-10-05 DIAGNOSIS — Z87.442 HISTORY OF RENAL STONE: ICD-10-CM

## 2022-10-05 PROCEDURE — 99214 OFFICE O/P EST MOD 30 MIN: CPT | Mod: 95 | Performed by: UROLOGY

## 2022-10-05 ASSESSMENT — PAIN SCALES - GENERAL: PAINLEVEL: NO PAIN (0)

## 2022-10-05 NOTE — PROGRESS NOTES
Assessment/Plan:    Assessment & Plan   Emelyn was seen today for new patient.    Diagnoses and all orders for this visit:    Flank pain        Stone Management Plan  No flowsheet data found.          PLAN    Will schedule in person clinic encounter at patients request    Phone call duration: 10 minutes  15 minutes spent on the date of the encounter doing chart review, history and exam, documentation and further activities per the note    LUIS A RHODES MD  [facility]    Subjective:     HPI  Mr. Emelyn Marmolejo is a 32 year old  male who is being evaluated via a billable telephone visit by [facility] for second opinion regarding persistent right sided pain.    Unusual history of right flank pain and dysuria status post left nephrectomy (2021) for atrophic kidney secondary to stone. Persistent dysuria has resolved after Dr Devi removed residual left UVJ stone. Major issue now is right flank pain which is worse at night, radiating from groin to flank, and is occasionally associated with headache.     Right retrograde at time of recent ureteroscopy was normal.     Recent renal ultrasound possible mild right hydronephrosis.    I offered lasix renogram to check for obstruction but he wants in person visit first. Will arrange that.    ROS   Review of systems is negative except for HPI    Past Medical History:   Diagnosis Date     Nephrolithiasis      Prostate infection        Past Surgical History:   Procedure Laterality Date     COMBINED CYSTOSCOPY, RETROGRADES, URETEROSCOPY, LASER HOLMIUM LITHOTRIPSY URETER(S), INSERT STENT Bilateral 9/7/2022    Procedure: CYSTOSCOPY, BILATERAL RETROGRADE PYELOGRAM, LEFT URETEROSCOPY, BASKET REMOVAL OF STONE;  Surgeon: Aj Hedrick MD;  Location:  OR     COMBINED CYSTOSCOPY, URETEROSCOPY, LASER HOLMIUM LITHOTRIPSY URETER(S) Right 8/13/2020    Procedure: CYSTOSCOPY, RIGHT RETROGRADE PYELOGRAM, RIGHT diagnostic  URETEROSCOPY;  Surgeon: Aj Hedrick MD;  Location: [facility] OR     ESOPHAGOSCOPY, GASTROSCOPY, DUODENOSCOPY (EGD), COMBINED N/A 1/25/2021    Procedure: ESOPHAGOGASTRODUODENOSCOPY, WITH BIOPSY;  Surgeon: Rip Her MD;  Location: [facility] OR     LAPAROSCOPIC NEPHRECTOMY Left 5/24/2021    Procedure: LEFT NEPHRECTOMY, TOTAL, LAPAROSCOPIC;  Surgeon: Aj Hedrick MD;  Location: [facility] OR       Current Outpatient Medications   Medication Sig Dispense Refill     gabapentin (NEURONTIN) 300 MG capsule Take 1 capsule (300 mg) by mouth nightly as needed for neuropathic pain 30 capsule 11     multivitamin, therapeutic (MULTIVITAMIN) TABS tablet Take 1 tablet by mouth daily 90 tablet 3     vitamin D3 (CHOLECALCIFEROL) 50 mcg (2000 units) tablet Take 1 tablet (50 mcg) by mouth daily 90 tablet 3       Allergies   Allergen Reactions     Nsaids      Not true allergy. But be sparing with patient as he has single kidney        Social History     Socioeconomic History     Marital status: Single     Spouse name: Not on file     Number of children: Not on file     Years of education: Not on file     Highest education level: Not on file   Occupational History     Not on file   Tobacco Use     Smoking status: Former Smoker     Packs/day: 0.50     Years: 5.00     Pack years: 2.50     Types: Cigarettes     Quit date: 9/17/2019     Years since quitting: 3.0     Smokeless tobacco: Never Used   Vaping Use     Vaping Use: Never used   Substance and Sexual Activity     Alcohol use: Never     Drug use: Never     Sexual activity: Not Currently     Partners: Female     Birth control/protection: Condom   Other Topics Concern     Parent/sibling w/ CABG, MI or angioplasty before 65F 55M? Not Asked   Social History Narrative     Not on file     Social Determinants of Health     Financial Resource Strain: Not on file   Food Insecurity: Not on file   Transportation Needs: Not on file   Physical Activity: Not on file   Stress: Not on file   Social  Connections: Not on file   Intimate Partner Violence: Not on file   Housing Stability: Not on file       Family History   Problem Relation Age of Onset     Crohn's Disease No family hx of      Ulcerative Colitis No family hx of      GERD No family hx of      Stomach Cancer No family hx of        Objective:     No vitals or physical exam obtained due to virtual visit    Labs  Most Recent 3 CBC's:Recent Labs   Lab Test 08/30/22  1222 08/04/22  1733 05/19/22  0918   WBC 4.8 7.4 6.8   HGB 14.5 15.0 15.1   MCV 86 86 87    206 224     Most Recent 3 BMP's:Recent Labs   Lab Test 08/30/22  1222 08/04/22  1733 05/19/22  0918    139 139   POTASSIUM 3.9 3.9 3.8   CHLORIDE 109 105 107   CO2 27 29 25   BUN 13 21 17   CR 1.04 1.16 1.02   ANIONGAP 4 5 7   DARRELL 9.4 9.2 9.4   GLC 95 93 90     Most Recent Urinalysis:Recent Labs   Lab Test 08/30/22  1246 01/23/22  1537 12/07/21  1228   COLOR Straw   < > Yellow   APPEARANCE Clear   < > Clear   URINEGLC Negative   < > Negative   URINEBILI Negative   < > Negative   URINEKETONE Negative   < > Negative   SG 1.006   < > 1.015   UBLD Negative   < > Trace*   URINEPH 6.0   < > 6.5   PROTEIN Negative   < > Negative   UROBILINOGEN  --   --  0.2   NITRITE Negative   < > Negative   LEUKEST Negative   < > Negative   RBCU <1   < > 0-2   WBCU 0   < > None Seen    < > = values in this interval not displayed.     Acute Labs   Urine Culture    Culture   Date Value Ref Range Status   08/04/2022 No Growth  Final

## 2022-10-05 NOTE — PROGRESS NOTES
Patient states that they are in [place] at the time of their visit.  Patient is aware telehealth visit is billable and agrees to proceed.  Dotty Miller RN

## 2022-11-02 ENCOUNTER — APPOINTMENT (OUTPATIENT)
Dept: CT IMAGING | Facility: CLINIC | Age: 32
End: 2022-11-02
Attending: FAMILY MEDICINE
Payer: COMMERCIAL

## 2022-11-02 ENCOUNTER — HOSPITAL ENCOUNTER (EMERGENCY)
Facility: CLINIC | Age: 32
Discharge: HOME | End: 2022-11-02
Attending: FAMILY MEDICINE | Admitting: FAMILY MEDICINE
Payer: COMMERCIAL

## 2022-11-02 VITALS
BODY MASS INDEX: 20.38 KG/M2 | DIASTOLIC BLOOD PRESSURE: 72 MMHG | TEMPERATURE: 98.3 F | HEART RATE: 62 BPM | OXYGEN SATURATION: 97 % | RESPIRATION RATE: 16 BRPM | WEIGHT: 140 LBS | SYSTOLIC BLOOD PRESSURE: 109 MMHG

## 2022-11-02 DIAGNOSIS — G47.00 INSOMNIA, UNSPECIFIED TYPE: ICD-10-CM

## 2022-11-02 DIAGNOSIS — R10.9 RIGHT FLANK PAIN: ICD-10-CM

## 2022-11-02 LAB
ALBUMIN SERPL-MCNC: 3.8 G/DL (ref 3.4–5)
ALBUMIN UR-MCNC: NEGATIVE MG/DL
ALP SERPL-CCNC: 84 U/L (ref 40–150)
ALT SERPL W P-5'-P-CCNC: 18 U/L (ref 0–70)
ANION GAP SERPL CALCULATED.3IONS-SCNC: 5 MMOL/L (ref 3–14)
APPEARANCE UR: CLEAR
AST SERPL W P-5'-P-CCNC: 15 U/L (ref 0–45)
BASOPHILS # BLD AUTO: 0.1 10E3/UL (ref 0–0.2)
BASOPHILS NFR BLD AUTO: 1 %
BILIRUB SERPL-MCNC: 0.4 MG/DL (ref 0.2–1.3)
BILIRUB UR QL STRIP: NEGATIVE
BUN SERPL-MCNC: 12 MG/DL (ref 7–30)
CALCIUM SERPL-MCNC: 9 MG/DL (ref 8.5–10.1)
CHLORIDE BLD-SCNC: 109 MMOL/L (ref 94–109)
CO2 SERPL-SCNC: 27 MMOL/L (ref 20–32)
COLOR UR AUTO: NORMAL
CREAT SERPL-MCNC: 0.98 MG/DL (ref 0.66–1.25)
EOSINOPHIL # BLD AUTO: 0.4 10E3/UL (ref 0–0.7)
EOSINOPHIL NFR BLD AUTO: 7 %
ERYTHROCYTE [DISTWIDTH] IN BLOOD BY AUTOMATED COUNT: 12.1 % (ref 10–15)
GFR SERPL CREATININE-BSD FRML MDRD: >90 ML/MIN/1.73M2
GLUCOSE BLD-MCNC: 97 MG/DL (ref 70–99)
GLUCOSE UR STRIP-MCNC: NEGATIVE MG/DL
HCT VFR BLD AUTO: 42.3 % (ref 40–53)
HGB BLD-MCNC: 14.5 G/DL (ref 13.3–17.7)
HGB UR QL STRIP: NEGATIVE
IMM GRANULOCYTES # BLD: 0 10E3/UL
IMM GRANULOCYTES NFR BLD: 0 %
KETONES UR STRIP-MCNC: NEGATIVE MG/DL
LEUKOCYTE ESTERASE UR QL STRIP: NEGATIVE
LIPASE SERPL-CCNC: 117 U/L (ref 73–393)
LYMPHOCYTES # BLD AUTO: 2.4 10E3/UL (ref 0.8–5.3)
LYMPHOCYTES NFR BLD AUTO: 44 %
MCH RBC QN AUTO: 29 PG (ref 26.5–33)
MCHC RBC AUTO-ENTMCNC: 34.3 G/DL (ref 31.5–36.5)
MCV RBC AUTO: 85 FL (ref 78–100)
MONOCYTES # BLD AUTO: 0.5 10E3/UL (ref 0–1.3)
MONOCYTES NFR BLD AUTO: 9 %
NEUTROPHILS # BLD AUTO: 2.2 10E3/UL (ref 1.6–8.3)
NEUTROPHILS NFR BLD AUTO: 39 %
NITRATE UR QL: NEGATIVE
NRBC # BLD AUTO: 0 10E3/UL
NRBC BLD AUTO-RTO: 0 /100
PH UR STRIP: 6 [PH] (ref 5–7)
PLATELET # BLD AUTO: 185 10E3/UL (ref 150–450)
POTASSIUM BLD-SCNC: 3.9 MMOL/L (ref 3.4–5.3)
PROT SERPL-MCNC: 7.4 G/DL (ref 6.8–8.8)
RBC # BLD AUTO: 5 10E6/UL (ref 4.4–5.9)
RBC URINE: <1 /HPF
SODIUM SERPL-SCNC: 141 MMOL/L (ref 133–144)
SP GR UR STRIP: 1 (ref 1–1.03)
UROBILINOGEN UR STRIP-MCNC: NORMAL MG/DL
WBC # BLD AUTO: 5.5 10E3/UL (ref 4–11)
WBC URINE: <1 /HPF

## 2022-11-02 PROCEDURE — 74176 CT ABD & PELVIS W/O CONTRAST: CPT

## 2022-11-02 PROCEDURE — 99284 EMERGENCY DEPT VISIT MOD MDM: CPT | Mod: 25

## 2022-11-02 PROCEDURE — 258N000003 HC RX IP 258 OP 636

## 2022-11-02 PROCEDURE — 250N000013 HC RX MED GY IP 250 OP 250 PS 637: Performed by: EMERGENCY MEDICINE

## 2022-11-02 PROCEDURE — 80053 COMPREHEN METABOLIC PANEL: CPT | Performed by: FAMILY MEDICINE

## 2022-11-02 PROCEDURE — 96360 HYDRATION IV INFUSION INIT: CPT

## 2022-11-02 PROCEDURE — 83690 ASSAY OF LIPASE: CPT | Performed by: FAMILY MEDICINE

## 2022-11-02 PROCEDURE — 36415 COLL VENOUS BLD VENIPUNCTURE: CPT | Performed by: FAMILY MEDICINE

## 2022-11-02 PROCEDURE — 81003 URINALYSIS AUTO W/O SCOPE: CPT | Performed by: FAMILY MEDICINE

## 2022-11-02 PROCEDURE — 99284 EMERGENCY DEPT VISIT MOD MDM: CPT | Performed by: FAMILY MEDICINE

## 2022-11-02 PROCEDURE — 85004 AUTOMATED DIFF WBC COUNT: CPT | Performed by: FAMILY MEDICINE

## 2022-11-02 RX ORDER — TRAZODONE HYDROCHLORIDE 50 MG/1
50-100 TABLET, FILM COATED ORAL
Qty: 30 TABLET | Refills: 0 | Status: SHIPPED | OUTPATIENT
Start: 2022-11-02 | End: 2023-02-21

## 2022-11-02 RX ORDER — ACETAMINOPHEN 325 MG/1
650 TABLET ORAL EVERY 4 HOURS PRN
Status: DISCONTINUED | OUTPATIENT
Start: 2022-11-02 | End: 2022-11-02 | Stop reason: HOSPADM

## 2022-11-02 RX ORDER — UBIDECARENONE 75 MG
100 CAPSULE ORAL DAILY
COMMUNITY
End: 2023-01-26

## 2022-11-02 RX ORDER — SODIUM CHLORIDE 9 MG/ML
INJECTION, SOLUTION INTRAVENOUS
Status: COMPLETED
Start: 2022-11-02 | End: 2022-11-02

## 2022-11-02 RX ORDER — SODIUM CHLORIDE 9 MG/ML
INJECTION, SOLUTION INTRAVENOUS CONTINUOUS
Status: DISCONTINUED | OUTPATIENT
Start: 2022-11-02 | End: 2022-11-02 | Stop reason: HOSPADM

## 2022-11-02 RX ORDER — OXYCODONE HYDROCHLORIDE 5 MG/1
5 TABLET ORAL ONCE
Status: DISCONTINUED | OUTPATIENT
Start: 2022-11-02 | End: 2022-11-02 | Stop reason: HOSPADM

## 2022-11-02 RX ORDER — HYDROCODONE BITARTRATE AND ACETAMINOPHEN 5; 325 MG/1; MG/1
1 TABLET ORAL EVERY 6 HOURS PRN
Qty: 6 TABLET | Refills: 0 | Status: SHIPPED | OUTPATIENT
Start: 2022-11-02 | End: 2023-01-26

## 2022-11-02 RX ADMIN — ACETAMINOPHEN 650 MG: 325 TABLET, FILM COATED ORAL at 04:43

## 2022-11-02 RX ADMIN — SODIUM CHLORIDE: 9 INJECTION, SOLUTION INTRAVENOUS at 09:00

## 2022-11-02 ASSESSMENT — ACTIVITIES OF DAILY LIVING (ADL)
ADLS_ACUITY_SCORE: 35
ADLS_ACUITY_SCORE: 35

## 2022-11-02 NOTE — ED TRIAGE NOTES
Right back/flank pain past 2 days.  Pt states hasn't been sleeping well last couple of days and now has a headache.     Triage Assessment     Row Name 11/02/22 2367       Triage Assessment (Adult)    Airway WDL WDL       Respiratory WDL    Respiratory WDL WDL       Skin Circulation/Temperature WDL    Skin Circulation/Temperature WDL WDL       Cardiac WDL    Cardiac WDL WDL       Peripheral/Neurovascular WDL    Peripheral Neurovascular WDL WDL       Cognitive/Neuro/Behavioral WDL    Cognitive/Neuro/Behavioral WDL WDL

## 2022-11-02 NOTE — DISCHARGE INSTRUCTIONS
Thank you for choosing [facility].     Please closely monitor for further symptoms. Return to the Emergency Department if you develop any new or worsening signs or symptoms.    If you received any opiate pain medications or sedatives during your visit, please do not drive for at least 8 hours.     Labs, cultures or final xray interpretations may still need to be reviewed.  We will call you if your plan of care needs to be changed.    Please follow up with your primary care physician or clinic.

## 2022-11-02 NOTE — ED PROVIDER NOTES
ED Provider Note  [facility]      History     Chief Complaint   Patient presents with     Headache     Flank Pain     Right flank pain for past couple of days     HPI  Emelyn Marmolejo is a 32 year old male who has a history of ureterolithiasis, specifically atrophic left kidney due to obstructing left ureteral stone which was removed, subsequently has developed right flank pain which has been ongoing for several months now.  He states that at times when he does not sleep he develops a constant throbbing, crampy pain in the right flank area which radiates towards his right groin.  This causes him to feel lightheaded and he has a mild headache.  Patient has not slept in 3 days and he believes he needs not sleeping is when the pain gets worse.  He has an appointment with his outpatient provider to discuss his insomnia.  He had a virtual visit with urology on 10/5/2022 in which discussion of this pain and potentially working up further with Lasix renogram was considered, however, per the note patient elected to hold off on any further testing until in person visit could occur.  He denies dysuria or hematuria.  He denies a fever.  No nausea vomiting or diarrhea.  No numbness weakness or tingling.    Past Medical History  Past Medical History:   Diagnosis Date     Nephrolithiasis      Prostate infection      Past Surgical History:   Procedure Laterality Date     COMBINED CYSTOSCOPY, RETROGRADES, URETEROSCOPY, LASER HOLMIUM LITHOTRIPSY URETER(S), INSERT STENT Bilateral 9/7/2022    Procedure: CYSTOSCOPY, BILATERAL RETROGRADE PYELOGRAM, LEFT URETEROSCOPY, BASKET REMOVAL OF STONE;  Surgeon: Aj Hedrick MD;  Location:  OR     COMBINED CYSTOSCOPY, URETEROSCOPY, LASER HOLMIUM LITHOTRIPSY URETER(S) Right 8/13/2020    Procedure: CYSTOSCOPY, RIGHT RETROGRADE PYELOGRAM, RIGHT diagnostic URETEROSCOPY;  Surgeon: Aj Hedrick MD;  Location:  OR     ESOPHAGOSCOPY, GASTROSCOPY, DUODENOSCOPY (EGD),  COMBINED N/A 1/25/2021    Procedure: ESOPHAGOGASTRODUODENOSCOPY, WITH BIOPSY;  Surgeon: Rip Her MD;  Location: [facility] OR     LAPAROSCOPIC NEPHRECTOMY Left 5/24/2021    Procedure: LEFT NEPHRECTOMY, TOTAL, LAPAROSCOPIC;  Surgeon: Aj Hedrick MD;  Location: [facility] OR     cyanocobalamin (VITAMIN B-12) 100 MCG tablet  HYDROcodone-acetaminophen (NORCO) 5-325 MG tablet  traZODone (DESYREL) 50 MG tablet  vitamin D3 (CHOLECALCIFEROL) 50 mcg (2000 units) tablet  gabapentin (NEURONTIN) 300 MG capsule  multivitamin, therapeutic (MULTIVITAMIN) TABS tablet      Allergies   Allergen Reactions     Nsaids      Not true allergy. But be sparing with patient as he has single kidney      Family History  Family History   Problem Relation Age of Onset     Crohn's Disease No family hx of      Ulcerative Colitis No family hx of      GERD No family hx of      Stomach Cancer No family hx of      Social History   Social History     Tobacco Use     Smoking status: Former     Packs/day: 0.50     Years: 5.00     Pack years: 2.50     Types: Cigarettes     Quit date: 9/17/2019     Years since quitting: 3.1     Smokeless tobacco: Never   Vaping Use     Vaping Use: Never used   Substance Use Topics     Alcohol use: Never     Drug use: Never      Past medical history, past surgical history, medications, allergies, family history, and social history were reviewed with the patient. No additional pertinent items.       Review of Systems  A complete review of systems was performed with pertinent positives and negatives noted in the HPI, and all other systems negative.    Physical Exam   BP: 108/71  Pulse: 91  Temp: 98.3  F (36.8  C)  Resp: 18  Weight: 63.5 kg (140 lb)  SpO2: 99 %  Physical Exam  Vitals and nursing note reviewed.   Constitutional:       General: He is not in acute distress.     Appearance: He is not diaphoretic.   HENT:      Head: Atraumatic.      Mouth/Throat:      Mouth: Oropharynx is clear and moist.   Eyes:       General: No scleral icterus.     Pupils: Pupils are equal, round, and reactive to light.   Cardiovascular:      Pulses: Intact distal pulses.      Heart sounds: Normal heart sounds.   Pulmonary:      Effort: No respiratory distress.      Breath sounds: Normal breath sounds.   Abdominal:      General: Bowel sounds are normal.      Palpations: Abdomen is soft.      Tenderness: There is no abdominal tenderness. There is right CVA tenderness.      Comments: Has no peritoneal signs.  He does have tenderness in the pelvic area below the ASIS, no palpable hernia or defect, pain is exacerbated with palpation or movements   Musculoskeletal:         General: No tenderness or edema.   Skin:     General: Skin is warm.      Findings: No rash.   Neurological:      General: No focal deficit present.      Mental Status: He is oriented to person, place, and time. Mental status is at baseline.      Motor: No weakness.      Deep Tendon Reflexes: Reflexes normal.         ED Course      Procedures       The medical record was reviewed and interpreted.  Current labs reviewed and interpreted.  Previous labs reviewed and interpreted.  Current images reviewed and interpreted: CT abdomen pelvis.  Previous images reviewed and interpreted: Renal ultrasound, CT abdomen.  Managed outpatient prescription medications.              Results for orders placed or performed during the hospital encounter of 11/02/22   CT Abdomen Pelvis w/o Contrast     Status: None    Narrative    CT ABDOMEN PELVIS W/O CONTRAST 11/2/2022 8:34 AM    CLINICAL HISTORY: acute on chronic right flank pain, history of stones  TECHNIQUE: CT scan of the abdomen and pelvis was performed without IV  contrast. Multiplanar reformats were obtained. Dose reduction  techniques were used.  CONTRAST: None.    COMPARISON: 5/19/2022    FINDINGS:   LOWER CHEST: Partly visualized thin-walled cysts and bullae in the  lung bases.    HEPATOBILIARY: Normal.    PANCREAS: Normal.    SPLEEN:  Normal.    ADRENAL GLANDS: Normal.    KIDNEYS/BLADDER: Left nephrectomy. Previously seen calculus at the  left ureterovesicular junction is no longer present. No calculi in the  right kidney or ureter. No right hydronephrosis or perinephric  stranding. No urinary bladder calculi.    BOWEL: No small bowel or colonic obstruction or inflammatory changes.  Normal appendix.    LYMPH NODES: No lymphadenopathy in the abdomen and pelvis. Few  calcified mesenteric lymph nodes are stable, likely related to prior  granulomatous disease.    VASCULATURE: No abdominal aortic aneurysm. Stable mild atherosclerotic  calcifications in the internal iliac arteries bilaterally.    PELVIC ORGANS: No pelvic masses.    OTHER: No free fluid or fluid collections. No extraluminal air.    MUSCULOSKELETAL: Unremarkable.      Impression    IMPRESSION:   1.  No acute findings in the abdomen and pelvis.  2.  No right renal or ureteral calculi or right hydronephrosis. Stable  postoperative changes of left nephrectomy. The previously seen  calculus in the left ureteral vesicular junction is no longer present.    JACOB ST MD         SYSTEM ID:  [id number]   Comprehensive metabolic panel     Status: Normal   Result Value Ref Range    Sodium 141 133 - 144 mmol/L    Potassium 3.9 3.4 - 5.3 mmol/L    Chloride 109 94 - 109 mmol/L    Carbon Dioxide (CO2) 27 20 - 32 mmol/L    Anion Gap 5 3 - 14 mmol/L    Urea Nitrogen 12 7 - 30 mg/dL    Creatinine 0.98 0.66 - 1.25 mg/dL    Calcium 9.0 8.5 - 10.1 mg/dL    Glucose 97 70 - 99 mg/dL    Alkaline Phosphatase 84 40 - 150 U/L    AST 15 0 - 45 U/L    ALT 18 0 - 70 U/L    Protein Total 7.4 6.8 - 8.8 g/dL    Albumin 3.8 3.4 - 5.0 g/dL    Bilirubin Total 0.4 0.2 - 1.3 mg/dL    GFR Estimate >90 >60 mL/min/1.73m2   UA with Microscopic reflex to Culture     Status: Normal    Specimen: Urine, Midstream   Result Value Ref Range    Color Urine Straw Colorless, Straw, Light Yellow, Yellow    Appearance Urine Clear Clear     Glucose Urine Negative Negative mg/dL    Bilirubin Urine Negative Negative    Ketones Urine Negative Negative mg/dL    Specific Gravity Urine 1.005 1.003 - 1.035    Blood Urine Negative Negative    pH Urine 6.0 5.0 - 7.0    Protein Albumin Urine Negative Negative mg/dL    Urobilinogen Urine Normal Normal, 2.0 mg/dL    Nitrite Urine Negative Negative    Leukocyte Esterase Urine Negative Negative    RBC Urine <1 <=2 /HPF    WBC Urine <1 <=5 /HPF    Narrative    Urine Culture not indicated   Lipase     Status: Normal   Result Value Ref Range    Lipase 117 73 - 393 U/L   CBC with platelets and differential     Status: None   Result Value Ref Range    WBC Count 5.5 4.0 - 11.0 10e3/uL    RBC Count 5.00 4.40 - 5.90 10e6/uL    Hemoglobin 14.5 13.3 - 17.7 g/dL    Hematocrit 42.3 40.0 - 53.0 %    MCV 85 78 - 100 fL    MCH 29.0 26.5 - 33.0 pg    MCHC 34.3 31.5 - 36.5 g/dL    RDW 12.1 10.0 - 15.0 %    Platelet Count 185 150 - 450 10e3/uL    % Neutrophils 39 %    % Lymphocytes 44 %    % Monocytes 9 %    % Eosinophils 7 %    % Basophils 1 %    % Immature Granulocytes 0 %    NRBCs per 100 WBC 0 <1 /100    Absolute Neutrophils 2.2 1.6 - 8.3 10e3/uL    Absolute Lymphocytes 2.4 0.8 - 5.3 10e3/uL    Absolute Monocytes 0.5 0.0 - 1.3 10e3/uL    Absolute Eosinophils 0.4 0.0 - 0.7 10e3/uL    Absolute Basophils 0.1 0.0 - 0.2 10e3/uL    Absolute Immature Granulocytes 0.0 <=0.4 10e3/uL    Absolute NRBCs 0.0 10e3/uL   CBC with platelets differential     Status: None    Narrative    The following orders were created for panel order CBC with platelets differential.  Procedure                               Abnormality         Status                     ---------                               -----------         ------                     CBC with platelets and d...[[id number]]                      Final result                 Please view results for these tests on the individual orders.     Medications   acetaminophen (TYLENOL) tablet 650 mg (650  mg Oral Given 11/2/22 8863)   sodium chloride 0.9% infusion (0 mLs Intravenous Stopped 11/2/22 0899)   oxyCODONE (ROXICODONE) tablet 5 mg (has no administration in time range)        Assessments & Plan (with Medical Decision Making)   A 32-year-old male with a history of atrophic left kidney due to obstructing ureteral stone which has been removed and recurrent episodes of right flank pain of undetermined etiology now presenting due to right flank pain and reporting significant insomnia.  Differential diagnosis includes ureterolithiasis, pyelonephritis, cholelithiasis or cholecystitis, appendicitis, colitis, other UTI, musculoskeletal pain, right lower lobe pneumonia, other referred genitourinary pain.  On exam patient's vitals are normal.  He appears minimally uncomfortable.  He has right CVA tenderness.  The remainder of his abdominal exam is completely benign.  His genitourinary exam is normal.  The remainder of complete physical exam is normal.  His labs and urinalysis are unremarkable.  Imaging reveals no residual right hydronephrosis, no stone and no acute pathology in the right side of the abdomen.  Serial exam patient appears more comfortable.  He states he has been unable to sleep and that when this happens is when he starts to experience more right flank pain, describes intermittent problems with insomnia.  He was reassured by the findings on his CT and labs, we discussed his insomnia will give a trial of trazodone, discussed the potential side effects and risks and benefits of trazodone and he would like to proceed with that medication.  He already has a follow-up appointment with his regular provider regarding his insomnia and has a follow-up planned with urology as well.  We discussed the indications for emergency department return and follow-up.  Stable for discharge.      I have reviewed the nursing notes. I have reviewed the findings, diagnosis, plan and need for follow up with the patient.    New  Prescriptions    HYDROCODONE-ACETAMINOPHEN (NORCO) 5-325 MG TABLET    Take 1 tablet by mouth every 6 hours as needed for pain    TRAZODONE (DESYREL) 50 MG TABLET    Take 1-2 tablets ( mg) by mouth nightly as needed for sleep       Final diagnoses:   Right flank pain   Insomnia, unspecified type       --  Kwabena Alvarez MD  [facility] EMERGENCY DEPARTMENT  11/2/2022     Kwabena Alvarez MD  11/02/22 0915

## 2022-12-20 NOTE — PROGRESS NOTES
Preceptor Attestation:  I discussed the patient with the resident. I have verified the content of the note, which accurately reflects my assessment of the patient and the plan of care.   Supervising Physician:  Urban Perry MD.        Social Work Note  12/20/2022    Attempted to reach patient for follow-up. VM left requesting return call. Attempted to reach [facility]  ([phone]). VM left requesting return call. [address] [facility]. Message left with staff for return call to reschedule patient's missed new patient appointment with Dr. Ena Pereyra on 12/14/22. SW Plan:  Follow-up with patient, APS worker. Assist with rescheduling of PCP appointment.      JET Colmenares, ACSW, [facility]    Ambulatory Care Management   [phone]

## 2022-12-23 ENCOUNTER — HOSPITAL ENCOUNTER (EMERGENCY)
Facility: CLINIC | Age: 32
Discharge: HOME | End: 2022-12-24
Attending: EMERGENCY MEDICINE | Admitting: EMERGENCY MEDICINE
Payer: COMMERCIAL

## 2022-12-23 DIAGNOSIS — M54.2 NECK PAIN: ICD-10-CM

## 2022-12-23 DIAGNOSIS — R07.9 CHEST PAIN, UNSPECIFIED TYPE: ICD-10-CM

## 2022-12-23 PROCEDURE — 99284 EMERGENCY DEPT VISIT MOD MDM: CPT | Mod: 25 | Performed by: EMERGENCY MEDICINE

## 2022-12-23 PROCEDURE — 99284 EMERGENCY DEPT VISIT MOD MDM: CPT | Performed by: EMERGENCY MEDICINE

## 2022-12-23 PROCEDURE — 93005 ELECTROCARDIOGRAM TRACING: CPT | Performed by: EMERGENCY MEDICINE

## 2022-12-23 PROCEDURE — 93010 ELECTROCARDIOGRAM REPORT: CPT | Performed by: EMERGENCY MEDICINE

## 2022-12-24 VITALS
HEART RATE: 69 BPM | DIASTOLIC BLOOD PRESSURE: 76 MMHG | SYSTOLIC BLOOD PRESSURE: 115 MMHG | BODY MASS INDEX: 21.22 KG/M2 | OXYGEN SATURATION: 98 % | HEIGHT: 68 IN | WEIGHT: 140 LBS | TEMPERATURE: 98.1 F | RESPIRATION RATE: 18 BRPM

## 2022-12-24 LAB
ANION GAP SERPL CALCULATED.3IONS-SCNC: 6 MMOL/L (ref 3–14)
ATRIAL RATE - MUSE: 71 BPM
BASOPHILS # BLD AUTO: 0 10E3/UL (ref 0–0.2)
BASOPHILS NFR BLD AUTO: 1 %
BUN SERPL-MCNC: 24 MG/DL (ref 7–30)
CALCIUM SERPL-MCNC: 9.1 MG/DL (ref 8.5–10.1)
CHLORIDE BLD-SCNC: 107 MMOL/L (ref 94–109)
CO2 SERPL-SCNC: 25 MMOL/L (ref 20–32)
CREAT SERPL-MCNC: 1.13 MG/DL (ref 0.66–1.25)
DIASTOLIC BLOOD PRESSURE - MUSE: NORMAL MMHG
EOSINOPHIL # BLD AUTO: 0.3 10E3/UL (ref 0–0.7)
EOSINOPHIL NFR BLD AUTO: 4 %
ERYTHROCYTE [DISTWIDTH] IN BLOOD BY AUTOMATED COUNT: 11.9 % (ref 10–15)
GFR SERPL CREATININE-BSD FRML MDRD: 87 ML/MIN/1.73M2
GLUCOSE BLD-MCNC: 99 MG/DL (ref 70–99)
HCT VFR BLD AUTO: 42.2 % (ref 40–53)
HGB BLD-MCNC: 14.5 G/DL (ref 13.3–17.7)
IMM GRANULOCYTES # BLD: 0 10E3/UL
IMM GRANULOCYTES NFR BLD: 0 %
INTERPRETATION ECG - MUSE: NORMAL
LYMPHOCYTES # BLD AUTO: 3.4 10E3/UL (ref 0.8–5.3)
LYMPHOCYTES NFR BLD AUTO: 49 %
MCH RBC QN AUTO: 29.4 PG (ref 26.5–33)
MCHC RBC AUTO-ENTMCNC: 34.4 G/DL (ref 31.5–36.5)
MCV RBC AUTO: 85 FL (ref 78–100)
MONOCYTES # BLD AUTO: 0.6 10E3/UL (ref 0–1.3)
MONOCYTES NFR BLD AUTO: 8 %
NEUTROPHILS # BLD AUTO: 2.7 10E3/UL (ref 1.6–8.3)
NEUTROPHILS NFR BLD AUTO: 38 %
NRBC # BLD AUTO: 0 10E3/UL
NRBC BLD AUTO-RTO: 0 /100
P AXIS - MUSE: 76 DEGREES
PLATELET # BLD AUTO: 234 10E3/UL (ref 150–450)
POTASSIUM BLD-SCNC: 3.9 MMOL/L (ref 3.4–5.3)
PR INTERVAL - MUSE: 138 MS
QRS DURATION - MUSE: 94 MS
QT - MUSE: 368 MS
QTC - MUSE: 399 MS
R AXIS - MUSE: 27 DEGREES
RBC # BLD AUTO: 4.94 10E6/UL (ref 4.4–5.9)
SODIUM SERPL-SCNC: 138 MMOL/L (ref 133–144)
SYSTOLIC BLOOD PRESSURE - MUSE: NORMAL MMHG
T AXIS - MUSE: 58 DEGREES
TROPONIN I SERPL HS-MCNC: 4 NG/L
VENTRICULAR RATE- MUSE: 71 BPM
WBC # BLD AUTO: 6.9 10E3/UL (ref 4–11)

## 2022-12-24 PROCEDURE — 84484 ASSAY OF TROPONIN QUANT: CPT | Performed by: EMERGENCY MEDICINE

## 2022-12-24 PROCEDURE — 36415 COLL VENOUS BLD VENIPUNCTURE: CPT | Performed by: EMERGENCY MEDICINE

## 2022-12-24 PROCEDURE — 85025 COMPLETE CBC W/AUTO DIFF WBC: CPT | Performed by: EMERGENCY MEDICINE

## 2022-12-24 PROCEDURE — 80048 BASIC METABOLIC PNL TOTAL CA: CPT | Performed by: EMERGENCY MEDICINE

## 2022-12-24 ASSESSMENT — ACTIVITIES OF DAILY LIVING (ADL): ADLS_ACUITY_SCORE: 35

## 2022-12-24 NOTE — ED TRIAGE NOTES
Pt. was at work when developed sharp pain in left chest, then left arm, then left neck.  Pain was sharp and comes and goes.  State has had intermittent sharp pain chest for 2 days.  No shortness of breath, diaphoresis or nausea.     Triage Assessment     Row Name 12/23/22 1969       Triage Assessment (Adult)    Airway WDL WDL       Respiratory WDL    Respiratory WDL WDL       Skin Circulation/Temperature WDL    Skin Circulation/Temperature WDL WDL       Cardiac WDL    Cardiac WDL X;chest pain       Chest Pain Assessment    Chest Pain Location anterior chest, left    Chest Pain Radiation arm    Character sharp    Duration intermittent    Chest Pain Intervention 12-lead ECG obtained;cardiac monitor placed       Peripheral/Neurovascular WDL    Peripheral Neurovascular WDL WDL       Cognitive/Neuro/Behavioral WDL    Cognitive/Neuro/Behavioral WDL WDL

## 2022-12-24 NOTE — ED PROVIDER NOTES
ED Provider Note  [facility]      History     Chief Complaint   Patient presents with     Chest Pain     HPI  Emelyn Marmolejo is a 32 year old male who presents to us with a chief complaint of chest pain.  2 days ago when he was at work he experienced some left-sided chest pain in the wax and wane for a few hours.  It since resolved.  Today he also notes left-sided neck pain that is rating down to his left arm.  That vary in intensity throughout the day and is also resolved at this point.  No fevers.  No shortness of breath.  No coughing.  No previous history of pulmonary embolism.  Patient with no cardiac history and does not take any medications currently.  Past Medical History  Past Medical History:   Diagnosis Date     Nephrolithiasis      Prostate infection      Past Surgical History:   Procedure Laterality Date     COMBINED CYSTOSCOPY, RETROGRADES, URETEROSCOPY, LASER HOLMIUM LITHOTRIPSY URETER(S), INSERT STENT Bilateral 9/7/2022    Procedure: CYSTOSCOPY, BILATERAL RETROGRADE PYELOGRAM, LEFT URETEROSCOPY, BASKET REMOVAL OF STONE;  Surgeon: Aj Hedrick MD;  Location:  OR     COMBINED CYSTOSCOPY, URETEROSCOPY, LASER HOLMIUM LITHOTRIPSY URETER(S) Right 8/13/2020    Procedure: CYSTOSCOPY, RIGHT RETROGRADE PYELOGRAM, RIGHT diagnostic URETEROSCOPY;  Surgeon: Aj Hedrick MD;  Location:  OR     ESOPHAGOSCOPY, GASTROSCOPY, DUODENOSCOPY (EGD), COMBINED N/A 1/25/2021    Procedure: ESOPHAGOGASTRODUODENOSCOPY, WITH BIOPSY;  Surgeon: Rip Her MD;  Location: [facility] OR     LAPAROSCOPIC NEPHRECTOMY Left 5/24/2021    Procedure: LEFT NEPHRECTOMY, TOTAL, LAPAROSCOPIC;  Surgeon: Aj Hedrick MD;  Location:  OR     cyanocobalamin (VITAMIN B-12) 100 MCG tablet  gabapentin (NEURONTIN) 300 MG capsule  HYDROcodone-acetaminophen (NORCO) 5-325 MG tablet  multivitamin, therapeutic (MULTIVITAMIN) TABS tablet  traZODone (DESYREL) 50 MG tablet  vitamin D3 (CHOLECALCIFEROL) 50 mcg  "(2000 units) tablet      Allergies   Allergen Reactions     Nsaids      Not true allergy. But be sparing with patient as he has single kidney      Family History  Family History   Problem Relation Age of Onset     Crohn's Disease No family hx of      Ulcerative Colitis No family hx of      GERD No family hx of      Stomach Cancer No family hx of      Social History   Social History     Tobacco Use     Smoking status: Former     Packs/day: 0.50     Years: 5.00     Pack years: 2.50     Types: Cigarettes     Quit date: 9/17/2019     Years since quitting: 3.2     Smokeless tobacco: Never   Vaping Use     Vaping Use: Never used   Substance Use Topics     Alcohol use: Never     Drug use: Never      Past medical history, past surgical history, medications, allergies, family history, and social history were reviewed with the patient. No additional pertinent items.       Review of Systems  A complete review of systems was performed with pertinent positives and negatives noted in the HPI, and all other systems negative.    Physical Exam   BP: (!) 124/90  Pulse: 72  Temp: 98.1  F (36.7  C)  Resp: 16  Height: 172.7 cm (5' 8\")  Weight: 63.5 kg (140 lb)  SpO2: 100 %  Physical Exam  Vitals reviewed.   Constitutional:       General: He is not in acute distress.     Appearance: He is not diaphoretic.   HENT:      Head: Normocephalic and atraumatic.      Right Ear: External ear normal.      Left Ear: External ear normal.      Nose: Nose normal. No rhinorrhea.      Mouth/Throat:      Mouth: Mucous membranes are moist.   Eyes:      General: No scleral icterus.     Extraocular Movements: Extraocular movements intact.      Conjunctiva/sclera: Conjunctivae normal.   Cardiovascular:      Rate and Rhythm: Normal rate and regular rhythm.      Heart sounds: Normal heart sounds.   Pulmonary:      Effort: No respiratory distress.      Breath sounds: Normal breath sounds.   Abdominal:      General: Bowel sounds are normal.      Palpations: " Abdomen is soft.      Tenderness: There is no abdominal tenderness.   Musculoskeletal:         General: No deformity. Normal range of motion.      Cervical back: Normal range of motion and neck supple.   Skin:     General: Skin is warm.      Findings: No rash.   Neurological:      Mental Status: Mental status is at baseline.   Psychiatric:         Mood and Affect: Mood normal.         Behavior: Behavior normal.         ED Course      Procedures            EKG Interpretation:      Interpreted by Niko Gross DO  Time reviewed: 2352  Symptoms at time of EKG: Chest pain  Rhythm: normal sinus   Rate: normal  Axis: normal  Ectopy: none  Conduction: normal  ST Segments/ T Waves: No ST-T wave changes  Q Waves: none  Comparison to prior: No old EKG available    Clinical Impression: normal EKG                    Results for orders placed or performed during the hospital encounter of 12/23/22   CBC with platelets and differential     Status: None   Result Value Ref Range    WBC Count 6.9 4.0 - 11.0 10e3/uL    RBC Count 4.94 4.40 - 5.90 10e6/uL    Hemoglobin 14.5 13.3 - 17.7 g/dL    Hematocrit 42.2 40.0 - 53.0 %    MCV 85 78 - 100 fL    MCH 29.4 26.5 - 33.0 pg    MCHC 34.4 31.5 - 36.5 g/dL    RDW 11.9 10.0 - 15.0 %    Platelet Count 234 150 - 450 10e3/uL    % Neutrophils 38 %    % Lymphocytes 49 %    % Monocytes 8 %    % Eosinophils 4 %    % Basophils 1 %    % Immature Granulocytes 0 %    NRBCs per 100 WBC 0 <1 /100    Absolute Neutrophils 2.7 1.6 - 8.3 10e3/uL    Absolute Lymphocytes 3.4 0.8 - 5.3 10e3/uL    Absolute Monocytes 0.6 0.0 - 1.3 10e3/uL    Absolute Eosinophils 0.3 0.0 - 0.7 10e3/uL    Absolute Basophils 0.0 0.0 - 0.2 10e3/uL    Absolute Immature Granulocytes 0.0 <=0.4 10e3/uL    Absolute NRBCs 0.0 10e3/uL   EKG 12 lead     Status: None (Preliminary result)   Result Value Ref Range    Systolic Blood Pressure  mmHg    Diastolic Blood Pressure  mmHg    Ventricular Rate 71 BPM    Atrial Rate 71 BPM    ID  Interval 138 ms    QRS Duration 94 ms     ms    QTc 399 ms    P Axis 76 degrees    R AXIS 27 degrees    T Axis 58 degrees    Interpretation ECG Sinus rhythm with sinus arrhythmia  Normal ECG      CBC with Platelets & Differential     Status: None    Narrative    The following orders were created for panel order CBC with Platelets & Differential.  Procedure                               Abnormality         Status                     ---------                               -----------         ------                     CBC with platelets and d...[[id number]]                      Final result                 Please view results for these tests on the individual orders.     Medications - No data to display     Assessments & Plan (with Medical Decision Making)   32-year-old male presents to us with a chief complaint of chest pain and later neck pain.  He is currently asymptomatic.  Previous records were reviewed.  EKG was interpreted as normal sinus rhythm and otherwise unremarkable.  Labs are interpreted and are normal.  Vital signs unremarkable as well.  As he is asymptomatic at this point we recommend over-the-counter pain medications as needed.  He will be discharged home.    I have reviewed the nursing notes. I have reviewed the findings, diagnosis, plan and need for follow up with the patient.    New Prescriptions    No medications on file       Final diagnoses:   Chest pain, unspecified type   Neck pain       --  Niko Gross  [facility] EMERGENCY DEPARTMENT  12/23/2022     Niko Gross,   12/24/22 0105

## 2022-12-27 ENCOUNTER — TELEPHONE (OUTPATIENT)
Dept: FAMILY MEDICINE | Facility: CLINIC | Age: 32
End: 2022-12-27

## 2022-12-27 NOTE — TELEPHONE ENCOUNTER
Patient transferred to me by SHENA, patient is c/o right flank pain, that he rates at 10, looks like patient was seen for the same complaint at the ED on 11/2. Multiple labs came back normal, CT normal.    Patient states the pain is keeping him from sleeping    Patient scheduled with Dr. Eid on 12/30 at 10am    Tatiana Bills RN

## 2023-01-13 ENCOUNTER — HOSPITAL ENCOUNTER (EMERGENCY)
Facility: CLINIC | Age: 33
Discharge: HOME | End: 2023-01-13
Attending: EMERGENCY MEDICINE | Admitting: EMERGENCY MEDICINE
Payer: COMMERCIAL

## 2023-01-13 ENCOUNTER — APPOINTMENT (OUTPATIENT)
Dept: ULTRASOUND IMAGING | Facility: CLINIC | Age: 33
End: 2023-01-13
Attending: EMERGENCY MEDICINE
Payer: COMMERCIAL

## 2023-01-13 VITALS
HEIGHT: 69 IN | TEMPERATURE: 97.6 F | BODY MASS INDEX: 20.73 KG/M2 | OXYGEN SATURATION: 100 % | SYSTOLIC BLOOD PRESSURE: 117 MMHG | HEART RATE: 62 BPM | DIASTOLIC BLOOD PRESSURE: 66 MMHG | WEIGHT: 140 LBS | RESPIRATION RATE: 16 BRPM

## 2023-01-13 DIAGNOSIS — R10.9 RIGHT FLANK PAIN: ICD-10-CM

## 2023-01-13 LAB
ALBUMIN SERPL BCG-MCNC: 4.6 G/DL (ref 3.5–5.2)
ALBUMIN UR-MCNC: NEGATIVE MG/DL
ALP SERPL-CCNC: 87 U/L (ref 40–129)
ALT SERPL W P-5'-P-CCNC: 13 U/L (ref 10–50)
ANION GAP SERPL CALCULATED.3IONS-SCNC: 14 MMOL/L (ref 7–15)
APPEARANCE UR: CLEAR
AST SERPL W P-5'-P-CCNC: 26 U/L (ref 10–50)
BASOPHILS # BLD AUTO: 0 10E3/UL (ref 0–0.2)
BASOPHILS NFR BLD AUTO: 1 %
BILIRUB SERPL-MCNC: 0.3 MG/DL
BILIRUB UR QL STRIP: NEGATIVE
BUN SERPL-MCNC: 14.1 MG/DL (ref 6–20)
CALCIUM SERPL-MCNC: 9.7 MG/DL (ref 8.6–10)
CHLORIDE SERPL-SCNC: 104 MMOL/L (ref 98–107)
COLOR UR AUTO: ABNORMAL
CREAT SERPL-MCNC: 1 MG/DL (ref 0.67–1.17)
DEPRECATED HCO3 PLAS-SCNC: 22 MMOL/L (ref 22–29)
EOSINOPHIL # BLD AUTO: 0.3 10E3/UL (ref 0–0.7)
EOSINOPHIL NFR BLD AUTO: 5 %
ERYTHROCYTE [DISTWIDTH] IN BLOOD BY AUTOMATED COUNT: 12.1 % (ref 10–15)
GFR SERPL CREATININE-BSD FRML MDRD: >90 ML/MIN/1.73M2
GLUCOSE SERPL-MCNC: 87 MG/DL (ref 70–99)
GLUCOSE UR STRIP-MCNC: NEGATIVE MG/DL
HCT VFR BLD AUTO: 44.6 % (ref 40–53)
HGB BLD-MCNC: 14.4 G/DL (ref 13.3–17.7)
HGB UR QL STRIP: NEGATIVE
HOLD SPECIMEN: NORMAL
HOLD SPECIMEN: NORMAL
IMM GRANULOCYTES # BLD: 0 10E3/UL
IMM GRANULOCYTES NFR BLD: 0 %
KETONES UR STRIP-MCNC: NEGATIVE MG/DL
LEUKOCYTE ESTERASE UR QL STRIP: NEGATIVE
LIPASE SERPL-CCNC: 34 U/L (ref 13–60)
LYMPHOCYTES # BLD AUTO: 2.6 10E3/UL (ref 0.8–5.3)
LYMPHOCYTES NFR BLD AUTO: 43 %
MCH RBC QN AUTO: 28.5 PG (ref 26.5–33)
MCHC RBC AUTO-ENTMCNC: 32.3 G/DL (ref 31.5–36.5)
MCV RBC AUTO: 88 FL (ref 78–100)
MONOCYTES # BLD AUTO: 0.5 10E3/UL (ref 0–1.3)
MONOCYTES NFR BLD AUTO: 9 %
MUCOUS THREADS #/AREA URNS LPF: PRESENT /LPF
NEUTROPHILS # BLD AUTO: 2.5 10E3/UL (ref 1.6–8.3)
NEUTROPHILS NFR BLD AUTO: 42 %
NITRATE UR QL: NEGATIVE
NRBC # BLD AUTO: 0 10E3/UL
NRBC BLD AUTO-RTO: 0 /100
PH UR STRIP: 6.5 [PH] (ref 5–7)
PLATELET # BLD AUTO: 223 10E3/UL (ref 150–450)
POTASSIUM SERPL-SCNC: 3.8 MMOL/L (ref 3.4–5.3)
PROT SERPL-MCNC: 7.8 G/DL (ref 6.4–8.3)
RBC # BLD AUTO: 5.06 10E6/UL (ref 4.4–5.9)
RBC URINE: 0 /HPF
SODIUM SERPL-SCNC: 140 MMOL/L (ref 136–145)
SP GR UR STRIP: 1 (ref 1–1.03)
SQUAMOUS EPITHELIAL: <1 /HPF
UROBILINOGEN UR STRIP-MCNC: NORMAL MG/DL
WBC # BLD AUTO: 6 10E3/UL (ref 4–11)
WBC URINE: <1 /HPF

## 2023-01-13 PROCEDURE — 76775 US EXAM ABDO BACK WALL LIM: CPT

## 2023-01-13 PROCEDURE — 96360 HYDRATION IV INFUSION INIT: CPT

## 2023-01-13 PROCEDURE — 83690 ASSAY OF LIPASE: CPT | Performed by: EMERGENCY MEDICINE

## 2023-01-13 PROCEDURE — 36415 COLL VENOUS BLD VENIPUNCTURE: CPT | Performed by: EMERGENCY MEDICINE

## 2023-01-13 PROCEDURE — 81003 URINALYSIS AUTO W/O SCOPE: CPT | Performed by: EMERGENCY MEDICINE

## 2023-01-13 PROCEDURE — 258N000003 HC RX IP 258 OP 636: Performed by: EMERGENCY MEDICINE

## 2023-01-13 PROCEDURE — 76775 US EXAM ABDO BACK WALL LIM: CPT | Mod: 26 | Performed by: RADIOLOGY

## 2023-01-13 PROCEDURE — 99284 EMERGENCY DEPT VISIT MOD MDM: CPT | Mod: 25

## 2023-01-13 PROCEDURE — 99284 EMERGENCY DEPT VISIT MOD MDM: CPT | Performed by: EMERGENCY MEDICINE

## 2023-01-13 PROCEDURE — 80053 COMPREHEN METABOLIC PANEL: CPT | Performed by: EMERGENCY MEDICINE

## 2023-01-13 PROCEDURE — 85025 COMPLETE CBC W/AUTO DIFF WBC: CPT | Performed by: EMERGENCY MEDICINE

## 2023-01-13 RX ORDER — SODIUM CHLORIDE 9 MG/ML
INJECTION, SOLUTION INTRAVENOUS CONTINUOUS
Status: DISCONTINUED | OUTPATIENT
Start: 2023-01-13 | End: 2023-01-13 | Stop reason: HOSPADM

## 2023-01-13 RX ORDER — ACETAMINOPHEN 500 MG
500-1000 TABLET ORAL EVERY 6 HOURS PRN
Qty: 60 TABLET | Refills: 0 | Status: SHIPPED | OUTPATIENT
Start: 2023-01-13 | End: 2023-01-20

## 2023-01-13 RX ADMIN — SODIUM CHLORIDE 1000 ML: 9 INJECTION, SOLUTION INTRAVENOUS at 18:53

## 2023-01-13 ASSESSMENT — ACTIVITIES OF DAILY LIVING (ADL): ADLS_ACUITY_SCORE: 35

## 2023-01-14 NOTE — ED TRIAGE NOTES
Presents with right sided flank pain radiating to RLQ. Pt states he has been urinating frequently, also having a lot of penile pain/pressure with urination.     Hx: 1 kidney.      Triage Assessment     Row Name 01/13/23 1810       Triage Assessment (Adult)    Airway WDL WDL       Respiratory WDL    Respiratory WDL WDL       Skin Circulation/Temperature WDL    Skin Circulation/Temperature WDL WDL       Cardiac WDL    Cardiac WDL WDL       Peripheral/Neurovascular WDL    Peripheral Neurovascular WDL WDL       Cognitive/Neuro/Behavioral WDL    Cognitive/Neuro/Behavioral WDL WDL

## 2023-01-14 NOTE — DISCHARGE INSTRUCTIONS
TODAY'S VISIT:  You were seen today for flank pain    You understand that you are declining a CT scan against medical advice. The risks of this include, but are not limited to, permanent disability (such as kidney failure, possibly requiring dialysis) or death. You are welcome to return to the ER for further evaluation at any time despite declining this test and are encouraged to do so if your symptoms worsen or you develop new symptoms or if you change your mind about having a CT done.     -   - If you had any labs or imaging/radiology tests performed today, you should also discuss these tests with your usual provider.     FOLLOW-UP:  Please make an appointment to follow up with:  - Your Primary Care Provider. If you do not have a PCP, please call the Primary Care Center (phone: [phone] for an appointment  - Urology Clinic (phone: [phone])     - Have your provider review the results from today's visit with you again to make sure no further follow-up or additional testing is needed based on those results.     PRESCRIPTIONS / MEDICATIONS:  - Tylenol as needed for pain     OTHER INSTRUCTIONS:  - Make sure you stay well hydrated    RETURN TO THE EMERGENCY DEPARTMENT  Return to the Emergency Department at any time for any new or worsening symptoms or any concerns.

## 2023-01-14 NOTE — ED PROVIDER NOTES
History     Chief Complaint   Patient presents with     Flank Pain     Urinary Frequency     Penis/Scrotum Problem     HPI  Emelyn Marmolejo is a 33 year old male with a past medical history of nephrolithiasis and left nephrectomy who presents to the emergency department with a chief complaint of flank pain.  Located in the right flank.  Associate with urinary frequency and urgency as well as dysuria.  The pain radiates into his groin.  Patient states he has had similar pain before and was worked up for kidney stone and this work-up was negative.  The patient has not been taking anything at home for pain.  He declines any pain medication in the emergency department today.  No fevers or chills.    I have reviewed the Medications, Allergies, Past Medical and Surgical History, and Social History in the [employer] system.    Past Medical History:   Diagnosis Date     Nephrolithiasis      Prostate infection      Past Surgical History:   Procedure Laterality Date     COMBINED CYSTOSCOPY, RETROGRADES, URETEROSCOPY, LASER HOLMIUM LITHOTRIPSY URETER(S), INSERT STENT Bilateral 9/7/2022    Procedure: CYSTOSCOPY, BILATERAL RETROGRADE PYELOGRAM, LEFT URETEROSCOPY, BASKET REMOVAL OF STONE;  Surgeon: Aj Hedrick MD;  Location:  OR     COMBINED CYSTOSCOPY, URETEROSCOPY, LASER HOLMIUM LITHOTRIPSY URETER(S) Right 8/13/2020    Procedure: CYSTOSCOPY, RIGHT RETROGRADE PYELOGRAM, RIGHT diagnostic URETEROSCOPY;  Surgeon: Aj Hedrick MD;  Location:  OR     ESOPHAGOSCOPY, GASTROSCOPY, DUODENOSCOPY (EGD), COMBINED N/A 1/25/2021    Procedure: ESOPHAGOGASTRODUODENOSCOPY, WITH BIOPSY;  Surgeon: Rip Her MD;  Location: [facility] OR     LAPAROSCOPIC NEPHRECTOMY Left 5/24/2021    Procedure: LEFT NEPHRECTOMY, TOTAL, LAPAROSCOPIC;  Surgeon: Aj Hedrick MD;  Location:  OR     Current Facility-Administered Medications   Medication     0.9% sodium chloride BOLUS    Followed by     sodium chloride 0.9% infusion     Current  Outpatient Medications   Medication     cyanocobalamin (VITAMIN B-12) 100 MCG tablet     gabapentin (NEURONTIN) 300 MG capsule     HYDROcodone-acetaminophen (NORCO) 5-325 MG tablet     multivitamin, therapeutic (MULTIVITAMIN) TABS tablet     traZODone (DESYREL) 50 MG tablet     vitamin D3 (CHOLECALCIFEROL) 50 mcg (2000 units) tablet     Allergies   Allergen Reactions     Nsaids      Not true allergy. But be sparing with patient as he has single kidney      Past medical history, past surgical history, medications, and allergies were reviewed with the patient. Additional pertinent items: None    Social History     Socioeconomic History     Marital status: Single     Spouse name: Not on file     Number of children: Not on file     Years of education: Not on file     Highest education level: Not on file   Occupational History     Not on file   Tobacco Use     Smoking status: Former     Packs/day: 0.50     Years: 5.00     Pack years: 2.50     Types: Cigarettes     Quit date: 9/17/2019     Years since quitting: 3.3     Smokeless tobacco: Never   Vaping Use     Vaping Use: Never used   Substance and Sexual Activity     Alcohol use: Never     Drug use: Never     Sexual activity: Not Currently     Partners: Female     Birth control/protection: Condom   Other Topics Concern     Parent/sibling w/ CABG, MI or angioplasty before 65F 55M? Not Asked   Social History Narrative     Not on file     Social Determinants of Health     Financial Resource Strain: Not on file   Food Insecurity: Not on file   Transportation Needs: Not on file   Physical Activity: Not on file   Stress: Not on file   Social Connections: Not on file   Intimate Partner Violence: Not on file   Housing Stability: Not on file     Social history was reviewed with the patient. Additional pertinent items: None    Review of Systems  A medically appropriate review of systems was performed with pertinent positives and negatives noted in the HPI, and all other systems  "negative.    Physical Exam   BP: 112/74  Pulse: 78  Temp: 97.6  F (36.4  C)  Resp: 18  Height: 175.3 cm (5' 9\")  Weight: 63.5 kg (140 lb)  SpO2: 100 %      General: Well nourished, well developed, NAD  HEENT: EOMI, anicteric. NCAT, MMM  Neck: no jugular venous distension, supple, nl ROM  Cardiac: Regular rate and rhythm. No murmurs, rubs, or gallops. Normal S1, S2.  Intact peripheral pulses  Pulm: CTAB, no stridor, wheezes, rales, rhonchi  Abd: Soft, no CVA tenderness, there is right-sided abdominal tenderness without rebound or guarding, nondistended.  No masses palpated.    Skin: Warm and dry to the touch.  No rash  Extremities: No LE edema, no cyanosis, w/w/p  Neuro: A&Ox3, no gross focal deficits    ED Course        Procedures                        Labs Ordered and Resulted from Time of ED Arrival to Time of ED Departure   ROUTINE UA WITH MICROSCOPIC REFLEX TO CULTURE - Abnormal       Result Value    Color Urine Straw      Appearance Urine Clear      Glucose Urine Negative      Bilirubin Urine Negative      Ketones Urine Negative      Specific Gravity Urine 1.003      Blood Urine Negative      pH Urine 6.5      Protein Albumin Urine Negative      Urobilinogen Urine Normal      Nitrite Urine Negative      Leukocyte Esterase Urine Negative      Mucus Urine Present (*)     RBC Urine 0      WBC Urine <1      Squamous Epithelials Urine <1     COMPREHENSIVE METABOLIC PANEL - Normal    Sodium 140      Potassium 3.8      Chloride 104      Carbon Dioxide (CO2) 22      Anion Gap 14      Urea Nitrogen 14.1      Creatinine 1.00      Calcium 9.7      Glucose 87      Alkaline Phosphatase 87      AST 26      ALT 13      Protein Total 7.8      Albumin 4.6      Bilirubin Total 0.3      GFR Estimate >90     LIPASE - Normal    Lipase 34     CBC WITH PLATELETS AND DIFFERENTIAL    WBC Count 6.0      RBC Count 5.06      Hemoglobin 14.4      Hematocrit 44.6      MCV 88      MCH 28.5      MCHC 32.3      RDW 12.1      Platelet Count " 223      % Neutrophils 42      % Lymphocytes 43      % Monocytes 9      % Eosinophils 5      % Basophils 1      % Immature Granulocytes 0      NRBCs per 100 WBC 0      Absolute Neutrophils 2.5      Absolute Lymphocytes 2.6      Absolute Monocytes 0.5      Absolute Eosinophils 0.3      Absolute Basophils 0.0      Absolute Immature Granulocytes 0.0      Absolute NRBCs 0.0              Results for orders placed or performed during the hospital encounter of 01/13/23 (from the past 24 hour(s))   CBC with platelets differential    Narrative    The following orders were created for panel order CBC with platelets differential.  Procedure                               Abnormality         Status                     ---------                               -----------         ------                     CBC with platelets and d...[[id number]]                      Final result                 Please view results for these tests on the individual orders.   Comprehensive metabolic panel   Result Value Ref Range    Sodium 140 136 - 145 mmol/L    Potassium 3.8 3.4 - 5.3 mmol/L    Chloride 104 98 - 107 mmol/L    Carbon Dioxide (CO2) 22 22 - 29 mmol/L    Anion Gap 14 7 - 15 mmol/L    Urea Nitrogen 14.1 6.0 - 20.0 mg/dL    Creatinine 1.00 0.67 - 1.17 mg/dL    Calcium 9.7 8.6 - 10.0 mg/dL    Glucose 87 70 - 99 mg/dL    Alkaline Phosphatase 87 40 - 129 U/L    AST 26 10 - 50 U/L    ALT 13 10 - 50 U/L    Protein Total 7.8 6.4 - 8.3 g/dL    Albumin 4.6 3.5 - 5.2 g/dL    Bilirubin Total 0.3 <=1.2 mg/dL    GFR Estimate >90 >60 mL/min/1.73m2   Lipase   Result Value Ref Range    Lipase 34 13 - 60 U/L   CBC with platelets and differential   Result Value Ref Range    WBC Count 6.0 4.0 - 11.0 10e3/uL    RBC Count 5.06 4.40 - 5.90 10e6/uL    Hemoglobin 14.4 13.3 - 17.7 g/dL    Hematocrit 44.6 40.0 - 53.0 %    MCV 88 78 - 100 fL    MCH 28.5 26.5 - 33.0 pg    MCHC 32.3 31.5 - 36.5 g/dL    RDW 12.1 10.0 - 15.0 %    Platelet Count 223 150 - 450 10e3/uL     % Neutrophils 42 %    % Lymphocytes 43 %    % Monocytes 9 %    % Eosinophils 5 %    % Basophils 1 %    % Immature Granulocytes 0 %    NRBCs per 100 WBC 0 <1 /100    Absolute Neutrophils 2.5 1.6 - 8.3 10e3/uL    Absolute Lymphocytes 2.6 0.8 - 5.3 10e3/uL    Absolute Monocytes 0.5 0.0 - 1.3 10e3/uL    Absolute Eosinophils 0.3 0.0 - 0.7 10e3/uL    Absolute Basophils 0.0 0.0 - 0.2 10e3/uL    Absolute Immature Granulocytes 0.0 <=0.4 10e3/uL    Absolute NRBCs 0.0 10e3/uL   UA with Microscopic reflex to Culture    Specimen: Urine, Clean Catch   Result Value Ref Range    Color Urine Straw Colorless, Straw, Light Yellow, Yellow    Appearance Urine Clear Clear    Glucose Urine Negative Negative mg/dL    Bilirubin Urine Negative Negative    Ketones Urine Negative Negative mg/dL    Specific Gravity Urine 1.003 1.003 - 1.035    Blood Urine Negative Negative    pH Urine 6.5 5.0 - 7.0    Protein Albumin Urine Negative Negative mg/dL    Urobilinogen Urine Normal Normal, 2.0 mg/dL    Nitrite Urine Negative Negative    Leukocyte Esterase Urine Negative Negative    Mucus Urine Present (A) None Seen /LPF    RBC Urine 0 <=2 /HPF    WBC Urine <1 <=5 /HPF    Squamous Epithelials Urine <1 <=1 /HPF    Narrative    Urine Culture not indicated   [place] Draw    Narrative    The following orders were created for panel order [place] Draw.  Procedure                               Abnormality         Status                     ---------                               -----------         ------                     Extra Blue Top Tube[[id number]]                              Final result               Extra Red Top Tube[[id number]]                               Final result                 Please view results for these tests on the individual orders.   Extra Blue Top Tube   Result Value Ref Range    Hold Specimen JIC    Extra Red Top Tube   Result Value Ref Range    Hold Specimen JIC    US Renal Limited    Narrative    EXAMINATION: US RENAL  LIMITED, 1/13/2023 7:36 PM     COMPARISON: None.    HISTORY: R flank pain, urinary symptoms, hx nephrolithiasis and left  nephrectomy, pt declined CT scan    TECHNIQUE: The kidneys and bladder were scanned in the standard  fashion with specialized ultrasound transducer(s) using both gray  scale and limited color/spectral Doppler techniques.    FINDINGS:    Right kidney: Measures 12.2 cm in length. Parenchyma is of normal  thickness and echogenicity. No focal mass. No hydronephrosis.    Left kidney: Nephrectomy.    Bladder: Well-distended bladder. Right ureteral jet identified.      Impression    IMPRESSION:  No nephrolithiasis. No hydronephrosis.    I have personally reviewed the examination and initial interpretation  and I agree with the findings.    KHURRAM VICENTE MD         SYSTEM ID:  [id number]       Labs, vital signs, and imaging studies were reviewed by me.    Medications   0.9% sodium chloride BOLUS (0 mLs Intravenous Stopped 1/13/23 2018)     Followed by   sodium chloride 0.9% infusion (has no administration in time range)       Assessments & Plan (with Medical Decision Making)   Emelyn Marmolejo is a 33 year old male who presents to the emergency department with right-sided abdominal pain.  Differential diagnosis includes nephrolithiasis, cholelithiasis, cholecystitis, hepatitis, urinary tract infection, musculoskeletal flank pain.  Greatest concern in this patient who has had prior kidney stones and left nephrectomy would be recurrent nephrolithiasis.     IV fluids were ordered.    Labs, urinalysis, and ultrasound were ordered to further evaluate the patient.  Patient declined pain medication in the emergency department.  Discussed obtaining CT scan of the abdomen and pelvis without contrast to rule out stone, however, the patient is adamant that he would not like to have this performed as he is concerned that he has received too many CT scans in the past and he is concerned about the cumulative radiation.   I discussed with the patient the risks of declining this procedure, primarily related to undiagnosed nephrolithiasis or other acute intra-abdominal pathology requiring treatment.  I did inform the patient that this could lead to complications such as kidney failure (possibly requiring permanent hemodialysis), infection, or death.  The patient expresses understanding of these risks and would still like to decline CT scan.  The patient states that if his pain persists, he will follow-up with his PCP or return here for consideration of additional testing.  He does agree to labs, urinalysis, and ultrasound at this time.    Laboratory work-up is remarkable for normal creatinine, normal white blood cell count, urinalysis does not appear consistent with UTI, there is no hematuria    Ultrasound shows no nephrolithiasis or hydronephrosis    Medical Decision Making  The patient presented with a problem that is acute and uncomplicated.    The patient's evaluation involved:  review of 3+ prior external note(s) (see separate area of note for details)  ordering and review of 3+ test(s) (see separate area of note for details)  review of 3+ test result(s) ordered prior to this encounter (see separate area of note for details)  strong consideration of a test (see separate area of note for details) that was ultimately deferred  independent interpretation of testing performed by another health professional (see separate area of note for details)    The patient's management involved prescription drug management.    I have reviewed the nursing notes.    I have reviewed the findings, diagnosis, plan and need for follow up with the patient.    Patient to be discharged home. Advised to follow up with PCP within 1 week. To return to ER immediately with any new/worsening symptoms. Plan of care discussed with patient who expresses understanding and agrees with plan of care.    Patient was provided with a prescription for Tylenol for pain  control at home per his request.    Discharge Medication List as of 1/13/2023  8:37 PM      START taking these medications    Details   acetaminophen (TYLENOL) 500 MG tablet Take 1-2 tablets (500-1,000 mg) by mouth every 6 hours as needed for pain or fever, Disp-60 tablet, R-0, E-Prescribe             Final diagnoses:   Right flank pain       Malina Estrada MD  1/13/2023   [facility] EMERGENCY DEPARTMENT     Malina Estrada MD  01/13/23 1203

## 2023-01-26 ENCOUNTER — OFFICE VISIT (OUTPATIENT)
Dept: FAMILY MEDICINE | Facility: CLINIC | Age: 33
End: 2023-01-26
Payer: COMMERCIAL

## 2023-01-26 ENCOUNTER — TELEPHONE (OUTPATIENT)
Dept: UROLOGY | Facility: CLINIC | Age: 33
End: 2023-01-26

## 2023-01-26 VITALS
OXYGEN SATURATION: 100 % | BODY MASS INDEX: 20.58 KG/M2 | WEIGHT: 135.8 LBS | SYSTOLIC BLOOD PRESSURE: 107 MMHG | HEIGHT: 68 IN | RESPIRATION RATE: 12 BRPM | DIASTOLIC BLOOD PRESSURE: 68 MMHG | TEMPERATURE: 98.2 F | HEART RATE: 68 BPM

## 2023-01-26 DIAGNOSIS — R10.9 RIGHT FLANK PAIN: Primary | ICD-10-CM

## 2023-01-26 DIAGNOSIS — Z11.3 SCREEN FOR STD (SEXUALLY TRANSMITTED DISEASE): ICD-10-CM

## 2023-01-26 DIAGNOSIS — R10.84 ABDOMINAL PAIN, GENERALIZED: ICD-10-CM

## 2023-01-26 DIAGNOSIS — R30.0 DYSURIA: ICD-10-CM

## 2023-01-26 DIAGNOSIS — Z90.5 H/O LEFT NEPHRECTOMY: ICD-10-CM

## 2023-01-26 PROBLEM — N26.1 RENAL ATROPHY, LEFT: Status: RESOLVED | Noted: 2019-08-21 | Resolved: 2023-01-26

## 2023-01-26 PROBLEM — N26.1 ATROPHY OF LEFT KIDNEY: Status: RESOLVED | Noted: 2020-12-29 | Resolved: 2023-01-26

## 2023-01-26 LAB
ALBUMIN UR-MCNC: NEGATIVE MG/DL
APPEARANCE UR: CLEAR
BACTERIA #/AREA URNS HPF: NORMAL /HPF
BILIRUB UR QL STRIP: NEGATIVE
COLOR UR AUTO: YELLOW
GLUCOSE UR STRIP-MCNC: NEGATIVE MG/DL
HBV SURFACE AB SERPL IA-ACNC: 0 M[IU]/ML
HBV SURFACE AB SERPL IA-ACNC: NONREACTIVE M[IU]/ML
HCV AB SERPL QL IA: NONREACTIVE
HGB UR QL STRIP: ABNORMAL
HIV 1+2 AB+HIV1 P24 AG SERPL QL IA: NONREACTIVE
KETONES UR STRIP-MCNC: NEGATIVE MG/DL
LEUKOCYTE ESTERASE UR QL STRIP: NEGATIVE
NITRATE UR QL: NEGATIVE
PH UR STRIP: 6 [PH] (ref 5–8)
RBC #/AREA URNS AUTO: NORMAL /HPF
SP GR UR STRIP: 1.01 (ref 1–1.03)
T PALLIDUM AB SER QL: NONREACTIVE
UROBILINOGEN UR STRIP-ACNC: 0.2 E.U./DL
WBC #/AREA URNS AUTO: NORMAL /HPF

## 2023-01-26 PROCEDURE — 86706 HEP B SURFACE ANTIBODY: CPT | Performed by: STUDENT IN AN ORGANIZED HEALTH CARE EDUCATION/TRAINING PROGRAM

## 2023-01-26 PROCEDURE — 87491 CHLMYD TRACH DNA AMP PROBE: CPT | Performed by: STUDENT IN AN ORGANIZED HEALTH CARE EDUCATION/TRAINING PROGRAM

## 2023-01-26 PROCEDURE — 87591 N.GONORRHOEAE DNA AMP PROB: CPT | Performed by: STUDENT IN AN ORGANIZED HEALTH CARE EDUCATION/TRAINING PROGRAM

## 2023-01-26 PROCEDURE — 86780 TREPONEMA PALLIDUM: CPT | Performed by: STUDENT IN AN ORGANIZED HEALTH CARE EDUCATION/TRAINING PROGRAM

## 2023-01-26 PROCEDURE — 99214 OFFICE O/P EST MOD 30 MIN: CPT | Mod: GC | Performed by: STUDENT IN AN ORGANIZED HEALTH CARE EDUCATION/TRAINING PROGRAM

## 2023-01-26 PROCEDURE — 87389 HIV-1 AG W/HIV-1&-2 AB AG IA: CPT | Performed by: STUDENT IN AN ORGANIZED HEALTH CARE EDUCATION/TRAINING PROGRAM

## 2023-01-26 PROCEDURE — 87086 URINE CULTURE/COLONY COUNT: CPT | Performed by: STUDENT IN AN ORGANIZED HEALTH CARE EDUCATION/TRAINING PROGRAM

## 2023-01-26 PROCEDURE — 36415 COLL VENOUS BLD VENIPUNCTURE: CPT | Performed by: STUDENT IN AN ORGANIZED HEALTH CARE EDUCATION/TRAINING PROGRAM

## 2023-01-26 PROCEDURE — 86803 HEPATITIS C AB TEST: CPT | Performed by: STUDENT IN AN ORGANIZED HEALTH CARE EDUCATION/TRAINING PROGRAM

## 2023-01-26 PROCEDURE — 81001 URINALYSIS AUTO W/SCOPE: CPT | Performed by: STUDENT IN AN ORGANIZED HEALTH CARE EDUCATION/TRAINING PROGRAM

## 2023-01-26 ASSESSMENT — PAIN SCALES - GENERAL: PAINLEVEL: MODERATE PAIN (5)

## 2023-01-26 ASSESSMENT — PATIENT HEALTH QUESTIONNAIRE - PHQ9: SUM OF ALL RESPONSES TO PHQ QUESTIONS 1-9: 1

## 2023-01-26 NOTE — PROGRESS NOTES
Assessment & Plan     Right flank pain  Dysuria  H/O left nephrectomy  Screen for STD (sexually transmitted disease)  Patient presenting with suprapubic pain and history of right flank pain, with a past medical history significant for left nephrectomy.  Recent visit to the ED was unrevealing for etiology of flank pain.  An ultrasound was performed that showed no evidence of hydronephrosis or nephrolithiasis.  Urinalysis was unrevealing at that time.  Urinalysis repeated today with culture.  After brief sexual history, appropriate to do STI screening today as well which may be causing his pain.  We will also refer to urology, as patient with lower urinary symptoms with single kidney should be established with a urologist.  If no etiology discovered with today's labs would urge patient to follow-up with urology as soon as possible.  - Adult Urology  Referral  - Chlamydia trachomatis/Neisseria gonorrhoeae by PCR - Clinic Collect  - HIV Antigen Antibody Combo  - Treponema Abs w Reflex to RPR and Titer  - Hepatitis C Screen Reflex to HCV RNA Quant and Genotype  - Adult Urology  Referral  - HIV Antigen Antibody Combo  - UA reflex to Microscopic  - Urine Culture Aerobic Bacterial  - Urine Microscopic Exam  - Hepatitis B Surface Antibody    No follow-ups on file.    Melissa Garcia [facility] Duna[facility] is a 33 year old, presenting for the following health issues:  Back Pain and Urinary Problem (Bladder pain and urinating problems)      HPI     Urinary problems  - Pain on right side (flank) resolving, now having supra-pubic and pubic pain  - Pain/pressure/discomfort  - Urgency, frequency  - urine smells bad  - sexually active, no problems in that area  - More than one partner, does not use condoms    Review of Systems   Constitutional, HEENT, cardiovascular, pulmonary, gi and gu systems are negative, except as otherwise noted.      Objective    /68   Pulse 68  "  Temp 98.2  F (36.8  C) (Oral)   Resp 12   Ht 1.715 m (5' 7.5\")   Wt 61.6 kg (135 lb 12.8 oz)   SpO2 100%   BMI 20.96 kg/m    Body mass index is 20.96 kg/m .  Physical Exam   GENERAL: healthy, alert and no distress  RESP: lungs clear to auscultation - no rales, rhonchi or wheezes  CV: regular rate and rhythm, normal S1 S2, no S3 or S4, no murmur, click or rub, no peripheral edema and peripheral pulses strong  ABDOMEN: soft, nontender, no hepatosplenomegaly, no masses and bowel sounds normal  MS: no gross musculoskeletal defects noted, no edema  Patient declined  exam.                    "

## 2023-01-26 NOTE — PATIENT INSTRUCTIONS
It was great to see you today! Thanks for coming to [facility]!      Here is the plan from [facility]'s visit    Testing today, I will let you know the results by [employer] when they are back.      Thank you for coming to [facility] today.  Lab Testing:  **If you had lab testing today and your results are reassuring or normal they will be mailed to you or sent through [employer] within 7 days.   **If the lab tests need quick action we will call you with the results.  **If you are having labs done on a different day, please call [phone] to schedule at [facility] or [phone] for other [facility] locations. Labs do not offer walk-in appointments.  The phone number we will call with results is # [phone] (home) . If this is not the best number please call our clinic and change the number.    Medication Refills:  If you need any refills please call your pharmacy and they will contact us.   If you need to  your refill at a new pharmacy, please contact the new pharmacy directly. The new pharmacy will help you get your medications transferred faster.       Scheduling, Urgent Medical Concerns (24 hours a day!), or ultrasound [phone] (8-5:00 M-F)    Referrals: If a referral was made to an [facility] specialty provider and you do not get a call from central scheduling, please refer to directions on your visit summary or call our office during normal business hours for assistance.     If a Mammogram was ordered for you at the Breast Center call [phone] to schedule or change your appointment.  If you had an XRay/CT/Ultrasound/MRI ordered the number is [phone] to schedule or change your radiology appointment.       Melissa Garcia, DO  Pronouns: she/her/hers  Resident Physician  [facility] - [facility]/ [facility]    Department of Family Medicine and Community Health

## 2023-01-26 NOTE — LETTER
January 27, 2023      Emelyn Marmolejo  [address] [address]        Dear Jaleel,    We are writing to inform you of your test results.    Your test results fall within the expected range(s) or remain unchanged from previous results.  Please continue with current treatment plan and see Urology as instructed at your last visit.    Resulted Orders   Chlamydia trachomatis/Neisseria gonorrhoeae by PCR - Clinic Collect   Result Value Ref Range    Chlamydia Trachomatis Negative Negative      Comment:      Negative for C. trachomatis rRNA by transcription mediated amplification.   A negative result by transcription mediated amplification does not preclude the presence of infection because results are dependent on proper and adequate collection, absence of inhibitors and sufficient rRNA to be detected.    Neisseria gonorrhoeae Negative Negative      Comment:      Negative for N. gonorrhoeae rRNA by transcription mediated amplification. A negative result by transcription mediated amplification does not preclude the presence of C. trachomatis infection because results are dependent on proper and adequate collection, absence of inhibitors and sufficient rRNA to be detected.   HIV Antigen Antibody Combo   Result Value Ref Range    HIV Antigen Antibody Combo Nonreactive Nonreactive      Comment:      HIV-1 p24 Ag & HIV-1/HIV-2 Ab Not Detected   Treponema Abs w Reflex to RPR and Titer   Result Value Ref Range    Treponema Antibody Total Nonreactive Nonreactive   Hepatitis C Screen Reflex to HCV RNA Quant and Genotype   Result Value Ref Range    Hepatitis C Antibody Nonreactive Nonreactive    Narrative    Assay performance characteristics have not been established for newborns, infants, and children.   UA reflex to Microscopic   Result Value Ref Range    Color Urine Yellow Colorless, Straw, Light Yellow, Yellow    Appearance Urine Clear Clear    Glucose Urine Negative Negative mg/dL    Bilirubin Urine Negative  Negative    Ketones Urine Negative Negative mg/dL    Specific Gravity Urine 1.010 1.005 - 1.030    Blood Urine Small (A) Negative    pH Urine 6.0 5.0 - 8.0    Protein Albumin Urine Negative Negative mg/dL    Urobilinogen Urine 0.2 0.2, 1.0 E.U./dL    Nitrite Urine Negative Negative    Leukocyte Esterase Urine Negative Negative   Hepatitis B Surface Antibody   Result Value Ref Range    Hepatitis B Surface Antibody Instrument Value 0.00 <8.00 m[IU]/mL    Hepatitis B Surface Antibody Nonreactive       Comment:      No antibody detected when the value is less than 8.00 mIU/mL.   Urine Microscopic Exam   Result Value Ref Range    Bacteria Urine None Seen None Seen /HPF    RBC Urine None Seen 0-2 /HPF /HPF    WBC Urine None Seen 0-5 /HPF /HPF       If you have any questions or concerns, please call the clinic at the number listed above.       Sincerely,      Melissa Garcia, DO  Pronouns: she/her/hers  Resident Physician  [facility] - [facility]/ [facility]    Department of Family Medicine and [facility]

## 2023-01-26 NOTE — TELEPHONE ENCOUNTER
[facility] Call Center    Phone Message    May a detailed message be left on voicemail: yes     Reason for Call: Appointment Intake    Referring Provider Name: Liliana Ward MD  Diagnosis and/or Symptoms:   Patient has a single kidney with lower urinary symptoms., Right flank pain ,  Dysuria     Emelyn has referral to be seen for dx above. Ebergillian is a pt of Dr. Hedrick and last seen him in Sept 2022. He then seen Dr Oliva Oct 2022 for a second opinion, but was told that [facility] mostly operates and he is also not looking to do a VV, so pt is now requesting to see another urologist because Dr Hedrick's first available isn't until June 2023. Please review and reach out to pt to discuss options. Thanks    Action Taken: Other: uro    Travel Screening: Not Applicable

## 2023-01-26 NOTE — PROGRESS NOTES
Preceptor Attestation:    I discussed the patient with the resident and evaluated the patient in person. I have verified the content of the note, which accurately reflects my assessment of the patient and the plan of care.   Supervising Physician:  Liliana Ward MD.

## 2023-01-27 LAB
C TRACH DNA SPEC QL PROBE+SIG AMP: NEGATIVE
N GONORRHOEA DNA SPEC QL NAA+PROBE: NEGATIVE

## 2023-01-28 ENCOUNTER — HOSPITAL ENCOUNTER (EMERGENCY)
Facility: CLINIC | Age: 33
Discharge: HOME | End: 2023-01-28
Payer: COMMERCIAL

## 2023-01-28 LAB — BACTERIA UR CULT: NO GROWTH

## 2023-01-30 NOTE — TELEPHONE ENCOUNTER
Message left for patient to call back to schedule a follow up.  Per recent ultrasound, no stones.  Can possibly order a ct to evaluate for stones and or infection?  Dotty Miller RN

## 2023-02-01 ENCOUNTER — ALLIED HEALTH/NURSE VISIT (OUTPATIENT)
Dept: UROLOGY | Facility: CLINIC | Age: 33
End: 2023-02-01
Payer: COMMERCIAL

## 2023-02-01 DIAGNOSIS — R31.29 MICROSCOPIC HEMATURIA: Primary | ICD-10-CM

## 2023-02-01 DIAGNOSIS — R39.14 FEELING OF INCOMPLETE BLADDER EMPTYING: ICD-10-CM

## 2023-02-01 LAB
ALBUMIN UR-MCNC: NEGATIVE MG/DL
APPEARANCE UR: CLEAR
BILIRUB UR QL STRIP: NEGATIVE
COLOR UR AUTO: YELLOW
GLUCOSE UR STRIP-MCNC: NEGATIVE MG/DL
HGB UR QL STRIP: ABNORMAL
KETONES UR STRIP-MCNC: NEGATIVE MG/DL
LEUKOCYTE ESTERASE UR QL STRIP: NEGATIVE
NITRATE UR QL: NEGATIVE
PH UR STRIP: 7 [PH] (ref 5–7)
PR INTERVAL - MUSE: 0
SP GR UR STRIP: 1.01 (ref 1–1.03)
UROBILINOGEN UR STRIP-ACNC: 0.2 E.U./DL

## 2023-02-01 PROCEDURE — 51798 US URINE CAPACITY MEASURE: CPT

## 2023-02-01 PROCEDURE — 81003 URINALYSIS AUTO W/O SCOPE: CPT

## 2023-02-01 NOTE — PROGRESS NOTES
Emelyn Marmolejo comes into clinic today at the request of Dr. Hedrick Ordering Provider for PVR.    Patient comes into clinic with complaint of not being able to void all but tiny amounts. Patient is concerned he is not emptying his bladder and is also having some flank pain on (r) side. Patient's urine was collected for a UA today which showed- UA RESULTS:  Recent Labs   Lab Test 02/01/23  1338 01/26/23  0850   COLOR Yellow Yellow   APPEARANCE Clear Clear   URINEGLC Negative Negative   URINEBILI Negative Negative   URINEKETONE Negative Negative   SG 1.010 1.010   UBLD Trace* Small*   URINEPH 7.0 6.0   PROTEIN Negative Negative   UROBILINOGEN 0.2 0.2   NITRITE Negative Negative   LEUKEST Negative Negative   RBCU  --  None Seen   WBCU  --  None Seen     Patient's PVR was 0ml. Patient reassured that he is emptying his bladder just fine. Reassured patient per MD that recent renal US did not show anything and MD is not concerned it's coming from his kidney. Reassured patient that he is emptying his bladder just fine and should follow-up with his PCP if symptoms persist.       This service provided today was under the supervising provider of the day Dr. Hedrick, who was available if needed.    Janice Mojica LPN

## 2023-02-21 ENCOUNTER — OFFICE VISIT (OUTPATIENT)
Dept: FAMILY MEDICINE | Facility: CLINIC | Age: 33
End: 2023-02-21
Payer: COMMERCIAL

## 2023-02-21 VITALS
HEIGHT: 69 IN | SYSTOLIC BLOOD PRESSURE: 119 MMHG | RESPIRATION RATE: 12 BRPM | HEART RATE: 77 BPM | TEMPERATURE: 98.5 F | OXYGEN SATURATION: 97 % | DIASTOLIC BLOOD PRESSURE: 74 MMHG | WEIGHT: 137.6 LBS | BODY MASS INDEX: 20.38 KG/M2

## 2023-02-21 DIAGNOSIS — Z90.5 H/O LEFT NEPHRECTOMY: ICD-10-CM

## 2023-02-21 DIAGNOSIS — G47.9 SLEEP TROUBLE: ICD-10-CM

## 2023-02-21 DIAGNOSIS — Z23 ENCOUNTER FOR VACCINATION: ICD-10-CM

## 2023-02-21 DIAGNOSIS — Z00.00 HEALTHCARE MAINTENANCE: ICD-10-CM

## 2023-02-21 DIAGNOSIS — E55.9 VITAMIN D DEFICIENCY: ICD-10-CM

## 2023-02-21 DIAGNOSIS — N39.41 URGE INCONTINENCE OF URINE: Primary | ICD-10-CM

## 2023-02-21 DIAGNOSIS — R31.9 HEMATURIA, UNSPECIFIED TYPE: ICD-10-CM

## 2023-02-21 PROBLEM — N20.0 RENAL STONE: Status: RESOLVED | Noted: 2019-08-21 | Resolved: 2023-02-21

## 2023-02-21 PROBLEM — J47.9 BRONCHIECTASIS, UNCOMPLICATED (H): Status: RESOLVED | Noted: 2021-03-09 | Resolved: 2023-02-21

## 2023-02-21 PROCEDURE — 91312 COVID-19 VACCINE BIVALENT BOOSTER 12+ (PFIZER): CPT | Performed by: STUDENT IN AN ORGANIZED HEALTH CARE EDUCATION/TRAINING PROGRAM

## 2023-02-21 PROCEDURE — 0124A COVID-19 VACCINE BIVALENT BOOSTER 12+ (PFIZER): CPT | Performed by: STUDENT IN AN ORGANIZED HEALTH CARE EDUCATION/TRAINING PROGRAM

## 2023-02-21 PROCEDURE — 99214 OFFICE O/P EST MOD 30 MIN: CPT | Mod: 25 | Performed by: STUDENT IN AN ORGANIZED HEALTH CARE EDUCATION/TRAINING PROGRAM

## 2023-02-21 RX ORDER — GABAPENTIN 300 MG/1
300 CAPSULE ORAL DAILY
COMMUNITY
Start: 2023-02-07 | End: 2023-03-06

## 2023-02-21 RX ORDER — CHOLECALCIFEROL (VITAMIN D3) 50 MCG
1 TABLET ORAL DAILY
Qty: 90 TABLET | Refills: 1 | Status: SHIPPED | OUTPATIENT
Start: 2023-02-21 | End: 2023-03-28

## 2023-02-21 RX ORDER — TRAZODONE HYDROCHLORIDE 50 MG/1
50-100 TABLET, FILM COATED ORAL
Qty: 30 TABLET | Refills: 0 | Status: SHIPPED | OUTPATIENT
Start: 2023-02-21 | End: 2023-07-28

## 2023-02-21 ASSESSMENT — PAIN SCALES - GENERAL: PAINLEVEL: SEVERE PAIN (7)

## 2023-02-21 NOTE — PATIENT INSTRUCTIONS
Patient Education   Here is the plan from today's visit    1. Urge incontinence of urine  2. Hematuria, unspecified type  - Adult Urology  Referral; Future      3. Healthcare maintenance  - vitamin D3 (CHOLECALCIFEROL) 50 mcg (2000 units) tablet; Take 1 tablet (50 mcg) by mouth daily  Dispense: 90 tablet; Refill: 1    4. Vitamin D deficiency  - traZODone (DESYREL) 50 MG tablet; Take 1-2 tablets ( mg) by mouth nightly as needed for sleep  Dispense: 30 tablet; Refill: 0      Please call or return to clinic if your symptoms don't go away.    Follow up plan  Return if symptoms worsen or fail to improve.    Thank you for coming to [facility] today.  Lab Testing:  **If you had lab testing today and your results are reassuring or normal they will be mailed to you or sent through [employer] within 7 days.   **If the lab tests need quick action we will call you with the results.  **If you are having labs done on a different day, please call [phone] to schedule at [facility] or [phone] for other [facility] Outpatient Lab locations. Labs do not offer walk-in appointments.  The phone number we will call with results is # [phone] (home) . If this is not the best number please call our clinic and change the number.  Medication Refills:  If you need any refills please call your pharmacy and they will contact us.   If you need to  your refill at a new pharmacy, please contact the new pharmacy directly. The new pharmacy will help you get your medications transferred faster.   Scheduling:  If you have any concerns about today's visit or wish to schedule another appointment please call our office during normal business hours [phone] (8-5:00 M-F). If you can no longer make a scheduled visit, please cancel via [employer] or call us to cancel.   If a referral was made to an [facility] specialty provider and you do not get a call from central scheduling, please refer to directions on  your visit summary or call our office during normal business hours for assistance.   If a Mammogram was ordered for you at the [facility] call [phone] to schedule or change your appointment.  If you had an XRay/CT/Ultrasound/MRI ordered the number is [phone] to schedule or change your radiology appointment.   [facility] has limited ultrasound appointments available on Wednesdays, if you would like your ultrasound at [facility], please call [phone] to schedule.   Medical Concerns:  If you have urgent medical concerns please call [phone] at any time of the day.    Mallika Hallman, DO

## 2023-02-21 NOTE — PROGRESS NOTES
Assessment & Plan     Urge incontinence of urine  Hematuria, unspecified type  H/o L nephrectomy  Imaging and procedures since Oct 2022 including R renal US, cystoscopy, PVR, abdominal CT have all been negative for bladder outlet obstruction, kidney stone, hydronephrosis, mass on kidney or bladder. Ddx: Overactive bladder vs Psychosomatic (admits to being very nervous about mechanism of losing L kidney). Likely not medication related (only takes Trazodone, Gabapentin), no obvious constipation, no alcohol consumption but does drink tea with caffeine; however these would not explain hematuria. No signs of infection. Recommend returning to urology for lasix urogram (mentioned in 10/22 note).   - Adult Urology  Referral  - tylenol q6h prn  - heating pad over R flank and RLQ  - provided bladder training instructions to trial prior to urology appt    Healthcare maintenance  - vitamin D3 (CHOLECALCIFEROL) 50 mcg (2000 units) tablet; Take 1 tablet (50 mcg) by mouth daily    Vitamin D deficiency  - traZODone (DESYREL) 50 MG tablet; Take 1-2 tablets ( mg) by mouth nightly as needed for sleep    Encounter for vaccination  COVID booster given     Return if symptoms worsen or fail to improve.    Mallika Hallman [facility] is a 33 year old presenting for the following health issues:  Urinary Pain (Pt reports painful urination since January. Pt reports bladder also hurts.)      HPI     hx nephrolithiasis and left Nephrectomy (making him very nervous)    Saw urology 10/22; Unusual history of right flank pain and dysuria status post left nephrectomy (2021) for atrophic kidney secondary to stone. Persistent dysuria has resolved after Dr Devi removed residual left UVJ stone. Right retrograde at time of recent ureteroscopy was normal.         Was offered lasix renogram to check for obstruction but he wants in person visit first.   CT scan negative in November    Painful  "urination since January; R flank pain, urinary urgency  1/13 renal US: Right kidney measures 12.2 cm in length. Parenchyma is of normal  thickness and echogenicity. No focal mass. No hydronephrosis.  1/28 ED was unrevealing for etiology of flank pain.  An ultrasound was performed that showed no evidence of hydronephrosis or nephrolithiasis    2/1/23: PVR was 0mL    When he uses lemon, olive oil, honey helps him feel like his bladder is emptying   Alcohol: none  Caffeine: tea  BM: daily  Now having \"pressure inside his penis\", urgency      Review of Systems   Constitutional, HEENT, cardiovascular, pulmonary, gi and gu systems are negative, except as otherwise noted.      Objective    /74   Pulse 77   Temp 98.5  F (36.9  C)   Resp 12   Ht 1.763 m (5' 9.41\")   Wt 62.4 kg (137 lb 9.6 oz)   SpO2 97%   BMI 20.08 kg/m    Body mass index is 20.08 kg/m .     Physical Exam   General: Alert and oriented  Skin: Warm and dry, no abnormalities noted.  Eyes: Extra-ocular muscles intact, pupils equal and reactive.  ENT: Speech intact, nasal passages open, no hearing impairment noted.  CV: No cyanosis or pallor, warm and well perfused.  GI/: tenderness to palpation over RUQ, RLQ, R posterior lower ribs; no mass appreciated, liver border not felt  Respiratory: No respiratory distress, no accessory muscle use.  Neuro: Gait and station normal, comprehension intact. Gross and fine motor skills intact.   Psychiatric: Mood is anxious  Extremities: Warm, able to move all four extremities at will.    Allied Health/Nurse Visit on 02/01/2023   Component Date Value Ref Range Status     Color Urine 02/01/2023 Yellow  Colorless, Straw, Light Yellow, Yellow Final     Appearance Urine 02/01/2023 Clear  Clear Final     Glucose Urine 02/01/2023 Negative  Negative mg/dL Final     Bilirubin Urine 02/01/2023 Negative  Negative Final     Ketones Urine 02/01/2023 Negative  Negative mg/dL Final     Specific Gravity Urine 02/01/2023 1.010  " 1.003 - 1.035 Final     Blood Urine 02/01/2023 Trace (A)  Negative Final     pH Urine 02/01/2023 7.0  5.0 - 7.0 Final     Protein Albumin Urine 02/01/2023 Negative  Negative mg/dL Final     Urobilinogen Urine 02/01/2023 0.2  0.2, 1.0 E.U./dL Final     Nitrite Urine 02/01/2023 Negative  Negative Final     Leukocyte Esterase Urine 02/01/2023 Negative  Negative Final     ME Interval 02/01/2023 0   Final

## 2023-02-22 ENCOUNTER — TELEPHONE (OUTPATIENT)
Dept: UROLOGY | Facility: CLINIC | Age: 33
End: 2023-02-22
Payer: COMMERCIAL

## 2023-02-22 NOTE — TELEPHONE ENCOUNTER
Message sent to [facility] scheduling team with request to contact patient to assist in scheduling with [facility] provider per patient request.    Inez Mora RN, BSN

## 2023-02-22 NOTE — TELEPHONE ENCOUNTER
[facility] Call Center    Phone Message    May a detailed message be left on voicemail: yes     Reason for Call: Appointment Intake    Referring Provider Name: Tatyana Ricks MD  Diagnosis and/or Symptoms: urge incontinence, hematuria with normal PVR, cystoscopy    Pt referred for above. Pt currently sees Floridalma in [place]. Pt states he would like to see a different provider due to location. Prefers [place] and a male provider. Sending to clinic where pt is established per guidelines. Please review and follow-up with patient and his request to switch providers.    Action Taken: Message routed to:  Other: [facility]    Travel Screening: Not Applicable

## 2023-02-24 NOTE — TELEPHONE ENCOUNTER
Spoke with pt and advised him of his appointment scheduled and that we could get him scheduled at [facility] but we are booked out for months. He will keep appointment in [place] and request [facility] after for future appointments.

## 2023-03-01 ENCOUNTER — OFFICE VISIT (OUTPATIENT)
Dept: UROLOGY | Facility: CLINIC | Age: 33
End: 2023-03-01
Attending: FAMILY MEDICINE
Payer: COMMERCIAL

## 2023-03-01 VITALS
SYSTOLIC BLOOD PRESSURE: 106 MMHG | WEIGHT: 137 LBS | BODY MASS INDEX: 20.29 KG/M2 | HEIGHT: 69 IN | DIASTOLIC BLOOD PRESSURE: 58 MMHG

## 2023-03-01 DIAGNOSIS — Z90.5 S/P NEPHRECTOMY: Primary | ICD-10-CM

## 2023-03-01 DIAGNOSIS — N39.41 URGE INCONTINENCE OF URINE: ICD-10-CM

## 2023-03-01 LAB
ALBUMIN UR-MCNC: NEGATIVE MG/DL
APPEARANCE UR: CLEAR
BILIRUB UR QL STRIP: NEGATIVE
COLOR UR AUTO: YELLOW
GLUCOSE UR STRIP-MCNC: NEGATIVE MG/DL
HGB UR QL STRIP: ABNORMAL
KETONES UR STRIP-MCNC: NEGATIVE MG/DL
LEUKOCYTE ESTERASE UR QL STRIP: NEGATIVE
NITRATE UR QL: NEGATIVE
PH UR STRIP: 6.5 [PH] (ref 5–7)
RESIDUAL VOLUME (RV) (EXTERNAL): 23
SP GR UR STRIP: 1.01 (ref 1–1.03)
UROBILINOGEN UR STRIP-ACNC: 0.2 E.U./DL

## 2023-03-01 PROCEDURE — 51798 US URINE CAPACITY MEASURE: CPT | Performed by: UROLOGY

## 2023-03-01 PROCEDURE — 99213 OFFICE O/P EST LOW 20 MIN: CPT | Mod: 25 | Performed by: UROLOGY

## 2023-03-01 PROCEDURE — 81003 URINALYSIS AUTO W/O SCOPE: CPT | Mod: QW | Performed by: UROLOGY

## 2023-03-01 ASSESSMENT — PAIN SCALES - GENERAL: PAINLEVEL: MILD PAIN (2)

## 2023-03-01 NOTE — NURSING NOTE
Chief Complaint   Patient presents with     Hematuria     And urgency   post void residual 23 ml  Katelyn Leach LPN

## 2023-03-01 NOTE — LETTER
3/1/2023       RE: Emelyn Marmolejo  [address] [address]     Dear Colleague,    Thank you for referring your patient, Emelyn Marmolejo, to the [facility] at [facility]. Please see a copy of my visit note below.    [place]  CHIEF COMPLAINT   It was my pleasure to see Emelyn Marmolejo who is a 33 year old male for follow-up of right flank pain, left atrophic kidney .     HPI   Emelyn Marmolejo is a very pleasant 33 year old male who presents with a history of an atrophic left kidney due to a large obstructing stone.  I had seen him previously for work-up for possible left simple nephrectomy.  At that time he was noting some right-sided flank and upper abdominal pain and is very concerned that this is related to his right kidney.  Imaging from February was reviewed with no evidence of pathology on the right side.  He subsequently been diagnosed with H. pylori and is undergoing treatment for this.  He notes continued right-sided flank and abdominal pain especially with intake of food and also prolonged sitting with driving.  He denies any fevers, chills, or hematuria.    7/3/20:  Follow-up today to discuss the results of his CT scan which was completed yesterday which demonstrates a small 3 mm distal ureteral stone.  He is feeling okay today with minimal right-sided flank pain.  He denies any fevers, chills, nausea, or vomiting.  He denies noting any stone passage    8/6/20:  Follow-up today after his CT scan last week which demonstrated persistence of the 3 mm distal ureteral stone  He notes that he is continued to have ongoing right-sided flank pain    8/25/20:  He is now s/p diagnostic Ureteroscopy on 8/13/20. No stone identified at that time with complete visualization of the entire right collecting system.  The last 3-4 days he has been doing well with resolution of the RIGHT flank pain  He is having some intermittent LEFT flank  "pain    Developed COVID-19, now recovered     12/22/20:  With the assistance of a    He notes that he has been having increase LEFT sided pain - especially in the morning  He continues to have right abdominal pain and burning with unknown cause  He wants to move forward with left nephrectomy    6/3/21:  He is status post a laparoscopic left nephrectomy on 5/24/2021  He is doing well after surgery and healing well  No left-sided pain and incisions are healing well  He notes some continued intermittent right upper back and side pain up near his ribs    9/7/21:  Follow-up today to discuss his recent urinalysis which demonstrated again persistent microscopic hematuria  He continues to have intermittent right-sided abdominal pain, but no kidney related flank pain    5/25/2022:  He had been doing well  He continues to have intermittent right sided abdominal pain and was recently in the emergency room on 5/19/2022  CT scan performed does show a increased calcification size of the left UVJ and again possible right ureteral stone versus a phlebolith  His urinalysis was normal from that ER visit  He does note some increased urinary urgency and frequency    8/1/2022:  He had previously been scheduled for ureteroscopy on 6/17/2022, however canceled this as he was out of town  He continues to have intermittent vague right-sided flank and lower quadrant abdominal pain  He continues to have issues with urinary frequency and urgency    9/7/2022:  Ureteroscopy for removal of the left UVJ stone and normal right Retrograde Pyelogram    TODAY 3/1/2023:  He has noted some ongoing right lower quadrant abdominal pain as well as some back and right flank pain which is muscle skeletal in nature  He notes that he will at times have urinary frequency and urgency  He remains very concerned about his solitary kidney    PHYSICAL EXAM  Patient is a 33 year old  male   Vitals: Blood pressure 106/58, height 1.763 m (5' 9.41\"), " weight 62.1 kg (137 lb).  Body mass index is 19.99 kg/m .  General Appearance Adult:   Alert, no acute distress, oriented  HENT: throat/mouth:normal, good dentition  Lungs: no respiratory distress, or pursed lip breathing  Heart: No obvious jugular venous distension present  Abdomen: soft, nontender, no organomegaly or masses  Incisions: Well-healed with no evidence of a hernia or palpable issues with the scar  Musculoskeltal: extremities normal, no peripheral edema  Skin: no suspicious lesions or rashes  Neuro: Alert, oriented, speech and mentation normal  Psych: affect and mood normal  Gait: Normal      Creatinine   Date Value Ref Range Status   01/13/2023 1.00 0.67 - 1.17 mg/dL Final   12/24/2022 1.13 0.66 - 1.25 mg/dL Final   11/02/2022 0.98 0.66 - 1.25 mg/dL Final   08/30/2022 1.04 0.66 - 1.25 mg/dL Final   08/04/2022 1.16 0.66 - 1.25 mg/dL Final     UA RESULTS:  Recent Labs   Lab Test 03/01/23  1255 02/01/23  1338 01/26/23  0850   COLOR Yellow   < > Yellow   APPEARANCE Clear   < > Clear   URINEGLC Negative   < > Negative   URINEBILI Negative   < > Negative   URINEKETONE Negative   < > Negative   SG 1.010   < > 1.010   UBLD Small*   < > Small*   URINEPH 6.5   < > 6.0   PROTEIN Negative   < > Negative   UROBILINOGEN 0.2   < > 0.2   NITRITE Negative   < > Negative   LEUKEST Negative   < > Negative   RBCU  --   --  None Seen   WBCU  --   --  None Seen    < > = values in this interval not displayed.      IMAGING:  All pertinent imaging reviewed:    All imaging studies reviewed by me.  I personally reviewed these imaging films.  A formal report from radiology will follow.    CT ABD/PEL 11/2/2022:  IMPRESSION:   1.  No acute findings in the abdomen and pelvis.  2.  No right renal or ureteral calculi or right hydronephrosis. Stable  postoperative changes of left nephrectomy. The previously seen  calculus in the left ureteral vesicular junction is no longer present.    U/S RENAL 1/13/2023:  FINDINGS:     Right kidney:  Measures 12.2 cm in length. Parenchyma is of normal  thickness and echogenicity. No focal mass. No hydronephrosis.     Left kidney: Nephrectomy.     Bladder: Well-distended bladder. Right ureteral jet identified.                                                                      IMPRESSION:  No nephrolithiasis. No hydronephrosis.    ASSESSMENT and PLAN  33-year-old man with a left atrophic kidney secondary now status post a laparoscopic left nephrectomy    Solitary right kidney  - I reviewed his labs which are notable for normal and stable serum creatinine  - I reviewed his urinalysis and his most recent microscopic analysis was negative.  Small amount of blood noted on his dip today and we will send this for microscopic analysis  - Reviewed his recent renal ultrasound with no hydronephrosis    Urinary frequency and urgency  - His stream and flow are good and he has a low PVR of 23 cc today  - No evidence of bladder outlet obstruction  - We discussed the impacts of caffeine and he does drink 2 to 3 glasses of tea per day  - We discussed that there is no evidence for any of his pain symptoms being related to his urinary tract    Follow-up with his PCP      Time spent: 15 minutes spent on the date of the encounter doing chart review, history and exam, documentation and further activities as noted above.     Aj Hedrick MD   Urology  [facility]  [facility] Phone: [phone]  [facility] Phone: [phone]

## 2023-03-01 NOTE — PROGRESS NOTES
[facility]  CHIEF COMPLAINT   It was my pleasure to see Emelyn Marmolejo who is a 33 year old male for follow-up of right flank pain, left atrophic kidney .     HPI   Emelyn Marmolejo is a very pleasant 33 year old male who presents with a history of an atrophic left kidney due to a large obstructing stone.  I had seen him previously for work-up for possible left simple nephrectomy.  At that time he was noting some right-sided flank and upper abdominal pain and is very concerned that this is related to his right kidney.  Imaging from February was reviewed with no evidence of pathology on the right side.  He subsequently been diagnosed with H. pylori and is undergoing treatment for this.  He notes continued right-sided flank and abdominal pain especially with intake of food and also prolonged sitting with driving.  He denies any fevers, chills, or hematuria.    7/3/20:  Follow-up today to discuss the results of his CT scan which was completed yesterday which demonstrates a small 3 mm distal ureteral stone.  He is feeling okay today with minimal right-sided flank pain.  He denies any fevers, chills, nausea, or vomiting.  He denies noting any stone passage    8/6/20:  Follow-up today after his CT scan last week which demonstrated persistence of the 3 mm distal ureteral stone  He notes that he is continued to have ongoing right-sided flank pain    8/25/20:  He is now s/p diagnostic Ureteroscopy on 8/13/20. No stone identified at that time with complete visualization of the entire right collecting system.  The last 3-4 days he has been doing well with resolution of the RIGHT flank pain  He is having some intermittent LEFT flank pain    Developed COVID-19, now recovered     12/22/20:  With the assistance of a    He notes that he has been having increase LEFT sided pain - especially in the morning  He continues to have right abdominal pain and burning with unknown cause  He wants to move forward with left  "nephrectomy    6/3/21:  He is status post a laparoscopic left nephrectomy on 5/24/2021  He is doing well after surgery and healing well  No left-sided pain and incisions are healing well  He notes some continued intermittent right upper back and side pain up near his ribs    9/7/21:  Follow-up today to discuss his recent urinalysis which demonstrated again persistent microscopic hematuria  He continues to have intermittent right-sided abdominal pain, but no kidney related flank pain    5/25/2022:  He had been doing well  He continues to have intermittent right sided abdominal pain and was recently in the emergency room on 5/19/2022  CT scan performed does show a increased calcification size of the left UVJ and again possible right ureteral stone versus a phlebolith  His urinalysis was normal from that ER visit  He does note some increased urinary urgency and frequency    8/1/2022:  He had previously been scheduled for ureteroscopy on 6/17/2022, however canceled this as he was out of town  He continues to have intermittent vague right-sided flank and lower quadrant abdominal pain  He continues to have issues with urinary frequency and urgency    9/7/2022:  Ureteroscopy for removal of the left UVJ stone and normal right Retrograde Pyelogram    TODAY 3/1/2023:  He has noted some ongoing right lower quadrant abdominal pain as well as some back and right flank pain which is muscle skeletal in nature  He notes that he will at times have urinary frequency and urgency  He remains very concerned about his solitary kidney    PHYSICAL EXAM  Patient is a 33 year old  male   Vitals: Blood pressure 106/58, height 1.763 m (5' 9.41\"), weight 62.1 kg (137 lb).  Body mass index is 19.99 kg/m .  General Appearance Adult:   Alert, no acute distress, oriented  HENT: throat/mouth:normal, good dentition  Lungs: no respiratory distress, or pursed lip breathing  Heart: No obvious jugular venous distension present  Abdomen: soft, nontender, " no organomegaly or masses  Incisions: Well-healed with no evidence of a hernia or palpable issues with the scar  Musculoskeltal: extremities normal, no peripheral edema  Skin: no suspicious lesions or rashes  Neuro: Alert, oriented, speech and mentation normal  Psych: affect and mood normal  Gait: Normal      Creatinine   Date Value Ref Range Status   01/13/2023 1.00 0.67 - 1.17 mg/dL Final   12/24/2022 1.13 0.66 - 1.25 mg/dL Final   11/02/2022 0.98 0.66 - 1.25 mg/dL Final   08/30/2022 1.04 0.66 - 1.25 mg/dL Final   08/04/2022 1.16 0.66 - 1.25 mg/dL Final     UA RESULTS:  Recent Labs   Lab Test 03/01/23  1255 02/01/23  1338 01/26/23  0850   COLOR Yellow   < > Yellow   APPEARANCE Clear   < > Clear   URINEGLC Negative   < > Negative   URINEBILI Negative   < > Negative   URINEKETONE Negative   < > Negative   SG 1.010   < > 1.010   UBLD Small*   < > Small*   URINEPH 6.5   < > 6.0   PROTEIN Negative   < > Negative   UROBILINOGEN 0.2   < > 0.2   NITRITE Negative   < > Negative   LEUKEST Negative   < > Negative   RBCU  --   --  None Seen   WBCU  --   --  None Seen    < > = values in this interval not displayed.      IMAGING:  All pertinent imaging reviewed:    All imaging studies reviewed by me.  I personally reviewed these imaging films.  A formal report from radiology will follow.    CT ABD/PEL 11/2/2022:  IMPRESSION:   1.  No acute findings in the abdomen and pelvis.  2.  No right renal or ureteral calculi or right hydronephrosis. Stable  postoperative changes of left nephrectomy. The previously seen  calculus in the left ureteral vesicular junction is no longer present.    U/S RENAL 1/13/2023:  FINDINGS:     Right kidney: Measures 12.2 cm in length. Parenchyma is of normal  thickness and echogenicity. No focal mass. No hydronephrosis.     Left kidney: Nephrectomy.     Bladder: Well-distended bladder. Right ureteral jet identified.                                                                      IMPRESSION:  No  nephrolithiasis. No hydronephrosis.    ASSESSMENT and PLAN  33-year-old man with a left atrophic kidney secondary now status post a laparoscopic left nephrectomy    Solitary right kidney  - I reviewed his labs which are notable for normal and stable serum creatinine  - I reviewed his urinalysis and his most recent microscopic analysis was negative.  Small amount of blood noted on his dip today and we will send this for microscopic analysis  - Reviewed his recent renal ultrasound with no hydronephrosis    Urinary frequency and urgency  - His stream and flow are good and he has a low PVR of 23 cc today  - No evidence of bladder outlet obstruction  - We discussed the impacts of caffeine and he does drink 2 to 3 glasses of tea per day  - We discussed that there is no evidence for any of his pain symptoms being related to his urinary tract    Follow-up with his PCP      Time spent: 15 minutes spent on the date of the encounter doing chart review, history and exam, documentation and further activities as noted above.     Aj Hedrick MD   Urology  [facility]  [facility] Phone: [phone]  [facility] Phone: [phone]

## 2023-03-06 NOTE — PROGRESS NOTES
Patient is a 89y old  Female who presents with a chief complaint of sacral fracture (02 Mar 2023 10:28)      HPI:  This is a 89-year-old woman with hypertension, glaucoma, arthritis, presents to [facility] ED on 2/21 with c/o worsened low back pain and difficulty ambulating for the past 2-3 days.  Pt lives alone and at baseline, walks independently, with no assistive devices.  Pt states she fell on her buttocks at home, about 2 weeks prior and she did see ortho.  Pt stated the pain did improve after a week, also take Tylenol prn.  However, she fell again about 3 days prior to presentataion.  Pt was okay. until for the past 2 days, low back pain has worsened, even with minimal movement.  Pt also reports pain not relieved with Tylenol. pt started taking Advil, but last time was yesterday.  She has difficulty standing and walking around due to the low back pain. She denied numbness, tingling, she is able to move all her extremities.  She denies fever, chills, chest pain, headache, dizziness, cough, dyspnea, dysuria.  She had nml BM and urination.   CT head neg for acute hemorrhage, extra-axial collection or displaced calvarial fracture.  Cervical spine CT showed no acute fracture or traumatic subluxation. Lumbar spine CT showed bilateral sacral germania insufficiency fractures. Multilevel degenerative changes of the lumbar spine. She was seen by ortho for acute, nondisplaced, minimally impacted sacral fracture, no surgical intervention, WBAT.      (24 Feb 2023 13:22)        SUBJECTIVE/OVERNIGHT EVENTS/ROS:  Patient was seen and examined at bedside this morning. No overnight events. She reports some buttock pain when moving around in bed however this pain is well controlled. Feels well this morning and denies fever, chills, chest pain, SOB. Denies nausea, vomiting, constipation, abdominal pain. Discussed plan for EVAN at [facility] tomorrow and safety measures upon discharge from rehab.      PAST MEDICAL & SURGICAL HISTORY:  Hypertension      History of total knee replacement  right      History of total hip arthroplasty  right with pinning      History of hip replacement, total, left          Allergies    No Known Allergies    Intolerances        MEDICATIONS  (STANDING):  amLODIPine   Tablet 5 milliGRAM(s) Oral daily  calcium carbonate 1250 mG  + Vitamin D (OsCal 500 + D) 1 Tablet(s) Oral daily  enoxaparin Injectable 40 milliGRAM(s) SubCutaneous every 24 hours  gabapentin 100 milliGRAM(s) Oral three times a day  latanoprost 0.005% Ophthalmic Solution 1 Drop(s) Both EYES every 24 hours  lidocaine   4% Patch 1 Patch Transdermal <User Schedule>  lidocaine   4% Patch 1 Patch Transdermal daily  multivitamin 1 Tablet(s) Oral daily  mupirocin 2% Ointment 1 Application(s) Topical two times a day  polyethylene glycol 3350 17 Gram(s) Oral two times a day  senna 2 Tablet(s) Oral at bedtime  triamcinolone 0.1% Ointment 1 Application(s) Topical two times a day  vitamin A &amp; D Ointment 1 Application(s) Topical daily    MEDICATIONS  (PRN):  acetaminophen     Tablet .. 650 milliGRAM(s) Oral every 6 hours PRN Temp greater or equal to 38C (100.4F), Mild Pain (1 - 3)  bisacodyl Suppository 10 milliGRAM(s) Rectal daily PRN Constipation  diclofenac sodium 1% Gel 2 Gram(s) Topical two times a day PRN shoulder pain  ibuprofen  Tablet. 400 milliGRAM(s) Oral three times a day PRN Moderate Pain (4 - 6)  oxycodone    5 mG/acetaminophen 325 mG 1 Tablet(s) Oral every 4 hours PRN Severe Pain (7 - 10)                 RECENT LABS/IMAGING                        10.6   7.56  )-----------( 394      ( 06 Mar 2023 06:07 )             33.5     03-06    136  |  103  |  26<H>  ----------------------------<  96  4.2   |  27  |  0.61    Ca    9.0      06 Mar 2023 06:07    TPro  7.2  /  Alb  2.5<L>  /  TBili  0.2  /  DBili  x   /  AST  35  /  ALT  28  /  AlkPhos  166<H>  03-06                CAPILLARY BLOOD GLUCOSE          Vital Signs Last 24 Hrs  T(C): 37.1 (05 Mar 2023 21:53), Max: 37.1 (05 Mar 2023 21:53)  T(F): 98.7 (05 Mar 2023 21:53), Max: 98.7 (05 Mar 2023 21:53)  HR: 75 (06 Mar 2023 06:15) (75 - 80)  BP: 138/74 (06 Mar 2023 06:15) (126/68 - 138/74)  BP(mean): --  RR: 16 (05 Mar 2023 21:53) (16 - 16)  SpO2: 94% (05 Mar 2023 21:53) (94% - 94%)    Parameters below as of 05 Mar 2023 21:53  Patient On (Oxygen Delivery Method): room air              PHYSICAL EXAM:  General: NAD, Resting Comfortable,                                  HEENT: NC/AT, EOM I, PERRLA, Normal Conjunctivae  Cardio: RRR, Normal S1-S2, No M/G/R                              Pulm: No Respiratory Distress,  Lungs CTAB                        Abdomen: ND/NT, Soft, BS+                                                MSK: No joint swelling, Right shoulder Frozen                                        Ext: 1+ edema, No calf tenderness    Skin:  Left lower leg with scabs, no erythema                                                               Neuro - AAOx3, SILT, LLE eversion/DF weakness with slight improvement      # Case discussed at IDT rounds 2/28    SW: Patient lives alone in a private home  Family lives in [place], though patient has friends nearby who are able to help her.   1 ANDIE with 1F setup inside    OT: Limited at times by pain  UBD/LBD- Min/Total  Transfers - Total A    PT:  Amb- 8 steps with RW and WC follow Min-Mod assist  Transfers- Min-Mod assist    Discharge Planning- 3/7 EVAN. Preceptor Attestation:   Patient seen, evaluated and discussed with the resident. I have verified the content of the note, which accurately reflects my assessment of the patient and the plan of care.   Supervising Physician:  Tatyana Ricks MD    Patient is a 89y old  Female who presents with a chief complaint of sacral fracture (02 Mar 2023 10:28)      HPI:  This is a 89-year-old woman with hypertension, glaucoma, arthritis, presents to [facility] ED on 2/21 with c/o worsened low back pain and difficulty ambulating for the past 2-3 days.  Pt lives alone and at baseline, walks independently, with no assistive devices.  Pt states she fell on her buttocks at home, about 2 weeks prior and she did see ortho.  Pt stated the pain did improve after a week, also take Tylenol prn.  However, she fell again about 3 days prior to presentataion.  Pt was okay. until for the past 2 days, low back pain has worsened, even with minimal movement.  Pt also reports pain not relieved with Tylenol. pt started taking Advil, but last time was yesterday.  She has difficulty standing and walking around due to the low back pain. She denied numbness, tingling, she is able to move all her extremities.  She denies fever, chills, chest pain, headache, dizziness, cough, dyspnea, dysuria.  She had nml BM and urination.   CT head neg for acute hemorrhage, extra-axial collection or displaced calvarial fracture.  Cervical spine CT showed no acute fracture or traumatic subluxation. Lumbar spine CT showed bilateral sacral germania insufficiency fractures. Multilevel degenerative changes of the lumbar spine. She was seen by ortho for acute, nondisplaced, minimally impacted sacral fracture, no surgical intervention, WBAT.      (24 Feb 2023 13:22)        SUBJECTIVE/OVERNIGHT EVENTS/ROS:  Patient was seen and examined at bedside this morning. No overnight events. She reports some buttock pain when moving around in bed however this pain is well controlled. Feels well this morning and denies fever, chills, chest pain, SOB. Denies nausea, vomiting, constipation, abdominal pain. No dizziness, no headaches Discussed plan for [facility] at [facility] tomorrow and safety measures upon discharge from rehab.      PAST MEDICAL & SURGICAL HISTORY:  Hypertension      History of total knee replacement  right      History of total hip arthroplasty  right with pinning      History of hip replacement, total, left          Allergies    No Known Allergies    Intolerances        MEDICATIONS  (STANDING):  amLODIPine   Tablet 5 milliGRAM(s) Oral daily  calcium carbonate 1250 mG  + Vitamin D (OsCal 500 + D) 1 Tablet(s) Oral daily  enoxaparin Injectable 40 milliGRAM(s) SubCutaneous every 24 hours  gabapentin 100 milliGRAM(s) Oral three times a day  latanoprost 0.005% Ophthalmic Solution 1 Drop(s) Both EYES every 24 hours  lidocaine   4% Patch 1 Patch Transdermal <User Schedule>  lidocaine   4% Patch 1 Patch Transdermal daily  multivitamin 1 Tablet(s) Oral daily  mupirocin 2% Ointment 1 Application(s) Topical two times a day  polyethylene glycol 3350 17 Gram(s) Oral two times a day  senna 2 Tablet(s) Oral at bedtime  triamcinolone 0.1% Ointment 1 Application(s) Topical two times a day  vitamin A &amp; D Ointment 1 Application(s) Topical daily    MEDICATIONS  (PRN):  acetaminophen     Tablet .. 650 milliGRAM(s) Oral every 6 hours PRN Temp greater or equal to 38C (100.4F), Mild Pain (1 - 3)  bisacodyl Suppository 10 milliGRAM(s) Rectal daily PRN Constipation  diclofenac sodium 1% Gel 2 Gram(s) Topical two times a day PRN shoulder pain  ibuprofen  Tablet. 400 milliGRAM(s) Oral three times a day PRN Moderate Pain (4 - 6)  oxycodone    5 mG/acetaminophen 325 mG 1 Tablet(s) Oral every 4 hours PRN Severe Pain (7 - 10)                 RECENT LABS/IMAGING                        10.6   7.56  )-----------( 394      ( 06 Mar 2023 06:07 )             33.5     03-06    136  |  103  |  26<H>  ----------------------------<  96  4.2   |  27  |  0.61    Ca    9.0      06 Mar 2023 06:07    TPro  7.2  /  Alb  2.5<L>  /  TBili  0.2  /  DBili  x   /  AST  35  /  ALT  28  /  AlkPhos  166<H>  03-06                CAPILLARY BLOOD GLUCOSE          Vital Signs Last 24 Hrs  T(C): 37.1 (05 Mar 2023 21:53), Max: 37.1 (05 Mar 2023 21:53)  T(F): 98.7 (05 Mar 2023 21:53), Max: 98.7 (05 Mar 2023 21:53)  HR: 75 (06 Mar 2023 06:15) (75 - 80)  BP: 138/74 (06 Mar 2023 06:15) (126/68 - 138/74)  BP(mean): --  RR: 16 (05 Mar 2023 21:53) (16 - 16)  SpO2: 94% (05 Mar 2023 21:53) (94% - 94%)    Parameters below as of 05 Mar 2023 21:53  Patient On (Oxygen Delivery Method): room air        PHYSICAL EXAM:  General: NAD, Resting Comfortable,                                  HEENT: NC/AT, EOM I, PERRLA, Normal Conjunctivae  Cardio: RRR, Normal S1-S2, No M/G/R                              Pulm: No Respiratory Distress,  Lungs CTAB                        Abdomen: ND/NT, Soft, BS+                                                MSK: No joint swelling, Right shoulder Frozen                                        Ext: 1+ edema, No calf tenderness    Skin:  Left lower leg with scabs, no erythema                                                               Neuro - AAOx3, SILT, LLE eversion/DF weakness with slight improvement      # Case discussed at IDT rounds 2/28    SW: Patient lives alone in a private home  Family lives in [place], though patient has friends nearby who are able to help her.   1 ANDIE with 1F setup inside    OT: Limited at times by pain  UBD/LBD- Min/Total  Transfers - Total A    PT:  Amb- 8 steps with RW and WC follow Min-Mod assist  Transfers- Min-Mod assist    Discharge Planning- 3/7 EVAN.

## 2023-03-27 ENCOUNTER — TELEPHONE (OUTPATIENT)
Dept: FAMILY MEDICINE | Facility: CLINIC | Age: 33
End: 2023-03-27
Payer: COMMERCIAL

## 2023-03-27 NOTE — TELEPHONE ENCOUNTER
"Patient warm transferred to me by FD    Patient c/o \"rash\" on his head, \"burning\"    I asked patient if he had changed anything, shampoo, soap, etc. He said he had not but he put \"black dye\" in his hair 2 days prior to rash showing up.     Sounds like an allergic reaction to the dye possibly. I recommended that patient get some benadryl and take every 6 to 8 hours to help with the reaction, also suggested that he take a cool shower and rinse his hair using his hands to massage his scalp and do not use any soap or shampoo just let it air dry.    I scheduled patient for [facility] appt tomorrow 3/28 with Dr. Mojica to evaluate the rash. I let patient know that if it gets so bad he cannot stand it he does have the option to go to UC/ER for treatment.    Tatiana Bills RN    "

## 2023-03-28 ENCOUNTER — OFFICE VISIT (OUTPATIENT)
Dept: FAMILY MEDICINE | Facility: CLINIC | Age: 33
End: 2023-03-28
Payer: COMMERCIAL

## 2023-03-28 VITALS
BODY MASS INDEX: 20.71 KG/M2 | HEIGHT: 69 IN | WEIGHT: 139.8 LBS | HEART RATE: 73 BPM | SYSTOLIC BLOOD PRESSURE: 109 MMHG | DIASTOLIC BLOOD PRESSURE: 73 MMHG | TEMPERATURE: 98 F | RESPIRATION RATE: 16 BRPM | OXYGEN SATURATION: 97 %

## 2023-03-28 DIAGNOSIS — L23.9 DERMAL HYPERSENSITIVITY REACTION: Primary | ICD-10-CM

## 2023-03-28 DIAGNOSIS — Z00.00 HEALTHCARE MAINTENANCE: ICD-10-CM

## 2023-03-28 PROCEDURE — 99213 OFFICE O/P EST LOW 20 MIN: CPT | Mod: GC

## 2023-03-28 RX ORDER — TRIAMCINOLONE ACETONIDE 1 MG/G
OINTMENT TOPICAL 2 TIMES DAILY
Qty: 30 G | Refills: 0 | Status: SHIPPED | OUTPATIENT
Start: 2023-03-28 | End: 2023-05-31

## 2023-03-28 RX ORDER — CHOLECALCIFEROL (VITAMIN D3) 50 MCG
1 TABLET ORAL DAILY
Qty: 90 TABLET | Refills: 1 | Status: SHIPPED | OUTPATIENT
Start: 2023-03-28 | End: 2023-12-19

## 2023-03-28 NOTE — PATIENT INSTRUCTIONS
Patient Education   Here is the plan from today's visit    1. Healthcare maintenance  - vitamin D3 (CHOLECALCIFEROL) 50 mcg (2000 units) tablet; Take 1 tablet (50 mcg) by mouth daily  Dispense: 90 tablet; Refill: 1    2. Dermal hypersensitivity reaction  - triamcinolone (KENALOG) 0.1 % external ointment; Apply topically 2 times daily Apply topically 2 times daily for the first week, 1 time daily for the second week  Dispense: 30 g; Refill: 0      Please call or return to clinic if your symptoms don't go away.    Follow up plan  Return if symptoms worsen or fail to improve.    Thank you for coming to [facility] today.  Lab Testing:  **If you had lab testing today and your results are reassuring or normal they will be mailed to you or sent through [employer] within 7 days.   **If the lab tests need quick action we will call you with the results.  **If you are having labs done on a different day, please call [phone] to schedule at [facility] or [phone] for other [facility]patient Lab locations. Labs do not offer walk-in appointments.  The phone number we will call with results is # [phone] (home) . If this is not the best number please call our clinic and change the number.  Medication Refills:  If you need any refills please call your pharmacy and they will contact us.   If you need to  your refill at a new pharmacy, please contact the new pharmacy directly. The new pharmacy will help you get your medications transferred faster.   Scheduling:  If you have any concerns about today's visit or wish to schedule another appointment please call our office during normal business hours [phone] (8-5:00 M-F). If you can no longer make a scheduled visit, please cancel via [employer] or call us to cancel.   If a referral was made to an [facility] specialty provider and you do not get a call from central scheduling, please refer to directions on your visit summary or call our office  during normal business hours for assistance.   If a Mammogram was ordered for you at the [facility] call [phone] to schedule or change your appointment.  If you had an XRay/CT/Ultrasound/MRI ordered the number is [phone] to schedule or change your radiology appointment.   [facility] has limited ultrasound appointments available on Wednesdays, if you would like your ultrasound at [facility], please call [phone] to schedule.   Medical Concerns:  If you have urgent medical concerns please call [phone] at any time of the day.    MADALYN REYES, DO

## 2023-03-28 NOTE — PROGRESS NOTES
Assessment & Plan     Dermal hypersensitivity reaction  Encourage patient to suspend use of typical daily hair products, as well as avoid the use of black hair dye in the future.  If he feels that he wants to get his hair dyed again, I would recommend he see a professional to get it done.  Given the dermal hypersensitivity reaction on his scalp, recommend he use triamcinolone cream twice daily for first week, 1 time daily for the second week.  Encourage patient to return to clinic if pain, itching, bleeding worsens or if he notices any shortness of breath, fever, chills.  - triamcinolone (KENALOG) 0.1 % external ointment; Apply topically 2 times daily Apply topically 2 times daily for the first week, 1 time daily for the second week    Healthcare maintenance  - vitamin D3 (CHOLECALCIFEROL) 50 mcg (2000 units) tablet; Take 1 tablet (50 mcg) by mouth daily    Return if symptoms worsen or fail to improve.    MADALYN REYES DO  [facility] EARL Dunaway is a 33 year old, presenting for the following health issues:  Hair/Scalp Problem (Pt states that he dyed his hair 4 days ago and has experienced burning and itching on his scalp.)    Additional Questions 3/28/2023   Roomed by mariel   Accompanied by self     HPI     Did hair dye about a week ago. Felt itching about two days after applying hair dye solution, applied regular oils and products. Doesn't know if scalp has skin changes. Tried benadryl, which helped with cooling. Has tried some dandruff shampoo - okay for a few minutes, then burning lasts all day. When sun hits his head, gets worse. Getting worse over the last few days.    Little bit of hair loss, burning has been impacting sleep. Applying a tight bandana helps with sleep. No fevers. Little bit of blood with scratching. Itching and burning.    Review of Systems   Constitutional, HEENT, cardiovascular, pulmonary, gi and gu systems are negative, except as otherwise noted.     "  Objective    /73 (BP Location: Left arm, Patient Position: Sitting, Cuff Size: Adult Large)   Pulse 73   Temp 98  F (36.7  C) (Oral)   Resp 16   Ht 1.753 m (5' 9\")   Wt 63.4 kg (139 lb 12.8 oz)   SpO2 97%   BMI 20.64 kg/m    Body mass index is 20.64 kg/m .  Physical Exam  Vitals reviewed.   Constitutional:       General: He is not in acute distress.     Appearance: Normal appearance. He is normal weight. He is not ill-appearing.   HENT:      Head: Normocephalic and atraumatic.      Right Ear: External ear normal.      Left Ear: External ear normal.      Nose: Nose normal.   Eyes:      Extraocular Movements: Extraocular movements intact.      Conjunctiva/sclera: Conjunctivae normal.   Cardiovascular:      Rate and Rhythm: Normal rate and regular rhythm.      Pulses: Normal pulses.      Heart sounds: Normal heart sounds. No murmur heard.    No friction rub. No gallop.   Pulmonary:      Effort: Pulmonary effort is normal. No respiratory distress.      Breath sounds: Normal breath sounds. No stridor. No wheezing.   Abdominal:      General: Abdomen is flat. Bowel sounds are normal. There is no distension.      Palpations: Abdomen is soft.      Tenderness: There is no abdominal tenderness.   Musculoskeletal:         General: Normal range of motion.      Cervical back: Normal range of motion.   Skin:     General: Skin is warm and dry.      Findings: Erythema and rash present.             Comments: Scaling, minor erythema, xerosis, and flaking skin present over the crown of the head.  Mild excoriations present.   Neurological:      General: No focal deficit present.      Mental Status: He is alert and oriented to person, place, and time. Mental status is at baseline.   Psychiatric:         Mood and Affect: Mood normal.         Behavior: Behavior normal.         Thought Content: Thought content normal.         Judgment: Judgment normal.            "

## 2023-05-07 ENCOUNTER — HEALTH MAINTENANCE LETTER (OUTPATIENT)
Age: 33
End: 2023-05-07

## 2023-05-30 ENCOUNTER — HOSPITAL ENCOUNTER (OUTPATIENT)
Facility: CLINIC | Age: 33
Setting detail: OBSERVATION
Discharge: HOME | End: 2023-05-31
Attending: EMERGENCY MEDICINE | Admitting: INTERNAL MEDICINE
Payer: COMMERCIAL

## 2023-05-30 DIAGNOSIS — Z90.5 ACQUIRED ABSENCE OF KIDNEY: ICD-10-CM

## 2023-05-30 DIAGNOSIS — Z87.442 PERSONAL HISTORY OF URINARY CALCULI: ICD-10-CM

## 2023-05-30 DIAGNOSIS — Z90.5 SOLITARY KIDNEY, ACQUIRED: ICD-10-CM

## 2023-05-30 DIAGNOSIS — N17.9 ACUTE KIDNEY INJURY (H): ICD-10-CM

## 2023-05-30 DIAGNOSIS — N17.9 AKI (ACUTE KIDNEY INJURY) (H): ICD-10-CM

## 2023-05-30 LAB
ALBUMIN UR-MCNC: NEGATIVE MG/DL
APPEARANCE UR: CLEAR
BASOPHILS # BLD AUTO: 0 10E3/UL (ref 0–0.2)
BASOPHILS NFR BLD AUTO: 0 %
BILIRUB UR QL STRIP: NEGATIVE
COLOR UR AUTO: ABNORMAL
EOSINOPHIL # BLD AUTO: 0.3 10E3/UL (ref 0–0.7)
EOSINOPHIL NFR BLD AUTO: 4 %
ERYTHROCYTE [DISTWIDTH] IN BLOOD BY AUTOMATED COUNT: 12.1 % (ref 10–15)
GLUCOSE UR STRIP-MCNC: NEGATIVE MG/DL
HCT VFR BLD AUTO: 42.7 % (ref 40–53)
HGB BLD-MCNC: 14.2 G/DL (ref 13.3–17.7)
HGB UR QL STRIP: ABNORMAL
IMM GRANULOCYTES # BLD: 0 10E3/UL
IMM GRANULOCYTES NFR BLD: 0 %
KETONES UR STRIP-MCNC: NEGATIVE MG/DL
LEUKOCYTE ESTERASE UR QL STRIP: NEGATIVE
LYMPHOCYTES # BLD AUTO: 2.8 10E3/UL (ref 0.8–5.3)
LYMPHOCYTES NFR BLD AUTO: 42 %
MCH RBC QN AUTO: 29.1 PG (ref 26.5–33)
MCHC RBC AUTO-ENTMCNC: 33.3 G/DL (ref 31.5–36.5)
MCV RBC AUTO: 88 FL (ref 78–100)
MONOCYTES # BLD AUTO: 0.5 10E3/UL (ref 0–1.3)
MONOCYTES NFR BLD AUTO: 8 %
MUCOUS THREADS #/AREA URNS LPF: PRESENT /LPF
NEUTROPHILS # BLD AUTO: 3.1 10E3/UL (ref 1.6–8.3)
NEUTROPHILS NFR BLD AUTO: 46 %
NITRATE UR QL: NEGATIVE
NRBC # BLD AUTO: 0 10E3/UL
NRBC BLD AUTO-RTO: 0 /100
PH UR STRIP: 6.5 [PH] (ref 5–7)
PLATELET # BLD AUTO: 193 10E3/UL (ref 150–450)
RBC # BLD AUTO: 4.88 10E6/UL (ref 4.4–5.9)
RBC URINE: 1 /HPF
SP GR UR STRIP: 1.02 (ref 1–1.03)
UROBILINOGEN UR STRIP-MCNC: NORMAL MG/DL
WBC # BLD AUTO: 6.8 10E3/UL (ref 4–11)
WBC URINE: <1 /HPF

## 2023-05-30 PROCEDURE — 85025 COMPLETE CBC W/AUTO DIFF WBC: CPT | Performed by: EMERGENCY MEDICINE

## 2023-05-30 PROCEDURE — 258N000003 HC RX IP 258 OP 636: Performed by: EMERGENCY MEDICINE

## 2023-05-30 PROCEDURE — 81001 URINALYSIS AUTO W/SCOPE: CPT | Performed by: EMERGENCY MEDICINE

## 2023-05-30 PROCEDURE — 36415 COLL VENOUS BLD VENIPUNCTURE: CPT | Performed by: EMERGENCY MEDICINE

## 2023-05-30 PROCEDURE — 80048 BASIC METABOLIC PNL TOTAL CA: CPT | Performed by: EMERGENCY MEDICINE

## 2023-05-30 PROCEDURE — 99285 EMERGENCY DEPT VISIT HI MDM: CPT | Performed by: EMERGENCY MEDICINE

## 2023-05-30 PROCEDURE — 250N000013 HC RX MED GY IP 250 OP 250 PS 637: Performed by: EMERGENCY MEDICINE

## 2023-05-30 PROCEDURE — 96360 HYDRATION IV INFUSION INIT: CPT | Performed by: EMERGENCY MEDICINE

## 2023-05-30 PROCEDURE — 99285 EMERGENCY DEPT VISIT HI MDM: CPT | Mod: 25 | Performed by: EMERGENCY MEDICINE

## 2023-05-30 RX ORDER — ACETAMINOPHEN 325 MG/1
650 TABLET ORAL ONCE
Status: COMPLETED | OUTPATIENT
Start: 2023-05-30 | End: 2023-05-30

## 2023-05-30 RX ADMIN — SODIUM CHLORIDE 500 ML: 9 INJECTION, SOLUTION INTRAVENOUS at 23:01

## 2023-05-30 RX ADMIN — ACETAMINOPHEN 650 MG: 325 TABLET ORAL at 23:04

## 2023-05-30 ASSESSMENT — ACTIVITIES OF DAILY LIVING (ADL): ADLS_ACUITY_SCORE: 35

## 2023-05-31 ENCOUNTER — APPOINTMENT (OUTPATIENT)
Dept: CT IMAGING | Facility: CLINIC | Age: 33
End: 2023-05-31
Attending: EMERGENCY MEDICINE
Payer: COMMERCIAL

## 2023-05-31 VITALS
DIASTOLIC BLOOD PRESSURE: 86 MMHG | BODY MASS INDEX: 20.95 KG/M2 | OXYGEN SATURATION: 99 % | SYSTOLIC BLOOD PRESSURE: 115 MMHG | WEIGHT: 141.9 LBS | TEMPERATURE: 97.7 F | RESPIRATION RATE: 16 BRPM | HEART RATE: 60 BPM

## 2023-05-31 PROBLEM — N17.9 AKI (ACUTE KIDNEY INJURY) (H): Status: ACTIVE | Noted: 2023-05-31

## 2023-05-31 PROBLEM — Z90.5 SOLITARY KIDNEY, ACQUIRED: Status: ACTIVE | Noted: 2023-05-31

## 2023-05-31 LAB
ANION GAP SERPL CALCULATED.3IONS-SCNC: 12 MMOL/L (ref 7–15)
ANION GAP SERPL CALCULATED.3IONS-SCNC: 8 MMOL/L (ref 7–15)
BUN SERPL-MCNC: 12.6 MG/DL (ref 6–20)
BUN SERPL-MCNC: 17 MG/DL (ref 6–20)
CALCIUM SERPL-MCNC: 9.1 MG/DL (ref 8.6–10)
CALCIUM SERPL-MCNC: 9.3 MG/DL (ref 8.6–10)
CHLORIDE SERPL-SCNC: 105 MMOL/L (ref 98–107)
CHLORIDE SERPL-SCNC: 108 MMOL/L (ref 98–107)
CREAT SERPL-MCNC: 1.06 MG/DL (ref 0.67–1.17)
CREAT SERPL-MCNC: 1.42 MG/DL (ref 0.67–1.17)
DEPRECATED HCO3 PLAS-SCNC: 22 MMOL/L (ref 22–29)
DEPRECATED HCO3 PLAS-SCNC: 25 MMOL/L (ref 22–29)
GFR SERPL CREATININE-BSD FRML MDRD: 66 ML/MIN/1.73M2
GFR SERPL CREATININE-BSD FRML MDRD: >90 ML/MIN/1.73M2
GLUCOSE SERPL-MCNC: 105 MG/DL (ref 70–99)
GLUCOSE SERPL-MCNC: 96 MG/DL (ref 70–99)
HOLD SPECIMEN: NORMAL
POTASSIUM SERPL-SCNC: 3.6 MMOL/L (ref 3.4–5.3)
POTASSIUM SERPL-SCNC: 3.9 MMOL/L (ref 3.4–5.3)
SODIUM SERPL-SCNC: 139 MMOL/L (ref 136–145)
SODIUM SERPL-SCNC: 141 MMOL/L (ref 136–145)

## 2023-05-31 PROCEDURE — 74176 CT ABD & PELVIS W/O CONTRAST: CPT | Mod: 26 | Performed by: RADIOLOGY

## 2023-05-31 PROCEDURE — 99207 PR NO BILLABLE SERVICE THIS VISIT: CPT | Performed by: STUDENT IN AN ORGANIZED HEALTH CARE EDUCATION/TRAINING PROGRAM

## 2023-05-31 PROCEDURE — 258N000003 HC RX IP 258 OP 636: Performed by: EMERGENCY MEDICINE

## 2023-05-31 PROCEDURE — 74176 CT ABD & PELVIS W/O CONTRAST: CPT

## 2023-05-31 PROCEDURE — 99222 1ST HOSP IP/OBS MODERATE 55: CPT | Performed by: PHYSICIAN ASSISTANT

## 2023-05-31 PROCEDURE — 36415 COLL VENOUS BLD VENIPUNCTURE: CPT | Performed by: STUDENT IN AN ORGANIZED HEALTH CARE EDUCATION/TRAINING PROGRAM

## 2023-05-31 PROCEDURE — 80048 BASIC METABOLIC PNL TOTAL CA: CPT | Performed by: STUDENT IN AN ORGANIZED HEALTH CARE EDUCATION/TRAINING PROGRAM

## 2023-05-31 PROCEDURE — 250N000013 HC RX MED GY IP 250 OP 250 PS 637: Performed by: STUDENT IN AN ORGANIZED HEALTH CARE EDUCATION/TRAINING PROGRAM

## 2023-05-31 PROCEDURE — 96361 HYDRATE IV INFUSION ADD-ON: CPT | Performed by: EMERGENCY MEDICINE

## 2023-05-31 RX ORDER — TRAZODONE HYDROCHLORIDE 50 MG/1
50-100 TABLET, FILM COATED ORAL
Status: DISCONTINUED | OUTPATIENT
Start: 2023-05-31 | End: 2023-05-31 | Stop reason: HOSPADM

## 2023-05-31 RX ORDER — GABAPENTIN 300 MG/1
300 CAPSULE ORAL AT BEDTIME
Status: DISCONTINUED | OUTPATIENT
Start: 2023-05-31 | End: 2023-05-31 | Stop reason: HOSPADM

## 2023-05-31 RX ORDER — LIDOCAINE 40 MG/G
CREAM TOPICAL
Status: DISCONTINUED | OUTPATIENT
Start: 2023-05-31 | End: 2023-05-31 | Stop reason: HOSPADM

## 2023-05-31 RX ORDER — ONDANSETRON 2 MG/ML
4 INJECTION INTRAMUSCULAR; INTRAVENOUS EVERY 6 HOURS PRN
Status: DISCONTINUED | OUTPATIENT
Start: 2023-05-31 | End: 2023-05-31 | Stop reason: HOSPADM

## 2023-05-31 RX ORDER — ACETAMINOPHEN 325 MG/1
650 TABLET ORAL EVERY 6 HOURS PRN
Status: DISCONTINUED | OUTPATIENT
Start: 2023-05-31 | End: 2023-05-31 | Stop reason: HOSPADM

## 2023-05-31 RX ORDER — ONDANSETRON 4 MG/1
4 TABLET, ORALLY DISINTEGRATING ORAL EVERY 6 HOURS PRN
Status: DISCONTINUED | OUTPATIENT
Start: 2023-05-31 | End: 2023-05-31 | Stop reason: HOSPADM

## 2023-05-31 RX ORDER — ACETAMINOPHEN 650 MG/1
650 SUPPOSITORY RECTAL EVERY 6 HOURS PRN
Status: DISCONTINUED | OUTPATIENT
Start: 2023-05-31 | End: 2023-05-31 | Stop reason: HOSPADM

## 2023-05-31 RX ADMIN — ACETAMINOPHEN 650 MG: 325 TABLET ORAL at 10:24

## 2023-05-31 RX ADMIN — SODIUM CHLORIDE 1000 ML: 9 INJECTION, SOLUTION INTRAVENOUS at 02:16

## 2023-05-31 ASSESSMENT — ACTIVITIES OF DAILY LIVING (ADL)
ADLS_ACUITY_SCORE: 35
FALL_HISTORY_WITHIN_LAST_SIX_MONTHS: NO
DOING_ERRANDS_INDEPENDENTLY_DIFFICULTY: NO
ADLS_ACUITY_SCORE: 18
CHANGE_IN_FUNCTIONAL_STATUS_SINCE_ONSET_OF_CURRENT_ILLNESS/INJURY: NO
ADLS_ACUITY_SCORE: 35
TOILETING_ISSUES: NO
WALKING_OR_CLIMBING_STAIRS_DIFFICULTY: NO
DIFFICULTY_EATING/SWALLOWING: NO
ADLS_ACUITY_SCORE: 18
DRESSING/BATHING_DIFFICULTY: NO
WEAR_GLASSES_OR_BLIND: NO
CONCENTRATING,_REMEMBERING_OR_MAKING_DECISIONS_DIFFICULTY: NO

## 2023-05-31 NOTE — ED PROVIDER NOTES
ED Provider Note  [facility]      History     Chief Complaint   Patient presents with     Flank Pain     Right      The history is provided by the patient and medical records.     Emelyn Marmolejo is a 33 year old male with history of kidney stones, hx of atrophic left kidney d/t large obstructing stone s/p laparoscopic left nephrectomy 5/24/2021 who presents to the ED with right flank pain.  Patient reports that he has had intermittent right flank and RLQ abdominal pain for the past 2 weeks.  Pain continues to be intermittent, but became worse today.  He states that he has never had a pain like this before.  He has not taken anything for his pain.  He also reports urinary frequency, straining to urinate, and feeling of incomplete emptying of the bladder.  He denies hematuria, nausea, or vomiting.  He has had no abdominal surgeries apart from the nephrectomy.  He denies fever, rhinorrhea, congestion, sore throat, cough, chest pain, shortness of breath.  He did drive himself here today.    Per chart review patient last seen in Urology clinic on 3/1. Noted ongoing RLQ abdominal pain as well as back and right flank pain. Felt to be musculoskeletal in nature. Also noted intermittent urinary frequency and urgency. Labs from 1/2023 reviewed and notable for normal and stable creatinine. UA unremarkable. Stream and flow in clinic noted to be good with low PVR of 23 ml. No evidence of bladder outlet obstruction. Discussed that no evidence for pain or symptoms being related to urinary tract. Renal US from 1/13/23 reviewed with no hydronephrosis.     Past Medical History  Past Medical History:   Diagnosis Date     H/O left nephrectomy 1/26/2023     Nephrolithiasis      Prostate infection      Past Surgical History:   Procedure Laterality Date     COMBINED CYSTOSCOPY, RETROGRADES, URETEROSCOPY, LASER HOLMIUM LITHOTRIPSY URETER(S), INSERT STENT Bilateral 9/7/2022    Procedure: CYSTOSCOPY, BILATERAL  RETROGRADE PYELOGRAM, LEFT URETEROSCOPY, BASKET REMOVAL OF STONE;  Surgeon: Aj Hedrick MD;  Location:  OR     COMBINED CYSTOSCOPY, URETEROSCOPY, LASER HOLMIUM LITHOTRIPSY URETER(S) Right 8/13/2020    Procedure: CYSTOSCOPY, RIGHT RETROGRADE PYELOGRAM, RIGHT diagnostic URETEROSCOPY;  Surgeon: Aj Hedrick MD;  Location: [facility] OR     ESOPHAGOSCOPY, GASTROSCOPY, DUODENOSCOPY (EGD), COMBINED N/A 1/25/2021    Procedure: ESOPHAGOGASTRODUODENOSCOPY, WITH BIOPSY;  Surgeon: Rip Her MD;  Location: [facility] OR     LAPAROSCOPIC NEPHRECTOMY Left 5/24/2021    Procedure: LEFT NEPHRECTOMY, TOTAL, LAPAROSCOPIC;  Surgeon: Aj Hedrick MD;  Location: [facility] OR     gabapentin (NEURONTIN) 300 MG capsule  traZODone (DESYREL) 50 MG tablet  triamcinolone (KENALOG) 0.1 % external ointment  vitamin D3 (CHOLECALCIFEROL) 50 mcg (2000 units) tablet      Allergies   Allergen Reactions     Nsaids      Not true allergy. But be sparing with patient as he has single kidney      Family History  Family History   Problem Relation Age of Onset     Crohn's Disease No family hx of      Ulcerative Colitis No family hx of      GERD No family hx of      Stomach Cancer No family hx of      Social History   Social History     Tobacco Use     Smoking status: Former     Packs/day: 0.50     Years: 5.00     Pack years: 2.50     Types: Cigarettes     Quit date: 9/17/2019     Years since quitting: 3.7     Smokeless tobacco: Never   Vaping Use     Vaping status: Never Used   Substance Use Topics     Alcohol use: Never     Drug use: Never         A medically appropriate review of systems was performed with pertinent positives and negatives noted in the HPI, and all other systems negative.    Physical Exam   BP: 112/70  Pulse: 73  Temp: 97.7  F (36.5  C)  Resp: 20  Weight: 64.4 kg (141 lb 14.4 oz)  SpO2: 97 %  Physical Exam  Vitals and nursing note reviewed.   Constitutional:       General: He is not in acute distress.     Appearance: He is not  diaphoretic.      Comments: Adult male, alert, cooperative, no acute distress   HENT:      Head: Atraumatic.      Mouth/Throat:      Mouth: Mucous membranes are moist.      Pharynx: Oropharynx is clear. No oropharyngeal exudate.   Eyes:      General: No scleral icterus.     Pupils: Pupils are equal, round, and reactive to light.   Cardiovascular:      Rate and Rhythm: Normal rate.      Pulses: Normal pulses.      Heart sounds: No murmur heard.  Pulmonary:      Effort: No respiratory distress.      Breath sounds: Normal breath sounds.   Abdominal:      General: Bowel sounds are normal.      Palpations: Abdomen is soft.      Tenderness: There is abdominal tenderness.      Comments: Abdomen is soft, minimal tenderness to deep palpation in R periumbilical region without guarding or rebound.  Nontender elsewhere.  Normal bowel sounds.   Musculoskeletal:         General: No tenderness.   Skin:     General: Skin is warm.      Findings: No rash.   Neurological:      General: No focal deficit present.           ED Course, Procedures, & Data      Procedures               Results for orders placed or performed during the hospital encounter of 05/30/23   CT Abdomen Pelvis w/o Contrast     Status: None    Narrative    EXAM: CT ABDOMEN PELVIS W/O CONTRAST  LOCATION: [facility]  DATE/TIME: 5/31/2023 1:11 AM CDT    INDICATION: hx of renal stones, s p L nephrectomy, now with R flank pain and increasing creatinine, eval for obstruction  COMPARISON: CT from 11/02/2022  TECHNIQUE: CT scan of the abdomen and pelvis was performed without IV contrast. Multiplanar reformats were obtained. Dose reduction techniques were used.  CONTRAST: None.    FINDINGS:   LOWER CHEST: Cystic bronchiectasis involving the entirety of the visualized right middle lobe as well as subsegmental portions of the lingula and bilateral lower lobes.    HEPATOBILIARY: Normal.    PANCREAS: Normal.    SPLEEN:  Normal.    ADRENAL GLANDS: Normal.    KIDNEYS/BLADDER: Status post left nephrectomy. No right hydronephrosis or calcified ureteral stone. Bladder is normal.    BOWEL: No mechanical bowel obstruction or free air. Appendix is mildly prominent for size, but no periappendiceal inflammatory change.    LYMPH NODES: Normal.    VASCULATURE: Unremarkable.    PELVIC ORGANS: Normal.    MUSCULOSKELETAL: Normal.      Impression    IMPRESSION:   1.  Solitary right kidney without hydronephrosis or visualized obstructing ureteral calculus.    2.  Appendix mildly prominent in size but without inflammatory change to suggest appendicitis at this time.     UA with Microscopic reflex to Culture     Status: Abnormal    Specimen: Urine, Midstream   Result Value Ref Range    Color Urine Light Yellow Colorless, Straw, Light Yellow, Yellow    Appearance Urine Clear Clear    Glucose Urine Negative Negative mg/dL    Bilirubin Urine Negative Negative    Ketones Urine Negative Negative mg/dL    Specific Gravity Urine 1.016 1.003 - 1.035    Blood Urine Trace (A) Negative    pH Urine 6.5 5.0 - 7.0    Protein Albumin Urine Negative Negative mg/dL    Urobilinogen Urine Normal Normal, 2.0 mg/dL    Nitrite Urine Negative Negative    Leukocyte Esterase Urine Negative Negative    Mucus Urine Present (A) None Seen /LPF    RBC Urine 1 <=2 /HPF    WBC Urine <1 <=5 /HPF    Narrative    Urine Culture not indicated   Basic metabolic panel     Status: Abnormal   Result Value Ref Range    Sodium 139 136 - 145 mmol/L    Potassium 3.6 3.4 - 5.3 mmol/L    Chloride 105 98 - 107 mmol/L    Carbon Dioxide (CO2) 22 22 - 29 mmol/L    Anion Gap 12 7 - 15 mmol/L    Urea Nitrogen 17.0 6.0 - 20.0 mg/dL    Creatinine 1.42 (H) 0.67 - 1.17 mg/dL    Calcium 9.3 8.6 - 10.0 mg/dL    Glucose 105 (H) 70 - 99 mg/dL    GFR Estimate 66 >60 mL/min/1.73m2   CBC with platelets and differential     Status: None   Result Value Ref Range    WBC Count 6.8 4.0 - 11.0 10e3/uL    RBC Count 4.88  4.40 - 5.90 10e6/uL    Hemoglobin 14.2 13.3 - 17.7 g/dL    Hematocrit 42.7 40.0 - 53.0 %    MCV 88 78 - 100 fL    MCH 29.1 26.5 - 33.0 pg    MCHC 33.3 31.5 - 36.5 g/dL    RDW 12.1 10.0 - 15.0 %    Platelet Count 193 150 - 450 10e3/uL    % Neutrophils 46 %    % Lymphocytes 42 %    % Monocytes 8 %    % Eosinophils 4 %    % Basophils 0 %    % Immature Granulocytes 0 %    NRBCs per 100 WBC 0 <1 /100    Absolute Neutrophils 3.1 1.6 - 8.3 10e3/uL    Absolute Lymphocytes 2.8 0.8 - 5.3 10e3/uL    Absolute Monocytes 0.5 0.0 - 1.3 10e3/uL    Absolute Eosinophils 0.3 0.0 - 0.7 10e3/uL    Absolute Basophils 0.0 0.0 - 0.2 10e3/uL    Absolute Immature Granulocytes 0.0 <=0.4 10e3/uL    Absolute NRBCs 0.0 10e3/uL   CBC with Platelets & Differential     Status: None    Narrative    The following orders were created for panel order CBC with Platelets & Differential.  Procedure                               Abnormality         Status                     ---------                               -----------         ------                     CBC with platelets and d...[[id number]]                      Final result                 Please view results for these tests on the individual orders.     Medications   0.9% sodium chloride BOLUS (has no administration in time range)   0.9% sodium chloride BOLUS (500 mLs Intravenous $New Bag 5/30/23 2307)   acetaminophen (TYLENOL) tablet 650 mg (650 mg Oral $Given 5/30/23 2302)       CT Abdomen Pelvis w/o Contrast   Final Result   IMPRESSION:    1.  Solitary right kidney without hydronephrosis or visualized obstructing ureteral calculus.      2.  Appendix mildly prominent in size but without inflammatory change to suggest appendicitis at this time.                Critical care was not performed.     Medical Decision Making  The patient's presentation was of moderate complexity (an acute illness with systemic symptoms).    The patient's evaluation involved:  ordering and/or review of 3+ test(s) in  "this encounter (see separate area of note for details)  review of 1 test result(s) ordered prior to this encounter (see separate area of note for details)    The patient's management necessitated high risk (a decision regarding hospitalization).      Assessment & Plan    Patient presents to the emergency department today for the above complaints.  Given his history, right flank pain definitely could represent renal colic.  Pyelonephritis would be possible.  Also need to consider the possibility of chronic pain as he has had similar symptoms in the past.  This pain could potentially be due to musculoskeletal abdominal wall pain.    Certainly he is at risk for urinary stone disease, and the fact that he has a solitary kidney now (status post nephrectomy) does raise concern for this and potential complications.   I did have a conversation with the patient where I explained that I do believe imaging would be in order, particularly to evaluate for obstructing stone.  Patient has declined this, stating he is \"scared of CT scans\" as he reports he has had multiple CT scans in the past.  I do believe that imaging is still appropriate, though after long discussion with the patient he instead would prefer to only do urine and blood studies, not interested in imaging studies at this time.  I have told him that I cannot definitively rule out obstructing process without imaging, but he states he understands this and does not want imaging.    CBC demonstrates normal white count, normal hemoglobin, normal platelet count, BMP demonstrates normal electrolytes, creatinine is 1.42, up from 1.0 4 months ago. UA demonstrates trace blood, 1 red cell, less than 1 white cell, negative nitrites and negative LE.  Patient did drive himself here today, so I am unable to give him any opiate analgesics.  He is allergic to NSAIDs.  I did order Tylenol for him.    I did have a discussion with the patient about the need to get imaging now that his " creatinine is elevated.  He is now agreeable.    CT scan shows solitary right kidney without hydronephrosis or visualized obstructing renal calculus.  Appendix is mildly prominent in size but no evidence of inflammatory change to suggest appendicitis.    Given SHELLIE in the context of solitary kidney, we will plan to admit to the hospital.  I have ordered a [place] bed for him.  Additional IV fluids have been ordered.  Patient agreeable to plan.    I have reviewed the nursing notes. I have reviewed the findings, diagnosis, plan and need for follow up with the patient.    New Prescriptions    No medications on file       Final diagnoses:   SHELLIE (acute kidney injury) (H)   Solitary kidney, acquired   I, Debbie Abraham, am serving as a trained medical scribe to document services personally performed by Radha Carmona MD, based on the provider's statements to me.     I, Radha Carmona MD, was physically present and have reviewed and verified the accuracy of this note documented by Debbie Abraham.      Radha Carmona MD  [facility] EMERGENCY DEPARTMENT  5/30/2023     Radha Carmona MD  05/31/23 0137

## 2023-05-31 NOTE — ED TRIAGE NOTES
Pt presents with c/o right flank pain and concerns of voiding a lot. PT states he only has his right kidney and has been having ongoing flank pain for two weeks.      Triage Assessment     Row Name 05/30/23 1160       Triage Assessment (Adult)    Airway WDL WDL       Respiratory WDL    Respiratory WDL WDL       Skin Circulation/Temperature WDL    Skin Circulation/Temperature WDL WDL       Cardiac WDL    Cardiac WDL WDL       Peripheral/Neurovascular WDL    Peripheral Neurovascular WDL WDL       Cognitive/Neuro/Behavioral WDL    Cognitive/Neuro/Behavioral WDL WDL

## 2023-05-31 NOTE — PLAN OF CARE
Goal Outcome Evaluation:    Pt admitted to 8A from [place] ED for R. Flank pain associated with SHELLIE.     /86 (BP Location: Right arm)   Pulse 60   Temp 97.7  F (36.5  C) (Oral)   Resp 16   Wt 64.4 kg (141 lb 14.4 oz)   SpO2 99%   BMI 20.95 kg/m      Pt denied chest pain, SOB, and stated pain 5/10. Pt denied pain meds.     Pt is AO4.     Pt has PIV- SL- Flushed and cap on    Pt skin is WNL. No wounds or tattoos.     Pt is on regular diet and thin liquids. Pt is able to order meals.     Pt is independent and can ambulate to the bathroom. Pt is continent of both bowel and bladder. Last BM 5/28/23.

## 2023-05-31 NOTE — PROGRESS NOTES
"SPIRITUAL HEALTH SERVICES  SPIRITUAL ASSESSMENT Progress Note  [facility] ([facility]) 8A MED SURG       REFERRAL SOURCE: Self initiated  visit, Confucianist specific.     DEMOGRAPHIC: Pt Emelyn Marmolejo identifies as Confucianist and is of [place] descent.        ILLNESS CIRCUMSTANCE: I introduced myself to Emelyn as the Lead Confucianist  and oriented him to [facility].  Assessed emotional/spiritual needs and resources while offering reflective conversation, which integrated elements of illness and family narratives.     Emelyn stated that, \"I had my left kidney removed a couple of years ago and my right kidney was hurting me\". Emelyn also stated that this was due to dehydration but is recovering now.     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 He lives near [facility] in [place] ([facility]) and attends the [employer] there.     SPIRITUAL INTERVENTIONS: Emelyn welcomed Islamic incantations/prayer at bedside. We prayed together at his request. I also provided him with an Islamic prayer booklet for Confucianist patients.    PLAN: I will follow up with Saskiajohn for the duration of his stay. [facility] is available to Emelyn per request.     Paulina Rithcie  Lead Confucianist   Pager [phone]    [facility] remains available 24/7 for emergent requests/referrals, either by having the switchboard page the on-call  or by entering an ASAP/STAT consult in Epic (this will also page the on-call ).    "

## 2023-05-31 NOTE — PROGRESS NOTES
VS: /86 (BP Location: Right arm)   Pulse 60   Temp 97.7  F (36.5  C) (Oral)   Resp 16   Wt 64.4 kg (141 lb 14.4 oz)   SpO2 99%   BMI 20.95 kg/m     Output/Last BM: Voids spontaneously without difficulty   Activity: Up IND in room without AD   Skin/Dressing: WNL   Pain: Right flank pain, rates 4/10, prn tylenol effective   CMS: AOx4, denies numbness and tingling to extremities    Diet: Regular diet, good appetite   LDA: PIV - removed at discharge   Equipment: Pt personal belongings   Plan:    Additional Info:

## 2023-05-31 NOTE — PROGRESS NOTES
Transfer Type: [facility]  Transfer Triage Note    Originating unit:[facility] ED    Final intended location for transfer: [facility] surg or IMC, or ICU    Tele required:  No    Time of admission request: 0300    Anticipated to be boarding in ED for >4 hours? Yes    Was Hospitalist Team notified to complete H&P, admission orders, and assume care (sign into chart, collaborate with ED nurse, etc)? Yes    Patient added to Interhospital transfer list?  Yes    Brief case description: 32 y/o with solitary kidney here for flank pain; found to have SHELLIE. H/o nephrolithiasis but CT negative, admission for IV fluids and monitoring of renal function.     Jennyfer Davalos MD

## 2023-05-31 NOTE — DISCHARGE SUMMARY
[facility]  Hospitalist Discharge Summary      Date of Admission:  5/30/2023  Date of Discharge:  5/31/2023  Discharging Provider: Partha Zurita PA-C  Discharge Service: Hospitalist Service, GOLD TEAM 20    Discharge Diagnoses   1. Acute right flank pain, resolved  2. SHELLIE, resolved  3. Hx solitary right kidney    Clinically Significant Risk Factors          Follow-ups Needed After Discharge   Follow-up Appointments     Adult [facility] Follow-up and recommended labs and tests      Follow up with primary care provider, MADALYN REYES, within 7 days   for hospital follow- up.  The following labs/tests are recommended: BMP.      Follow up with urology as needed.    Appointments on [place] and/or [facility] (with [facility] or [facility]   provider or service). Call [phone] if you haven't heard regarding   these appointments within 7 days of discharge.           Discharge Disposition   Discharged to home  Condition at discharge: Stable    Hospital Course   Emelyn Marmolejo is a 33 year old male with history of atrophic left kidney due to renal stones status post nephrectomy in 2021 presents with acute right flank pain.  Patient notes intermittent pain for a number of months.  Previously evaluated by urology in January 2023 with negative work-up.  Felt to be musculoskeletal pain.  Symptoms worsening prior to admission.  Afebrile.  WBC normal.  CR 1.42 on arrival (baseline CR 0.8-1.0).  UA negative.  CT abdomen pelvis for stones or hydronephrosis; noted enlarged appendix but without inflammatory changes to suggest acute appendicitis.  No other pathology noted to explain symptoms.  Patient was treated with IV fluids with CR improved to 1.06.  Suspect SHELLIE due to dehydration.  Flank pain resolved, etiology unclear.  Patient tolerating p.o. intake.  Encouraged to follow-up with urology for recurrent symptoms.  Also recommend follow-up with PCP in 1 week for repeat BMP to  monitor renal function.  Reassurance given that CT revealed no acute pathology.  Exam seems consistent with musculoskeletal etiology.    Consultations This Hospital Stay   None    Code Status   Full Code    Time Spent on this Encounter   I, Partha Zurita PA-C, personally saw the patient today and spent greater than 30 minutes discharging this patient.       Partha Zurita PA-C  [facility] [address]  [address] [address]  Phone: [phone]  Fax: [phone]  ______________________________________________________________________    Physical Exam   Vital Signs: Temp: 97.7  F (36.5  C) Temp src: Oral BP: 115/86 Pulse: 60   Resp: 16 SpO2: 99 % O2 Device: None (Room air)    Weight: 141 lbs 14.4 oz  See exam from earlier today       Primary Care Physician   MADALYN REYES    Discharge Orders      Reason for your hospital stay    Admitted with right flank pain and worsening renal function. Evaluation was negative for acute pathology. No evidence of kidney stones or infection. You were treated with IV fluids and renal function improved. Pain also resolved. Please follow up with primary care or Urology for recurrent symptoms.     Activity    Your activity upon discharge: activity as tolerated     Adult [facility] Follow-up and recommended labs and tests    Follow up with primary care provider, MADALYN REYES, within 7 days for hospital follow- up.  The following labs/tests are recommended: BMP.      Follow up with urology as needed.    Appointments on [place] and/or [facility] (with [facility] or [facility] provider or service). Call [phone] if you haven't heard regarding these appointments within 7 days of discharge.     Diet    Follow this diet upon discharge:  Combination Diet Regular Diet Adult       Significant Results and Procedures    Lab results reviewed over the past 24 hrs:   Recent Labs   Lab 05/31/23  0615 05/30/23  2259    139   POTASSIUM 3.9 3.6   CHLORIDE 108* 105   CO2  25 22   ANIONGAP 8 12   BUN 12.6 17.0   CR 1.06 1.42*   DARRELL 9.1 9.3     Recent Labs   Lab 05/30/23  2259   WBC 6.8   RBC 4.88   HGB 14.2   HCT 42.7   MCV 88   MCH 29.1   MCHC 33.3   RDW 12.1        Recent Labs   Lab 05/31/23  0615 05/30/23  2259   GLC 96 105*           Results for orders placed or performed during the hospital encounter of 05/30/23   CT Abdomen Pelvis w/o Contrast    Narrative    EXAM: CT ABDOMEN PELVIS W/O CONTRAST  LOCATION: [facility]  DATE/TIME: 5/31/2023 1:11 AM CDT    INDICATION: hx of renal stones, s p L nephrectomy, now with R flank pain and increasing creatinine, eval for obstruction  COMPARISON: CT from 11/02/2022  TECHNIQUE: CT scan of the abdomen and pelvis was performed without IV contrast. Multiplanar reformats were obtained. Dose reduction techniques were used.  CONTRAST: None.    FINDINGS:   LOWER CHEST: Cystic bronchiectasis involving the entirety of the visualized right middle lobe as well as subsegmental portions of the lingula and bilateral lower lobes.    HEPATOBILIARY: Normal.    PANCREAS: Normal.    SPLEEN: Normal.    ADRENAL GLANDS: Normal.    KIDNEYS/BLADDER: Status post left nephrectomy. No right hydronephrosis or calcified ureteral stone. Bladder is normal.    BOWEL: No mechanical bowel obstruction or free air. Appendix is mildly prominent for size, but no periappendiceal inflammatory change.    LYMPH NODES: Normal.    VASCULATURE: Unremarkable.    PELVIC ORGANS: Normal.    MUSCULOSKELETAL: Normal.      Impression    IMPRESSION:   1.  Solitary right kidney without hydronephrosis or visualized obstructing ureteral calculus.    2.  Appendix mildly prominent in size but without inflammatory change to suggest appendicitis at this time.           Discharge Medications   Current Discharge Medication List      CONTINUE these medications which have NOT CHANGED    Details   traZODone (DESYREL) 50 MG tablet Take 1-2 tablets  ( mg) by mouth nightly as needed for sleep  Qty: 30 tablet, Refills: 0    Associated Diagnoses: Sleep trouble      vitamin D3 (CHOLECALCIFEROL) 50 mcg (2000 units) tablet Take 1 tablet (50 mcg) by mouth daily  Qty: 90 tablet, Refills: 1    Associated Diagnoses: Healthcare maintenance      gabapentin (NEURONTIN) 300 MG capsule TAKE ONE (1) CAPSULE (300 MG) BY MOUTH NIGHTLY AS NEEDED FOR NEUROPATHIC PAIN  Qty: 30 capsule, Refills: 2    Associated Diagnoses: Neuropathy           Allergies   Allergies   Allergen Reactions     Nsaids      Not true allergy. But be sparing with patient as he has single kidney

## 2023-05-31 NOTE — PROGRESS NOTES
Report given to RHIANNA RN and pt LCARITZA via EMS. A&Ox4, pleasant and cooperative, ambulating independently.

## 2023-05-31 NOTE — PROGRESS NOTES
DISCHARGE SUMMARY    Pt discharging to: Home  Transportation: [employer]  AVS given and discussed: yes   Stoplight Tool given and discussed: N/A  Medications given: N/A, no new medications ordered during hospitalization  Belongings returned: remains with patient  Comments:

## 2023-05-31 NOTE — H&P
[facility]    History and Physical - Hospitalist Service, GOLD TEAM 20       Date of Admission:  5/30/2023    Assessment & Plan   Emelyn Marmolejo is a 33 year old male with history of renal stones and solitary right kidney who was admitted with acute right flank pain and SHELLIE.    #Right flank pain:  Presents with acute onset of right flank pain with radiation to RLQ.  Afebrile.  WBC normal.  Worsening renal function noted.  CT negative for stones or hydronephrosis.  No other acute pathology to explain symptoms.  Appendix mildly prominent but without acute inflammatory changes to suggest appendicitis.  Symptoms have now resolved.  Suspect musculoskeletal in nature.  He was evaluated by urology in January 2023 and felt to have musculoskeletal pain at that time as well.  - Reassurance given of no acute pathology  - Okay to use Tylenol as needed  - No further evaluation necessary  - Follow-up with urology for recurrent symptoms    #SHELLIE, resolved:   #Solitary right kidney:    Hx atrophic left kidney due large renal stone s/p nephrectomy in 2021.  Baseline Cr 0.8-1.0.  Cr 1.42 on arrival.  No work-up in ED.  Treated empirically with IVF for suspected dehydration.  Creatinine 1.06 this morning.  - Repeat labs in 1 week to monitor  - Follow-up with PCP          Diet: Combination Diet Regular Diet Adult    DVT Prophylaxis: Ambulate every shift  Burgos Catheter: Not present  Lines: None     Cardiac Monitoring: None  Code Status: Full Code      Clinically Significant Risk Factors Present on Admission                                Disposition Plan      Expected Discharge Date: 06/01/2023              Will discharge home later today if tolerating PO and no return of symptoms.    The patient's care was discussed with the Attending Physician, Dr. Whalen.    Partha Zurita PA-C  Hospitalist Service, GOLD TEAM 20  [facility]  Securely  message with [employer] (more info)  Text page via [facility] Paging/Directory   See signed in provider for up to date coverage information    ______________________________________________________________________    Chief Complaint   Right flank pain    History is obtained from the patient    History of Present Illness   Emelyn Marmolejo is a 33 year old male who presents with acute onset right flank pain.  Onset 3 to 4 days ago.  Symptoms progressively worse over the last 24 to 48 hours.  Presented last evening with worsening right flank pain which radiates to the right lower quadrant.  No aggravating or alleviating factors.  No fevers or chills.  Patient reports mild urinary urgency and dysuria.  No hematuria.  Reports similar symptoms in the past which come and go.  Previously evaluated by urology in January 2023 with negative work-up.  History of atrophic left kidney due to stones s/p left nephrectomy in 2021.  Patient admits that he becomes very worried anytime he has right flank pain to solitary kidney.    In the ED he was noted to be afebrile with stable vitals.  CR 1.42, labs otherwise unremarkable.  UA negative.  CT abd/pelvis negative for stones or hydronephrosis; appendix mildly prominent but without inflammatory changes to suggest appendicitis.  Received 1500 mL of IV fluid.  Admitted for further monitoring.    This morning patient reports right flank pain has completely resolved.  Urinary symptoms have resolved as well.  He denies any other symptoms.  No nausea or vomiting.  Tolerating p.o. intake.  Fevers or chills.  Repeat labs with CR 1.06.       Past Medical History    Past Medical History:   Diagnosis Date     H/O left nephrectomy 1/26/2023     Nephrolithiasis      Prostate infection        Past Surgical History   Past Surgical History:   Procedure Laterality Date     COMBINED CYSTOSCOPY, RETROGRADES, URETEROSCOPY, LASER HOLMIUM LITHOTRIPSY URETER(S), INSERT STENT Bilateral 9/7/2022    Procedure: CYSTOSCOPY,  BILATERAL RETROGRADE PYELOGRAM, LEFT URETEROSCOPY, BASKET REMOVAL OF STONE;  Surgeon: Aj Hedrick MD;  Location:  OR     COMBINED CYSTOSCOPY, URETEROSCOPY, LASER HOLMIUM LITHOTRIPSY URETER(S) Right 8/13/2020    Procedure: CYSTOSCOPY, RIGHT RETROGRADE PYELOGRAM, RIGHT diagnostic URETEROSCOPY;  Surgeon: Aj Hedrick MD;  Location:  OR     ESOPHAGOSCOPY, GASTROSCOPY, DUODENOSCOPY (EGD), COMBINED N/A 1/25/2021    Procedure: ESOPHAGOGASTRODUODENOSCOPY, WITH BIOPSY;  Surgeon: Rip Her MD;  Location: [facility] OR     LAPAROSCOPIC NEPHRECTOMY Left 5/24/2021    Procedure: LEFT NEPHRECTOMY, TOTAL, LAPAROSCOPIC;  Surgeon: Aj Hedrick MD;  Location:  OR       Prior to Admission Medications   Prior to Admission Medications   Prescriptions Last Dose Informant Patient Reported? Taking?   gabapentin (NEURONTIN) 300 MG capsule  Self No No   Sig: TAKE ONE (1) CAPSULE (300 MG) BY MOUTH NIGHTLY AS NEEDED FOR NEUROPATHIC PAIN   traZODone (DESYREL) 50 MG tablet Past Month at 2 weeks ago Self No Yes   Sig: Take 1-2 tablets ( mg) by mouth nightly as needed for sleep   vitamin D3 (CHOLECALCIFEROL) 50 mcg (2000 units) tablet Past Month at month ago Self No Yes   Sig: Take 1 tablet (50 mcg) by mouth daily      Facility-Administered Medications: None        Physical Exam   Vital Signs: Temp: 97.7  F (36.5  C) Temp src: Oral BP: 115/86 Pulse: 60   Resp: 16 SpO2: 99 % O2 Device: None (Room air)    Weight: 141 lbs 14.4 oz    General Appearance:  Awake. Alert. Oriented x3. NAD.   HEENT:  No scleral icterus. Moist mucous membranes.   Respiratory:  Normal effort. CTAB.   Cardiovascular:  RRR. S1,S2. No murmurs or gallops.   GI:  Soft, non-distended.  Mild tenderness noted with deep palpation of the right and left pelvic regions, just above the iliac crests. No CVA tenderness. No paravertebral or vertebral tenderness.    Extremity:  No pitting edema. No calf tenderness.   Skin:  No visible rash. No jaundice.    Neuro:  Grossly non-focal.    Medical Decision Making       NOTE(S)/MEDICAL RECORDS REVIEWED over the past 24 hours: ED provider  Tests ORDERED & REVIEWED in the past 24 hours:  - CMP  - CBC  - UA  Medical complexity over the past 24 hours:  - Prescription DRUG MANAGEMENT performed      Data    Lab results reviewed over the past 24 hrs:   Recent Labs   Lab 05/31/23  0615 05/30/23 2259    139   POTASSIUM 3.9 3.6   CHLORIDE 108* 105   CO2 25 22   ANIONGAP 8 12   BUN 12.6 17.0   CR 1.06 1.42*   DARRELL 9.1 9.3     Recent Labs   Lab 05/30/23 2259   WBC 6.8   RBC 4.88   HGB 14.2   HCT 42.7   MCV 88   MCH 29.1   MCHC 33.3   RDW 12.1        No lab results found in last 7 days.  No lab results found in last 7 days.   No lab results found in last 7 days.  No lab results found in last 7 days.  Recent Labs   Lab 05/31/23  0615 05/30/23 2259   GLC 96 105*           Imaging results reviewed over the past 24 hrs:   Recent Results (from the past 24 hour(s))   CT Abdomen Pelvis w/o Contrast    Narrative    EXAM: CT ABDOMEN PELVIS W/O CONTRAST  LOCATION: [facility]  DATE/TIME: 5/31/2023 1:11 AM CDT    INDICATION: hx of renal stones, s p L nephrectomy, now with R flank pain and increasing creatinine, eval for obstruction  COMPARISON: CT from 11/02/2022  TECHNIQUE: CT scan of the abdomen and pelvis was performed without IV contrast. Multiplanar reformats were obtained. Dose reduction techniques were used.  CONTRAST: None.    FINDINGS:   LOWER CHEST: Cystic bronchiectasis involving the entirety of the visualized right middle lobe as well as subsegmental portions of the lingula and bilateral lower lobes.    HEPATOBILIARY: Normal.    PANCREAS: Normal.    SPLEEN: Normal.    ADRENAL GLANDS: Normal.    KIDNEYS/BLADDER: Status post left nephrectomy. No right hydronephrosis or calcified ureteral stone. Bladder is normal.    BOWEL: No mechanical bowel obstruction or free air. Appendix  is mildly prominent for size, but no periappendiceal inflammatory change.    LYMPH NODES: Normal.    VASCULATURE: Unremarkable.    PELVIC ORGANS: Normal.    MUSCULOSKELETAL: Normal.      Impression    IMPRESSION:   1.  Solitary right kidney without hydronephrosis or visualized obstructing ureteral calculus.    2.  Appendix mildly prominent in size but without inflammatory change to suggest appendicitis at this time.

## 2023-05-31 NOTE — MEDICATION SCRIBE - ADMISSION MEDICATION HISTORY
Medication Scribe Admission Medication History    Admission medication history is complete. The information provided in this note is only as accurate as the sources available at the time of the update.    Medication reconciliation/reorder completed by provider prior to medication history? No    Information Source(s): Patient via in-person    Pertinent Information: Patient states that he is not taking gabapentin, however, I did not removed it due to 3 refills left.     Changes made to PTA medication list:    Added: None    Deleted: triamcinolone acetonide    Changed: None    Medication Affordability:  Not including over the counter (OTC) medications, was there a time in the past 3 months when you did not take your medications as prescribed because of cost?: No    Allergies reviewed with patient and updates made in EHR: yes    Medication History Completed By: Nikki Smith 5/31/2023 3:20 AM    Prior to Admission medications    Medication Sig Last Dose Taking? Auth Provider Long Term End Date   traZODone (DESYREL) 50 MG tablet Take 1-2 tablets ( mg) by mouth nightly as needed for sleep Past Month at 2 weeks ago Yes Tatyana Ricks MD Yes    vitamin D3 (CHOLECALCIFEROL) 50 mcg (2000 units) tablet Take 1 tablet (50 mcg) by mouth daily Past Month at month ago Yes Tatyana Ricks MD     gabapentin (NEURONTIN) 300 MG capsule TAKE ONE (1) CAPSULE (300 MG) BY MOUTH NIGHTLY AS NEEDED FOR NEUROPATHIC PAIN   Jordan Mojica,  Yes

## 2023-05-31 NOTE — UTILIZATION REVIEW
"Admission Status; Secondary Review Determination     Admission Date: 5/30/2023 10:30 PM       Under the authority of the Utilization Management Committee, the utilization review process indicated a secondary review on the above patient.  The review outcome is based on review of the medical records, discussions with staff, and applying clinical experience noted on the date of the review.          (x) Observation Status Appropriate - This patient does not meet hospital inpatient criteria and is placed in observation status. If this patient's primary payer is Medicare and was admitted as an inpatient, Condition Code 44 should be used and patient status changed to \"observation\".       RATIONALE FOR DETERMINATION      Brief clinical presentation, information copied from the chart, abbreviated and edited for relevant content:     Agree with status change from IP to OBS, patient discharging in stable condition after 1 night.     Patient is a 32 y/o with solitary kidney here for flank pain; found to have SHELLIE. H/o nephrolithiasis but CT negative. Labs unremarkable. Hemodynamically stable. Was admitted  for IV fluids and monitoring of renal function. Discharging after one night.           The severity of illness, intensity of cares provided, risk for adverse outcome, and expected LOS make the care appropriate for observation.       The information on this document is developed by the utilization review team in order for the business office to ensure compliance.  This only denotes the appropriateness of proper admission status and does not reflect the quality of care rendered.         The definitions of Inpatient Status and Observation Status used in making the determination above are those provided in the CMS Coverage Manual, Chapter 1 and Chapter 6, section 70.4.      Sincerely,     Azra Diaz MD   Utilization Review/ Case Management  [facility].            "

## 2023-06-07 ENCOUNTER — HOSPITAL ENCOUNTER (EMERGENCY)
Facility: CLINIC | Age: 33
Discharge: LEFT BEFORE DISCHARGE | End: 2023-06-07
Attending: EMERGENCY MEDICINE | Admitting: EMERGENCY MEDICINE
Payer: COMMERCIAL

## 2023-06-07 VITALS
DIASTOLIC BLOOD PRESSURE: 73 MMHG | TEMPERATURE: 98.2 F | SYSTOLIC BLOOD PRESSURE: 111 MMHG | HEART RATE: 72 BPM | RESPIRATION RATE: 16 BRPM | OXYGEN SATURATION: 98 %

## 2023-06-07 DIAGNOSIS — R10.31 ABDOMINAL PAIN, RIGHT LOWER QUADRANT: ICD-10-CM

## 2023-06-07 LAB
ALBUMIN SERPL BCG-MCNC: 4.5 G/DL (ref 3.5–5.2)
ALBUMIN UR-MCNC: NEGATIVE MG/DL
ALP SERPL-CCNC: 80 U/L (ref 40–129)
ALT SERPL W P-5'-P-CCNC: 18 U/L (ref 10–50)
ANION GAP SERPL CALCULATED.3IONS-SCNC: 15 MMOL/L (ref 7–15)
APPEARANCE UR: CLEAR
AST SERPL W P-5'-P-CCNC: 27 U/L (ref 10–50)
BASOPHILS # BLD AUTO: 0 10E3/UL (ref 0–0.2)
BASOPHILS NFR BLD AUTO: 1 %
BILIRUB SERPL-MCNC: 0.4 MG/DL
BILIRUB UR QL STRIP: NEGATIVE
BUN SERPL-MCNC: 11.6 MG/DL (ref 6–20)
CALCIUM SERPL-MCNC: 9.6 MG/DL (ref 8.6–10)
CHLORIDE SERPL-SCNC: 104 MMOL/L (ref 98–107)
COLOR UR AUTO: NORMAL
CREAT SERPL-MCNC: 1.06 MG/DL (ref 0.67–1.17)
DEPRECATED HCO3 PLAS-SCNC: 21 MMOL/L (ref 22–29)
EOSINOPHIL # BLD AUTO: 0.3 10E3/UL (ref 0–0.7)
EOSINOPHIL NFR BLD AUTO: 5 %
ERYTHROCYTE [DISTWIDTH] IN BLOOD BY AUTOMATED COUNT: 12.1 % (ref 10–15)
GFR SERPL CREATININE-BSD FRML MDRD: >90 ML/MIN/1.73M2
GLUCOSE SERPL-MCNC: 92 MG/DL (ref 70–99)
GLUCOSE UR STRIP-MCNC: NEGATIVE MG/DL
HCT VFR BLD AUTO: 45.7 % (ref 40–53)
HGB BLD-MCNC: 15 G/DL (ref 13.3–17.7)
HGB UR QL STRIP: NEGATIVE
HOLD SPECIMEN: NORMAL
HOLD SPECIMEN: NORMAL
IMM GRANULOCYTES # BLD: 0 10E3/UL
IMM GRANULOCYTES NFR BLD: 0 %
KETONES UR STRIP-MCNC: NEGATIVE MG/DL
LEUKOCYTE ESTERASE UR QL STRIP: NEGATIVE
LIPASE SERPL-CCNC: 28 U/L (ref 13–60)
LYMPHOCYTES # BLD AUTO: 2.3 10E3/UL (ref 0.8–5.3)
LYMPHOCYTES NFR BLD AUTO: 41 %
MCH RBC QN AUTO: 28.8 PG (ref 26.5–33)
MCHC RBC AUTO-ENTMCNC: 32.8 G/DL (ref 31.5–36.5)
MCV RBC AUTO: 88 FL (ref 78–100)
MONOCYTES # BLD AUTO: 0.5 10E3/UL (ref 0–1.3)
MONOCYTES NFR BLD AUTO: 8 %
NEUTROPHILS # BLD AUTO: 2.6 10E3/UL (ref 1.6–8.3)
NEUTROPHILS NFR BLD AUTO: 45 %
NITRATE UR QL: NEGATIVE
NRBC # BLD AUTO: 0 10E3/UL
NRBC BLD AUTO-RTO: 0 /100
PH UR STRIP: 6 [PH] (ref 5–7)
PLATELET # BLD AUTO: 204 10E3/UL (ref 150–450)
POTASSIUM SERPL-SCNC: 4 MMOL/L (ref 3.4–5.3)
PROT SERPL-MCNC: 8 G/DL (ref 6.4–8.3)
RBC # BLD AUTO: 5.2 10E6/UL (ref 4.4–5.9)
RBC URINE: 1 /HPF
SODIUM SERPL-SCNC: 140 MMOL/L (ref 136–145)
SP GR UR STRIP: 1 (ref 1–1.03)
UROBILINOGEN UR STRIP-MCNC: NORMAL MG/DL
WBC # BLD AUTO: 5.8 10E3/UL (ref 4–11)
WBC URINE: <1 /HPF

## 2023-06-07 PROCEDURE — 99283 EMERGENCY DEPT VISIT LOW MDM: CPT | Performed by: EMERGENCY MEDICINE

## 2023-06-07 PROCEDURE — 83690 ASSAY OF LIPASE: CPT | Performed by: EMERGENCY MEDICINE

## 2023-06-07 PROCEDURE — 80053 COMPREHEN METABOLIC PANEL: CPT | Performed by: EMERGENCY MEDICINE

## 2023-06-07 PROCEDURE — 99284 EMERGENCY DEPT VISIT MOD MDM: CPT | Performed by: EMERGENCY MEDICINE

## 2023-06-07 PROCEDURE — 85025 COMPLETE CBC W/AUTO DIFF WBC: CPT | Performed by: EMERGENCY MEDICINE

## 2023-06-07 PROCEDURE — 36415 COLL VENOUS BLD VENIPUNCTURE: CPT | Performed by: EMERGENCY MEDICINE

## 2023-06-07 PROCEDURE — 81001 URINALYSIS AUTO W/SCOPE: CPT | Performed by: EMERGENCY MEDICINE

## 2023-06-07 ASSESSMENT — ACTIVITIES OF DAILY LIVING (ADL)
ADLS_ACUITY_SCORE: 35
ADLS_ACUITY_SCORE: 35

## 2023-06-07 NOTE — ED TRIAGE NOTES
Pt had left sided abdominal pain that is now on the right side that started yesterday, pain rated at a 8 out of 10  The pain is dull and achy that radiates to the back  Pt denies nausea, and dizziness  Pt stated last BM was yesterday consistency was normal for patient

## 2023-06-07 NOTE — DISCHARGE INSTRUCTIONS
Please return to the emergency department if your pain worsens, if you develop fever or the pain intensifies in the right lower quadrant.  We would be concerned about appendicitis at that time.    If you notice your kidney function is worse please on my chart please return to the ER.

## 2023-06-07 NOTE — ED PROVIDER NOTES
"ED Provider Note  [facility]      History     Chief Complaint   Patient presents with     Abdominal Pain     HPI  Emelyn Marmolejo is a 33 year old male who has a past medical history significant for renal stone, generalized abdominal pain, and solitary kidney secondary to kidney injury presents with abdominal pain.  Patient notes that yesterday he had diffuse abdominal pain.   He now states it is right-sided and rates an 8 out of 10.   Patient points to right lower quadrant as area of maximal pain. He notes that it does radiate towards the back.  He states his last BM was yesterday morning and that he feels \"constipated\".  Denies associated diarrhea.  No associated nausea, vomiting, lack of appetite, or fever.  Patient had nephrectomy secondary to kidney damage from a stone but no other abdominal surgery.    Per chart review patient was seen here a week ago with right-sided flank pain and had negative imaging at that time.  There is also urology note noting right lower quadrant pain as well.    Per chart review patient was recently admitted for SHELLIE in the setting of a solitary kidney.  Scan at that time was unremarkable though appendix was noted to be prominent in size without inflammatory changes.    Past Medical History  Past Medical History:   Diagnosis Date     H/O left nephrectomy 1/26/2023     Nephrolithiasis      Prostate infection      Past Surgical History:   Procedure Laterality Date     COMBINED CYSTOSCOPY, RETROGRADES, URETEROSCOPY, LASER HOLMIUM LITHOTRIPSY URETER(S), INSERT STENT Bilateral 9/7/2022    Procedure: CYSTOSCOPY, BILATERAL RETROGRADE PYELOGRAM, LEFT URETEROSCOPY, BASKET REMOVAL OF STONE;  Surgeon: Aj Hedirck MD;  Location:  OR     COMBINED CYSTOSCOPY, URETEROSCOPY, LASER HOLMIUM LITHOTRIPSY URETER(S) Right 8/13/2020    Procedure: CYSTOSCOPY, RIGHT RETROGRADE PYELOGRAM, RIGHT diagnostic URETEROSCOPY;  Surgeon: Aj Hedrick MD;  Location:  OR     " ESOPHAGOSCOPY, GASTROSCOPY, DUODENOSCOPY (EGD), COMBINED N/A 1/25/2021    Procedure: ESOPHAGOGASTRODUODENOSCOPY, WITH BIOPSY;  Surgeon: Rip Her MD;  Location: [facility] OR     LAPAROSCOPIC NEPHRECTOMY Left 5/24/2021    Procedure: LEFT NEPHRECTOMY, TOTAL, LAPAROSCOPIC;  Surgeon: Aj Hedrick MD;  Location: [facility] OR     gabapentin (NEURONTIN) 300 MG capsule  traZODone (DESYREL) 50 MG tablet  vitamin D3 (CHOLECALCIFEROL) 50 mcg (2000 units) tablet      Allergies   Allergen Reactions     Nsaids      Not true allergy. But be sparing with patient as he has single kidney      Family History  Family History   Problem Relation Age of Onset     Crohn's Disease No family hx of      Ulcerative Colitis No family hx of      GERD No family hx of      Stomach Cancer No family hx of      Social History   Social History     Tobacco Use     Smoking status: Former     Packs/day: 0.50     Years: 5.00     Pack years: 2.50     Types: Cigarettes     Quit date: 9/17/2019     Years since quitting: 3.7     Smokeless tobacco: Never   Vaping Use     Vaping status: Never Used   Substance Use Topics     Alcohol use: Never     Drug use: Never         A medically appropriate review of systems was performed with pertinent positives and negatives noted in the HPI, and all other systems negative.    Physical Exam   BP: 111/73  Pulse: 72  Temp: 98.2  F (36.8  C)  Resp: 16  SpO2: 98 %  Physical Exam  General: Awake alert no acute distress  Lungs: Breathing comfortably on room air  Cardiovascular: Regular rate   Abdomen: Soft, patient with tenderness in his right lower quadrant and right upper quadrant.  No guarding or peritoneal signs.  Neuro: Patient is awake alert no acute distress.  Psych: Patient is calm, cooperative, answering questions appropriately.    ED Course, Procedures, & Data      Procedures            Results for orders placed or performed during the hospital encounter of 06/07/23   [place] Draw     Status: None (In process)     Narrative    The following orders were created for panel order [place] Draw.  Procedure                               Abnormality         Status                     ---------                               -----------         ------                     Extra Blue Top Tube[[id number]]                              Final result               Extra Red Top Tube[[id number]]                               Final result               Extra Green Top (Lithium...[[id number]]                                                 Extra Purple Top Tube[[id number]]                                                         Please view results for these tests on the individual orders.   UA with Microscopic reflex to Culture     Status: Normal    Specimen: Urine, Midstream   Result Value Ref Range    Color Urine Straw Colorless, Straw, Light Yellow, Yellow    Appearance Urine Clear Clear    Glucose Urine Negative Negative mg/dL    Bilirubin Urine Negative Negative    Ketones Urine Negative Negative mg/dL    Specific Gravity Urine 1.003 1.003 - 1.035    Blood Urine Negative Negative    pH Urine 6.0 5.0 - 7.0    Protein Albumin Urine Negative Negative mg/dL    Urobilinogen Urine Normal Normal, 2.0 mg/dL    Nitrite Urine Negative Negative    Leukocyte Esterase Urine Negative Negative    RBC Urine 1 <=2 /HPF    WBC Urine <1 <=5 /HPF    Narrative    Urine Culture not indicated   Extra Blue Top Tube     Status: None   Result Value Ref Range    Hold Specimen JIC    Extra Red Top Tube     Status: None   Result Value Ref Range    Hold Specimen JIC    CBC with platelets and differential     Status: None   Result Value Ref Range    WBC Count 5.8 4.0 - 11.0 10e3/uL    RBC Count 5.20 4.40 - 5.90 10e6/uL    Hemoglobin 15.0 13.3 - 17.7 g/dL    Hematocrit 45.7 40.0 - 53.0 %    MCV 88 78 - 100 fL    MCH 28.8 26.5 - 33.0 pg    MCHC 32.8 31.5 - 36.5 g/dL    RDW 12.1 10.0 - 15.0 %    Platelet Count 204 150 - 450 10e3/uL    % Neutrophils 45 %    % Lymphocytes 41 %     % Monocytes 8 %    % Eosinophils 5 %    % Basophils 1 %    % Immature Granulocytes 0 %    NRBCs per 100 WBC 0 <1 /100    Absolute Neutrophils 2.6 1.6 - 8.3 10e3/uL    Absolute Lymphocytes 2.3 0.8 - 5.3 10e3/uL    Absolute Monocytes 0.5 0.0 - 1.3 10e3/uL    Absolute Eosinophils 0.3 0.0 - 0.7 10e3/uL    Absolute Basophils 0.0 0.0 - 0.2 10e3/uL    Absolute Immature Granulocytes 0.0 <=0.4 10e3/uL    Absolute NRBCs 0.0 10e3/uL   CBC with platelets differential     Status: None    Narrative    The following orders were created for panel order CBC with platelets differential.  Procedure                               Abnormality         Status                     ---------                               -----------         ------                     CBC with platelets and d...[[id number]]                      Final result                 Please view results for these tests on the individual orders.     Medications - No data to display  Labs Ordered and Resulted from Time of ED Arrival to Time of ED Departure   ROUTINE UA WITH MICROSCOPIC REFLEX TO CULTURE - Normal       Result Value    Color Urine Straw      Appearance Urine Clear      Glucose Urine Negative      Bilirubin Urine Negative      Ketones Urine Negative      Specific Gravity Urine 1.003      Blood Urine Negative      pH Urine 6.0      Protein Albumin Urine Negative      Urobilinogen Urine Normal      Nitrite Urine Negative      Leukocyte Esterase Urine Negative      RBC Urine 1      WBC Urine <1     CBC WITH PLATELETS AND DIFFERENTIAL    WBC Count 5.8      RBC Count 5.20      Hemoglobin 15.0      Hematocrit 45.7      MCV 88      MCH 28.8      MCHC 32.8      RDW 12.1      Platelet Count 204      % Neutrophils 45      % Lymphocytes 41      % Monocytes 8      % Eosinophils 5      % Basophils 1      % Immature Granulocytes 0      NRBCs per 100 WBC 0      Absolute Neutrophils 2.6      Absolute Lymphocytes 2.3      Absolute Monocytes 0.5      Absolute Eosinophils 0.3       Absolute Basophils 0.0      Absolute Immature Granulocytes 0.0      Absolute NRBCs 0.0     COMPREHENSIVE METABOLIC PANEL   LIPASE     No orders to display          Critical care was not performed.     Medical Decision Making  The patient's presentation was of moderate complexity (an undiagnosed new problem with uncertain diagnosis).    The patient's evaluation involved:  review of external note(s) from 3+ sources (see separate area of note for details)  ordering and/or review of 3+ test(s) in this encounter (see separate area of note for details)  review of 3+ test result(s) ordered prior to this encounter (see separate area of note for details)  strong consideration of a test (see separate area of note for details) that was ultimately deferred    The patient's management necessitated high risk (a decision regarding hospitalization).      Assessment & Plan    Emelyn is a 33-year-old male who presents to the emergency department for right-sided abdominal pain.  Patient reports yesterday was generalized and now is localized in the right, he has tenderness in both right upper and right lower quadrant but he notes right lower quadrant is area of maximal tenderness.  Differential include things such as constipation, appendicitis, choledocholithiasis or cholecystitis, less likely renal colic given location and generalized abdominal discomfort.    Initial evaluation included laboratory studies.  Labs are notable for urine without evidence of blood.  CBC is a normal white count without a left shift.    Labs are still pending for CMP and lipase.  Patient discusses he that he wants to be discharged.  I did discuss I would want him to go AGAINST MEDICAL ADVICE without imaging or remainder of his laboratory studies.    I discussed that his symptoms could be consistent with appendicitis given right lower quadrant tenderness that followed generalized pain.  Patient is refusing CT scan.  I discussed this at length and that  appendicitis could be life-threatening and missed or delayed diagnosis could lead to complications and without imaging cannot rule out this out.  Patient continued to decline CT scans.  I encouraged that if he developed worsening symptoms including fever, more severe pain, feeling generally unwell he would need to return to the ER.  Ultimately patient did not want to wait for the rest of his laboratory results either so he opted to leave AGAINST MEDICAL ADVICE.  He reported that he will follow-up on his kidney functions on his MyChart.  He was encouraged to return to the ER should he have a decline of his renal function.    I have reviewed the nursing notes. I have reviewed the findings, diagnosis, plan and need for follow up with the patient.    Discharge Medication List as of 6/7/2023 10:19 AM          Final diagnoses:   Abdominal pain, right lower quadrant       Maxwell Treviño  [facility] EMERGENCY DEPARTMENT  6/7/2023     Maxwell Treviño MD  06/07/23 [id number]

## 2023-07-10 ENCOUNTER — HOSPITAL ENCOUNTER (EMERGENCY)
Facility: CLINIC | Age: 33
Discharge: STILL ADMITTED OR ADMINISTRATIVE | End: 2023-07-11
Admitting: EMERGENCY MEDICINE
Payer: COMMERCIAL

## 2023-07-10 VITALS
HEART RATE: 66 BPM | SYSTOLIC BLOOD PRESSURE: 100 MMHG | DIASTOLIC BLOOD PRESSURE: 65 MMHG | TEMPERATURE: 97.9 F | RESPIRATION RATE: 18 BRPM | OXYGEN SATURATION: 98 %

## 2023-07-10 PROCEDURE — 99281 EMR DPT VST MAYX REQ PHY/QHP: CPT

## 2023-07-11 ASSESSMENT — ACTIVITIES OF DAILY LIVING (ADL): ADLS_ACUITY_SCORE: 33

## 2023-07-11 NOTE — ED TRIAGE NOTES
Pt states he is having trouble sleeping. Pt stated he can only sleep by taking medication (trazadone) prescribed by doctor. Pt stated it is not helping now. Pt has not slept since Saturday. Pt has HA.  Pt c/o of right flank pain and has one kidney.   Triage Assessment     Row Name 07/10/23 8345       Triage Assessment (Adult)    Airway WDL WDL       Respiratory WDL    Respiratory WDL WDL       Skin Circulation/Temperature WDL    Skin Circulation/Temperature WDL WDL       Cardiac WDL    Cardiac WDL WDL       Peripheral/Neurovascular WDL    Peripheral Neurovascular WDL WDL       Cognitive/Neuro/Behavioral WDL    Cognitive/Neuro/Behavioral WDL WDL

## 2023-07-27 ENCOUNTER — HOSPITAL ENCOUNTER (EMERGENCY)
Facility: CLINIC | Age: 33
Discharge: HOME | End: 2023-07-27
Attending: STUDENT IN AN ORGANIZED HEALTH CARE EDUCATION/TRAINING PROGRAM | Admitting: STUDENT IN AN ORGANIZED HEALTH CARE EDUCATION/TRAINING PROGRAM
Payer: COMMERCIAL

## 2023-07-27 VITALS
SYSTOLIC BLOOD PRESSURE: 134 MMHG | DIASTOLIC BLOOD PRESSURE: 80 MMHG | BODY MASS INDEX: 20.82 KG/M2 | OXYGEN SATURATION: 100 % | RESPIRATION RATE: 18 BRPM | TEMPERATURE: 97.5 F | HEART RATE: 62 BPM | WEIGHT: 141 LBS

## 2023-07-27 DIAGNOSIS — G47.09 OTHER INSOMNIA: ICD-10-CM

## 2023-07-27 DIAGNOSIS — F99 INSOMNIA DUE TO OTHER MENTAL DISORDER: ICD-10-CM

## 2023-07-27 DIAGNOSIS — R10.9 FLANK PAIN: ICD-10-CM

## 2023-07-27 DIAGNOSIS — F51.05 INSOMNIA DUE TO OTHER MENTAL DISORDER: ICD-10-CM

## 2023-07-27 LAB
ALBUMIN SERPL BCG-MCNC: 4.5 G/DL (ref 3.5–5.2)
ALBUMIN UR-MCNC: NEGATIVE MG/DL
ALP SERPL-CCNC: 72 U/L (ref 40–129)
ALT SERPL W P-5'-P-CCNC: 13 U/L (ref 0–70)
ANION GAP SERPL CALCULATED.3IONS-SCNC: 12 MMOL/L (ref 7–15)
APPEARANCE UR: CLEAR
AST SERPL W P-5'-P-CCNC: 26 U/L (ref 0–45)
BACTERIA #/AREA URNS HPF: ABNORMAL /HPF
BASOPHILS # BLD AUTO: 0 10E3/UL (ref 0–0.2)
BASOPHILS NFR BLD AUTO: 1 %
BILIRUB SERPL-MCNC: 0.2 MG/DL
BILIRUB UR QL STRIP: NEGATIVE
BUN SERPL-MCNC: 17.7 MG/DL (ref 6–20)
CALCIUM SERPL-MCNC: 9.3 MG/DL (ref 8.6–10)
CHLORIDE SERPL-SCNC: 104 MMOL/L (ref 98–107)
COLOR UR AUTO: ABNORMAL
CREAT SERPL-MCNC: 1.12 MG/DL (ref 0.67–1.17)
DEPRECATED HCO3 PLAS-SCNC: 23 MMOL/L (ref 22–29)
EOSINOPHIL # BLD AUTO: 0.2 10E3/UL (ref 0–0.7)
EOSINOPHIL NFR BLD AUTO: 2 %
ERYTHROCYTE [DISTWIDTH] IN BLOOD BY AUTOMATED COUNT: 12.3 % (ref 10–15)
GFR SERPL CREATININE-BSD FRML MDRD: 88 ML/MIN/1.73M2
GLUCOSE SERPL-MCNC: 101 MG/DL (ref 70–99)
GLUCOSE UR STRIP-MCNC: NEGATIVE MG/DL
HCT VFR BLD AUTO: 42.8 % (ref 40–53)
HGB BLD-MCNC: 14.3 G/DL (ref 13.3–17.7)
HGB UR QL STRIP: ABNORMAL
IMM GRANULOCYTES # BLD: 0 10E3/UL
IMM GRANULOCYTES NFR BLD: 0 %
KETONES UR STRIP-MCNC: NEGATIVE MG/DL
LEUKOCYTE ESTERASE UR QL STRIP: NEGATIVE
LIPASE SERPL-CCNC: 31 U/L (ref 13–60)
LYMPHOCYTES # BLD AUTO: 2.5 10E3/UL (ref 0.8–5.3)
LYMPHOCYTES NFR BLD AUTO: 39 %
MCH RBC QN AUTO: 28.8 PG (ref 26.5–33)
MCHC RBC AUTO-ENTMCNC: 33.4 G/DL (ref 31.5–36.5)
MCV RBC AUTO: 86 FL (ref 78–100)
MONOCYTES # BLD AUTO: 0.6 10E3/UL (ref 0–1.3)
MONOCYTES NFR BLD AUTO: 9 %
NEUTROPHILS # BLD AUTO: 3.1 10E3/UL (ref 1.6–8.3)
NEUTROPHILS NFR BLD AUTO: 49 %
NITRATE UR QL: NEGATIVE
NRBC # BLD AUTO: 0 10E3/UL
NRBC BLD AUTO-RTO: 0 /100
PH UR STRIP: 6.5 [PH] (ref 5–7)
PLATELET # BLD AUTO: 213 10E3/UL (ref 150–450)
POTASSIUM SERPL-SCNC: 4 MMOL/L (ref 3.4–5.3)
PROT SERPL-MCNC: 7.3 G/DL (ref 6.4–8.3)
RBC # BLD AUTO: 4.96 10E6/UL (ref 4.4–5.9)
RBC URINE: <1 /HPF
SODIUM SERPL-SCNC: 139 MMOL/L (ref 136–145)
SP GR UR STRIP: 1 (ref 1–1.03)
TSH SERPL DL<=0.005 MIU/L-ACNC: 1.51 UIU/ML (ref 0.3–4.2)
UROBILINOGEN UR STRIP-MCNC: NORMAL MG/DL
WBC # BLD AUTO: 6.4 10E3/UL (ref 4–11)
WBC URINE: <1 /HPF

## 2023-07-27 PROCEDURE — 81003 URINALYSIS AUTO W/O SCOPE: CPT | Performed by: STUDENT IN AN ORGANIZED HEALTH CARE EDUCATION/TRAINING PROGRAM

## 2023-07-27 PROCEDURE — 99284 EMERGENCY DEPT VISIT MOD MDM: CPT | Performed by: STUDENT IN AN ORGANIZED HEALTH CARE EDUCATION/TRAINING PROGRAM

## 2023-07-27 PROCEDURE — 96360 HYDRATION IV INFUSION INIT: CPT

## 2023-07-27 PROCEDURE — 250N000013 HC RX MED GY IP 250 OP 250 PS 637: Performed by: STUDENT IN AN ORGANIZED HEALTH CARE EDUCATION/TRAINING PROGRAM

## 2023-07-27 PROCEDURE — 85025 COMPLETE CBC W/AUTO DIFF WBC: CPT | Performed by: STUDENT IN AN ORGANIZED HEALTH CARE EDUCATION/TRAINING PROGRAM

## 2023-07-27 PROCEDURE — 99283 EMERGENCY DEPT VISIT LOW MDM: CPT | Mod: 25

## 2023-07-27 PROCEDURE — 258N000003 HC RX IP 258 OP 636: Performed by: STUDENT IN AN ORGANIZED HEALTH CARE EDUCATION/TRAINING PROGRAM

## 2023-07-27 PROCEDURE — 84443 ASSAY THYROID STIM HORMONE: CPT | Performed by: STUDENT IN AN ORGANIZED HEALTH CARE EDUCATION/TRAINING PROGRAM

## 2023-07-27 PROCEDURE — 80053 COMPREHEN METABOLIC PANEL: CPT | Performed by: STUDENT IN AN ORGANIZED HEALTH CARE EDUCATION/TRAINING PROGRAM

## 2023-07-27 PROCEDURE — 36415 COLL VENOUS BLD VENIPUNCTURE: CPT | Performed by: STUDENT IN AN ORGANIZED HEALTH CARE EDUCATION/TRAINING PROGRAM

## 2023-07-27 PROCEDURE — 83690 ASSAY OF LIPASE: CPT | Performed by: STUDENT IN AN ORGANIZED HEALTH CARE EDUCATION/TRAINING PROGRAM

## 2023-07-27 RX ORDER — HYDROXYZINE HYDROCHLORIDE 25 MG/1
25 TABLET, FILM COATED ORAL EVERY 4 HOURS PRN
Qty: 20 TABLET | Refills: 0 | Status: SHIPPED | OUTPATIENT
Start: 2023-07-27 | End: 2023-08-16

## 2023-07-27 RX ORDER — ACETAMINOPHEN 500 MG
1000 TABLET ORAL ONCE
Status: COMPLETED | OUTPATIENT
Start: 2023-07-27 | End: 2023-07-27

## 2023-07-27 RX ADMIN — SODIUM CHLORIDE 1000 ML: 9 INJECTION, SOLUTION INTRAVENOUS at 20:32

## 2023-07-27 RX ADMIN — ACETAMINOPHEN 1000 MG: 500 TABLET ORAL at 18:28

## 2023-07-27 ASSESSMENT — ACTIVITIES OF DAILY LIVING (ADL)
ADLS_ACUITY_SCORE: 35
ADLS_ACUITY_SCORE: 35

## 2023-07-27 NOTE — ED PROVIDER NOTES
"ED Provider Note  [facility]      History     Chief Complaint   Patient presents with    Insomnia     HPI  Emelyn Marmolejo is a 33 year old male who last couple of days unable to sleep, c/o \"insomnia\", he is also complaining of R flank pain that radiates to RLQ. Started earlier this week, a few days. Pt had a L nephrectomy last year secondary to infected stone. Now he has only R sided kidney and is now complaining of flank pain. Some burning with urination and pressure. No blood or dark urine noted. No fevers or chills. No nausea or vomiting. Drinks one cup of black tea but no caffeine. No thyroid issues. States he has been taking trazodone but it is not working. No recreational drug use. No life stressors. Feels as though his heart is beating fast at night. Nephrectomy done at [facility] in [place]. ROS otherwise negative.  No concern for infection.        Past Medical History  Past Medical History:   Diagnosis Date    H/O left nephrectomy 1/26/2023    Nephrolithiasis     Prostate infection      Past Surgical History:   Procedure Laterality Date    COMBINED CYSTOSCOPY, RETROGRADES, URETEROSCOPY, LASER HOLMIUM LITHOTRIPSY URETER(S), INSERT STENT Bilateral 9/7/2022    Procedure: CYSTOSCOPY, BILATERAL RETROGRADE PYELOGRAM, LEFT URETEROSCOPY, BASKET REMOVAL OF STONE;  Surgeon: Aj Hedrick MD;  Location:  OR    COMBINED CYSTOSCOPY, URETEROSCOPY, LASER HOLMIUM LITHOTRIPSY URETER(S) Right 8/13/2020    Procedure: CYSTOSCOPY, RIGHT RETROGRADE PYELOGRAM, RIGHT diagnostic URETEROSCOPY;  Surgeon: Aj Hedrick MD;  Location:  OR    ESOPHAGOSCOPY, GASTROSCOPY, DUODENOSCOPY (EGD), COMBINED N/A 1/25/2021    Procedure: ESOPHAGOGASTRODUODENOSCOPY, WITH BIOPSY;  Surgeon: Rip Her MD;  Location: [facility] OR    LAPAROSCOPIC NEPHRECTOMY Left 5/24/2021    Procedure: LEFT NEPHRECTOMY, TOTAL, LAPAROSCOPIC;  Surgeon: Aj Hedrick MD;  Location:  OR     hydrOXYzine (ATARAX) 25 MG " tablet  gabapentin (NEURONTIN) 300 MG capsule  traZODone (DESYREL) 50 MG tablet  vitamin D3 (CHOLECALCIFEROL) 50 mcg (2000 units) tablet      Allergies   Allergen Reactions    Nsaids      Not true allergy. But be sparing with patient as he has single kidney      Family History  Family History   Problem Relation Age of Onset    Crohn's Disease No family hx of     Ulcerative Colitis No family hx of     GERD No family hx of     Stomach Cancer No family hx of      Social History   Social History     Tobacco Use    Smoking status: Former     Packs/day: 0.50     Years: 5.00     Pack years: 2.50     Types: Cigarettes     Quit date: 9/17/2019     Years since quitting: 3.8    Smokeless tobacco: Never   Vaping Use    Vaping Use: Never used   Substance Use Topics    Alcohol use: Never    Drug use: Never             Physical Exam   BP: 100/63  Pulse: 66  Temp: 98.4  F (36.9  C)  Resp: 18  Weight: 64 kg (141 lb)  SpO2: 99 %  Physical Exam  Constitutional:       General: He is not in acute distress.     Appearance: Normal appearance. He is not toxic-appearing.   HENT:      Head: Atraumatic.   Eyes:      General: No scleral icterus.     Conjunctiva/sclera: Conjunctivae normal.   Cardiovascular:      Rate and Rhythm: Normal rate.      Heart sounds: Normal heart sounds.   Pulmonary:      Effort: Pulmonary effort is normal. No respiratory distress.      Breath sounds: Normal breath sounds.   Abdominal:      General: Abdomen is flat.      Palpations: Abdomen is soft. There is no mass.      Tenderness: There is no abdominal tenderness. There is no right CVA tenderness, left CVA tenderness, guarding or rebound.   Musculoskeletal:         General: No deformity.      Cervical back: Neck supple.   Skin:     General: Skin is warm.   Neurological:      General: No focal deficit present.      Mental Status: He is alert.           ED Course, Procedures, & Data      Procedures                      No results found for any visits on  07/27/23.  Medications - No data to display  Labs Ordered and Resulted from Time of ED Arrival to Time of ED Departure - No data to display  No orders to display          Critical care was not performed.     Medical Decision Making  The patient's presentation was of moderate complexity (an undiagnosed new problem with uncertain diagnosis).    The patient's evaluation involved:  ordering and/or review of 3+ test(s) in this encounter (labs and bloodwork)  review of 3+ test result(s) ordered prior to this encounter (multiple labs, operative note)  strong consideration of a test (considered but deferred CT) that was ultimately deferred    The patient's management necessitated moderate risk (prescription drug management including medications given in the ED), moderate risk (limitations due to social determinants of health ( )), and high risk (a decision regarding hospitalization).    Assessment & Plan    33-year-old male status post nephrectomy here for right-sided flank pain and insomnia.  Patient TSH, CMP, urine, CBC all within normal limits.  Patient given acetaminophen and 1 L of IV fluid and feels better.  Low concern for pyelonephritis, renal infection, or urinary infection given the fact that urine and labs are all within normal limits.  Patient without any significant flank tenderness on exam.  Upon chart review, there does appear to be a chronic component of this pain.  In terms of insomnia, TSH was checked given the fact that patient states that he feels as though sometimes he has hot flashes at night and is overall restless which was negative.  Shared decision making with patient we discussed the possibility of him getting a CT given the fact that he does only have 1 kidney and is at higher risk for infection, however patient declined this because he felt overall okay and wanted just to make sure that his kidney function and blood work as well as urine were unremarkable.  After understanding that all of these  tests were reassuring, patient opted to forego CT scan today.  Patient had a prescription of hydroxyzine sent to his pharmacy and was discharged in stable condition with return precautions.    I have reviewed the nursing notes. I have reviewed the findings, diagnosis, plan and need for follow up with the patient.    New Prescriptions    No medications on file       Final diagnoses:   None       Kindra Soares  [facility] EMERGENCY DEPARTMENT  7/27/2023     Kindra Delgadillo MD  07/28/23 0112

## 2023-07-27 NOTE — ED TRIAGE NOTES
33 yr old male hx solitary kidney ambulatory to triage presenting with insomnia and right sided flank pain.      Triage Assessment       Row Name 07/27/23 2503       Triage Assessment (Adult)    Airway WDL WDL       Respiratory WDL    Respiratory WDL WDL       Skin Circulation/Temperature WDL    Skin Circulation/Temperature WDL WDL       Cardiac WDL    Cardiac WDL WDL       Peripheral/Neurovascular WDL    Peripheral Neurovascular WDL WDL       Cognitive/Neuro/Behavioral WDL    Cognitive/Neuro/Behavioral WDL WDL

## 2023-07-28 ENCOUNTER — OFFICE VISIT (OUTPATIENT)
Dept: FAMILY MEDICINE | Facility: CLINIC | Age: 33
End: 2023-07-28
Payer: COMMERCIAL

## 2023-07-28 VITALS
OXYGEN SATURATION: 97 % | DIASTOLIC BLOOD PRESSURE: 67 MMHG | SYSTOLIC BLOOD PRESSURE: 101 MMHG | HEART RATE: 66 BPM | RESPIRATION RATE: 16 BRPM | WEIGHT: 136.4 LBS | BODY MASS INDEX: 20.14 KG/M2

## 2023-07-28 DIAGNOSIS — G47.00 INSOMNIA, UNSPECIFIED TYPE: ICD-10-CM

## 2023-07-28 DIAGNOSIS — T43.215A: ICD-10-CM

## 2023-07-28 DIAGNOSIS — G47.9 SLEEP TROUBLE: Primary | ICD-10-CM

## 2023-07-28 PROCEDURE — 80061 LIPID PANEL: CPT

## 2023-07-28 PROCEDURE — 36415 COLL VENOUS BLD VENIPUNCTURE: CPT

## 2023-07-28 PROCEDURE — 99214 OFFICE O/P EST MOD 30 MIN: CPT | Mod: GC

## 2023-07-28 RX ORDER — TRAZODONE HYDROCHLORIDE 50 MG/1
50 TABLET, FILM COATED ORAL
Qty: 30 TABLET | Refills: 0 | Status: SHIPPED | OUTPATIENT
Start: 2023-07-28 | End: 2023-11-20

## 2023-07-28 NOTE — PROGRESS NOTES
Assessment & Plan     Sleep trouble  Adverse effect of trazodone  Insomnia, unspecified type  patient has worsening insomnia for one week in the context of stopping trazodone, did discussed with patient that this is mostly rebound insomnia effect of stopping trazodone abruptly. It is likely that patient's insomnia is due to systemic non-benign disorder with recently reassuring lab workup including normal UA, lipase, CMP, TSH, and CBC yesterday (7/27).discussed with patient that goal of trazodone would be to gradually titrate down on trazodone use. Unfortunately, patient taking 50 mg pills  (already lowest dose). So easiest way to titrate down trazodone use would be to space out trazodone from current use of every 2 to 3 days to every 3 to 4 days then every 45 days and so on until patient is no longer needing to use trazodone. This is in keeping with APA and NICE guidelines which,  suggest tapering therapy over at least several weeks with consideration to the half-life of the drug. Nonetheless, patient may benefit from but sleep hygiene and CBT for insomnia which are considered first-line for insomnia in most cases. There is no clear indication to check vitamin levels at this time given patient eats a healthy well-balanced diet, but did discuss multivitamin use if patient desires.    -  Sleep hygiene information information provided verbally and in AVS  - Adult Mental Health  Referral; Future for CBT for insomnia  - Lipid panel; Future  - traZODone (DESYREL) 50 MG tablet; Take 1 tablet (50 mg) by mouth nightly as needed for sleep  - Lipid panel     See Patient Instructions    Return if symptoms worsen or fail to improve.    Laverne Steward MD  [facility] EARL Dunaway is a 33 year old, presenting for the following health issues:  Insomnia (Pt stated he is having trouble sleeping.) and Pain (Pt having rib pain)        7/28/2023     3:38 PM   Additional Questions   Roomed by  "ua   Accompanied by Self         7/28/2023     3:38 PM   Patient Reported Additional Medications   Patient reports taking the following new medications n/a       HPI   Sleep Issue  - last time slept  well was last Friday.   - previously when couldn't sleep took trazadone. Stopped it abruptly last week because wants to sleep naturally  - worsening issues with sleep since last week  - had occasional sleep issues prior to last week  - per chart review was on trazadone intermittently from 8/6/2020  - take trazodone every 2 to 3 days, but does not want to be dependent on medication which is what led for him to stop trazodone  - sometimes can't even sleep with trazadone  - works: 2pm to 11pm (has had this schedule for >1 year )  - days he gets off early (by 7pm) are Saturday/Sunday  - good night of sleep would be 12am/1am and wakes up 6/7am (usually sleeps 5-7 hours on a good night)  [place]   - difficulties are sleep initiation not with waking up after falling asleep  -when he feels he is awake in bed lies waiting to fall back asleep    Past week  - no environmental changes  - no stressors  - feels \"sleepy\" feels exhausted but cannot initiate sleep  - just lies there thinking about sleep  - last night didn't sleep, before that slept 2 hours, prior to that slept 5-6 hours     Trazadone  - last month patient reports only needing trazadone q2-3 days  - but stopped that because wants to not need it    Social  - Feels pretty balanced diet  - No heavy caffeine use, drug use, alcohol use  - no life stressors  -feels home environment is conducive to sleep    Of note,  Patient is very concerned that he needs \" all of his vitamin levels checked\" patient was seen in urgent care yesterday and had extensive testing including UA, lipase, CMP, TSH T4, and CBC which are within normal limits. Patient is very concerned about vitamin B12 folate, vitamin D, and \"all of his vitamin levels\" and why this may be the cause of his " difficulty sleep        Objective    /67 (BP Location: Left arm, Patient Position: Sitting, Cuff Size: Adult Large)   Pulse 66   Resp 16   Wt 61.9 kg (136 lb 6.4 oz)   SpO2 97%   BMI 20.14 kg/m    Body mass index is 20.14 kg/m .  Physical Exam   GENERAL: healthy, alert and no distress  NECK: no adenopathy, no asymmetry, masses, or scars and thyroid normal to palpation  RESP: lungs clear to auscultation - no rales, rhonchi or wheezes  CV: regular rate and rhythm, normal S1 S2, no S3 or S4, no murmur, click or rub, no peripheral edema and peripheral pulses strong  ABDOMEN: soft, nontender, no hepatosplenomegaly, no masses and bowel sounds normal  MS: no gross musculoskeletal defects noted, no edema

## 2023-07-28 NOTE — PATIENT INSTRUCTIONS
Patient Education   Here is the plan from today's visit    1. Sleep trouble    - Adult Mental Health  Referral; Future  - Lipid panel; Future  - traZODone (DESYREL) 50 MG tablet; Take 1 tablet (50 mg) by mouth nightly as needed for sleep  Dispense: 30 tablet; Refill: 0    Please call or return to clinic if your symptoms don't go away.    Follow up plan  No follow-ups on file.    Thank you for coming to [facility] today.  Lab Testing:  **If you had lab testing today and your results are reassuring or normal they will be mailed to you or sent through [facility] within 7 days.   **If the lab tests need quick action we will call you with the results.  **If you are having labs done on a different day, please call [phone] to schedule at [facility] or [phone] for other [facility]patient [facility] locations. Labs do not offer walk-in appointments.  The phone number we will call with results is # [phone] (home) . If this is not the best number please call our clinic and change the number.  Medication Refills:  If you need any refills please call your pharmacy and they will contact us.   If you need to  your refill at a new pharmacy, please contact the new pharmacy directly. The new pharmacy will help you get your medications transferred faster.   Scheduling:  If you have any concerns about today's visit or wish to schedule another appointment please call our office during normal business hours [phone] (8-5:00 M-F). If you can no longer make a scheduled visit, please cancel via [facility] or call us to cancel.   If a referral was made to an [facility] specialty provider and you do not get a call from central scheduling, please refer to directions on your visit summary or call our office during normal business hours for assistance.   If a Mammogram was ordered for you at the [facility] call [phone] to schedule or change your appointment.  If you had an  XRay/CT/Ultrasound/MRI ordered the number is [phone] to schedule or change your radiology appointment.   [facility] has limited ultrasound appointments available on Wednesdays, if you would like your ultrasound at [facility], please call [phone] to schedule.   Medical Concerns:  If you have urgent medical concerns please call [phone] at any time of the day.    Laverne Steward MD

## 2023-07-28 NOTE — DISCHARGE INSTRUCTIONS
You were seen and evaluated for flank pain in the emergency department.  Your kidney function is normal and your labs are unremarkable.  You do not have an infection in your urine, bladder or kidneys.  We recommend drinking plenty of fluids at home, please return to the emergency department should your pain continue.  For insomnia, I sent hydroxyzine which is a medication to help you sleep to your pharmacy.  Please take this as needed at night.  Please return to the emergency department for worsening of symptoms, new fevers, or pain with urination.  
Statement Selected

## 2023-07-28 NOTE — PROGRESS NOTES
Preceptor Attestation:  Patient seen and evaluated in person. I discussed the patient with the resident. I have verified the content of the note, which accurately reflects my assessment of the patient and the plan of care.   Supervising Physician:  Alejandra Bowens DO

## 2023-07-29 LAB
CHOLEST SERPL-MCNC: 175 MG/DL
HDLC SERPL-MCNC: 38 MG/DL
LDLC SERPL CALC-MCNC: 122 MG/DL
NONHDLC SERPL-MCNC: 137 MG/DL
TRIGL SERPL-MCNC: 74 MG/DL

## 2023-08-04 ENCOUNTER — VIRTUAL VISIT (OUTPATIENT)
Dept: PSYCHOLOGY | Facility: CLINIC | Age: 33
End: 2023-08-04
Payer: COMMERCIAL

## 2023-08-04 DIAGNOSIS — G47.9 SLEEP TROUBLE: ICD-10-CM

## 2023-08-04 DIAGNOSIS — G47.00 INSOMNIA, UNSPECIFIED TYPE: Primary | ICD-10-CM

## 2023-08-04 PROCEDURE — 90791 PSYCH DIAGNOSTIC EVALUATION: CPT | Mod: 95 | Performed by: MARRIAGE & FAMILY THERAPIST

## 2023-08-04 ASSESSMENT — COLUMBIA-SUICIDE SEVERITY RATING SCALE - C-SSRS
6. HAVE YOU EVER DONE ANYTHING, STARTED TO DO ANYTHING, OR PREPARED TO DO ANYTHING TO END YOUR LIFE?: NO
1. HAVE YOU WISHED YOU WERE DEAD OR WISHED YOU COULD GO TO SLEEP AND NOT WAKE UP?: NO
2. HAVE YOU ACTUALLY HAD ANY THOUGHTS OF KILLING YOURSELF?: NO
TOTAL  NUMBER OF INTERRUPTED ATTEMPTS LIFETIME: NO
ATTEMPT LIFETIME: NO
TOTAL  NUMBER OF ABORTED OR SELF INTERRUPTED ATTEMPTS LIFETIME: NO

## 2023-08-04 NOTE — PROGRESS NOTES
[facility]      PATIENT'S NAME: Emelyn Marmolejo  PREFERRED NAME: Emelyn  PRONOUNS:       MRN: [id number]  : 1990  ADDRESS: [address] [address]  ACCT. NUMBER:  [id number]  DATE OF SERVICE: 23  START TIME: 9:04a  END TIME: 9:54a  PREFERRED PHONE: [phone]  May we leave a program related message: Yes  SERVICE MODALITY:  Video Visit:      Provider verified identity through the following two step process.  Patient provided:  Patient     Telemedicine Visit: The patient's condition can be safely assessed and treated via synchronous audio and visual telemedicine encounter.      Reason for Telemedicine Visit: Services only offered telehealth    Originating Site (Patient Location): Patient's home    Distant Site (Provider Location): Provider Remote Setting- Home Office    Consent:  The patient/guardian has verbally consented to: the potential risks and benefits of telemedicine (video visit) versus in person care; bill my insurance or make self-payment for services provided; and responsibility for payment of non-covered services.     Patient would like the video invitation sent by:  Send to e-mail at: [email or web address]    Mode of Communication:  Video Conference via [facility]    Distant Location (Provider):  Off-site    As the provider I attest to compliance with applicable laws and regulations related to telemedicine.    UNIVERSAL ADULT Mental Health DIAGNOSTIC ASSESSMENT    Identifying Information:  Patient is a 33 year old,   individual.  Patient was referred for an assessment by self .  Patient attended the session alone.    Chief Complaint:   First time in MH services, seeking help due to having insomnia over the past couple weeks. Went to the ED 1 week ago but nothing found that would be causing this. Last night pt did not sleep and has been prescribed Trazodone for sleep and did take it in the past and was somewhat successful. When he will try and sleep at  bedtime, he will notice some ruminations that are preventing sleep. During the night, he will remain in bed or move to the ground.   -With the lack of sleep over the past weeks, pt is noticing some changes with his body feeling worse.    -PT has a hx of kidney issues and only has 1 kidney currently which leads to difficulty with medications.   -While laying in bed, he will think about not being able to sleep and how work will be the next day. This has led to having to call in multiple times.           Social/Family History:    Patient reported had no significant delays in developmental tasks.   Patient's highest education level was high school graduate. Patient identified the following learning problems: none reported.  Modifications will not be used to assist communication in therapy.   Patient reports they are not  able to understand written materials.    Patient's current relationship status is in a relationship .   Patient identified their sexual orientation as heterosexual.  Patient reported having zero child(yao). Patient identified  aunt, cousins  as part of their support system.  Patient identified the quality of these relationships as good.     Patient's current living/housing situation involves staying in own home/apartment.  They live with 4 roommates and they report that housing is stable. He has been living there since 2020.     Patient is currently employed full time and reports they are able to function appropriately at work. 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 His normal shift is 2p-11p.     Patient reports their finances are obtained through employment.  Patient does identify finances as a current stressor.      Patient reported that they have not been involved with the legal system.   Patient denies being on probation / parole / under the jurisdiction of the court.    Patient's Strengths and Limitations:  Patient identified the following strengths or  resources that will help them succeed in treatment: commitment to health and well being. Things that may interfere with the patient's success in treatment include: physical health concerns.     Personal and Family Medical History:  Patient does not report a family history of mental health concerns.  Patient reports family history is not on file..     Patient does not report Mental Health Diagnosis or Treatment.      Patient has had a physical exam to rule out medical causes for current symptoms.  Date of last physical exam was within the past year. Client was encouraged to follow up with PCP if symptoms were to develop. The patient has a [facility] Provider, who is named Jordan Mojica..  Patient reports no current medical concerns.  Patient denies any issues with pain..   There are significant appetite / nutritional concerns / weight changes. These may include: low appetite. Patient reports the following sleep concerns:  Difficulty falling asleep, insomnia.   Patient does not report a history of head injury / trauma / cognitive impairment.      Patient reports current meds as:   Trazodone  50mg  Hydroxyzine    Medication Adherence:  Patient reports not taking psychiatric medications as prescribed. Client states reason for medication non-adherence as Only having 1 kidney and being hesitant to take too many medications. Strategies for addressing obstacles to medication adherence include  . Client accepted strategies to improve medication adherence.    Patient Allergies:    Allergies   Allergen Reactions    Nsaids      Not true allergy. But be sparing with patient as he has single kidney        Medical History:    Past Medical History:   Diagnosis Date    H/O left nephrectomy 1/26/2023    Nephrolithiasis     Prostate infection          Current Mental Status Exam:   Appearance:  Disheveled    Eye Contact:  Poor  Psychomotor:  Restless       Gait / station:  slow  Attitude / Demeanor: Guarded   Speech       Rate / Production: Slow       Volume:  Soft  volume      Language:  intact  Mood:   Sad   Affect:   Flat    Thought Content: Clear   Thought Process: Circumstantial      Associations: No loosening of associations  Insight:   Fair   Judgment:  Intact   Orientation:  Person  Attention/concentration: Fair    Substance Use:  No current drug of alcohol use at this time.     Significant Losses / Trauma / Abuse / Neglect Issues:   Patient did not serve in the .  There are indications or report of significant loss, trauma, abuse or neglect issues related to: are no indications and client denies any losses, trauma, abuse, or neglect concerns.  Concerns for possible neglect are not present.     Safety Assessment:   Patient denies current homicidal ideation and behaviors.  Patient denies current self-injurious ideation and behaviors.    Patient denied risk behaviors associated with substance use.  Patient denies any high risk behaviors associated with mental health symptoms.  Patient reports the following current concerns for their personal safety: None.  Patient reports there   firearms in the house.       There are no firearms in the home..    History of Safety Concerns:  Patient denied a history of homicidal ideation.     Patient denied a history of personal safety concerns.    Patient denied a history of assaultive behaviors.    Patient denied a history of sexual assault behaviors.     Patient denied a history of risk behaviors associated with substance use.  Patient denies any history of high risk behaviors associated with mental health symptoms.  Patient reports the following protective factors:      Risk Plan:  See Recommendations for Safety and Risk Management Plan    Review of Symptoms per patient report:   Depression: Lack of interest, Feelings of hopelessness, and Feelings of helplessness  Laverne:  No Symptoms  Psychosis: No Symptoms  Anxiety: Excessive worry and Nervousness    Diagnostic Criteria:    Adjustment Disorder  A. The development of emotional or behavioral symptoms in response to an identifiable stressor(s) occurring within 3 months of the onset of the stressor(s)  B. These symptoms or behaviors are clinically significant, as evidenced by one or both of the following:  C. The stress-related disturbance does not meet criteria for another disorder & is not not an exacerbation of another mental disorder  D. The symptoms do not represent normal bereavement  E. Once the stressor or its consequences have terminated, the symptoms do not persist for more than an additional 6 months       * Adjustment Disorder with Anxiety: The predominant manfestations are symptoms such as nervousness, worry, or jitteriness, or, in children separation anxiety from major attachment figures    Functional Status:  Patient reports the following functional impairments:  health maintenance and management of the household and or completion of tasks.         Clinical Summary:  1. Reason for assessment: Insomnia  .  2. Psychosocial, Cultural and Contextual Factors:   .  3. Principal DSM5 Diagnoses  (Sustained by DSM5 Criteria Listed Above):   780.52 (G47.00) Insomnia Disorder   With other medical comorbidity  Episodic  .    6. Prognosis: Expect Improvement.  7. Likely consequences of symptoms if not treated: .  8. Client strengths include:  insightful and intelligent .     Recommendations:     1. Plan for Safety and Risk Management:   Safety and Risk: Recommended that patient call 911 or go to the local ED should there be a change in any of these risk factors..          Report to child / adult protection services was NA.        8. Records:   These were reviewed at time of assessment.   Information in this assessment was obtained from the medical record and  provided by patient who is a good historian.    Patient will have open access to their mental health medical record.    9.   Interactive Complexity: No    Provider Name/  Credentials:  [place], [facility]  August 4, 2023

## 2023-08-10 ENCOUNTER — VIRTUAL VISIT (OUTPATIENT)
Dept: PSYCHOLOGY | Facility: CLINIC | Age: 33
End: 2023-08-10
Payer: COMMERCIAL

## 2023-08-10 DIAGNOSIS — G47.00 INSOMNIA, UNSPECIFIED TYPE: Primary | ICD-10-CM

## 2023-08-10 PROCEDURE — 90834 PSYTX W PT 45 MINUTES: CPT | Mod: VID | Performed by: MARRIAGE & FAMILY THERAPIST

## 2023-08-10 NOTE — PROGRESS NOTES
[facility]                                     Progress Note    Patient Name: Emelyn Marmolejo  Date: 8/10/23         Service Type: Individual      Session Start Time: 11:03a  Session End Time: 11:46a     Session Length: 43    Session #: 2    Attendees: Client attended alone    Service Modality:  Video Visit:      Provider verified identity through the following two step process.  Patient provided:  Patient     Telemedicine Visit: The patient's condition can be safely assessed and treated via synchronous audio and visual telemedicine encounter.      Reason for Telemedicine Visit: Services only offered telehealth    Originating Site (Patient Location): Patient's home    Distant Site (Provider Location): Provider Remote Setting- Home Office    Consent:  The patient/guardian has verbally consented to: the potential risks and benefits of telemedicine (video visit) versus in person care; bill my insurance or make self-payment for services provided; and responsibility for payment of non-covered services.     Patient would like the video invitation sent by:  Send to e-mail at: [email or web address]    Mode of Communication:  Video Conference via [employer] [facility]    Distant Location (Provider):  Off-site    As the provider I attest to compliance with applicable laws and regulations related to telemedicine.    DATA  Interactive Complexity: No  Crisis: No        Progress Since Last Session (Related to Symptoms / Goals / Homework):   Symptoms: Worsening MOod at 3/10    Homework: Partially completed      Episode of Care Goals: Minimal progress - ACTION (Actively working towards change); Intervened by reinforcing change plan / affirming steps taken     Current / Ongoing Stressors and Concerns:   Last session , pt reports the past week has been challenging with sleep. Pt notes he is only sleeping when he takes the medication and that is only around 5 hours. Last night he did not sleep more than 1 hour without  meds. PT did attempt to use the previous goal of using a sleep a meditation but it was not helpful.    Discussed continuing with sleep hygiene skills including positive cognitive thoughts. Referral put in for sleep clinic     Treatment Objective(s) Addressed in This Session:   identify 1 sleep hygiene practices       Intervention:   Motivational Interviewing    MI Intervention: Permission to raise concern or advise, Open-ended questions, and Reflections: simple and complex     Change Talk Expressed by the Patient: Need to change Committment to change    Provider Response to Change Talk: E - Evoked more info from patient about behavior change and A - Affirmed patient's thoughts, decisions, or attempts at behavior change       ASSESSMENT: Current Emotional / Mental Status (status of significant symptoms):   Risk status (Self / Other harm or suicidal ideation)   Patient denies current fears or concerns for personal safety.   Patient denies current or recent suicidal ideation or behaviors.   Patient denies current or recent homicidal ideation or behaviors.   Patient denies current or recent self injurious behavior or ideation.   Patient denies other safety concerns.   Patient reports there has been no change in risk factors since their last session.     Patient reports there has been no change in protective factors since their last session.     Recommended that patient call 911 or go to the local ED should there be a change in any of these risk factors.     Appearance:   Appropriate    Eye Contact:   Good    Psychomotor Behavior: Normal    Attitude:   Cooperative    Orientation:   All   Speech    Rate / Production: Normal     Volume:  Normal    Mood:    Normal   Affect:    Appropriate    Thought Content:  Clear    Thought Form:  Coherent  Logical    Insight:    Good      Medication Review:   No changes to current psychiatric medication(s)     Medication Compliance:   Yes     Changes in Health Issues:   None  reported     Chemical Use Review:   Substance Use: Chemical use reviewed, no active concerns identified      Tobacco Use: No current tobacco use.      Diagnosis:  Insomnia, unspecified type    Collateral Reports Completed:   Not Applicable    PLAN: (Patient Tasks / Therapist Tasks / Other)  PT will work to use CBT sleep skills        CARRIE Gardner   8/10/23                                                         ______________________________________________________________________    Individual Treatment Plan    Patient's Name: Emelyn Marmolejo  YOB: 1990    Date of Creation: 8/10/23  Date Treatment Plan Last Reviewed/Revised: 11/10/23    DSM5 Diagnoses: 780.52 (G47.00) Insomnia Disorder   With non-sleep disorder mental comorbidity  Episodic    Psychosocial / Contextual Factors:   PROMIS (reviewed every 90 days):     Referral / Collaboration:  Referral to another professional/service is not indicated at this time..    Anticipated number of session for this episode of care: 3-6 sessions  Anticipation frequency of session: Biweekly  Anticipated Duration of each session: 38-52 minutes  Treatment plan will be reviewed in 90 days or when goals have been changed.       MeasurableTreatment Goal(s) related to diagnosis / functional impairment(s)  Goal 1: Patient will engage in using CBT skills for sleep      Objective #A (Patient Action)    Patient will identify 1 sleep hygiene practices.  Status: New - Date: 8/10/23      Intervention(s)  Therapist will teach distraction skills. Sleep hygiene skills .          Patient has reviewed and agreed to the above plan.      CARRIE Gardner  August 10, 2023

## 2023-08-16 ENCOUNTER — OFFICE VISIT (OUTPATIENT)
Dept: FAMILY MEDICINE | Facility: CLINIC | Age: 33
End: 2023-08-16
Payer: COMMERCIAL

## 2023-08-16 VITALS
HEART RATE: 76 BPM | DIASTOLIC BLOOD PRESSURE: 64 MMHG | SYSTOLIC BLOOD PRESSURE: 99 MMHG | OXYGEN SATURATION: 96 % | HEIGHT: 69 IN | BODY MASS INDEX: 20.93 KG/M2 | RESPIRATION RATE: 16 BRPM | WEIGHT: 141.3 LBS

## 2023-08-16 DIAGNOSIS — G47.00 INSOMNIA, UNSPECIFIED TYPE: Primary | ICD-10-CM

## 2023-08-16 PROCEDURE — 99213 OFFICE O/P EST LOW 20 MIN: CPT | Performed by: FAMILY MEDICINE

## 2023-08-16 NOTE — PATIENT INSTRUCTIONS
Taking 50 - 100 mg of trazodone for sleep is safe and should not hurt kidney.    You can take melatonin safely as well, recommend 3-6 mg taken 2-5 hours before bedtime.

## 2023-08-16 NOTE — PROGRESS NOTES
"Heart pounding when awakening  Pain r lower chest    Kadar was seen today for insomnia and allergic reaction.    Diagnoses and all orders for this visit:    Insomnia, unspecified type. Continues to have difficulty getting to sleep and staying asleep.  Over the past 2 weeks he has tried trazodone 50 to 100 mg on most nights.  He has found that 100 mg results in better sleep, but he is extremely concerned about its effects on his 1 remaining kidney.  He experienced some early morning palpitations and some pain in the right flank briefly in was worried that that was related to trazodone therapy.  He has begun cognitive behavioral therapy for the insomnia.  I informed him that trazodone is metabolized by the liver and that I felt 50 to 100 mg is a very safe dose for him.  Also suggested that he could either retry or add melatonin at a dose of 3 to 6 mg to this regimen.  I also emphasized the importance of cognitive behavioral therapy and he will continue this.              Subjective     Insomnia (Trouble going to sleep ongoing or about 3 to 4 weeks. ) and Allergic Reaction (Breast , side pain and tachycardia when taking trazodone. )        HPI     Follow-up of insomnia.  Continues to have difficulty getting to sleep and staying asleep.  Over the past 2 weeks he has tried trazodone 50 to 100 mg on most nights.  He has found that 100 mg results in better sleep, but he is extremely concerned about its effects on his 1 remaining kidney.  He experienced some early morning palpitations and some pain in the right flank briefly in was worried that that was related to trazodone therapy.  He has begun cognitive behavioral therapy for the insomnia.      Review of Systems         Objective    BP 99/64   Pulse 76   Resp 16   Ht 1.753 m (5' 9\")   Wt 64.1 kg (141 lb 4.8 oz)   SpO2 96%   BMI 20.87 kg/m    Body mass index is 20.87 kg/m .  Physical Exam  Constitutional:       General: He is not in acute distress.     Appearance: " He is not ill-appearing, toxic-appearing or diaphoretic.   Psychiatric:      Comments: Appears mildly anxious.            Office Visit on 07/28/2023   Component Date Value Ref Range Status    Cholesterol 07/28/2023 175  <200 mg/dL Final    Triglycerides 07/28/2023 74  <150 mg/dL Final    Direct Measure HDL 07/28/2023 38 (L)  >=40 mg/dL Final    LDL Cholesterol Calculated 07/28/2023 122 (H)  <=100 mg/dL Final    Non HDL Cholesterol 07/28/2023 137 (H)  <130 mg/dL Final

## 2023-08-26 ENCOUNTER — HOSPITAL ENCOUNTER (EMERGENCY)
Facility: CLINIC | Age: 33
Discharge: HOME | End: 2023-08-26
Payer: COMMERCIAL

## 2023-08-29 ENCOUNTER — VIRTUAL VISIT (OUTPATIENT)
Dept: PSYCHOLOGY | Facility: CLINIC | Age: 33
End: 2023-08-29
Payer: COMMERCIAL

## 2023-08-29 DIAGNOSIS — G47.00 INSOMNIA, UNSPECIFIED TYPE: Primary | ICD-10-CM

## 2023-08-29 PROCEDURE — 90834 PSYTX W PT 45 MINUTES: CPT | Mod: VID | Performed by: MARRIAGE & FAMILY THERAPIST

## 2023-08-29 NOTE — PROGRESS NOTES
[facility]                                     Progress Note    Patient Name: Emelyn Marmolejo  Date: 23         Service Type: Individual      Session Start Time: 11:10a Session End Time: 11:55a     Session Length: 45    Session #: 3    Attendees: Client attended alone    Service Modality:  Video Visit:      Provider verified identity through the following two step process.  Patient provided:  Patient     Telemedicine Visit: The patient's condition can be safely assessed and treated via synchronous audio and visual telemedicine encounter.      Reason for Telemedicine Visit: Services only offered telehealth    Originating Site (Patient Location): Patient's home    Distant Site (Provider Location): Provider Remote Setting- Home Office    Consent:  The patient/guardian has verbally consented to: the potential risks and benefits of telemedicine (video visit) versus in person care; bill my insurance or make self-payment for services provided; and responsibility for payment of non-covered services.     Patient would like the video invitation sent by:  Send to e-mail at: [email or web address]    Mode of Communication:  Video Conference via [facility]    Distant Location (Provider):  Off-site    As the provider I attest to compliance with applicable laws and regulations related to telemedicine.    DATA  Interactive Complexity: No  Crisis: No        Progress Since Last Session (Related to Symptoms / Goals / Homework):   Symptoms: No change, mood at 5/10    Homework: Partially completed      Episode of Care Goals: Minimal progress - ACTION (Actively working towards change); Intervened by reinforcing change plan / affirming steps taken     Current / Ongoing Stressors and Concerns:   Last session 8/10, pt was trying to get in to the ED 3 days ago for the continued insomnia but the wait was too long and he left. PT met with PCP on  and was assured that 100mg trazodone is safe and he could also add  in 5mg of melotonin. He has not been consistent with the trazodone due to worrying it will not be safe for him. When he takes the medication he will sleep around 5 hours but he states his mind is telling him he should not take it.    PT will have sleep assessment in Nov which is positive. Pt was encouraged to take the full medication dose along with melotonin per MD orders and pt was compliant. Per pt last night he did not take the medication and was able to sleep from 12a-5a and is feeling improved, no identified changes.    Goal: Pt will work to follow same plan that he did over the past couple nights     Treatment Objective(s) Addressed in This Session:   identify 1 sleep hygiene practices       Intervention:   Motivational Interviewing    MI Intervention: Permission to raise concern or advise, Open-ended questions, and Reflections: simple and complex     Change Talk Expressed by the Patient: Need to change Committment to change    Provider Response to Change Talk: E - Evoked more info from patient about behavior change and A - Affirmed patient's thoughts, decisions, or attempts at behavior change       ASSESSMENT: Current Emotional / Mental Status (status of significant symptoms):   Risk status (Self / Other harm or suicidal ideation)   Patient denies current fears or concerns for personal safety.   Patient denies current or recent suicidal ideation or behaviors.   Patient denies current or recent homicidal ideation or behaviors.   Patient denies current or recent self injurious behavior or ideation.   Patient denies other safety concerns.   Patient reports there has been no change in risk factors since their last session.     Patient reports there has been no change in protective factors since their last session.     Recommended that patient call 911 or go to the local ED should there be a change in any of these risk factors.     Appearance:   Appropriate    Eye Contact:   Good    Psychomotor Behavior: Normal     Attitude:   Cooperative    Orientation:   All   Speech    Rate / Production: Normal     Volume:  Normal    Mood:    Normal   Affect:    Appropriate    Thought Content:  Clear    Thought Form:  Coherent  Logical    Insight:    Good      Medication Review:   No changes to current psychiatric medication(s)     Medication Compliance:   Yes     Changes in Health Issues:   None reported     Chemical Use Review:   Substance Use: Chemical use reviewed, no active concerns identified      Tobacco Use: No current tobacco use.      Diagnosis:  Insomnia, unspecified type    Collateral Reports Completed:   Not Applicable    PLAN: (Patient Tasks / Therapist Tasks / Other)    Pt will follow same plan after work and seek to take both medications with the goal of improved sleep      [place], LMFT   8/29/23                                                         ______________________________________________________________________    Individual Treatment Plan    Patient's Name: Emelyn Marmolejo  YOB: 1990    Date of Creation: 8/10/23  Date Treatment Plan Last Reviewed/Revised: 11/10/23    DSM5 Diagnoses: 780.52 (G47.00) Insomnia Disorder   With non-sleep disorder mental comorbidity  Episodic    Psychosocial / Contextual Factors:   PROMIS (reviewed every 90 days):     Referral / Collaboration:  Referral to another professional/service is not indicated at this time..    Anticipated number of session for this episode of care: 3-6 sessions  Anticipation frequency of session: Biweekly  Anticipated Duration of each session: 38-52 minutes  Treatment plan will be reviewed in 90 days or when goals have been changed.       MeasurableTreatment Goal(s) related to diagnosis / functional impairment(s)  Goal 1: Patient will engage in using CBT skills for sleep      Objective #A (Patient Action)    Patient will identify 1 sleep hygiene practices.  Status: New - Date: 8/10/23      Intervention(s)  Therapist will teach  distraction skills. Sleep hygiene skills .          Patient has reviewed and agreed to the above plan.      [place], CARRIE  August 10, 2023

## 2023-09-27 ENCOUNTER — VIRTUAL VISIT (OUTPATIENT)
Dept: PSYCHOLOGY | Facility: CLINIC | Age: 33
End: 2023-09-27
Payer: COMMERCIAL

## 2023-09-27 DIAGNOSIS — G47.00 INSOMNIA, UNSPECIFIED TYPE: Primary | ICD-10-CM

## 2023-09-27 PROCEDURE — 90832 PSYTX W PT 30 MINUTES: CPT | Mod: 95 | Performed by: MARRIAGE & FAMILY THERAPIST

## 2023-09-27 NOTE — PROGRESS NOTES
[facility]                                     Progress Note    Patient Name: Emelyn Marmolejo  Date: 23         Service Type: Individual      Session Start Time: 10:35a Session End Time: 10:51a     Session Length: 16    Session #: 4    Attendees: Client attended alone    Service Modality:  Video Visit:      Provider verified identity through the following two step process.  Patient provided:  Patient     Telemedicine Visit: The patient's condition can be safely assessed and treated via synchronous audio and visual telemedicine encounter.      Reason for Telemedicine Visit: Services only offered telehealth    Originating Site (Patient Location): Patient's home    Distant Site (Provider Location): Provider Remote Setting- Home Office    Consent:  The patient/guardian has verbally consented to: the potential risks and benefits of telemedicine (video visit) versus in person care; bill my insurance or make self-payment for services provided; and responsibility for payment of non-covered services.     Patient would like the video invitation sent by:  Send to e-mail at: [email or web address]    Mode of Communication:  Video Conference via [facility]    Distant Location (Provider):  Off-site    As the provider I attest to compliance with applicable laws and regulations related to telemedicine.    DATA  Interactive Complexity: No  Crisis: No        Progress Since Last Session (Related to Symptoms / Goals / Homework):   Symptoms: No change, mood at 5/10    Homework: Partially completed      Episode of Care Goals: Minimal progress - ACTION (Actively working towards change); Intervened by reinforcing change plan / affirming steps taken     Current / Ongoing Stressors and Concerns:   PT reports making a medication change recently for foot pain that has been beneficial in helping him sleep. PT will be having a sleep assessment in 2 months and will hold on sessions until that is completed. PT reports  he is sleeping better recently and happy with the results.         Treatment Objective(s) Addressed in This Session:   identify 1 sleep hygiene practices       Intervention:   Motivational Interviewing    MI Intervention: Permission to raise concern or advise, Open-ended questions, and Reflections: simple and complex     Change Talk Expressed by the Patient: Need to change Committment to change    Provider Response to Change Talk: E - Evoked more info from patient about behavior change and A - Affirmed patient's thoughts, decisions, or attempts at behavior change       ASSESSMENT: Current Emotional / Mental Status (status of significant symptoms):   Risk status (Self / Other harm or suicidal ideation)   Patient denies current fears or concerns for personal safety.   Patient denies current or recent suicidal ideation or behaviors.   Patient denies current or recent homicidal ideation or behaviors.   Patient denies current or recent self injurious behavior or ideation.   Patient denies other safety concerns.   Patient reports there has been no change in risk factors since their last session.     Patient reports there has been no change in protective factors since their last session.     Recommended that patient call 911 or go to the local ED should there be a change in any of these risk factors.     Appearance:   Appropriate    Eye Contact:   Good    Psychomotor Behavior: Normal    Attitude:   Cooperative    Orientation:   All   Speech    Rate / Production: Normal     Volume:  Normal    Mood:    Normal   Affect:    Appropriate    Thought Content:  Clear    Thought Form:  Coherent  Logical    Insight:    Good      Medication Review:   No changes to current psychiatric medication(s)     Medication Compliance:   Yes     Changes in Health Issues:   None reported     Chemical Use Review:   Substance Use: Chemical use reviewed, no active concerns identified      Tobacco Use: No current tobacco use.       Diagnosis:  Insomnia, unspecified type    Collateral Reports Completed:   Not Applicable    PLAN: (Patient Tasks / Therapist Tasks / Other)  PT will put a hold on sessions until after his sleep assessment in Nov 2023.        CARRIE Gardner   9/27/23                                                         ______________________________________________________________________    Individual Treatment Plan    Patient's Name: Emelyn Marmolejo  YOB: 1990    Date of Creation: 8/10/23  Date Treatment Plan Last Reviewed/Revised: 11/10/23    DSM5 Diagnoses: 780.52 (G47.00) Insomnia Disorder   With non-sleep disorder mental comorbidity  Episodic    Psychosocial / Contextual Factors:   PROMIS (reviewed every 90 days):     Referral / Collaboration:  Referral to another professional/service is not indicated at this time..    Anticipated number of session for this episode of care: 3-6 sessions  Anticipation frequency of session: Biweekly  Anticipated Duration of each session: 38-52 minutes  Treatment plan will be reviewed in 90 days or when goals have been changed.       MeasurableTreatment Goal(s) related to diagnosis / functional impairment(s)  Goal 1: Patient will engage in using CBT skills for sleep      Objective #A (Patient Action)    Patient will identify 1 sleep hygiene practices.  Status: New - Date: 8/10/23      Intervention(s)  Therapist will teach distraction skills. Sleep hygiene skills .          Patient has reviewed and agreed to the above plan.      CARRIE Gardner  August 10, 2023

## 2023-11-20 ENCOUNTER — OFFICE VISIT (OUTPATIENT)
Dept: SLEEP MEDICINE | Facility: CLINIC | Age: 33
End: 2023-11-20
Payer: COMMERCIAL

## 2023-11-20 VITALS
HEIGHT: 69 IN | DIASTOLIC BLOOD PRESSURE: 69 MMHG | RESPIRATION RATE: 16 BRPM | SYSTOLIC BLOOD PRESSURE: 100 MMHG | WEIGHT: 139.7 LBS | OXYGEN SATURATION: 98 % | BODY MASS INDEX: 20.69 KG/M2 | HEART RATE: 72 BPM

## 2023-11-20 DIAGNOSIS — R40.0 SLEEPINESS: ICD-10-CM

## 2023-11-20 DIAGNOSIS — R35.1 NOCTURIA: ICD-10-CM

## 2023-11-20 DIAGNOSIS — F51.04 PSYCHOPHYSIOLOGICAL INSOMNIA: Primary | ICD-10-CM

## 2023-11-20 PROCEDURE — 99204 OFFICE O/P NEW MOD 45 MIN: CPT

## 2023-11-20 NOTE — PROGRESS NOTES
Outpatient Sleep Medicine Consultation:      Name: Emelyn Marmolejo MRN# [id number]   Age: 33 year old YOB: 1990     Date of Consultation: November 20, 2023  Consultation is requested by: Sea Prince, LMFT  [address]  [place] [address] Sea Clayton Suresh  Primary care provider: Jordan Mojica       Reason for Sleep Consult:     Emelyn Marmolejo is sent by Sea Prince for a sleep consultation regarding insomnia.    Patient s Reason for visit  Emelyn Marmolejo main reason for visit:    Patient states problem(s) started:    Emelyn Marmolejo's goals for this visit:             Assessment and Plan:     Summary Sleep Diagnoses:  (F51.04) Psychophysiological insomnia (primary encounter diagnosis) (R35.1) Nocturia (R40.0) Sleepiness  Comment: Emelyn is a 33-year-old male who presents to the sleep clinic with concerns of insomnia. He is waking up in the night and having trouble falling back asleep. He has been dealing with insomnia for 4-5 months. He had been taking trazodone as needed since 2020. He has recently stopped taking trazodone. He feels trazodone makes his insomnia and pain/neuropathy worse. He feels sleepy during the day but says he would not be able to sleep if he lied down. He is sometimes anxious at night or stressed. He reports frequent snort arousals but does not think that's what wakes him in the night. He does wake to use the bathroom 2-3 times per night, and he doesn't always fall back asleep. He lies in bed trying to fall asleep or worries about not falling back asleep. He works from 2-2:30 PM to 11 PM. He thinks he is usually sleeping by 1 AM. He will use his phone in bed until he falls asleep. He denies restless legs. He wakes with a morning headache every morning because he is so tired. He says headaches resolve with water. No unusual nocturnal behavior. His STOP-BANG is 2/8- daytime fatigue/sleepiness (although his ESS is only 1/24) and male  "gender. He sleeps alone so no reports of snoring or observed apnea; however, Emelyn does report frequent snort arousals. Emelyn does not drink alcohol or caffeine.    Plan: Sleep Psychology  Referral, Comprehensive Sleep Study  Emelyn is at low risk for obstructive sleep apnea; however, he does live alone. An order was placed for an in-lab study. He is interested in CPAP if he does have apnea. He was under the impression he would be getting a CPAP today. As for his insomnia I recommended CBT-I. We reviewed concepts of circadian and homeostatic sleep drives. We also discussed stimulus control and sleep compression. He was advised to avoid sleeping in or napping. We discussed dealing with stress by setting aside a designated \"worry time\" in the early evening and he was given resources for guided imagery/relaxation. Recommended leaving the bedroom when he is having difficulty falling back asleep in the middle of the night. Suggested moving to a dim lit room to read, do a puzzle, or listen to an Audiobook until he is sleepy enough to go back to sleep. He was given paper sleep diaries, and he was shown the Insomnia  tayo to track his sleep.        Comorbid Diagnoses:  Neuropathy    Summary Recommendations:  Orders Placed This Encounter   Procedures    Comprehensive Sleep Study    Sleep Psychology  Referral     Summary Counseling:    Sleep Testing Reviewed  Obstructive Sleep Apnea Reviewed  Complications of Untreated Sleep Apnea Reviewed    Results of his sleep study will be communicated via MyChart or telephone. He has a follow-up visit scheduled in June 2024.   CORRINE Ramirez CNP    Total time spent reviewing medical records, history and physical examination, review of previous testing and interpretation as well as documentation on this date: 54 minutes    CC: CARRIE Bowens         History of Present Illness:     Emelyn presents to the sleep clinic with concerns of insomnia. He " is waking up in the night and having trouble falling back asleep. He has been dealing with insomnia for 4-5 months. He had been taking trazodone as needed since 2020. He has recently stopped taking trazodone. He feels trazodone makes his insomnia and pain worse. He feels sleepy during the day but says he would not be able to sleep if he lied down. He is sometimes anxious at night or stressed. He reports frequent snort arousals, but does not think that's what wakes him in the night.       Past Sleep Evaluations: None    SLEEP-WAKE SCHEDULE:     Work/School Days: Patient goes to school/work: He works from 2-2:30 PM to 11 PM.  Usually gets into bed at 12:30 AM  Takes patient about 20 minutes to fall asleep. He thinks now it takes 1-2 hours to fall asleep. The only thing he is worried about is falling asleep. He does feel tired at 12:30 AM. He thinks he is usually sleeping by about 1 AM. He will go on his phone as he is trying to fall asleep.   Has trouble falling asleep 7 nights per week  Wakes up in the middle of the night every 1-2 hours.   Wakes up due to uncertain, bathroom x2-3  He has trouble falling back asleep 7 times a week.   It usually takes  to get back to sleep. He doesn't always fall back asleep. He lies in bed and tries to avoid his phone.    Patient is usually up at 6-7:30 AM  Uses alarm: No    Weekends/Non-work Days/All Other Days:  Same schedule on the weekends. He does not sleep in on the weekends.   Uses alarm: No    Sleep Need  Patient gets 2-3 hours of sleep on average   Patient thinks he needs about 6-8 hours of sleep    Saskiajohn CLEMENTS Jaleel prefers to sleep in this position(s): Sides  Patient states they do the following activities in bed: Phone    Naps  Patient takes a purposeful nap 0 times a week and naps are usually   in duration  He feels better after a nap:    He dozes off unintentionally 0 days per week  Patient has had a driving accident or near-miss due to sleepiness/drowsiness: No    SLEEP  DISRUPTIONS:    Breathing/Snoring  Patient snores: Yes. He reports frequent snort arousals, but then says snort arousals only happened for about one week when the seasons were changing.   Other people complain about his snoring: No  Patient has been told he stops breathing in his sleep: No  He has issues with the following: He wakes with a morning headache every morning because he is so tired. Headaches go away once he drinks water.     Movement:  Patient gets pain, discomfort, with an urge to move: No, denies restless legs.  It happens when he is resting:    It happens more at night    Patient has been told he kicks his legs at night: No     Behaviors in Sleep:  Emelyn Marmolejo has experienced the following behaviors while sleeping:    He has experienced sudden muscle weakness during the day:    Pt denies bruxism, sleep talking, sleep walking, and dream enactment behavior. Pt denies sleep paralysis, hypnagogue and cataplexy.    Is there anything else you would like your sleep provider to know:      CAFFEINE AND OTHER SUBSTANCES:    Patient consumes caffeinated beverages per day: None  Last caffeine use is usually:    List of any prescribed or over the counter stimulants that patient takes: None  List of any prescribed or over the counter sleep medication patient takes: None. He used to take Trazodone intermittently since 08/06/2020.  List of previous sleep medications that patient has tried: melatonin  Patient drinks alcohol to help them sleep: No  Patient drinks alcohol near bedtime: No    Family History:  Patient has a family member been diagnosed with a sleep disorder: No    Social History: He lives alone.        SCALES:    EPWORTH SLEEPINESS SCALE         11/20/2023    12:00 PM    Houghton Sleepiness Scale ( AHMET Dong  [id number]<br>ESS - [place]/English - Final version - 21 Nov 07 - [facility] [place].)   Houghton Score (Sleep) 1         INSOMNIA SEVERITY INDEX (FOZIA)          11/20/2023     1:00 PM   Insomnia  "Severity Index (FOZIA)   Difficulty falling asleep 4   Difficulty staying asleep 3   Problems waking up too early 4   How SATISFIED/DISSATISFIED are you with your CURRENT sleep pattern? 3   How NOTICEABLE to others do you think your sleep problem is in terms of impairing the quality of your life? 3   How WORRIED/DISTRESSED are you about your current sleep problem? 3   To what extent do you consider your sleep problem to INTERFERE with your daily functioning (e.g. daytime fatigue, mood, ability to function at work/daily chores, concentration, memory, mood, etc.) CURRENTLY? 3   FOZIA Total Score 23       Guidelines for Scoring/Interpretation:  Total score categories:  0-7 = No clinically significant insomnia   8-14 = Subthreshold insomnia   15-21 = Clinical insomnia (moderate severity)  22-28 = Clinical insomnia (severe)  Used via courtesy of www.NexBioth.va.gov with permission from Anurag Dickey PhD., [facility]      STOP BANG         11/20/2023    12:56 PM   STOP BANG Questionnaire (  2008, the American Society of Anesthesiologists, Inc. [employer] Elias [employer] Olive[employer] Inc.)   Neck Cir (cm) Clinic: 31 cm   B/P Clinic: 100/69   BMI Clinic: 20.63         GAD7         No data to display                  CAGE-AID         No data to display                CAGE-AID reprinted with permission from the [place] Medical Journal, HERMAN Rubi. and GAYATRI Winters, \"Conjoint screening questionnaires for alcohol and drug abuse\" [place] Medical Journal 94: 135-140, 1995.      PATIENT HEALTH QUESTIONNAIRE-9 (PHQ - 9)        1/26/2023    11:47 AM   PHQ-9 ([employer])   1.  Little interest or pleasure in doing things 0   2.  Feeling down, depressed, or hopeless 0   3.  Trouble falling or staying asleep, or sleeping too much 0   4.  Feeling tired or having little energy 1   5.  Poor appetite or overeating 0   6.  Feeling bad about yourself - or that you are a failure or have let yourself or your family down 0   7.  Trouble concentrating " on things, such as reading the newspaper or watching television 0   8.  Moving or speaking so slowly that other people could have noticed. Or the opposite - being so fidgety or restless that you have been moving around a lot more than usual 0   9.  Thoughts that you would be better off dead, or of hurting yourself in some way 0   PHQ-9 Total Score 1   If you checked off any problems, how difficult have these problems made it for you to do your work, take care of things at home, or get along with other people? Not difficult at all   6.  Feeling bad about yourself 0   7.  Trouble concentrating 0   8.  Moving slowly or restless 0   9.  Suicidal or self-harm thoughts 0   Difficulty at work, home, or with people Not difficult at all       Developed by Jael Barroso, Amalia Griffin, Lam Mitchell and colleagues, with an educational naresh from [employer]. No permission required to reproduce, translate, display or distribute.        Allergies:    Allergies   Allergen Reactions    Nsaids      Not true allergy. But be sparing with patient as he has single kidney        Medications:    Current Outpatient Medications   Medication Sig Dispense Refill    vitamin D3 (CHOLECALCIFEROL) 50 mcg (2000 units) tablet Take 1 tablet (50 mcg) by mouth daily 90 tablet 1       Problem List:  Patient Active Problem List    Diagnosis Date Noted    Insomnia, unspecified type 09/27/2023     Priority: Medium    Solitary kidney, acquired 05/31/2023     Priority: Medium    SHELLIE (acute kidney injury) (H24) 05/31/2023     Priority: Medium    Hematuria, unspecified type 02/21/2023     Priority: Medium    Urge incontinence of urine 02/21/2023     Priority: Medium    H/O left nephrectomy 01/26/2023     Priority: Medium    Abdominal pain, generalized 10/31/2020     Priority: Medium    H. pylori infection 05/14/2020     Priority: Medium    History of renal stone 05/14/2020     Priority: Medium    Abnormal CT of the chest 10/09/2019      Priority: Medium        Past Medical/Surgical History:  Past Medical History:   Diagnosis Date    H/O left nephrectomy 2023    Nephrolithiasis     Prostate infection      Past Surgical History:   Procedure Laterality Date    COMBINED CYSTOSCOPY, RETROGRADES, URETEROSCOPY, LASER HOLMIUM LITHOTRIPSY URETER(S), INSERT STENT Bilateral 2022    Procedure: CYSTOSCOPY, BILATERAL RETROGRADE PYELOGRAM, LEFT URETEROSCOPY, BASKET REMOVAL OF STONE;  Surgeon: Aj Hedrick MD;  Location:  OR    COMBINED CYSTOSCOPY, URETEROSCOPY, LASER HOLMIUM LITHOTRIPSY URETER(S) Right 2020    Procedure: CYSTOSCOPY, RIGHT RETROGRADE PYELOGRAM, RIGHT diagnostic URETEROSCOPY;  Surgeon: Aj Hedrick MD;  Location:  OR    ESOPHAGOSCOPY, GASTROSCOPY, DUODENOSCOPY (EGD), COMBINED N/A 2021    Procedure: ESOPHAGOGASTRODUODENOSCOPY, WITH BIOPSY;  Surgeon: Rip Her MD;  Location: [facility] OR    LAPAROSCOPIC NEPHRECTOMY Left 2021    Procedure: LEFT NEPHRECTOMY, TOTAL, LAPAROSCOPIC;  Surgeon: Aj Hedrick MD;  Location:  OR       Social History:  Social History     Socioeconomic History    Marital status: Single     Spouse name: Not on file    Number of children: Not on file    Years of education: Not on file    Highest education level: Not on file   Occupational History    Not on file   Tobacco Use    Smoking status: Former     Packs/day: 0.50     Years: 5.00     Additional pack years: 0.00     Total pack years: 2.50     Types: Cigarettes     Quit date: 2019     Years since quittin.1    Smokeless tobacco: Never   Vaping Use    Vaping Use: Never used   Substance and Sexual Activity    Alcohol use: Never    Drug use: Never    Sexual activity: Not Currently     Partners: Female     Birth control/protection: Condom   Other Topics Concern    Parent/sibling w/ CABG, MI or angioplasty before 65F 55M? Not Asked   Social History Narrative    Not on file     Social Determinants of Health     Financial  "Resource Strain: Not on file   Food Insecurity: Not on file   Transportation Needs: Not on file   Physical Activity: Not on file   Stress: Not on file   Social Connections: Not on file   Interpersonal Safety: Not on file   Housing Stability: Not on file       Family History:  Family History   Problem Relation Age of Onset    Crohn's Disease No family hx of     Ulcerative Colitis No family hx of     GERD No family hx of     Stomach Cancer No family hx of        Review of Systems:  A complete review of systems reviewed by me is negative with the exeption of what has been mentioned in the history of present illness.    Physical Examination:  Vitals: /69   Pulse 72   Resp 16   Ht 1.753 m (5' 9\")   Wt 63.4 kg (139 lb 11.2 oz)   SpO2 98%   BMI 20.63 kg/m    BMI= Body mass index is 20.63 kg/m .    Neck Cir (cm): 31 cm    GENERAL APPEARANCE: healthy, alert, no distress, and cooperative  EYES: Eyes grossly normal to inspection  HENT: oral mucous membranes moist, oropharynx clear, and uvula removed   NECK: no asymmetry, masses, or scars  RESP: no increased work of breathing noted, no audible cough or wheeze   NEURO: mentation intact and speech normal  PSYCH: mentation appears normal and affect normal/bright  Mallampati Class: II.  Tonsillar Stage: 1 hidden by pillars.         Data: All pertinent previous laboratory data reviewed     Recent Labs   Lab Test 07/27/23 1825 06/07/23  0632    140   POTASSIUM 4.0 4.0   CHLORIDE 104 104   CO2 23 21*   ANIONGAP 12 15   * 92   BUN 17.7 11.6   CR 1.12 1.06   DARRELL 9.3 9.6       Recent Labs   Lab Test 07/27/23 1825   WBC 6.4   RBC 4.96   HGB 14.3   HCT 42.8   MCV 86   MCH 28.8   MCHC 33.4   RDW 12.3          Recent Labs   Lab Test 07/27/23 1825   PROTTOTAL 7.3   ALBUMIN 4.5   BILITOTAL 0.2   ALKPHOS 72   AST 26   ALT 13       TSH   Date Value   07/27/2023 1.51 uIU/mL   06/17/2021 1.61 mU/L       No results found for: \"UAMP\", \"UBARB\", \"BENZODIAZEUR\", " "\"UCANN\", \"UCOC\", \"OPIT\", \"UPCP\"    No results found for: \"IRONSAT\", \"EI73624\", \"MANISHA\"    No results found for: \"PH\", \"PHARTERIAL\", \"PO2\", \"KQ2MEAIVUPF\", \"SAT\", \"PCO2\", \"HCO3\", \"BASEEXCESS\", \"JENNIFER\", \"BEB\"    @LABRCNTIPR(phv:4,pco2v:4,po2v:4,hco3v:4,dory:4,o2per:4)@    Echocardiology: No results found for this or any previous visit (from the past 4320 hour(s)).    Chest x-ray: No results found for this or any previous visit from the past 365 days.      Chest CT: No results found for this or any previous visit from the past 365 days.      PFT: Most Recent [facility] Pulmonary Function Testing    FVC-Pred   Date Value Ref Range Status   12/15/2020 4.90 L      FVC-Pre   Date Value Ref Range Status   12/15/2020 3.85 L      FVC-%Pred-Pre   Date Value Ref Range Status   12/15/2020 78 %      FEV1-Pre   Date Value Ref Range Status   12/15/2020 3.26 L      FEV1-%Pred-Pre   Date Value Ref Range Status   12/15/2020 80 %      FEV1FVC-Pred   Date Value Ref Range Status   12/15/2020 83 %      FEV1FVC-Pre   Date Value Ref Range Status   12/15/2020 85 %      No results found for: \"76059\"  FEFMax-Pred   Date Value Ref Range Status   12/15/2020 10.15 L/sec      FEFMax-Pre   Date Value Ref Range Status   12/15/2020 7.20 L/sec      FEFMax-%Pred-Pre   Date Value Ref Range Status   12/15/2020 70 %      ExpTime-Pre   Date Value Ref Range Status   12/15/2020 5.77 sec      FIFMax-Pre   Date Value Ref Range Status   12/15/2020 5.41 L/sec      FEV1FEV6-Pred   Date Value Ref Range Status   12/15/2020 83 %      FEV1FEV6-Pre   Date Value Ref Range Status   12/15/2020 85 %      No results found for: \"20055\"      Queenie Galeana, APRN CNP 11/20/2023   "

## 2023-11-20 NOTE — PATIENT INSTRUCTIONS
General recommendations for sleep problems (Insomnia)  Allow 2-4 weeks to see results     Establish a regular sleep schedule    Most people only need 7-8 hours of sleep.  Don't be in bed longer than you need     to sleep or you will end up spending more time awake in bed. This trains your    brain to think of the bed as a place to not sleep.  Go to bed at same time each night   Get up at same time each day - Set an alarm everyday (even weekends). This is one of    the most important tips. It prevents you from relying on your insomnia to get you    up on time for your day. That actually reinforces insomnia. It also will help your    body get into a pattern where you start feeling tired at a consistent time each    night.  The body functions best when you keep a consistent routine.  Avoid sleeping-in and napping. Anytime you sleep during the day, you will be less tired at    night. You may be tired enough to fall asleep, but you will wake more in the    middle of the night because you will have met your sleep need before the night is    done.   Cut down time in bed (if not asleep, get up)- Use your bed only for sleep and sex    Anytime you spend time in bed doing activities other than sleep (reading,    watching TV, working, playing on the computer or phone, or even just laying in    bed trying to sleep), you are training your brain to think of the bed as a place to    do activities other than sleep. If you are not falling asleep within 20-30 minutes,    get out of bed. While out of bed, avoid bright lights. Avoid work or chores. Being    productive in the middle of the night reinforces waking up at night. Find relaxing,    not particularly entertaining activities like reading, listening to music, or relaxation    exercises. Go back to bed if you start feeling groggy, or after about 30 minutes,    even if not feeling very tired. Sometimes, just getting out of bed stops the pattern    of getting frustrated about laying  in bed not sleeping, and that can help you fall    asleep.   Avoid trying to force yourself to sleep- sleep is not like everything else. The harder you    work at most things, the more you can accomplish. The harder you work at    sleep, the less you will sleep.     Make the bedroom comfortable - quiet, dark and cool are better. Consider ear plugs    (silicon). Use dark blinds or wear an eye mask if needed     Make a relaxing routine prior to bedtime  Relaxation exercises:   Progressive muscle relaxation: Relax each muscle group individually    Begin with your feet, flex, then relax. Try to imagine your feet feeling heavy and sinking into the bed. Move to your calves, do the same thing. Work through each muscle group toward your head.    Relaxing Mental Imagery: Try to imagine a trip that you took and found relaxing, or imagine a day at the beach. Try to walk yourself through the day in your mind as if you were dreaming it. Try to imagine sensing the different experiences, such as feeling sand between your toes, the heat of the sun on your skin, seeing the waves crashing the shore, the smell of the salt water, etc.     Deal with your worries before bedtime    Set aside a worry time around dinner time for 10-15 minutes. Write down the    things that are on your mind. Plan time in the coming days to address those    issues. Brainstorm ideas on how you will deal with them. Try to identify issues    that are out of your control, and try to let those issues go.  Listen to relaxation tapes   Classical Music or Nature sounds   Back Massage   Get regular exercise each day (at least 1-2 hours before bedtime)   Take medications only as directed   Eat a light bedtime snack or warm drink   Warm milk   Warm herbal tea (non-caffeinated)     Things to avoid   No overstimulating activities just before bed   No competitive games before bedtime   No exciting television programs before bedtime   Avoid caffeine after lunchtime   Avoid  chocolate   Do not use alcohol to induce sleep (worsens Insomnia)   Do not take someone else's sleeping pills   Do not look at the clock when awakening   Do not turn on light when getting up to use bathroom, use a nightlight     Online Programs   www.SHUTi.me (pronounced shut eye). There is a fee for this program. Enter the code  [facility]  if you decide to enroll in this program.    www.sleepIO.com (pronounced sleep ee oh). There is a fee for this program. Enter the code  [facility]  if you decide to enroll in this program.     Suggested Resources  Insomnia Treatment Books   Overcoming Insomnia by Dwight Avendano and Esme Contreras (2008)  No More Sleepless Nights by Ish aHyes and Erna Romeo (1996)  Say Alex to Insomnia by Nba Amos (2009)  The Insomnia Workbook by Ann Krause and Anurag Dickey (2009)  The Insomnia Answer by Elijah Bronson and Lei Bejarano (2006)    Stress Management and Relaxation Books  The Relaxation and Stress Reduction Workbook by Divya Pratt, Subha Knowles and Antelmo Treviño (2008)  Stress Management Workbook: Techniques and Self-Assessment Procedures by Amy Guzman and Carlos Eduardo Reyes (1997)  A Mindfulness-Based Stress Reduction Workbook by Willis Guzman and Irma Vázquez (2010)  The Complete Stress Management Workbook by Malick Vargas, Erlin Rico and Shawn Horn (1996)  Assert Yourself by Lyndsey Hampton and Jose Hampton (1977)    Relaxation Resources for Computer Download   These websites offer resources to help you relax. This list is for information only. [facility] is not responsible for the quality of services or the actions of any person or organization.  Progressive Muscle Relaxation (PMR):   http://www.[employer] Groupstudio.com/progressive-muscle-relaxation-exercise.html   http://studentsupport.[facility]/counseling/resources/self-help/relaxation-and-stress-management/   Deep Breathing  Exercises:  http://www.[facility]/breathing-awareness.html     Meditation:   www.[employer].Provender  www.theMagnomaticsguided-meditation-site.com You may have to pay for some of these resources.    Guided Imagery:  http://www.[facility]/guided-imagery-scripts.html   http://Kixer/library/qfyswshwze-zgoewt-dbaffvw/     Consider phone apps such as: Calm, Headspace or Insight Timer.    Counseling / Behavioral Health  [facility]  Visit www.[place].org or call [phone] to find a clinic close to you.  Or call [phone] for [facility].     You can either keep paper sleep diaries of your sleep, or you can use the Insomnia  tayo and track your sleep on your phone.

## 2023-11-21 ENCOUNTER — PATIENT OUTREACH (OUTPATIENT)
Dept: CARE COORDINATION | Facility: CLINIC | Age: 33
End: 2023-11-21
Payer: COMMERCIAL

## 2023-11-21 ENCOUNTER — TELEPHONE (OUTPATIENT)
Dept: SLEEP MEDICINE | Facility: CLINIC | Age: 33
End: 2023-11-21
Payer: COMMERCIAL

## 2023-11-21 NOTE — PROGRESS NOTES
Clinic Care Coordination Contact  Program:  County:   Renewal:  Date Applied:     BERKLEY Outreach:   11/23/23: CTA called to see if patient needed assistance with their [employer] Renewal. Patient declined needing assistance and no follow up needed   Mariya Mora  Care   [facility]  Clinic Care Coordination  [phone]      Health Insurance:      Referral/Screening:

## 2023-11-21 NOTE — TELEPHONE ENCOUNTER
SCHEDULED PT FOR SLEEP STUDY SOONER [facility]INCE [facility] Visit facilitator.

## 2023-11-28 ENCOUNTER — THERAPY VISIT (OUTPATIENT)
Dept: SLEEP MEDICINE | Facility: CLINIC | Age: 33
End: 2023-11-28
Payer: COMMERCIAL

## 2023-11-28 DIAGNOSIS — R35.1 NOCTURIA: ICD-10-CM

## 2023-11-28 DIAGNOSIS — F51.04 PSYCHOPHYSIOLOGICAL INSOMNIA: ICD-10-CM

## 2023-11-28 DIAGNOSIS — R40.0 SLEEPINESS: ICD-10-CM

## 2023-11-28 PROCEDURE — 95810 POLYSOM 6/> YRS 4/> PARAM: CPT | Performed by: STUDENT IN AN ORGANIZED HEALTH CARE EDUCATION/TRAINING PROGRAM

## 2023-11-29 NOTE — PATIENT INSTRUCTIONS
[place] [facility]    1. Your sleep study will be reviewed by a sleep physician within the next few days.     2. Please follow up in the sleep clinic as scheduled, or, make an appointment with your sleep provider to be seen within two weeks to discuss the results of the sleep study.    3. If you have any questions or problems with your treatment plan, please contact your sleep clinic provider at [phone] to further manage your condition.    4. Please review your attached medication list, and, at your follow-up appointment advise your sleep clinic provider about any changes.    5. Go to [email or web address] for more information about your sleep problems.    Danial Swan, PSGT  November 29, 2023

## 2023-11-29 NOTE — NURSING NOTE
Diagnostic PSG completed per provider order.  Patient did not meet criteria for PAP therapy. His AHI is 0.4

## 2023-12-07 LAB — SLPCOMP: NORMAL

## 2023-12-07 NOTE — PROCEDURES
"         SLEEP STUDY INTERPRETATION  DIAGNOSTIC POLYSOMNOGRAPHY REPORT      Patient: REYNOLD ENGLISH  YOB: 1990  Study Date: 11/28/2023  MRN: [id number]  Referring Provider: -  Ordering Provider: -    Indications for Polysomnography: The patient is a 33 year old Male who is 5' 9\" and weighs 139.0 lbs. His BMI is 20.6, Carlisle sleepiness scale - and neck circumference is - cm. Relevant medical history includes R solitary kidney and peripheral neuropathy. A diagnostic polysomnogram was performed to evaluate for sleep apnea/PLMS/RLS/RBD/S-S/CSA/hypoventilation/hypoxemia.    Polysomnogram Data: A full night polysomnogram recorded the standard physiologic parameters including EEG, EOG, EMG, ECG, nasal and oral airflow. Respiratory parameters of chest and abdominal movements were recorded with respiratory inductance plethysmography. Oxygen saturation was recorded by pulse oximetry. Hypopnea scoring rule used: 1B 4%.    Sleep Architecture: Sleep was fragmented with an increased arousal index. Sleep efficiency was normal with a normal sleep latency and normal wake after sleep onset. All stages of sleep were observed.   The total recording time of the polysomnogram was 440.4 minutes. The total sleep time was 369.5 minutes. Sleep latency was normal at 7.5 minutes without the use of a sleep aid. REM latency was 101.5 minutes. Arousal index was increased at 21.8 arousals per hour. Sleep efficiency was normal at 83.9%. Wake after sleep onset was 63.0 minutes. The patient spent 7.4% of total sleep time in Stage N1, 50.6% in Stage N2, 20.7% in Stage N3, and 21.2% in REM. Time in REM supine was 62.0 minutes.    Respiration: Clinically significant sleep apnea and sleep associated hypoxemia were not observed.   Events ? The polysomnogram revealed a presence of - obstructive, 1 central, and - mixed apneas resulting in an apnea index of 0.2 events per hour. There were 1 obstructive hypopneas and - central hypopneas " resulting in an obstructive hypopnea index of 0.2 and central hypopnea index of - events per hour. The combined apnea/hypopnea index was 0.3 events per hour (central apnea/hypopnea index was 0.2 events per hour). The REM AHI was 1.5 events per hour. The supine AHI was 0.8 events per hour. The RERA index was - events per hour.  The RDI was 0.3 events per hour.  Snoring - was reported as absent  Respiratory rate and pattern - was notable for normal respiratory rate and pattern.  Sustained Sleep Associated Hypoventilation - Transcutaneous carbon dioxide monitoring was not used.  Sleep Associated Hypoxemia - (Greater than 5 minutes O2 sat at or below 88%) was not present. Baseline oxygen saturation was 96.3%. Lowest oxygen saturation was 89.0%. Time spent less than or equal to 88% was 0 minutes. Time spent less than or equal to 89% was 0 minutes.    Movement Activity: No abnormal movements or behaviors were observed.   Periodic Limb Activity - There were 20 PLMs during the entire study. The PLM index was 3.2 movements per hour. The PLM Arousal Index was 1.1 per hour.  REM EMG Activity - Excessive transient/sustained muscle activity was not present.  Nocturnal Behavior - Abnormal sleep related behaviors were not noted during/arising out of NREM / REM sleep.  Bruxism - None apparent.    Cardiac Summary: Normal sinus rhythm  The average pulse rate was 60.2 bpm. The minimum pulse rate was 50.0 bpm while the maximum pulse rate was 106.0 bpm.  Arrhythmias were not noted.      Assessment:   Sleep was fragmented with an increased arousal index. Sleep efficiency was normal with a normal sleep latency and normal wake after sleep onset. All stages of sleep were observed.   Clinically significant sleep apnea and sleep associated hypoxemia were not observed.   No abnormal movements or behaviors were observed.   Normal sinus rhythm      Recommendations:  Consider Cognitive Behavioral Therapy for Insomnia   Advice regarding the risks  of drowsy driving.  Suggest optimizing sleep schedule and avoiding sleep deprivation.  Weight management (if BMI > 30).  Pharmacologic therapy should be used for management of restless legs syndrome only if present and clinically indicated and not based on the presence of periodic limb movements alone.    Diagnostic Codes:   Unspecified Sleep Disturbance G47.9    11/28/2023 [facility] Sleep Study (139.0 lbs) - AHI 0.3, RDI 0.3, Supine AHI 0.8, REM AHI 1.5, Low O2 89.0%, Time Spent ?88% 0 minutes / Time Spent ?89% 0 minutes.    _____________________________________   Electronically Signed By:Arcelia Gallo MD 12/07/2023

## 2023-12-18 DIAGNOSIS — Z00.00 HEALTHCARE MAINTENANCE: ICD-10-CM

## 2023-12-18 NOTE — TELEPHONE ENCOUNTER
"Request for medication refill:  vitamin D3 (CHOLECALCIFEROL) 50 mcg (2000 units) tablet     Providers if patient needs an appointment and you are willing to give a one month supply please refill for one month and  send a letter/MyChart using \".SMILLIMITEDREFILL\" .smillimited and route chart to \"P [facility] \" (Giving one month refill in non controlled medications is strongly recommended before denial)    If refill has been denied, meaning absolutely no refills without visit, please complete the smart phrase \".smirxrefuse\" and route it to the \"P SMI MED REFILLS\"  pool to inform the patient and the pharmacy.    Citlalli Berger, CMA      "

## 2023-12-19 RX ORDER — CHOLECALCIFEROL (VITAMIN D3) 50 MCG
TABLET ORAL
Qty: 90 TABLET | Refills: 1 | Status: SHIPPED | OUTPATIENT
Start: 2023-12-19 | End: 2024-07-22

## 2024-01-09 ENCOUNTER — OFFICE VISIT (OUTPATIENT)
Dept: FAMILY MEDICINE | Facility: CLINIC | Age: 34
End: 2024-01-09
Payer: COMMERCIAL

## 2024-01-09 VITALS
HEART RATE: 76 BPM | RESPIRATION RATE: 17 BRPM | BODY MASS INDEX: 20.67 KG/M2 | DIASTOLIC BLOOD PRESSURE: 79 MMHG | OXYGEN SATURATION: 100 % | WEIGHT: 140 LBS | SYSTOLIC BLOOD PRESSURE: 119 MMHG

## 2024-01-09 DIAGNOSIS — R10.9 RIGHT FLANK PAIN: ICD-10-CM

## 2024-01-09 DIAGNOSIS — R30.9 PAIN WITH URINATION: Primary | ICD-10-CM

## 2024-01-09 DIAGNOSIS — Z00.00 HEALTHCARE MAINTENANCE: ICD-10-CM

## 2024-01-09 DIAGNOSIS — R10.31 RLQ ABDOMINAL PAIN: ICD-10-CM

## 2024-01-09 LAB
ALBUMIN UR-MCNC: NEGATIVE MG/DL
ANION GAP SERPL CALCULATED.3IONS-SCNC: 11 MMOL/L (ref 7–15)
APPEARANCE UR: CLEAR
BACTERIA #/AREA URNS HPF: NORMAL /HPF
BASOPHILS # BLD AUTO: 0 10E3/UL (ref 0–0.2)
BASOPHILS NFR BLD AUTO: 1 %
BILIRUB UR QL STRIP: NEGATIVE
BUN SERPL-MCNC: 15 MG/DL (ref 6–20)
CALCIUM SERPL-MCNC: 9.5 MG/DL (ref 8.6–10)
CHLORIDE SERPL-SCNC: 103 MMOL/L (ref 98–107)
CHOLEST SERPL-MCNC: 190 MG/DL
COLOR UR AUTO: YELLOW
CREAT SERPL-MCNC: 0.98 MG/DL (ref 0.67–1.17)
DEPRECATED HCO3 PLAS-SCNC: 26 MMOL/L (ref 22–29)
EGFRCR SERPLBLD CKD-EPI 2021: >90 ML/MIN/1.73M2
EOSINOPHIL # BLD AUTO: 0.1 10E3/UL (ref 0–0.7)
EOSINOPHIL NFR BLD AUTO: 2 %
ERYTHROCYTE [DISTWIDTH] IN BLOOD BY AUTOMATED COUNT: 12 % (ref 10–15)
FASTING STATUS PATIENT QL REPORTED: ABNORMAL
GLUCOSE SERPL-MCNC: 91 MG/DL (ref 70–99)
GLUCOSE UR STRIP-MCNC: NEGATIVE MG/DL
HCT VFR BLD AUTO: 45.5 % (ref 40–53)
HDLC SERPL-MCNC: 40 MG/DL
HGB BLD-MCNC: 15.4 G/DL (ref 13.3–17.7)
HGB UR QL STRIP: ABNORMAL
IMM GRANULOCYTES # BLD: 0 10E3/UL
IMM GRANULOCYTES NFR BLD: 0 %
KETONES UR STRIP-MCNC: NEGATIVE MG/DL
LDLC SERPL CALC-MCNC: 138 MG/DL
LEUKOCYTE ESTERASE UR QL STRIP: NEGATIVE
LYMPHOCYTES # BLD AUTO: 1.9 10E3/UL (ref 0.8–5.3)
LYMPHOCYTES NFR BLD AUTO: 40 %
MCH RBC QN AUTO: 29.6 PG (ref 26.5–33)
MCHC RBC AUTO-ENTMCNC: 33.8 G/DL (ref 31.5–36.5)
MCV RBC AUTO: 88 FL (ref 78–100)
MONOCYTES # BLD AUTO: 0.4 10E3/UL (ref 0–1.3)
MONOCYTES NFR BLD AUTO: 10 %
NEUTROPHILS # BLD AUTO: 2.2 10E3/UL (ref 1.6–8.3)
NEUTROPHILS NFR BLD AUTO: 48 %
NITRATE UR QL: NEGATIVE
NONHDLC SERPL-MCNC: 150 MG/DL
PH UR STRIP: 7 [PH] (ref 5–8)
PLATELET # BLD AUTO: 188 10E3/UL (ref 150–450)
POTASSIUM SERPL-SCNC: 4 MMOL/L (ref 3.4–5.3)
RBC # BLD AUTO: 5.2 10E6/UL (ref 4.4–5.9)
RBC #/AREA URNS AUTO: NORMAL /HPF
SODIUM SERPL-SCNC: 140 MMOL/L (ref 135–145)
SP GR UR STRIP: 1.02 (ref 1–1.03)
SQUAMOUS #/AREA URNS AUTO: NORMAL /LPF
TRIGL SERPL-MCNC: 62 MG/DL
UROBILINOGEN UR STRIP-ACNC: 0.2 E.U./DL
WBC # BLD AUTO: 4.6 10E3/UL (ref 4–11)
WBC #/AREA URNS AUTO: NORMAL /HPF

## 2024-01-09 PROCEDURE — 99214 OFFICE O/P EST MOD 30 MIN: CPT | Performed by: FAMILY MEDICINE

## 2024-01-09 PROCEDURE — 81001 URINALYSIS AUTO W/SCOPE: CPT | Performed by: FAMILY MEDICINE

## 2024-01-09 PROCEDURE — 80048 BASIC METABOLIC PNL TOTAL CA: CPT | Performed by: FAMILY MEDICINE

## 2024-01-09 PROCEDURE — 36415 COLL VENOUS BLD VENIPUNCTURE: CPT | Performed by: FAMILY MEDICINE

## 2024-01-09 PROCEDURE — 85025 COMPLETE CBC W/AUTO DIFF WBC: CPT | Performed by: FAMILY MEDICINE

## 2024-01-09 PROCEDURE — 80061 LIPID PANEL: CPT | Performed by: FAMILY MEDICINE

## 2024-01-09 RX ORDER — LANOLIN ALCOHOL/MO/W.PET/CERES
1000 CREAM (GRAM) TOPICAL DAILY
COMMUNITY
End: 2024-07-22

## 2024-01-09 NOTE — PATIENT INSTRUCTIONS
Please Call [phone] ([phone]) to schedule renal ultrasound if no call in 24 hours    Kidney Stone: Care Instructions  Your Care Instructions     Kidney stones are formed when salts, minerals, and other substances normally found in the urine clump together. They can be as small as grains of sand or, rarely, as large as golf balls.  While the stone is traveling through the ureter, which is the tube that carries urine from the kidney to the bladder, you will probably feel pain. The pain may be mild or very severe. You may also have some blood in your urine. As soon as the stone reaches the bladder, any intense pain should go away.  If a stone is too large to pass on its own, you may need a medical procedure to help you pass the stone.  The doctor has checked you carefully, but problems can develop later. If you notice any problems or new symptoms, get medical treatment right away.  Follow-up care is a key part of your treatment and safety. Be sure to make and go to all appointments, and call your doctor if you are having problems. It's also a good idea to know your test results and keep a list of the medicines you take.  How can you care for yourself at home?  Drink plenty of fluids. If you have kidney, heart, or liver disease and have to limit fluids, talk with your doctor before you increase the amount of fluids you drink.  Take pain medicines exactly as directed. Call your doctor if you think you are having a problem with your medicine.  If the doctor gave you a prescription medicine for pain, take it as prescribed.  If you are not taking a prescription pain medicine, ask your doctor if you can take an over-the-counter medicine. Read and follow all instructions on the label.  Your doctor may ask you to strain your urine so that you can collect your kidney stone when it passes. You can use a kitchen strainer or a tea strainer to catch the stone. Store it in a plastic bag until you see your doctor  "again.  Preventing future kidney stones  Some changes in your diet may help prevent kidney stones. Depending on the cause of your stones, your doctor may recommend that you:  Drink plenty of fluids. If you have kidney, heart, or liver disease and have to limit fluids, talk with your doctor before you increase the amount of fluids you drink.  Limit coffee, tea, and alcohol. Also avoid grapefruit juice.  Do not take more than the recommended daily dose of vitamins C and D.  Avoid antacids such as Gaviscon, Maalox, Mylanta, or Tums.  Limit the amount of salt (sodium) in your diet.  Eat a balanced diet that is not too high in protein.  Limit foods that are high in a substance called oxalate, which can cause kidney stones. These foods include dark green vegetables, rhubarb, chocolate, wheat bran, nuts, cranberries, and beans.  When should you call for help?   Call your doctor now or seek immediate medical care if:    You cannot keep down fluids.     Your pain gets worse.     You have a fever or chills.     You have new or worse pain in your back just below your rib cage (the flank area).     You have new or more blood in your urine.   Watch closely for changes in your health, and be sure to contact your doctor if:    You do not get better as expected.   Where can you learn more?  Go to https://www.[employer].net/patiented  Enter X633 in the search box to learn more about \"Kidney Stone: Care Instructions.\"  Current as of: February 28, 2023               Content Version: 13.8    9797-0963 [employer].   Care instructions adapted under license by your healthcare professional. If you have questions about a medical condition or this instruction, always ask your healthcare professional. [employer] disclaims any warranty or liability for your use of this information.    Patient Education   Here is the plan from today's visit    1. Pain with urination    - UA Macroscopic with reflex to Microscopic and " Culture; Future  - UA Macroscopic with reflex to Microscopic and Culture  - UA Microscopic with Reflex to Culture  - Basic metabolic panel; Future  - CBC with platelets differential; Future  - US Renal Complete Non-Vascular; Future    2. RLQ abdominal pain    - US Abdomen Complete - clinic; Future          Please call or return to clinic if your symptoms don't go away.    Follow up plan  No follow-ups on file.    Thank you for coming to [facility] today.  Lab Testing:  **If you had lab testing today and your results are reassuring or normal they will be mailed to you or sent through [employer] within 7 days.   **If the lab tests need quick action we will call you with the results.  **If you are having labs done on a different day, please call [phone] to schedule at [facility] or [phone] for other [facility] locations. Labs do not offer walk-in appointments.  The phone number we will call with results is # [phone] (home) . If this is not the best number please call our clinic and change the number.  Medication Refills:  If you need any refills please call your pharmacy and they will contact us.   If you need to  your refill at a new pharmacy, please contact the new pharmacy directly. The new pharmacy will help you get your medications transferred faster.   Scheduling:  If you have any concerns about today's visit or wish to schedule another appointment please call our office during normal business hours [phone] (8-5:00 M-F). If you can no longer make a scheduled visit, please cancel via [employer] or call us to cancel.   If a referral was made to an [facility] specialty provider and you do not get a call from central scheduling, please refer to directions on your visit summary or call our office during normal business hours for assistance.   If a Mammogram was ordered for you at the Breast Center call [phone] to schedule or change your appointment.  If you  had an XRay/CT/Ultrasound/MRI ordered the number is [phone] to schedule or change your radiology appointment.   [facility] has limited ultrasound appointments available on Wednesdays, if you would like your ultrasound at [facility], please call [phone] to schedule.   Medical Concerns:  If you have urgent medical concerns please call [phone] at any time of the day.    Trupti Hassan MD

## 2024-01-09 NOTE — PROGRESS NOTES
Assessment & Plan     Pain with urination  Right flank pain  Patient has pain with urination most likely etiology consider this time is right-sided kidney stone. Differential for pain with urination includes prostatitis (patient defers rectal or genital exam today)and UTI (unlikely with sterile UA) unclear etiology of her lower quadrant pain. Differential for right flank pain includes hydronephrosis (will rule out with renal ultrasound) pyelonephritis/UTI (unlikely with sterile UA)  - UA Macroscopic with reflex to Microscopic and Culture; Future  - CBC with platelets differential; Future  - US Renal Complete Non-Vascular; Future  - Basic metabolic panel  - CBC with platelets differential patient to return to care for  -patient to return to exam for digital rectal exam with male provider symptoms unresolved (to rule out prostatitis)    RLQ abdominal pain  . However, did consider intestinal pathology, constipation, or appendicitis considered very unlikely with (negative Psoas, negative rovsing, and overall benign appearing abdominal exam)  - US Abdomen Complete - clinic; Future    Healthcare maintenance  Due for lipid screening, thus obtained.  - Lipid panel         See Patient Instructions    Return in about 1 week (around 1/16/2024) for Follow up evaluate for prostatis with MALE provider.    Laverne Steward MD    Patient seen in conjunction with Trupti Hassan MD  [facility] EARL Dunaway is a 34 year old, presenting for the following health issues:  Clinic Care Coordination - Initial (Pt here for back pain and pain with urination) and Imm/Inj (Flu Shot)      1/9/2024    10:48 AM   Additional Questions   Roomed by Doua   Accompanied by Self         1/9/2024    10:48 AM   Patient Reported Additional Medications   Patient reports taking the following new medications n/a       HPI   Patient had left kidney removal (1/26/23) and had atrophy of left kidney due to a large  "obstructing stone. Notably, has had imaging of right kidney as recently as May 2023 but did not show stones.  Patient also had prostate infection 2-3 years ago that has since resolved. Patient thinks was given antibiotics but these resolved  Patient has not seen urology/nephrology > 1 year per patient report.  Dr. Hedrick is current urologist.    Patient shares that he is  and has no concerns regard current STI's.    Denies fevers, chills, nausea, or vomiting  Has not seen stone pass  Right sided flank pain currently.  No hematuria  Urinary pain, but no burning, no blood, no weak strea, no change in urine color or urine smell.  Pain is \"not that much\"  patient shares this feels the same as previous kidney stone        Objective    /79 (BP Location: Left arm, Patient Position: Sitting, Cuff Size: Adult Large)   Pulse 76   Resp 17   Wt 63.5 kg (140 lb)   SpO2 100%   BMI 20.67 kg/m    Body mass index is 20.67 kg/m .  Physical Exam   GENERAL: healthy, alert and no distress  NECK: no adenopathy, no asymmetry, masses, or scars and thyroid normal to palpation  RESP: lungs clear to auscultation - no rales, rhonchi or wheezes  CV: regular rate and rhythm, normal S1 S2, no S3 or S4, no murmur, click or rub, no peripheral edema and peripheral pulses strong  ABDOMEN: soft, slight RLQ tenderness. negative Psoas, negative rovsing, patient of cholesterol screening no hepatosplenomegaly, no masses and bowel sounds normal  FLANK: right-sided flank pain  RECTAL: patient declined  MS: no gross musculoskeletal defects noted, no edema                   Patient seen, evaluated and discussed with the resident. I have verified the content of the note, which accurately reflects my assessment of the patient and the plan of care.   Supervising Physician:  Trupti Hassan MD         "

## 2024-01-31 ENCOUNTER — ANCILLARY PROCEDURE (OUTPATIENT)
Dept: ULTRASOUND IMAGING | Facility: CLINIC | Age: 34
End: 2024-01-31
Attending: FAMILY MEDICINE
Payer: COMMERCIAL

## 2024-01-31 DIAGNOSIS — R10.31 RLQ ABDOMINAL PAIN: ICD-10-CM

## 2024-01-31 PROCEDURE — 76700 US EXAM ABDOM COMPLETE: CPT | Mod: GC | Performed by: RADIOLOGY

## 2024-02-23 ENCOUNTER — HOSPITAL ENCOUNTER (EMERGENCY)
Facility: CLINIC | Age: 34
Discharge: HOME | End: 2024-02-24
Attending: EMERGENCY MEDICINE | Admitting: EMERGENCY MEDICINE
Payer: COMMERCIAL

## 2024-02-23 VITALS
SYSTOLIC BLOOD PRESSURE: 113 MMHG | RESPIRATION RATE: 18 BRPM | TEMPERATURE: 98.3 F | HEART RATE: 64 BPM | OXYGEN SATURATION: 96 % | DIASTOLIC BLOOD PRESSURE: 66 MMHG

## 2024-02-23 DIAGNOSIS — S09.90XA HEAD INJURY, INITIAL ENCOUNTER: ICD-10-CM

## 2024-02-23 PROCEDURE — 99282 EMERGENCY DEPT VISIT SF MDM: CPT | Performed by: EMERGENCY MEDICINE

## 2024-02-23 PROCEDURE — 99283 EMERGENCY DEPT VISIT LOW MDM: CPT | Performed by: EMERGENCY MEDICINE

## 2024-02-23 RX ORDER — ACETAMINOPHEN 325 MG/1
975 TABLET ORAL ONCE
Status: DISCONTINUED | OUTPATIENT
Start: 2024-02-23 | End: 2024-02-24 | Stop reason: HOSPADM

## 2024-02-23 ASSESSMENT — COLUMBIA-SUICIDE SEVERITY RATING SCALE - C-SSRS
2. HAVE YOU ACTUALLY HAD ANY THOUGHTS OF KILLING YOURSELF IN THE PAST MONTH?: NO
1. IN THE PAST MONTH, HAVE YOU WISHED YOU WERE DEAD OR WISHED YOU COULD GO TO SLEEP AND NOT WAKE UP?: NO
6. HAVE YOU EVER DONE ANYTHING, STARTED TO DO ANYTHING, OR PREPARED TO DO ANYTHING TO END YOUR LIFE?: NO

## 2024-02-24 NOTE — DISCHARGE INSTRUCTIONS
For pain, please take 975-1000mg acetaminophen (tylenol) every 8 hours -- do not take more than 3000mg in a 24 hour period.    Please call today to schedule a follow-up appointment with your primary medical doctor in 3-5 days for reevaluation, which is important after today's emergency department visit.   Follow concussion / head injury instructions

## 2024-02-24 NOTE — ED PROVIDER NOTES
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 Pt memory intact. C/o headache and lower back pain, no report of neck pain. Feeling drowsy and dizzy       HPI  Emelyn Marmolejo is a 34 year old male with a past medical history of acute kidney injury, renal stone, nephrectomy, and insomnia who presents to the ED for evaluation of head injury.      35 y/o male who has no ongoing medical problems   on Standing blood thinners who inadvertently walked into a wall on the right and struck his right frontal head on a stone pillar in a parking lot. no loss of consciousness. no vomiting. patient has mild pain overlying the his injury. of note patient has single kidney and cannot take NSAIDs.    Past Medical History  Past Medical History:   Diagnosis Date    H/O left nephrectomy 1/26/2023    Nephrolithiasis     Prostate infection      Past Surgical History:   Procedure Laterality Date    COMBINED CYSTOSCOPY, RETROGRADES, URETEROSCOPY, LASER HOLMIUM LITHOTRIPSY URETER(S), INSERT STENT Bilateral 09/07/2022    Procedure: CYSTOSCOPY, BILATERAL RETROGRADE PYELOGRAM, LEFT URETEROSCOPY, BASKET REMOVAL OF STONE;  Surgeon: Aj Hedrick MD;  Location:  OR    COMBINED CYSTOSCOPY, URETEROSCOPY, LASER HOLMIUM LITHOTRIPSY URETER(S) Right 08/13/2020    Procedure: CYSTOSCOPY, RIGHT RETROGRADE PYELOGRAM, RIGHT diagnostic URETEROSCOPY;  Surgeon: Aj Hedrick MD;  Location:  OR    ESOPHAGOSCOPY, GASTROSCOPY, DUODENOSCOPY (EGD), COMBINED N/A 01/25/2021    Procedure: ESOPHAGOGASTRODUODENOSCOPY, WITH BIOPSY;  Surgeon: Rip Her MD;  Location: [facility] [place] [place] OR    LAPAROSCOPIC NEPHRECTOMY Left 05/24/2021    Procedure: LEFT NEPHRECTOMY, TOTAL, LAPAROSCOPIC;  Surgeon: Aj Hedrick MD;  Location:  OR     cyanocobalamin (VITAMIN B-12) 1000 MCG  tablet  VITAMIN D3 50 MCG ( UT) tablet      Allergies   Allergen Reactions    Nsaids      Not true allergy. But be sparing with patient as he has single kidney      Family History  Family History   Problem Relation Age of Onset    Crohn's Disease No family hx of     Ulcerative Colitis No family hx of     GERD No family hx of     Stomach Cancer No family hx of      Social History   Social History     Tobacco Use    Smoking status: Former     Packs/day: 0.50     Years: 5.00     Additional pack years: 0.00     Total pack years: 2.50     Types: Cigarettes     Quit date: 2019     Years since quittin.4    Smokeless tobacco: Never   Vaping Use    Vaping Use: Never used   Substance Use Topics    Alcohol use: Never    Drug use: Never         A medically appropriate review of systems was performed with pertinent positives and negatives noted in the HPI, and all other systems negative.    Physical Exam   BP: 113/66  Pulse: 64  Temp: 98.3  F (36.8  C)  Resp: 18  SpO2: 96 %  Physical Exam  patient has atraumatic scalp   small area of tenderness without fluctuance or step-off on the right frontal forehead C-spine nontender   overall well-appearing   breathing comfortably   ambulating briskly    ED Course, Procedures, & Data      Procedures                     No results found for any visits on 24.  Medications   acetaminophen (TYLENOL) tablet 975 mg (has no administration in time range)     Labs Ordered and Resulted from Time of ED Arrival to Time of ED Departure - No data to display  No orders to display          Critical care was not performed.     Medical Decision Making  The patient's presentation was of moderate complexity (an acute illness with systemic symptoms).    The patient's evaluation involved:  strong consideration of a test (CT head) that was ultimately deferred    The patient's management necessitated moderate risk (prescription drug management including medications given in the  ED).    Assessment & Plan     head injury approximately 1 hour prior to arrival. Tylenol for pain per Stateless CT head rules I have strongly considered a CT of her head but will defer it to discuss this with the patient and reported the rationale for no need for CTs return precautions Tylenol discharged with head injury precautions        I have reviewed the nursing notes. I have reviewed the findings, diagnosis, plan and need for follow up with the patient.    New Prescriptions    No medications on file       Final diagnoses:   Head injury, initial encounter     This part of the medical record was transcribed by Mallika Longoria, Medical Scribe, from a dictation done by Alfonzo Painting MD.     Alfonzo painting MD  [facility] EMERGENCY DEPARTMENT  2/23/2024     Alfonzo Painting MD  02/24/24 0021

## 2024-05-16 ENCOUNTER — TELEPHONE (OUTPATIENT)
Dept: UROLOGY | Facility: CLINIC | Age: 34
End: 2024-05-16
Payer: COMMERCIAL

## 2024-05-16 NOTE — TELEPHONE ENCOUNTER
[facility] Call Center    Phone Message    May a detailed message be left on voicemail: yes     Reason for Call: Other: Patient is requesting to switch urology providers to [place]. Please call patient to approve of provider switch.     Action Taken: Other: [facility] - Urology    Travel Screening: Not Applicable

## 2024-07-09 ENCOUNTER — PRE VISIT (OUTPATIENT)
Dept: UROLOGY | Facility: CLINIC | Age: 34
End: 2024-07-09
Payer: COMMERCIAL

## 2024-07-09 NOTE — TELEPHONE ENCOUNTER
Reason for visit: Provider switch, follow up from Aj Hedrick    Relevant information: hx of nephrectomy, renal stone    Records/imaging/labs/orders: all records available    Pt called: no need for a call    At Rooming:  Standard rooming      Trevor Aggarwal  7/9/2024  4:35 PM

## 2024-07-12 ENCOUNTER — PRE VISIT (OUTPATIENT)
Dept: UROLOGY | Facility: CLINIC | Age: 34
End: 2024-07-12
Payer: COMMERCIAL

## 2024-07-12 NOTE — TELEPHONE ENCOUNTER
Reason for visit: Provider Switch/Follow up     Relevant information: referred by Aj Keys MD for urge incontinence, some hx of atrophy of left kidney, flank pain, S/P nephrectomy    Records/imaging/labs/orders: all records available    Pt called: no need for a call    At Rooming:  Standard rooming      Trevor Aggarwal  7/12/2024  1:19 PM

## 2024-07-22 ENCOUNTER — OFFICE VISIT (OUTPATIENT)
Dept: FAMILY MEDICINE | Facility: CLINIC | Age: 34
End: 2024-07-22
Payer: COMMERCIAL

## 2024-07-22 VITALS
SYSTOLIC BLOOD PRESSURE: 112 MMHG | OXYGEN SATURATION: 99 % | HEIGHT: 69 IN | DIASTOLIC BLOOD PRESSURE: 76 MMHG | TEMPERATURE: 98.2 F | HEART RATE: 75 BPM | RESPIRATION RATE: 16 BRPM | WEIGHT: 134 LBS | BODY MASS INDEX: 19.85 KG/M2

## 2024-07-22 DIAGNOSIS — R10.9 FLANK PAIN: Primary | ICD-10-CM

## 2024-07-22 DIAGNOSIS — Z23 ENCOUNTER FOR VACCINATION: ICD-10-CM

## 2024-07-22 LAB
ALBUMIN UR-MCNC: NEGATIVE MG/DL
ANION GAP SERPL CALCULATED.3IONS-SCNC: 10 MMOL/L (ref 7–15)
APPEARANCE UR: CLEAR
BACTERIA #/AREA URNS HPF: ABNORMAL /HPF
BILIRUB UR QL STRIP: NEGATIVE
BUN SERPL-MCNC: 17.7 MG/DL (ref 6–20)
CALCIUM SERPL-MCNC: 9.5 MG/DL (ref 8.8–10.4)
CHLORIDE SERPL-SCNC: 103 MMOL/L (ref 98–107)
COLOR UR AUTO: YELLOW
CREAT SERPL-MCNC: 1 MG/DL (ref 0.67–1.17)
EGFRCR SERPLBLD CKD-EPI 2021: >90 ML/MIN/1.73M2
GLUCOSE SERPL-MCNC: 95 MG/DL (ref 70–99)
GLUCOSE UR STRIP-MCNC: NEGATIVE MG/DL
HCO3 SERPL-SCNC: 27 MMOL/L (ref 22–29)
HGB UR QL STRIP: ABNORMAL
KETONES UR STRIP-MCNC: NEGATIVE MG/DL
LEUKOCYTE ESTERASE UR QL STRIP: NEGATIVE
NITRATE UR QL: NEGATIVE
PH UR STRIP: 7 [PH] (ref 5–8)
POTASSIUM SERPL-SCNC: 4.4 MMOL/L (ref 3.4–5.3)
RBC #/AREA URNS AUTO: ABNORMAL /HPF
SODIUM SERPL-SCNC: 140 MMOL/L (ref 135–145)
SP GR UR STRIP: 1.01 (ref 1–1.03)
SQUAMOUS #/AREA URNS AUTO: ABNORMAL /LPF
UROBILINOGEN UR STRIP-ACNC: 0.2 E.U./DL
WBC #/AREA URNS AUTO: ABNORMAL /HPF

## 2024-07-22 PROCEDURE — 90471 IMMUNIZATION ADMIN: CPT

## 2024-07-22 PROCEDURE — 81001 URINALYSIS AUTO W/SCOPE: CPT

## 2024-07-22 PROCEDURE — 80048 BASIC METABOLIC PNL TOTAL CA: CPT

## 2024-07-22 PROCEDURE — 36415 COLL VENOUS BLD VENIPUNCTURE: CPT

## 2024-07-22 PROCEDURE — 90746 HEPB VACCINE 3 DOSE ADULT IM: CPT

## 2024-07-22 PROCEDURE — 99214 OFFICE O/P EST MOD 30 MIN: CPT | Mod: 25

## 2024-07-22 RX ORDER — CYCLOBENZAPRINE HCL 5 MG
5 TABLET ORAL 2 TIMES DAILY PRN
Qty: 60 TABLET | Refills: 0 | Status: SHIPPED | OUTPATIENT
Start: 2024-07-22

## 2024-07-22 NOTE — PROGRESS NOTES
Assessment & Plan     Flank pain  34-year-old patient with history of left nephrectomy January 2023 who presents with several months of right-sided flank pain that radiates to his groin.  Per chart review, evaluated by Dr. Hassan in Jan 2024 for similar symptoms. Pain thought to be secondary to right-sided kidney stone at the time, however, workup with BMP, UA, abdominal ultrasound negative for kidney stone.  Patient denies fevers, chills, hematuria, dysuria, low concern from infectious or systemic process. He is having regular bowel movements which makes constipation unlikely.  Patient has tight, boggy paraspinal musculature on the right side.  This may suggest a musculoskeletal or visceral-somatic origin of his pain. Given history of solitary kidney, will start initial workup with UA, BMP.  If either of these are concerning we will proceed with repeat abdominal ultrasound.   To address the paraspinal muscle tightness, will proceed with PT referral and trial of Flexeril. If pain not improved, patient would like to try more topical treatments (consider lidocaine or voltaren). May also benefit from osteopathic evaluation if he is interested in this. Plan for symptom recheck in 4 weeks.  - UA Macroscopic with reflex to Microscopic and Culture  - Basic metabolic panel  - cyclobenzaprine (FLEXERIL) 5 MG tablet  Dispense: 60 tablet; Refill: 0  - Physical Therapy  Referral    Encounter for vaccination  Patient due for routine immunization. Risks and benefits of immunization discussed. Patient agreeable to proceed with immunizations today.  - HEPATITIS B, ADULT 20+ (ENGERIX-B/RECOMBIVAX HB)      Return in about 4 weeks (around 8/19/2024) for with PCP for flank pain recheck.    Siddhartha Dunaway is a 34 year old, presenting for the following health issues:  Pain (Coughing, Pain on right side of body for 2 months. Pain while driving and laying down. Consistent pain ), Referral (CT scan), and Lab Only (Kidney  "testing)    HPI   Here to discuss back pain and kidney function.  Back pain: started a few months ago, intermittent, right flank radiates to groin and buttocks, thought it was a muscle strain, no pain with activity, feels the pain most when driving and lying down. No dysuria, no hematuria. Has regular bowel movements, every other day, not hard, no blood in stool. Drinks 5 small bottles of water in a day. Not taking any medicines. Has not tried physical therapy.   Kidney: Hx of left nephrectomy after ischemia from obstructing kidney stone. Has follow up appointment Oct 1. Wants a CT scan to check for stone in right kidney.         Objective    /76   Pulse 75   Temp 98.2  F (36.8  C) (Oral)   Resp 16   Ht 1.753 m (5' 9\")   Wt 60.8 kg (134 lb)   SpO2 99%   BMI 19.79 kg/m    Body mass index is 19.79 kg/m .  Physical Exam  Vitals reviewed.   Constitutional:       General: He is not in acute distress.     Appearance: Normal appearance.   HENT:      Head: Normocephalic and atraumatic.   Cardiovascular:      Rate and Rhythm: Normal rate and regular rhythm.      Pulses: Normal pulses.      Heart sounds: Normal heart sounds.   Pulmonary:      Effort: Pulmonary effort is normal. No respiratory distress.      Breath sounds: Normal breath sounds.   Abdominal:      General: Bowel sounds are normal.      Palpations: Abdomen is soft.      Tenderness: There is generalized abdominal tenderness and tenderness in the right lower quadrant, epigastric area and left upper quadrant. There is no guarding or rebound. Negative signs include 's sign and Rovsing's sign.   Musculoskeletal:      Lumbar back: Spasms and tenderness present.      Comments: Tight, tender right-sided paraspinal musculature, negative Bernardo's test   Skin:     General: Skin is warm and dry.   Neurological:      Mental Status: He is alert. Mental status is at baseline.   Psychiatric:         Mood and Affect: Mood normal.         Behavior: Behavior " normal.         Thought Content: Thought content normal.         Judgment: Judgment normal.              Signed Electronically by: Niko Harris DO

## 2024-07-22 NOTE — PATIENT INSTRUCTIONS
Patient Education   Here is the plan from today's visit    1. Flank pain  -We will check your urine and your blood to assess your kidney function. If these results come back concerning for kidney stone, we will repeat the kidney ultrasound.   -We put in a referral for you to start physical therapy for your back pain. We will also start a medicine called Flexeril which is a muscle relaxer. It can help relax the tight muscles in your back.    2. Encounter for vaccination  You got your Hepatitis B vaccine today      Please call or return to clinic if your symptoms don't go away.    Follow up plan  Return in about 4 weeks (around 8/19/2024) for with PCP for flank pain recheck.    Thank you for coming to [facility] today.  Lab Testing:  **If you had lab testing today and your results are reassuring or normal they will be mailed to you or sent through [facility] within 7 days.   **If the lab tests need quick action we will call you with the results.  **If you are having labs done on a different day, please call [phone] to schedule at [facility] or [phone] for other [facility]patient Lab locations. Labs do not offer walk-in appointments.  The phone number we will call with results is # [phone] (home) . If this is not the best number please call our clinic and change the number.  Medication Refills:  If you need any refills please call your pharmacy and they will contact us.   If you need to  your refill at a new pharmacy, please contact the new pharmacy directly. The new pharmacy will help you get your medications transferred faster.   Scheduling:  If you have any concerns about today's visit or wish to schedule another appointment please call our office during normal business hours [phone] (8-5:00 M-F). If you can no longer make a scheduled visit, please cancel via [facility] or call us to cancel.   If a referral was made to an [facility] specialty provider and you do not get a  call from central scheduling, please refer to directions on your visit summary or call our office during normal business hours for assistance.   If a Mammogram was ordered for you at the [facility] call [phone] to schedule or change your appointment.  If you had an XRay/CT/Ultrasound/MRI ordered the number is [phone] to schedule or change your radiology appointment.   [facility] has limited ultrasound appointments available on Wednesdays, if you would like your ultrasound at [facility], please call [phone] to schedule.   Medical Concerns:  If you have urgent medical concerns please call [phone] at any time of the day.    Niko Harris, DO

## 2024-09-10 ENCOUNTER — PRE VISIT (OUTPATIENT)
Dept: UROLOGY | Facility: CLINIC | Age: 34
End: 2024-09-10
Payer: COMMERCIAL

## 2024-09-10 NOTE — TELEPHONE ENCOUNTER
Reason for visit: Switch providers     Relevant information: referred by Aj Keys MD for urge incontinence, some hx of atrophy of left kidney, flank pain, S/P nephrectomy     Records/imaging/labs/orders: all records available    Pt called: no need for a call    At Rooming: have pt empty bladder/pvr,  Standard rooming      Trevor Aggarwal  9/10/2024  8:58 AM

## 2024-12-01 ENCOUNTER — HEALTH MAINTENANCE LETTER (OUTPATIENT)
Age: 34
End: 2024-12-01

## 2025-01-28 ENCOUNTER — OFFICE VISIT (OUTPATIENT)
Dept: FAMILY MEDICINE | Facility: CLINIC | Age: 35
End: 2025-01-28
Payer: COMMERCIAL

## 2025-01-28 VITALS
TEMPERATURE: 98.2 F | DIASTOLIC BLOOD PRESSURE: 74 MMHG | OXYGEN SATURATION: 98 % | HEART RATE: 85 BPM | HEIGHT: 69 IN | WEIGHT: 133 LBS | RESPIRATION RATE: 16 BRPM | BODY MASS INDEX: 19.7 KG/M2 | SYSTOLIC BLOOD PRESSURE: 126 MMHG

## 2025-01-28 DIAGNOSIS — G47.00 INSOMNIA, UNSPECIFIED TYPE: ICD-10-CM

## 2025-01-28 DIAGNOSIS — Z23 NEED FOR HEPATITIS B VACCINATION: ICD-10-CM

## 2025-01-28 DIAGNOSIS — R20.8 BURNING SENSATION OF FEET: Primary | ICD-10-CM

## 2025-01-28 DIAGNOSIS — R63.4 WEIGHT LOSS: ICD-10-CM

## 2025-01-28 LAB
ALBUMIN SERPL BCG-MCNC: 4.6 G/DL (ref 3.5–5.2)
ALP SERPL-CCNC: 86 U/L (ref 40–150)
ALT SERPL W P-5'-P-CCNC: 19 U/L (ref 0–70)
ANION GAP SERPL CALCULATED.3IONS-SCNC: 8 MMOL/L (ref 7–15)
AST SERPL W P-5'-P-CCNC: 30 U/L (ref 0–45)
BASOPHILS # BLD AUTO: 0 10E3/UL (ref 0–0.2)
BASOPHILS NFR BLD AUTO: 1 %
BILIRUB SERPL-MCNC: 0.4 MG/DL
BUN SERPL-MCNC: 14 MG/DL (ref 6–20)
CALCIUM SERPL-MCNC: 9.8 MG/DL (ref 8.8–10.4)
CHLORIDE SERPL-SCNC: 103 MMOL/L (ref 98–107)
CREAT SERPL-MCNC: 0.94 MG/DL (ref 0.67–1.17)
CRP SERPL-MCNC: <3 MG/L
EGFRCR SERPLBLD CKD-EPI 2021: >90 ML/MIN/1.73M2
EOSINOPHIL # BLD AUTO: 0 10E3/UL (ref 0–0.7)
EOSINOPHIL NFR BLD AUTO: 1 %
ERYTHROCYTE [DISTWIDTH] IN BLOOD BY AUTOMATED COUNT: 11.9 % (ref 10–15)
EST. AVERAGE GLUCOSE BLD GHB EST-MCNC: 105 MG/DL
FOLATE SERPL-MCNC: 13 NG/ML (ref 4.6–34.8)
GLUCOSE SERPL-MCNC: 92 MG/DL (ref 70–99)
HBA1C MFR BLD: 5.3 % (ref 0–5.6)
HCO3 SERPL-SCNC: 28 MMOL/L (ref 22–29)
HCT VFR BLD AUTO: 45.4 % (ref 40–53)
HGB BLD-MCNC: 15.5 G/DL (ref 13.3–17.7)
IMM GRANULOCYTES # BLD: 0 10E3/UL
IMM GRANULOCYTES NFR BLD: 0 %
IRON BINDING CAPACITY (ROCHE): 271 UG/DL (ref 240–430)
IRON SATN MFR SERPL: 38 % (ref 15–46)
IRON SERPL-MCNC: 102 UG/DL (ref 61–157)
LYMPHOCYTES # BLD AUTO: 1.7 10E3/UL (ref 0.8–5.3)
LYMPHOCYTES NFR BLD AUTO: 44 %
MCH RBC QN AUTO: 29.4 PG (ref 26.5–33)
MCHC RBC AUTO-ENTMCNC: 34.1 G/DL (ref 31.5–36.5)
MCV RBC AUTO: 86 FL (ref 78–100)
MONOCYTES # BLD AUTO: 0.3 10E3/UL (ref 0–1.3)
MONOCYTES NFR BLD AUTO: 9 %
NEUTROPHILS # BLD AUTO: 1.7 10E3/UL (ref 1.6–8.3)
NEUTROPHILS NFR BLD AUTO: 45 %
PLATELET # BLD AUTO: 198 10E3/UL (ref 150–450)
POTASSIUM SERPL-SCNC: 4.1 MMOL/L (ref 3.4–5.3)
PROT SERPL-MCNC: 7.8 G/DL (ref 6.4–8.3)
RBC # BLD AUTO: 5.27 10E6/UL (ref 4.4–5.9)
SODIUM SERPL-SCNC: 139 MMOL/L (ref 135–145)
TSH SERPL DL<=0.005 MIU/L-ACNC: 1.51 UIU/ML (ref 0.3–4.2)
VIT B12 SERPL-MCNC: 414 PG/ML (ref 232–1245)
WBC # BLD AUTO: 3.8 10E3/UL (ref 4–11)

## 2025-01-28 RX ORDER — MENTHOL AND METHYL SALICYLATE 10; 30 G/100G; G/100G
CREAM TOPICAL 2 TIMES DAILY PRN
Qty: 35.4 G | Refills: 0 | Status: SHIPPED | OUTPATIENT
Start: 2025-01-28

## 2025-01-28 RX ORDER — CHOLECALCIFEROL (VITAMIN D3) 50 MCG
1 TABLET ORAL DAILY
COMMUNITY

## 2025-01-28 NOTE — PROGRESS NOTES
Assessment & Plan     Burning sensation of feet  Insomnia, unspecified type  Weight loss  Patient is describing symptoms of peripheral neuropathy.  Exact etiology unclear.  Blood work ordered to rule out any endocrine pathology, metabolic derangement or nutritional deficiency.  Recommend symptomatic treatment.  Offered gabapentin, patient refused stating that he does not want to take pills.  He is open to topical treatment, medication prescribed as below.  Patient also desires to see a neurologist, referral placed.  At this time, based on symptoms, concern for vascular disease low.  Recommend following up in a month for reevaluation, recheck and to discuss further management.  He verbalized understanding.  - CBC with platelets differential; Future  - Comprehensive metabolic panel; Future  - CRP inflammation; Future  - TSH with free T4 reflex; Future  - Hemoglobin A1c; Future  - Iron and iron binding capacity; Future  - Folate; Future  - Vitamin B12; Future  - HIV Antigen Antibody Combo; Future  - Menthol-Methyl Salicylate (ICY HOT EXTRA STRENGTH) 10-30 % CREA; Externally apply topically 2 times daily as needed (pain in feet).  - Adult Neurology  Referral; Future    Need for hepatitis B vaccination  - HEPATITIS B, ADULT 20+ (ENGERIX-B/RECOMBIVAX HB)      Return in about 4 weeks (around 2/25/2025) for Follow up.    Siddhartha Dunaway is a 35 year old, presenting for the following health issues:  Pain (Feet pain, sleep issues at night  ) and Weight Loss (Eating normally )        1/28/2025    11:04 AM   Additional Questions   Roomed by Mallika   Accompanied by self         1/28/2025    Information    services provided? No     HPI     Burning in feet, started a month ago. Burning has been getting worse. Has also noticed inability to sleep because of the pain. Has noticed weight loss. Has lost 30 lbs in the last 5 yrs. Has not noticed any change in exercise routine or diet. Sometimes has to  "use ice/cold air to help with the burning sensation. Does not eat a lot of red meat or sugar. No fever, chest pain, trouble breathing, heart palpitations, belly pain, vomiting, diarrhea, burning or pain with urination. 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     Refused COVID and flu vaccine        Objective    /74   Pulse 85   Temp 98.2  F (36.8  C) (Tympanic)   Resp 16   Ht 1.753 m (5' 9\")   Wt 60.3 kg (133 lb)   SpO2 98%   BMI 19.64 kg/m    Body mass index is 19.64 kg/m .  Physical Exam  Constitutional:       Appearance: Normal appearance.   HENT:      Head: Normocephalic and atraumatic.      Right Ear: External ear normal.      Left Ear: External ear normal.   Eyes:      Extraocular Movements: Extraocular movements intact.      Conjunctiva/sclera: Conjunctivae normal.   Cardiovascular:      Rate and Rhythm: Normal rate and regular rhythm.   Pulmonary:      Effort: Pulmonary effort is normal.      Breath sounds: Normal breath sounds.   Musculoskeletal:      Cervical back: Normal range of motion.   Neurological:      General: No focal deficit present.      Mental Status: He is alert and oriented to person, place, and time.      Cranial Nerves: No cranial nerve deficit.      Sensory: No sensory deficit.      Motor: No weakness.   Psychiatric:         Mood and Affect: Mood normal.         Thought Content: Thought content normal.                Signed Electronically by: Pam Beavers MD    "

## 2025-01-29 LAB — HIV 1+2 AB+HIV1 P24 AG SERPL QL IA: REACTIVE

## 2025-01-30 DIAGNOSIS — Z11.3 ROUTINE SCREENING FOR STI (SEXUALLY TRANSMITTED INFECTION): Primary | ICD-10-CM

## 2025-01-30 DIAGNOSIS — Z21: Primary | ICD-10-CM

## 2025-01-30 LAB — HIV1 RNA # PLAS NAA DL=20: NOT DETECTED COPIES/ML

## 2025-03-13 NOTE — TELEPHONE ENCOUNTER
RECORDS RECEIVED FROM: [facility]   REASON FOR VISIT: Burning sensation of feet   PROVIDER: Declan Bautista DO   DATE OF APPT: 6/11/25 @ 8:30 am    NOTES (FOR ALL VISITS) STATUS DETAILS   OFFICE NOTE from referring provider [facility] 1/28/25 Pam Beavers MD @[facility]     DISCHARGE REPORT from the ER [facility] 9/25/23 Star Casas PA  @[facility]     MEDICATION LIST Internal    IMAGING  (FOR ALL VISITS)     MRI (HEAD, NECK, SPINE) N/A    CT (HEAD, NECK, SPINE) N/A

## 2025-03-24 ENCOUNTER — OFFICE VISIT (OUTPATIENT)
Dept: FAMILY MEDICINE | Facility: CLINIC | Age: 35
End: 2025-03-24
Payer: COMMERCIAL

## 2025-03-24 VITALS
WEIGHT: 128.6 LBS | RESPIRATION RATE: 16 BRPM | BODY MASS INDEX: 19.05 KG/M2 | HEART RATE: 75 BPM | HEIGHT: 69 IN | OXYGEN SATURATION: 98 % | SYSTOLIC BLOOD PRESSURE: 111 MMHG | DIASTOLIC BLOOD PRESSURE: 76 MMHG | TEMPERATURE: 98.5 F

## 2025-03-24 DIAGNOSIS — Z11.3 ROUTINE SCREENING FOR STI (SEXUALLY TRANSMITTED INFECTION): ICD-10-CM

## 2025-03-24 DIAGNOSIS — Z21: ICD-10-CM

## 2025-03-24 DIAGNOSIS — Z01.89 VISIT FOR LABORATORY TEST: Primary | ICD-10-CM

## 2025-03-24 LAB
C TRACH DNA SPEC QL PROBE+SIG AMP: NEGATIVE
HCV AB SERPL QL IA: NONREACTIVE
HIV 1+2 AB+HIV1 P24 AG SERPL QL IA: REACTIVE
N GONORRHOEA DNA SPEC QL NAA+PROBE: NEGATIVE
SPECIMEN TYPE: NORMAL

## 2025-03-24 ASSESSMENT — PAIN SCALES - GENERAL: PAINLEVEL_OUTOF10: NO PAIN (0)

## 2025-03-24 NOTE — PROGRESS NOTES
Scheduling Error - Should have been scheduled as a lab only visit.    Patient to have labs drawn as previously ordered by Dr. Beavers.

## 2025-03-25 LAB
HIV 1+2 AB+HIV1P24 AG SERPLBLD IA.RAPID: NORMAL
HIV 2 AB SERPLBLD QL IA.RAPID: NONREACTIVE
HIV1 AB SERPLBLD QL IA.RAPID: NONREACTIVE
HIV1 RNA # PLAS NAA DL=20: NOT DETECTED COPIES/ML
T PALLIDUM AB SER QL: NONREACTIVE

## 2025-03-26 LAB — HIV1 RNA # PLAS NAA DL=20: NOT DETECTED COPIES/ML

## 2025-05-08 ENCOUNTER — OFFICE VISIT (OUTPATIENT)
Dept: FAMILY MEDICINE | Facility: CLINIC | Age: 35
End: 2025-05-08
Payer: COMMERCIAL

## 2025-05-08 VITALS
DIASTOLIC BLOOD PRESSURE: 79 MMHG | HEART RATE: 76 BPM | HEIGHT: 69 IN | TEMPERATURE: 98.3 F | BODY MASS INDEX: 18.65 KG/M2 | SYSTOLIC BLOOD PRESSURE: 119 MMHG | OXYGEN SATURATION: 96 % | WEIGHT: 125.9 LBS | RESPIRATION RATE: 14 BRPM

## 2025-06-11 ENCOUNTER — PRE VISIT (OUTPATIENT)
Dept: NEUROLOGY | Facility: CLINIC | Age: 35
End: 2025-06-11

## (undated) DEVICE — KIT ENDO TURNOVER/PROCEDURE CARRY-ON 101822

## (undated) DEVICE — DRSG TELFA 2X3"

## (undated) DEVICE — SUCTION MANIFOLD NEPTUNE 2 SYS 1 PORT 702-025-000

## (undated) DEVICE — SOL WATER INJ 2000ML BAG 07118-07

## (undated) DEVICE — SYSTEM CLEARIFY VISUALIZATION 21-345

## (undated) DEVICE — GOWN IMPERVIOUS 2XL BLUE

## (undated) DEVICE — TROCAR AIRSEAL BLADELESS 12X120MM IAS12-120

## (undated) DEVICE — BAG DRAIN URO FOR SIEMENS 8MM ADAPTER NS CC164NS-A

## (undated) DEVICE — ENDO TROCAR FIRST ENTRY KII FIOS Z-THRD 12X100MM CTF73

## (undated) DEVICE — Device

## (undated) DEVICE — ESU ENDO SCISSORS 5MM CVD 5DCS

## (undated) DEVICE — SOL WATER IRRIG 1000ML BOTTLE 2F7114

## (undated) DEVICE — SOL NACL 0.9% INJ 1000ML BAG 2B1324X

## (undated) DEVICE — CLIP ENDO HEMO-LOC PURPLE LG 544240

## (undated) DEVICE — CATH URETERAL OPEN END 6FR AXXCESS

## (undated) DEVICE — GUIDEWIRE SENSOR DUAL FLEX STR 0.035"X150CM M0066703080

## (undated) DEVICE — ENDO FORCEP BX CAPTURA PRO SPIKE G50696

## (undated) DEVICE — SYR 30ML SLIP TIP W/O NDL 302833

## (undated) DEVICE — POUCH TISSUE RETRIEVAL 15MM 6.00" INTRO TRS190SB2

## (undated) DEVICE — BAG URINARY DRAIN 2000ML LF 154002

## (undated) DEVICE — SYR 30ML LL W/O NDL

## (undated) DEVICE — BASKET NITINOL TIPLESS HALO  1.5FRX120CM 554120

## (undated) DEVICE — MANIFOLD NEPTUNE 4 PORT 700-20

## (undated) DEVICE — GLOVE PROTEXIS MICRO 7.5  2D73PM75

## (undated) DEVICE — SOL NACL 0.9% IRRIG 3000ML BAG 2B7477

## (undated) DEVICE — PUMP SYSTEM SINGLE ACTION M0067201000

## (undated) DEVICE — SPECIMEN CONTAINER 3OZ W/FORMALIN 59901

## (undated) DEVICE — DRAPE IOBAN INCISE 23X17" 6650EZ

## (undated) DEVICE — GLOVE PROTEXIS BLUE W/NEU-THERA 8.0  2D73EB80

## (undated) DEVICE — ENDO TROCAR FIRST ENTRY KII FIOS Z-THRD 05X100MM CTF03

## (undated) DEVICE — WIPES FOLEY CARE SURESTEP PROVON DFC100

## (undated) DEVICE — GLOVE PROTEXIS W/NEU-THERA 7.5  2D73TE75

## (undated) DEVICE — SU PDS II 0 CT-2 27" Z334H

## (undated) DEVICE — ESU PENCIL W/HOLSTER E2350H

## (undated) DEVICE — SUCTION IRR STRYKERFLOW II W/TIP 250-070-520

## (undated) DEVICE — STPL ENDO ARTICULATING 45MM EC45A

## (undated) DEVICE — SU MONOCRYL 4-0 PS-2 18" UND Y496G

## (undated) DEVICE — CATH URETERAL FLEX TIP TIGERTAIL 06FRX70CM 139006

## (undated) DEVICE — WIRE GUIDE AMPLATZ SUPER STIFF 0.035"X145CM 46-524

## (undated) DEVICE — GOWN XLG DISP 9545

## (undated) DEVICE — SU VICRYL 0 UR-6 27" J603H

## (undated) DEVICE — PACK LAP CHOLE SLC15LCFSD

## (undated) DEVICE — SPECIMEN CONTAINER 4OZ

## (undated) DEVICE — TUBING CONMED AIRSEAL SMOKE EVAC INSUFFLATION ASM-EVAC

## (undated) DEVICE — KIT ENDO FIRST STEP DISINFECTANT 200ML W/POUCH EP-4

## (undated) DEVICE — TUBING SUCTION 12"X1/4" N612

## (undated) DEVICE — LINEN TOWEL PACK X5 5464

## (undated) DEVICE — NDL INSUFFLATION 13GA 120MM C2201

## (undated) DEVICE — TAPE DURAPORE 3" SILK 1538-3

## (undated) DEVICE — PACK CYSTOSCOPY SBA15CYFSI

## (undated) DEVICE — PAD CHUX UNDERPAD 23X24" 7136

## (undated) DEVICE — GLOVE EXAM NITRILE LG PF LATEX FREE 5064

## (undated) DEVICE — ESU HOLDER LAP INST DISP PURPLE LONG 330MM H-PRO-330

## (undated) DEVICE — SOL WATER IRRIG 1000ML BOTTLE 07139-09

## (undated) DEVICE — ENDO BITE BLOCK ADULT OMNI-BLOC

## (undated) DEVICE — SUCTION CATH AIRLIFE TRI-FLO W/CONTROL PORT 14FR  T60C

## (undated) DEVICE — SU VICRYL 0 TIE 12X18" J906G

## (undated) RX ORDER — FENTANYL CITRATE 50 UG/ML
INJECTION, SOLUTION INTRAMUSCULAR; INTRAVENOUS
Status: DISPENSED
Start: 2021-05-24

## (undated) RX ORDER — ALBUTEROL SULFATE 0.83 MG/ML
SOLUTION RESPIRATORY (INHALATION)
Status: DISPENSED
Start: 2020-01-27

## (undated) RX ORDER — ACETAMINOPHEN 325 MG/1
TABLET ORAL
Status: DISPENSED
Start: 2020-08-13

## (undated) RX ORDER — PROPOFOL 10 MG/ML
INJECTION, EMULSION INTRAVENOUS
Status: DISPENSED
Start: 2021-05-24

## (undated) RX ORDER — GLYCOPYRROLATE 0.2 MG/ML
INJECTION INTRAMUSCULAR; INTRAVENOUS
Status: DISPENSED
Start: 2020-08-13

## (undated) RX ORDER — KETOROLAC TROMETHAMINE 30 MG/ML
INJECTION, SOLUTION INTRAMUSCULAR; INTRAVENOUS
Status: DISPENSED
Start: 2020-08-13

## (undated) RX ORDER — ONDANSETRON 2 MG/ML
INJECTION INTRAMUSCULAR; INTRAVENOUS
Status: DISPENSED
Start: 2022-09-07

## (undated) RX ORDER — NEOSTIGMINE METHYLSULFATE 1 MG/ML
VIAL (ML) INJECTION
Status: DISPENSED
Start: 2021-05-24

## (undated) RX ORDER — CEFAZOLIN SODIUM 2 G/100ML
INJECTION, SOLUTION INTRAVENOUS
Status: DISPENSED
Start: 2020-08-13

## (undated) RX ORDER — FENTANYL CITRATE 50 UG/ML
INJECTION, SOLUTION INTRAMUSCULAR; INTRAVENOUS
Status: DISPENSED
Start: 2022-09-07

## (undated) RX ORDER — DEXAMETHASONE SODIUM PHOSPHATE 4 MG/ML
INJECTION, SOLUTION INTRA-ARTICULAR; INTRALESIONAL; INTRAMUSCULAR; INTRAVENOUS; SOFT TISSUE
Status: DISPENSED
Start: 2020-08-13

## (undated) RX ORDER — FENTANYL CITRATE 50 UG/ML
INJECTION, SOLUTION INTRAMUSCULAR; INTRAVENOUS
Status: DISPENSED
Start: 2020-08-13

## (undated) RX ORDER — ONDANSETRON 2 MG/ML
INJECTION INTRAMUSCULAR; INTRAVENOUS
Status: DISPENSED
Start: 2021-05-24

## (undated) RX ORDER — CEFAZOLIN SODIUM/WATER 2 G/20 ML
SYRINGE (ML) INTRAVENOUS
Status: DISPENSED
Start: 2022-09-07

## (undated) RX ORDER — GABAPENTIN 300 MG/1
CAPSULE ORAL
Status: DISPENSED
Start: 2020-08-13

## (undated) RX ORDER — LIDOCAINE HYDROCHLORIDE 20 MG/ML
INJECTION, SOLUTION EPIDURAL; INFILTRATION; INTRACAUDAL; PERINEURAL
Status: DISPENSED
Start: 2022-09-07

## (undated) RX ORDER — FENTANYL CITRATE-0.9 % NACL/PF 10 MCG/ML
PLASTIC BAG, INJECTION (ML) INTRAVENOUS
Status: DISPENSED
Start: 2020-08-13

## (undated) RX ORDER — FUROSEMIDE 10 MG/ML
INJECTION INTRAMUSCULAR; INTRAVENOUS
Status: DISPENSED
Start: 2020-08-13

## (undated) RX ORDER — DEXAMETHASONE SODIUM PHOSPHATE 4 MG/ML
INJECTION, SOLUTION INTRA-ARTICULAR; INTRALESIONAL; INTRAMUSCULAR; INTRAVENOUS; SOFT TISSUE
Status: DISPENSED
Start: 2021-05-24

## (undated) RX ORDER — DEXAMETHASONE SODIUM PHOSPHATE 4 MG/ML
INJECTION, SOLUTION INTRA-ARTICULAR; INTRALESIONAL; INTRAMUSCULAR; INTRAVENOUS; SOFT TISSUE
Status: DISPENSED
Start: 2022-09-07

## (undated) RX ORDER — ONDANSETRON 2 MG/ML
INJECTION INTRAMUSCULAR; INTRAVENOUS
Status: DISPENSED
Start: 2020-08-13

## (undated) RX ORDER — HYDROMORPHONE HYDROCHLORIDE 1 MG/ML
INJECTION, SOLUTION INTRAMUSCULAR; INTRAVENOUS; SUBCUTANEOUS
Status: DISPENSED
Start: 2021-05-24

## (undated) RX ORDER — LIDOCAINE HYDROCHLORIDE 20 MG/ML
INJECTION, SOLUTION EPIDURAL; INFILTRATION; INTRACAUDAL; PERINEURAL
Status: DISPENSED
Start: 2021-05-24

## (undated) RX ORDER — CEFAZOLIN SODIUM 2 G/100ML
INJECTION, SOLUTION INTRAVENOUS
Status: DISPENSED
Start: 2021-05-24

## (undated) RX ORDER — GLYCOPYRROLATE 0.2 MG/ML
INJECTION, SOLUTION INTRAMUSCULAR; INTRAVENOUS
Status: DISPENSED
Start: 2021-05-24